# Patient Record
Sex: MALE | Race: OTHER | NOT HISPANIC OR LATINO | URBAN - METROPOLITAN AREA
[De-identification: names, ages, dates, MRNs, and addresses within clinical notes are randomized per-mention and may not be internally consistent; named-entity substitution may affect disease eponyms.]

---

## 2017-04-14 ENCOUNTER — EMERGENCY (EMERGENCY)
Facility: HOSPITAL | Age: 62
LOS: 1 days | Discharge: HOME | End: 2017-04-14
Attending: EMERGENCY MEDICINE
Payer: COMMERCIAL

## 2017-04-14 VITALS
OXYGEN SATURATION: 98 % | HEART RATE: 82 BPM | DIASTOLIC BLOOD PRESSURE: 77 MMHG | HEIGHT: 66 IN | WEIGHT: 179.9 LBS | TEMPERATURE: 98 F | RESPIRATION RATE: 16 BRPM | SYSTOLIC BLOOD PRESSURE: 115 MMHG

## 2017-04-14 DIAGNOSIS — L29.9 PRURITUS, UNSPECIFIED: ICD-10-CM

## 2017-04-14 DIAGNOSIS — L50.9 URTICARIA, UNSPECIFIED: ICD-10-CM

## 2017-04-14 PROCEDURE — 99283 EMERGENCY DEPT VISIT LOW MDM: CPT

## 2017-04-14 RX ORDER — DIPHENHYDRAMINE HCL 50 MG
1 CAPSULE ORAL
Qty: 15 | Refills: 0 | OUTPATIENT
Start: 2017-04-14 | End: 2017-04-19

## 2017-04-14 RX ADMIN — Medication 60 MILLIGRAM(S): at 22:41

## 2017-04-14 NOTE — ED STATDOCS - NS ED MD SCRIBE ATTENDING SCRIBE SECTIONS
HIV/PAST MEDICAL/SURGICAL/SOCIAL HISTORY/PHYSICAL EXAM/DISPOSITION/HISTORY OF PRESENT ILLNESS/REVIEW OF SYSTEMS/VITAL SIGNS( Pullset) DISPOSITION/HISTORY OF PRESENT ILLNESS/INTAKE ASSESSMENT/SCREENINGS/PAST MEDICAL/SURGICAL/SOCIAL HISTORY/PHYSICAL EXAM/REVIEW OF SYSTEMS/HIV/VITAL SIGNS( Pullset)

## 2017-04-14 NOTE — ED STATDOCS - OBJECTIVE STATEMENT
60 y/o M pt presents to ED c/o intermittent diffuse urticaria and itchy welts since yesterday. Pt reports he has been putting calamine lotion on but has not taken any medication. Pt denies fever, nausea, vomiting, difficulty swallowing, CP, and SOB. No further complaints at this time. NKDA.

## 2017-12-12 ENCOUNTER — EMERGENCY (EMERGENCY)
Facility: HOSPITAL | Age: 62
LOS: 1 days | Discharge: HOME | End: 2017-12-12
Attending: EMERGENCY MEDICINE | Admitting: EMERGENCY MEDICINE
Payer: COMMERCIAL

## 2017-12-12 VITALS
HEART RATE: 98 BPM | HEIGHT: 66 IN | SYSTOLIC BLOOD PRESSURE: 142 MMHG | WEIGHT: 175.05 LBS | OXYGEN SATURATION: 97 % | DIASTOLIC BLOOD PRESSURE: 90 MMHG | RESPIRATION RATE: 20 BRPM | TEMPERATURE: 98 F

## 2017-12-12 VITALS
SYSTOLIC BLOOD PRESSURE: 138 MMHG | OXYGEN SATURATION: 98 % | HEART RATE: 90 BPM | DIASTOLIC BLOOD PRESSURE: 88 MMHG | RESPIRATION RATE: 18 BRPM

## 2017-12-12 LAB
ALBUMIN SERPL ELPH-MCNC: 2.1 G/DL — LOW (ref 3.3–5.2)
ALP SERPL-CCNC: 140 U/L — HIGH (ref 40–120)
ALT FLD-CCNC: 34 U/L — SIGNIFICANT CHANGE UP
ANION GAP SERPL CALC-SCNC: 12 MMOL/L — SIGNIFICANT CHANGE UP (ref 5–17)
AST SERPL-CCNC: 51 U/L — HIGH
BASOPHILS # BLD AUTO: 0 K/UL — SIGNIFICANT CHANGE UP (ref 0–0.2)
BASOPHILS NFR BLD AUTO: 0.6 % — SIGNIFICANT CHANGE UP (ref 0–2)
BILIRUB SERPL-MCNC: 0.6 MG/DL — SIGNIFICANT CHANGE UP (ref 0.4–2)
BUN SERPL-MCNC: 28 MG/DL — HIGH (ref 8–20)
CALCIUM SERPL-MCNC: 8.7 MG/DL — SIGNIFICANT CHANGE UP (ref 8.6–10.2)
CHLORIDE SERPL-SCNC: 104 MMOL/L — SIGNIFICANT CHANGE UP (ref 98–107)
CO2 SERPL-SCNC: 22 MMOL/L — SIGNIFICANT CHANGE UP (ref 22–29)
CREAT SERPL-MCNC: 0.83 MG/DL — SIGNIFICANT CHANGE UP (ref 0.5–1.3)
EOSINOPHIL # BLD AUTO: 0.2 K/UL — SIGNIFICANT CHANGE UP (ref 0–0.5)
EOSINOPHIL NFR BLD AUTO: 2.4 % — SIGNIFICANT CHANGE UP (ref 0–5)
GLUCOSE SERPL-MCNC: 118 MG/DL — HIGH (ref 70–115)
HCT VFR BLD CALC: 40.3 % — LOW (ref 42–52)
HGB BLD-MCNC: 14.3 G/DL — SIGNIFICANT CHANGE UP (ref 14–18)
LIDOCAIN IGE QN: 58 U/L — HIGH (ref 22–51)
LYMPHOCYTES # BLD AUTO: 2 K/UL — SIGNIFICANT CHANGE UP (ref 1–4.8)
LYMPHOCYTES # BLD AUTO: 29.5 % — SIGNIFICANT CHANGE UP (ref 20–55)
MCHC RBC-ENTMCNC: 32.3 PG — HIGH (ref 27–31)
MCHC RBC-ENTMCNC: 35.5 G/DL — SIGNIFICANT CHANGE UP (ref 32–36)
MCV RBC AUTO: 91 FL — SIGNIFICANT CHANGE UP (ref 80–94)
MONOCYTES # BLD AUTO: 0.7 K/UL — SIGNIFICANT CHANGE UP (ref 0–0.8)
MONOCYTES NFR BLD AUTO: 9.7 % — SIGNIFICANT CHANGE UP (ref 3–10)
NEUTROPHILS # BLD AUTO: 3.9 K/UL — SIGNIFICANT CHANGE UP (ref 1.8–8)
NEUTROPHILS NFR BLD AUTO: 57.1 % — SIGNIFICANT CHANGE UP (ref 37–73)
OB PNL STL: NEGATIVE — SIGNIFICANT CHANGE UP
PLATELET # BLD AUTO: 243 K/UL — SIGNIFICANT CHANGE UP (ref 150–400)
POTASSIUM SERPL-MCNC: 4.6 MMOL/L — SIGNIFICANT CHANGE UP (ref 3.5–5.3)
POTASSIUM SERPL-SCNC: 4.6 MMOL/L — SIGNIFICANT CHANGE UP (ref 3.5–5.3)
PROT SERPL-MCNC: 5.9 G/DL — LOW (ref 6.6–8.7)
RBC # BLD: 4.43 M/UL — LOW (ref 4.6–6.2)
RBC # FLD: 14.1 % — SIGNIFICANT CHANGE UP (ref 11–15.6)
SODIUM SERPL-SCNC: 138 MMOL/L — SIGNIFICANT CHANGE UP (ref 135–145)
WBC # BLD: 6.8 K/UL — SIGNIFICANT CHANGE UP (ref 4.8–10.8)
WBC # FLD AUTO: 6.8 K/UL — SIGNIFICANT CHANGE UP (ref 4.8–10.8)

## 2017-12-12 PROCEDURE — 36415 COLL VENOUS BLD VENIPUNCTURE: CPT

## 2017-12-12 PROCEDURE — 74177 CT ABD & PELVIS W/CONTRAST: CPT

## 2017-12-12 PROCEDURE — 83690 ASSAY OF LIPASE: CPT

## 2017-12-12 PROCEDURE — 82272 OCCULT BLD FECES 1-3 TESTS: CPT

## 2017-12-12 PROCEDURE — 99284 EMERGENCY DEPT VISIT MOD MDM: CPT

## 2017-12-12 PROCEDURE — 99284 EMERGENCY DEPT VISIT MOD MDM: CPT | Mod: 25

## 2017-12-12 PROCEDURE — 74177 CT ABD & PELVIS W/CONTRAST: CPT | Mod: 26

## 2017-12-12 PROCEDURE — 85027 COMPLETE CBC AUTOMATED: CPT

## 2017-12-12 PROCEDURE — 80053 COMPREHEN METABOLIC PANEL: CPT

## 2017-12-12 NOTE — ED STATDOCS - OBJECTIVE STATEMENT
62 year old male, with hx of DM on Metformin (taken for past couple of months), presenting to the ED complaining of abdominal pain, diarrhea, and hematochezia. Pt states that he has been having approximately 5-7 episodes of diarrhea per day. He states that he has had intermittent hematochezia as well. Pt describes his pain as a "knot in the middle of my stomach" that is intermittent and is exacerbated when he feels a bowel movement coming. He states that his pain lasts for a few minutes when it onsets. He denies having any nausea or vomiting. Pt states that he was previously placed on abx which he completed. He states that he had taken Pepto Bismol for his diarrhea with no relief. Pt denies having abdominal PSHx. He states that he had visited GI Dr. Aden and had a CT scan ordered, but is waiting on the insurance coverage. Pt states that his sx onset after having taken Metformin.  PMD: Dr. Bland

## 2017-12-12 NOTE — ED STATDOCS - ATTENDING CONTRIBUTION TO CARE
I, Shabbir Steen, performed the initial face to face bedside interview with this patient regarding history of present illness, review of symptoms and relevant past medical, social and family history.  I completed an independent physical examination.  I was the provider who initially evaluated this patient.  The history, relevant review of systems, past medical and surgical history, medical decision making, and physical examination was documented by the scribe in my presence and I attest to the accuracy of the documentation. Follow-up on ordered tests (ie labs, radiologic studies) and re-evaluation of the patient's status has been communicated to the ACP.  Disposition of the patient will be based on test outcome and response to ED interventions.

## 2017-12-12 NOTE — ED STATDOCS - ABDOMEN FINDINGS, MLM
TENDER/DISTENDED/Tympanitic to percussion. RECTAL EXAM: Non-thrombosed external hemorrhoids, no masses, no tenderness, and no gross blood.

## 2017-12-12 NOTE — ED STATDOCS - CARE PLAN
Yes Principal Discharge DX:	Left upper quadrant pain  Secondary Diagnosis:	Splenomegaly  Secondary Diagnosis:	Other ascites

## 2017-12-12 NOTE — ED ADULT TRIAGE NOTE - CHIEF COMPLAINT QUOTE
abd pain for a while as per pt. sees Dr Bland. constipated. saw GI Dr last week. Dr Gandara, wait approval for ct scan. some rectal bleed, no vomit

## 2017-12-12 NOTE — ED ADULT NURSE NOTE - OBJECTIVE STATEMENT
Pt c/o abdominal pain, diarrhea since November. Pt states he went to PMD in November and they did bloodwork and was put on ABX. Pt is unsure of the medication or the results of the bloodwork. Pt states that he has had bloody stools intermittently and the pain is becoming increasingly worse.

## 2017-12-12 NOTE — ED STATDOCS - PROGRESS NOTE DETAILS
NP NOTE: Pt seen by intake physician and HPI/ROS/PE/MDM reviewed. Pt seen and evaluated. Discussed plan and any resulted studies at this time. Will continue to monitor and re-evaluate.  Re-Evaluation: with diffuse abd pain, hematochezia. pending labs and ct. pt does not want pain medication at this time. + pleural effusion + ascitics + splenomegaly, occult blood neg.  discussed results with patient who is still refusing pain medication and states he will fu with Dr. Rose (GI) and Dr. Bland (pcp)

## 2018-02-08 ENCOUNTER — INPATIENT (INPATIENT)
Facility: HOSPITAL | Age: 63
LOS: 14 days | Discharge: HOME | DRG: 186 | End: 2018-02-23
Attending: HOSPITALIST | Admitting: HOSPITALIST
Payer: COMMERCIAL

## 2018-02-08 VITALS
OXYGEN SATURATION: 89 % | RESPIRATION RATE: 22 BRPM | HEART RATE: 106 BPM | HEIGHT: 66 IN | WEIGHT: 162.04 LBS | TEMPERATURE: 98 F | DIASTOLIC BLOOD PRESSURE: 81 MMHG | SYSTOLIC BLOOD PRESSURE: 120 MMHG

## 2018-02-08 DIAGNOSIS — J90 PLEURAL EFFUSION, NOT ELSEWHERE CLASSIFIED: ICD-10-CM

## 2018-02-08 DIAGNOSIS — K74.60 UNSPECIFIED CIRRHOSIS OF LIVER: ICD-10-CM

## 2018-02-08 DIAGNOSIS — R60.0 LOCALIZED EDEMA: ICD-10-CM

## 2018-02-08 DIAGNOSIS — E11.65 TYPE 2 DIABETES MELLITUS WITH HYPERGLYCEMIA: ICD-10-CM

## 2018-02-08 DIAGNOSIS — Z29.9 ENCOUNTER FOR PROPHYLACTIC MEASURES, UNSPECIFIED: ICD-10-CM

## 2018-02-08 LAB
ALBUMIN SERPL ELPH-MCNC: 2.6 G/DL — LOW (ref 3.3–5.2)
ALP SERPL-CCNC: 157 U/L — HIGH (ref 40–120)
ALT FLD-CCNC: 19 U/L — SIGNIFICANT CHANGE UP
ANION GAP SERPL CALC-SCNC: 14 MMOL/L — SIGNIFICANT CHANGE UP (ref 5–17)
APTT BLD: 35.4 SEC — SIGNIFICANT CHANGE UP (ref 27.5–37.4)
AST SERPL-CCNC: 40 U/L — HIGH
B PERT IGG+IGM PNL SER: ABNORMAL
BASOPHILS # BLD AUTO: 0 K/UL — SIGNIFICANT CHANGE UP (ref 0–0.2)
BASOPHILS NFR BLD AUTO: 0.2 % — SIGNIFICANT CHANGE UP (ref 0–2)
BILIRUB SERPL-MCNC: 0.8 MG/DL — SIGNIFICANT CHANGE UP (ref 0.4–2)
BLD GP AB SCN SERPL QL: SIGNIFICANT CHANGE UP
BUN SERPL-MCNC: 10 MG/DL — SIGNIFICANT CHANGE UP (ref 8–20)
CALCIUM SERPL-MCNC: 9 MG/DL — SIGNIFICANT CHANGE UP (ref 8.6–10.2)
CHLORIDE SERPL-SCNC: 101 MMOL/L — SIGNIFICANT CHANGE UP (ref 98–107)
CO2 SERPL-SCNC: 24 MMOL/L — SIGNIFICANT CHANGE UP (ref 22–29)
CREAT SERPL-MCNC: 1.15 MG/DL — SIGNIFICANT CHANGE UP (ref 0.5–1.3)
EOSINOPHIL # BLD AUTO: 0.1 K/UL — SIGNIFICANT CHANGE UP (ref 0–0.5)
EOSINOPHIL NFR BLD AUTO: 0.7 % — SIGNIFICANT CHANGE UP (ref 0–5)
FLUID INTAKE SUBSTANCE CLASS: SIGNIFICANT CHANGE UP
GLUCOSE BLDC GLUCOMTR-MCNC: 79 MG/DL — SIGNIFICANT CHANGE UP (ref 70–99)
GLUCOSE SERPL-MCNC: 130 MG/DL — HIGH (ref 70–115)
HCT VFR BLD CALC: 41.9 % — LOW (ref 42–52)
HGB BLD-MCNC: 14.6 G/DL — SIGNIFICANT CHANGE UP (ref 14–18)
INR BLD: 1.17 RATIO — HIGH (ref 0.88–1.16)
LYMPHOCYTES # BLD AUTO: 2 K/UL — SIGNIFICANT CHANGE UP (ref 1–4.8)
LYMPHOCYTES # BLD AUTO: 20.3 % — SIGNIFICANT CHANGE UP (ref 20–55)
MCHC RBC-ENTMCNC: 32.4 PG — HIGH (ref 27–31)
MCHC RBC-ENTMCNC: 34.8 G/DL — SIGNIFICANT CHANGE UP (ref 32–36)
MCV RBC AUTO: 92.9 FL — SIGNIFICANT CHANGE UP (ref 80–94)
MONOCYTES # BLD AUTO: 1 K/UL — HIGH (ref 0–0.8)
MONOCYTES NFR BLD AUTO: 9.9 % — SIGNIFICANT CHANGE UP (ref 3–10)
NEUTROPHILS # BLD AUTO: 6.6 K/UL — SIGNIFICANT CHANGE UP (ref 1.8–8)
NEUTROPHILS NFR BLD AUTO: 68.6 % — SIGNIFICANT CHANGE UP (ref 37–73)
NT-PROBNP SERPL-SCNC: 338 PG/ML — HIGH (ref 0–300)
PH FLD: 8 — SIGNIFICANT CHANGE UP
PLATELET # BLD AUTO: 269 K/UL — SIGNIFICANT CHANGE UP (ref 150–400)
POTASSIUM SERPL-MCNC: 4.5 MMOL/L — SIGNIFICANT CHANGE UP (ref 3.5–5.3)
POTASSIUM SERPL-SCNC: 4.5 MMOL/L — SIGNIFICANT CHANGE UP (ref 3.5–5.3)
PROT SERPL-MCNC: 6.4 G/DL — LOW (ref 6.6–8.7)
PROTHROM AB SERPL-ACNC: 12.9 SEC — HIGH (ref 9.8–12.7)
RBC # BLD: 4.51 M/UL — LOW (ref 4.6–6.2)
RBC # FLD: 15.3 % — SIGNIFICANT CHANGE UP (ref 11–15.6)
RCV VOL RI: 31 /UL — HIGH (ref 0–5)
SODIUM SERPL-SCNC: 139 MMOL/L — SIGNIFICANT CHANGE UP (ref 135–145)
TOTAL NUCLEATED CELL COUNT, BODY FLUID: 104 /UL — HIGH (ref 0–5)
TROPONIN T SERPL-MCNC: <0.01 NG/ML — SIGNIFICANT CHANGE UP (ref 0–0.06)
TUBE TYPE: SIGNIFICANT CHANGE UP
TYPE + AB SCN PNL BLD: SIGNIFICANT CHANGE UP
WBC # BLD: 9.7 K/UL — SIGNIFICANT CHANGE UP (ref 4.8–10.8)
WBC # FLD AUTO: 9.7 K/UL — SIGNIFICANT CHANGE UP (ref 4.8–10.8)

## 2018-02-08 PROCEDURE — 32557 INSERT CATH PLEURA W/ IMAGE: CPT

## 2018-02-08 PROCEDURE — 99223 1ST HOSP IP/OBS HIGH 75: CPT

## 2018-02-08 PROCEDURE — 71260 CT THORAX DX C+: CPT | Mod: 26

## 2018-02-08 PROCEDURE — 99285 EMERGENCY DEPT VISIT HI MDM: CPT | Mod: 25

## 2018-02-08 PROCEDURE — 93306 TTE W/DOPPLER COMPLETE: CPT | Mod: 26

## 2018-02-08 PROCEDURE — 71046 X-RAY EXAM CHEST 2 VIEWS: CPT | Mod: 26

## 2018-02-08 PROCEDURE — 88305 TISSUE EXAM BY PATHOLOGIST: CPT | Mod: 26

## 2018-02-08 PROCEDURE — 88112 CYTOPATH CELL ENHANCE TECH: CPT | Mod: 26

## 2018-02-08 PROCEDURE — 93010 ELECTROCARDIOGRAM REPORT: CPT

## 2018-02-08 PROCEDURE — 71045 X-RAY EXAM CHEST 1 VIEW: CPT | Mod: 26,59

## 2018-02-08 PROCEDURE — 74177 CT ABD & PELVIS W/CONTRAST: CPT | Mod: 26

## 2018-02-08 PROCEDURE — 93970 EXTREMITY STUDY: CPT | Mod: 26

## 2018-02-08 PROCEDURE — 99254 IP/OBS CNSLTJ NEW/EST MOD 60: CPT

## 2018-02-08 RX ORDER — DEXTROSE 50 % IN WATER 50 %
12.5 SYRINGE (ML) INTRAVENOUS ONCE
Qty: 0 | Refills: 0 | Status: DISCONTINUED | OUTPATIENT
Start: 2018-02-08 | End: 2018-02-10

## 2018-02-08 RX ORDER — OXYCODONE HYDROCHLORIDE 5 MG/1
5 TABLET ORAL EVERY 4 HOURS
Qty: 0 | Refills: 0 | Status: DISCONTINUED | OUTPATIENT
Start: 2018-02-08 | End: 2018-02-15

## 2018-02-08 RX ORDER — LIDOCAINE HCL 20 MG/ML
30 VIAL (ML) INJECTION ONCE
Qty: 0 | Refills: 0 | Status: DISCONTINUED | OUTPATIENT
Start: 2018-02-08 | End: 2018-02-23

## 2018-02-08 RX ORDER — ENOXAPARIN SODIUM 100 MG/ML
40 INJECTION SUBCUTANEOUS EVERY 24 HOURS
Qty: 0 | Refills: 0 | Status: DISCONTINUED | OUTPATIENT
Start: 2018-02-08 | End: 2018-02-08

## 2018-02-08 RX ORDER — SODIUM CHLORIDE 9 MG/ML
1000 INJECTION, SOLUTION INTRAVENOUS
Qty: 0 | Refills: 0 | Status: DISCONTINUED | OUTPATIENT
Start: 2018-02-08 | End: 2018-02-10

## 2018-02-08 RX ORDER — SPIRONOLACTONE 25 MG/1
50 TABLET, FILM COATED ORAL
Qty: 0 | Refills: 0 | Status: DISCONTINUED | OUTPATIENT
Start: 2018-02-08 | End: 2018-02-23

## 2018-02-08 RX ORDER — DEXTROSE 50 % IN WATER 50 %
25 SYRINGE (ML) INTRAVENOUS ONCE
Qty: 0 | Refills: 0 | Status: DISCONTINUED | OUTPATIENT
Start: 2018-02-08 | End: 2018-02-10

## 2018-02-08 RX ORDER — INSULIN LISPRO 100/ML
VIAL (ML) SUBCUTANEOUS
Qty: 0 | Refills: 0 | Status: DISCONTINUED | OUTPATIENT
Start: 2018-02-08 | End: 2018-02-10

## 2018-02-08 RX ORDER — INSULIN LISPRO 100/ML
VIAL (ML) SUBCUTANEOUS AT BEDTIME
Qty: 0 | Refills: 0 | Status: DISCONTINUED | OUTPATIENT
Start: 2018-02-08 | End: 2018-02-10

## 2018-02-08 RX ORDER — DEXTROSE 50 % IN WATER 50 %
1 SYRINGE (ML) INTRAVENOUS ONCE
Qty: 0 | Refills: 0 | Status: DISCONTINUED | OUTPATIENT
Start: 2018-02-08 | End: 2018-02-10

## 2018-02-08 RX ORDER — GLUCAGON INJECTION, SOLUTION 0.5 MG/.1ML
1 INJECTION, SOLUTION SUBCUTANEOUS ONCE
Qty: 0 | Refills: 0 | Status: DISCONTINUED | OUTPATIENT
Start: 2018-02-08 | End: 2018-02-10

## 2018-02-08 RX ORDER — METRONIDAZOLE 500 MG
500 TABLET ORAL THREE TIMES A DAY
Qty: 0 | Refills: 0 | Status: COMPLETED | OUTPATIENT
Start: 2018-02-08 | End: 2018-02-10

## 2018-02-08 RX ORDER — HYDROMORPHONE HYDROCHLORIDE 2 MG/ML
0.5 INJECTION INTRAMUSCULAR; INTRAVENOUS; SUBCUTANEOUS ONCE
Qty: 0 | Refills: 0 | Status: DISCONTINUED | OUTPATIENT
Start: 2018-02-08 | End: 2018-02-08

## 2018-02-08 RX ORDER — ENOXAPARIN SODIUM 100 MG/ML
70 INJECTION SUBCUTANEOUS EVERY 12 HOURS
Qty: 0 | Refills: 0 | Status: DISCONTINUED | OUTPATIENT
Start: 2018-02-08 | End: 2018-02-13

## 2018-02-08 RX ADMIN — ENOXAPARIN SODIUM 70 MILLIGRAM(S): 100 INJECTION SUBCUTANEOUS at 22:55

## 2018-02-08 RX ADMIN — HYDROMORPHONE HYDROCHLORIDE 0.5 MILLIGRAM(S): 2 INJECTION INTRAMUSCULAR; INTRAVENOUS; SUBCUTANEOUS at 23:30

## 2018-02-08 RX ADMIN — OXYCODONE HYDROCHLORIDE 5 MILLIGRAM(S): 5 TABLET ORAL at 22:55

## 2018-02-08 RX ADMIN — Medication 1 TABLET(S): at 12:13

## 2018-02-08 RX ADMIN — OXYCODONE HYDROCHLORIDE 5 MILLIGRAM(S): 5 TABLET ORAL at 22:25

## 2018-02-08 RX ADMIN — Medication 500 MILLIGRAM(S): at 22:13

## 2018-02-08 RX ADMIN — ENOXAPARIN SODIUM 40 MILLIGRAM(S): 100 INJECTION SUBCUTANEOUS at 12:13

## 2018-02-08 NOTE — ED ADULT NURSE REASSESSMENT NOTE - NS ED NURSE REASSESS COMMENT FT1
pt in no apparent distress, resting comfortably in bed, plan of care explained, pt verbalized understanding.

## 2018-02-08 NOTE — PROCEDURE NOTE - NSPROCDETAILS_GEN_ALL_CORE
ultrasound assessment of fluid (location)/Seldinger technique/secured in place/sterile dressing applied/ultrasound assessment of fluid (size)

## 2018-02-08 NOTE — CONSULT NOTE ADULT - SUBJECTIVE AND OBJECTIVE BOX
HPI: Patient      PAST MEDICAL & SURGICAL HISTORY:  DM (diabetes mellitus)  No significant past surgical history      ROS:  No Heartburn, regurgitation, dysphagia, odynophagia.  No dyspepsia  No abdominal pain.    No Nausea, vomiting.  No Bleeding.  No hematemesis.   No diarrhea.    No hematochesia.  No weight loss, anorexia.  No edema.      MEDICATIONS  (STANDING):  dextrose 5%. 1000 milliLiter(s) (50 mL/Hr) IV Continuous <Continuous>  dextrose 50% Injectable 12.5 Gram(s) IV Push once  dextrose 50% Injectable 25 Gram(s) IV Push once  dextrose 50% Injectable 25 Gram(s) IV Push once  enoxaparin Injectable 40 milliGRAM(s) SubCutaneous every 24 hours  insulin lispro (HumaLOG) corrective regimen sliding scale   SubCutaneous three times a day before meals  insulin lispro (HumaLOG) corrective regimen sliding scale   SubCutaneous at bedtime  multivitamin 1 Tablet(s) Oral daily    MEDICATIONS  (PRN):  dextrose Gel 1 Dose(s) Oral once PRN Blood Glucose LESS THAN 70 milliGRAM(s)/deciliter  glucagon  Injectable 1 milliGRAM(s) IntraMuscular once PRN Glucose LESS THAN 70 milligrams/deciliter      Allergies    No Known Allergies    Intolerances        SOCIAL HISTORY:Ex smoker and ex alcohol drinker. No drugs    FAMILY HISTORY: No liver disease      Vital Signs Last 24 Hrs  T(C): 36.5 (08 Feb 2018 11:59), Max: 36.5 (08 Feb 2018 07:53)  T(F): 97.7 (08 Feb 2018 11:59), Max: 97.7 (08 Feb 2018 07:53)  HR: 110 (08 Feb 2018 11:59) (106 - 110)  BP: 149/100 (08 Feb 2018 11:59) (120/81 - 149/100)  BP(mean): --  RR: 24 (08 Feb 2018 11:59) (22 - 24)  SpO2: 95% (08 Feb 2018 11:59) (89% - 95%)    PHYSICAL EXAM:    GENERAL: NAD, well-groomed, well-developed  HEAD:  Atraumatic, Normocephalic  EYES: EOMI, PERRLA, conjunctiva and sclera clear  ENMT: No tonsillar erythema, exudates, or enlargement; Moist mucous membranes, Good dentition, No lesions  NECK: Supple, No JVD, Normal thyroid  CHEST/LUNG: Clear to percussion bilaterally; No rales, rhonchi, wheezing, or rubs  HEART: Regular rate and rhythm; No murmurs, rubs, or gallops  ABDOMEN: Soft, Nontender, Nondistended; Bowel sounds present  EXTREMITIES:  2+ Peripheral Pulses, No clubbing, cyanosis, or edema  LYMPH: No lymphadenopathy noted  SKIN: No rashes or lesions      LABS:                        14.6   9.7   )-----------( 269      ( 08 Feb 2018 09:49 )             41.9     02-08    139  |  101  |  10.0  ----------------------------<  130<H>  4.5   |  24.0  |  1.15    Ca    9.0      08 Feb 2018 09:49    TPro  6.4<L>  /  Alb  2.6<L>  /  TBili  0.8  /  DBili  x   /  AST  40<H>  /  ALT  19  /  AlkPhos  157<H>  02-08    PT/INR - ( 08 Feb 2018 09:49 )   PT: 12.9 sec;   INR: 1.17 ratio         PTT - ( 08 Feb 2018 09:49 )  PTT:35.4 sec       LIVER FUNCTIONS - ( 08 Feb 2018 09:49 )  Alb: 2.6 g/dL / Pro: 6.4 g/dL / ALK PHOS: 157 U/L / ALT: 19 U/L / AST: 40 U/L / GGT: x           < from: CT Abdomen and Pelvis w/ Oral Cont and w/ IV Cont (12.12.17 @ 14:41) >  INTERPRETATION:  Clinical: Abdominal pain and rectal bleeding.    Technique: CT tomographic sections of the abdomen were obtained and axial   views followed by MIP parasagittal and coronal reconstructions.  Oral and   intravenous contrast were administered with no immediate adverse   reaction. Patient was injected with 95 cc of intravenous Omnipaque 300.     Comparisons: none    Findings: Ascites in the upper abdomen. Large right pleural effusion.     Atelectasis right lower lobe.  The liver is homogeneous in density and   normal in size. The spleen is mildly enlarged measuring 14 cm in length.   There are no focal masses or cysts.  The gallbladder is normal..  There   is no evidence of intrahepatic biliary dilatation. The pancreas and   adrenal glands are normal. There is no evidence of retroperitoneal   lymphadenopathy. The kidneys are normal in size,  shape and density.   There is no evidence of obstructive uropathy or renal mass. Both kidneys   are excreting contrast normally. The aorta is normal in caliber and   contour. No evidence of an aneurysm.     The bowel and mesentery is normal.  The terminal ileum and appendix   appear unremarkable..  There are no extracolonic fluid collections or   inflammatory changes.  Bone window examination demonstrates mild   degenerative changes consistent with age..  No evidence of osteolytic or   osteoblastic bone disease.     The prostate gland is normal in size, shape and density.. The inguinal   areas are normal..  No evidence of pelvic lymphadenopathy.   Urinary   bladder is normal in wall thickness, contour and density. Pelvic   musculature appears symmetrical.    Impression: Ascites.    Large right pleural effusion. Atelectasis right lower lobe    Splenomegaly    < end of copied text >      RADIOLOGY & ADDITIONAL STUDIES: HPI: 58 yo M with h/o ETOH cirrhosis, c diff (on abx therapy), DM presents from home with worsening shortness of breath. Pt states that in December 2017, he had noticed dyspnea on exertion. He was evaluated by his GI-Dr Marce but had trouble getting CT abdomen. Pt presented to [facility] ED, CT abdomen at the time showed ascites, large right pleural effusion, atelectasis right lower lobe, and splenomegaly. Pt declined further workup at [facility] and had followed up with his primary GI-Dr Marce. Pt subsequently was hospitalized at [facility] for similar symptoms in January where he had 2 thoracentesis of the right pleural effusion (each time draining approximately 2L). Pt was then transferred to [facility] for a possible TIPS procedure. However, he states that the doctors there had felt that his pleural effusions were not due to his cirrhosis, and that there may have been an additional disease process occurring. Pt had improvement in dyspnea and was discharged home. One month later, he noticed similar symptoms of NGUYEN with minimal ambulation. He was on spironolactone and lasix. He had finished lasix about a week ago. No fevers or chills. No worsening abdominal distension or pain. He had C difficile infection in [place] and is still complaining of diarrhea.     PAST MEDICAL & SURGICAL HISTORY:  DM (diabetes mellitus)  Pleural effusion  ?Cirrhosis      ROS:  No Heartburn, regurgitation, dysphagia, odynophagia.  No dyspepsia  No abdominal pain.    No Nausea, vomiting.  No Bleeding.  No hematemesis.   +diarrhea.    No hematochezia  No weight loss, anorexia.  + edema.      MEDICATIONS  (STANDING):  dextrose 5%. 1000 milliLiter(s) (50 mL/Hr) IV Continuous <Continuous>  dextrose 50% Injectable 12.5 Gram(s) IV Push once  dextrose 50% Injectable 25 Gram(s) IV Push once  dextrose 50% Injectable 25 Gram(s) IV Push once  enoxaparin Injectable 40 milliGRAM(s) SubCutaneous every 24 hours  insulin lispro (HumaLOG) corrective regimen sliding scale   SubCutaneous three times a day before meals  insulin lispro (HumaLOG) corrective regimen sliding scale   SubCutaneous at bedtime  multivitamin 1 Tablet(s) Oral daily    MEDICATIONS  (PRN):  dextrose Gel 1 Dose(s) Oral once PRN Blood Glucose LESS THAN 70 milliGRAM(s)/deciliter  glucagon  Injectable 1 milliGRAM(s) IntraMuscular once PRN Glucose LESS THAN 70 milligrams/deciliter      Allergies    No Known Allergies    Intolerances        SOCIAL HISTORY:Ex smoker and ex alcohol drinker. No drugs    FAMILY HISTORY: No liver disease      Vital Signs Last 24 Hrs  T(C): 36.5 (08 Feb 2018 11:59), Max: 36.5 (08 Feb 2018 07:53)  T(F): 97.7 (08 Feb 2018 11:59), Max: 97.7 (08 Feb 2018 07:53)  HR: 110 (08 Feb 2018 11:59) (106 - 110)  BP: 149/100 (08 Feb 2018 11:59) (120/81 - 149/100)  BP(mean): --  RR: 24 (08 Feb 2018 11:59) (22 - 24)  SpO2: 95% (08 Feb 2018 11:59) (89% - 95%)    PHYSICAL EXAM:    GENERAL: NAD, well-groomed, well-developed  HEAD:  Atraumatic, Normocephalic  EYES: EOMI, PERRLA, conjunctiva and sclera clear  ENMT: No tonsillar erythema, exudates, or enlargement; Moist mucous membranes, Good dentition, No lesions  NECK: Supple, No JVD, Normal thyroid  CHEST/LUNG: Decreased breath sounds on right side; No rales, rhonchi, wheezing, or rubs  HEART: Regular rate and rhythm; No murmurs, rubs, or gallops  ABDOMEN: Soft, Nontender, Nondistended; Bowel sounds present  EXTREMITIES:  2+ Peripheral Pulses, No clubbing, cyanosis, 2+ edema  LYMPH: No lymphadenopathy noted  SKIN: No rashes or lesions      LABS:                        14.6   9.7   )-----------( 269      ( 08 Feb 2018 09:49 )             41.9     02-08    139  |  101  |  10.0  ----------------------------<  130<H>  4.5   |  24.0  |  1.15    Ca    9.0      08 Feb 2018 09:49    TPro  6.4<L>  /  Alb  2.6<L>  /  TBili  0.8  /  DBili  x   /  AST  40<H>  /  ALT  19  /  AlkPhos  157<H>  02-08    PT/INR - ( 08 Feb 2018 09:49 )   PT: 12.9 sec;   INR: 1.17 ratio         PTT - ( 08 Feb 2018 09:49 )  PTT:35.4 sec       LIVER FUNCTIONS - ( 08 Feb 2018 09:49 )  Alb: 2.6 g/dL / Pro: 6.4 g/dL / ALK PHOS: 157 U/L / ALT: 19 U/L / AST: 40 U/L / GGT: x           < from: CT Abdomen and Pelvis w/ Oral Cont and w/ IV Cont (12.12.17 @ 14:41) >  INTERPRETATION:  Clinical: Abdominal pain and rectal bleeding.    Technique: CT tomographic sections of the abdomen were obtained and axial   views followed by MIP parasagittal and coronal reconstructions.  Oral and   intravenous contrast were administered with no immediate adverse   reaction. Patient was injected with 95 cc of intravenous Omnipaque 300.     Comparisons: none    Findings: Ascites in the upper abdomen. Large right pleural effusion.     Atelectasis right lower lobe.  The liver is homogeneous in density and   normal in size. The spleen is mildly enlarged measuring 14 cm in length.   There are no focal masses or cysts.  The gallbladder is normal..  There   is no evidence of intrahepatic biliary dilatation. The pancreas and   adrenal glands are normal. There is no evidence of retroperitoneal   lymphadenopathy. The kidneys are normal in size,  shape and density.   There is no evidence of obstructive uropathy or renal mass. Both kidneys   are excreting contrast normally. The aorta is normal in caliber and   contour. No evidence of an aneurysm.     The bowel and mesentery is normal.  The terminal ileum and appendix   appear unremarkable..  There are no extracolonic fluid collections or   inflammatory changes.  Bone window examination demonstrates mild   degenerative changes consistent with age..  No evidence of osteolytic or   osteoblastic bone disease.     The prostate gland is normal in size, shape and density.. The inguinal   areas are normal..  No evidence of pelvic lymphadenopathy.   Urinary   bladder is normal in wall thickness, contour and density. Pelvic   musculature appears symmetrical.    Impression: Ascites.    Large right pleural effusion. Atelectasis right lower lobe    Splenomegaly    < end of copied text >      RADIOLOGY & ADDITIONAL STUDIES:

## 2018-02-08 NOTE — ED PROVIDER NOTE - OBJECTIVE STATEMENT
62 year old male with a h/o alcohol abuse presents with sob. Pt states that he had a similar  episode 2 months and was found to have ascites and a large pleural effusion and had a pleurodesis at [facility]. Pt was eventually transferred to [facility] for a TIPS procedure which was not done and he improved enough to be discharged

## 2018-02-08 NOTE — ED PROVIDER NOTE - TEMPLATE
Call regarding: pt mom calling to state that the cream that was prescribed for the pt last week is $75 after insurance, too expensive for pt, is there an alternative or generic that can be called in instead?    Okay to leave detailed voice mail?  Yes         Respiratory

## 2018-02-08 NOTE — H&P ADULT - ASSESSMENT
60 yo M with h/o ETOH cirrhosis, c diff (on abx therapy), DM here with large right pleural effusion.

## 2018-02-08 NOTE — ED ADULT NURSE NOTE - OBJECTIVE STATEMENT
pt AOX4 c/o chest pressure, difficulty breathing, pt was diagnosed with fluid in chest cavity in December 2017, pt had 4L removed total since december from chest cavity, pt unsure what is causing fluid buildup in chest. pt also c/o Nausea, diarrhea. Pt denies any syncope, vomiting, fevers.

## 2018-02-08 NOTE — CONSULT NOTE ADULT - ATTENDING COMMENTS
Pt seen and examined.  Imaging reviewed.  Large right effusion with mild ascites.  H/O cirrhosis.  Will place drain for now but no good options for effusion going forward.

## 2018-02-08 NOTE — PROCEDURE NOTE - ADDITIONAL PROCEDURE DETAILS
Pigtail paced as discussed with Dr Saez. 2200cc initial output then tube clamped and pleurevac changed with instructions to unclamp after 1 hour. CXR pending. Pt's severe respiratory distress (tachypnea, accessory muscle use) markedly improved. CXR pending.

## 2018-02-08 NOTE — ED ADULT NURSE REASSESSMENT NOTE - NS ED NURSE REASSESS COMMENT FT1
pt status unchanged, refer to flowsheet and chart, pt safety maintained, pt hemodynamically stable, report given to OVIDIO Navarrete on 2 Bracket

## 2018-02-08 NOTE — H&P ADULT - HISTORY OF PRESENT ILLNESS
60 yo M with h/o ETOH cirrhosis, c diff (on abx therapy), DM presents from home with worsening shortness of breath. Pt states that in December 2017, he had noticed dyspnea on exertion. Pt presented to [facility] ED, CT abdomen at the time showed ascites, large right pleural effusion, atelectasis right lower lobe, and splenomegaly. Pt declined further workup at [facility] and had followed up with his primary GI. Pt subsequently was hospitalized at [facility] for similar symptoms in January where he had 2 thoracentesis of the right pleural effusion (each time draining approximately 2L). Pt was then transferred to [facility] for a possible TIPS procedure. However, he states that the doctors there had felt that his pleural effusions were not due to his cirrhosis, and that there may have been an additional disease process occurring. Pt had improvement in dyspnea and was discharged home. One month later, he noticed similar symptoms of NGUYEN with minimal ambulation. No fevers or chills. No worsening abdominal distension or pain.

## 2018-02-08 NOTE — H&P ADULT - PROBLEM SELECTOR PLAN 1
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

## 2018-02-08 NOTE — CONSULT NOTE ADULT - ASSESSMENT
Patient with recurrent right sided pleural effusion, ascites, splenomegaly, C difficile infection    1. Obtain US abdomen  2. IR consult for thoracocentesis  3. Check C difficile  4. Liver disease work up Patient with recurrent right sided pleural effusion, ascites, splenomegaly, C difficile infection    1. Obtain US abdomen  2. IR consult for thoracocentesis  3. Check C difficile  4. Liver disease work up has been performed at [facility]. As per patient there is no history of any hepatitis B or C. Once stable, can follow up as outpatient with his primary GI Dr Zheng

## 2018-02-08 NOTE — CONSULT NOTE ADULT - SUBJECTIVE AND OBJECTIVE BOX
Surgeon: Nadja  Consult requesting by: ED  outpt GI: Dr. Fay ([facility])    HISTORY OF PRESENT ILLNESS:  62M presents with few day h/o SOB which came on acutely, now increasing in severity including with minimal activity, unable to lay flat.  PA/Lat in ED with large R pleural effusion. Spo2 89% on RA at rest. Thoracic surgery asked to intervene.  Pt states he has been having diarrhea x several months.  He was seen by outpt GI doctor and had a delay in obtaining a CT scan so he presented to [facility] ED 12/12/17 and had a CT abd which showed a right pleural effusion, and ascites.  Pt took that CT report to his outpt GI doctor who had pt admitted immediately to [facility].  Pt states he was told he probably had cirrhosis given his drinking hx. He states during his admission to [facility], he had two chest tubes inserted in the right chest, both times drained approximately 2L.  Pt denies ever having surgery or medication (pleurodesis) inserted into the tubes during that admission. From [facility] he was transferred to [facility] for possible TIPS procedure. However, due to nl LFTs pt was deemed not a candidate (per pt) and that [facility] GI doctors felt pt's liver was not the source of his fluid accumulation.  Pt states he thinks he had an Echo at [facility] which was "normal".  Pt developed C diff during his hospital course and was discharged on PO antibiotics.  Pt states he continues to have diarrhea, but at this time his main complaint is SOB.  SOB is associated with chest pressure to mid anterior chest. Pt denies fevers, chills, cough, palpitations, dizziness, N/V.     PAST MEDICAL & SURGICAL HISTORY:  DM (diabetes mellitus)  No significant past surgical history    MEDICATIONS  (STANDING):  dextrose 5%. 1000 milliLiter(s) (50 mL/Hr) IV Continuous <Continuous>  dextrose 50% Injectable 12.5 Gram(s) IV Push once  dextrose 50% Injectable 25 Gram(s) IV Push once  dextrose 50% Injectable 25 Gram(s) IV Push once  enoxaparin Injectable 40 milliGRAM(s) SubCutaneous every 24 hours  insulin lispro (HumaLOG) corrective regimen sliding scale   SubCutaneous three times a day before meals  insulin lispro (HumaLOG) corrective regimen sliding scale   SubCutaneous at bedtime  multivitamin 1 Tablet(s) Oral daily    MEDICATIONS  (PRN):  dextrose Gel 1 Dose(s) Oral once PRN Blood Glucose LESS THAN 70 milliGRAM(s)/deciliter  glucagon  Injectable 1 milliGRAM(s) IntraMuscular once PRN Glucose LESS THAN 70 milligrams/deciliter    Allergies: No Known Allergies    SOCIAL HISTORY:  Smoker: former 1ppd x 10 years quit 10 years ago  ETOH use: + ETOH abuse, former, 3drinks per day (mixed drinks/beer), last drink 3/2017  [employer] Drug use: denies  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[employer]  Live with: wife in house    FAMILY HISTORY:  non contributory     Review of Systems  CONSTITUTIONAL:  Fevers[ ] chills[ ] sweats[ ] fatigue[ ] weight loss[ ] weight gain [ ]      NEGATIVE [X]   NEURO:  parathesias[ ] seizures [ ]  syncope [ ]  confusion [ ]                                        NEGATIVE[X]   EYES: glasses[X]  blurry vision[ ]  discharge[ ] pain[ ] glaucoma [ ]                                                                   ENMT:  difficulty hearing [ ]  vertigo[ ]  dysphagia[ ] epistaxis[ ] recent dental work [ ]     NEGATIVE[X]    CV:  chest pain[ ] palpitations[ ] NGUYEN [ ] diaphoresis [ ]    + chest pressure  RESPIRATORY:  wheezing[ ] SOB[ ] cough [ ] sputum[ ] hemoptysis[ ]                              NEGATIVE[X]   GI:  nausea[ ]  vomiting [ ]  diarrhea[X] constipation [ ] melena [ ]                                                                      : hematuria[ ]  dysuria[ ] urgency[ ] incontinence[ ]                                                    NEGATIVE[X]   MUSKULOSKELETAL:  arthritis[ ]  joint swelling [ ] muscle weakness [ ]                             NEGATIVE[X]   SKIN/BREAST:  rash[ ] itching [ ]  hair loss[ ] masses[ ]                                                    NEGATIVE[X]   PSYCH:  dementia [ ] depression [ ] anxiety[ ]                                                                 NEGATIVE[X]   HEME/LYMPH:  bruises easily[ ] enlarged lymph nodes[ ] tender lymph nodes[ ]               NEGATIVE[X]   ENDOCRINE:  cold intolerance[ ] heat intolerance[ ] polydipsia[ ]                                     NEGATIVE[X]     PHYSICAL EXAM  Vital Signs Last 24 Hrs  T(C): 36.5 (08 Feb 2018 11:59), Max: 36.5 (08 Feb 2018 07:53)  T(F): 97.7 (08 Feb 2018 11:59), Max: 97.7 (08 Feb 2018 07:53)  HR: 94 (08 Feb 2018 13:34) (94 - 110)  BP: 136/90 (08 Feb 2018 13:34) (120/81 - 149/100)  BP(mean): --  RR: 20 (08 Feb 2018 13:34) (20 - 24)  SpO2: 98% (08 Feb 2018 13:34) (89% - 98%)    CONSTITUTIONAL:                                                                           Neuro: A&Ox3  Neck: +JVD, muscle wasting of supraclavicular space   CV: S1S2 RRR  Pulm: clear on left, no breath sounds half way up R chest posteriorly, decreased breath sounds anterior R chest  GI: + BS soft NT mild dist (pt states nl for him)  Ext: 2+ b/l LE edema, WWP                                                          LABS:                        14.6   9.7   )-----------( 269      ( 08 Feb 2018 09:49 )             41.9     02-08    139  |  101  |  10.0  ----------------------------<  130<H>  4.5   |  24.0  |  1.15    Ca    9.0      08 Feb 2018 09:49  TPro  6.4<L>  /  Alb  2.6<L>  /  TBili  0.8  /  DBili  x   /  AST  40<H>  /  ALT  19  /  AlkPhos  157<H>  02-08  PT/INR - ( 08 Feb 2018 09:49 )   PT: 12.9 sec;   INR: 1.17 ratio    PTT - ( 08 Feb 2018 09:49 )  PTT:35.4 sec  CARDIAC MARKERS ( 08 Feb 2018 09:49 )  x     / <0.01 ng/mL / x     / x     / x      Serum Pro-Brain Natriuretic Peptide: 338 pg/mL (02-08-18 @ 09:49)    CXR:     Support Devices: None  Heart: Normal in size  Mediastinum: Normal in contour  Lungs/Airways: Large right pleural effusion  Musculoskeletal: Normal

## 2018-02-08 NOTE — CHART NOTE - NSCHARTNOTEFT_GEN_A_CORE
Called by radiologist Dr Camarena CT of chest/abd/pelvis positive for Left distal pulmonary artery PE; also right pleural effusion.  D/W Dr. Ingram, lovenox 40mg subcutaneously d/c'd and lovenox 70mg subcutaneously Q12H ordered  RN made aware of change in orders and test results

## 2018-02-09 DIAGNOSIS — K74.60 UNSPECIFIED CIRRHOSIS OF LIVER: ICD-10-CM

## 2018-02-09 DIAGNOSIS — R19.7 DIARRHEA, UNSPECIFIED: ICD-10-CM

## 2018-02-09 DIAGNOSIS — I26.09 OTHER PULMONARY EMBOLISM WITH ACUTE COR PULMONALE: ICD-10-CM

## 2018-02-09 DIAGNOSIS — K74.69 OTHER CIRRHOSIS OF LIVER: ICD-10-CM

## 2018-02-09 LAB
ALBUMIN SERPL ELPH-MCNC: 1.9 G/DL — LOW (ref 3.3–5.2)
ALP SERPL-CCNC: 124 U/L — HIGH (ref 40–120)
ALT FLD-CCNC: 15 U/L — SIGNIFICANT CHANGE UP
ANION GAP SERPL CALC-SCNC: 12 MMOL/L — SIGNIFICANT CHANGE UP (ref 5–17)
AST SERPL-CCNC: 29 U/L — SIGNIFICANT CHANGE UP
BASOPHILS # BLD AUTO: 0 K/UL — SIGNIFICANT CHANGE UP (ref 0–0.2)
BASOPHILS NFR BLD AUTO: 0.3 % — SIGNIFICANT CHANGE UP (ref 0–2)
BILIRUB SERPL-MCNC: 0.5 MG/DL — SIGNIFICANT CHANGE UP (ref 0.4–2)
BUN SERPL-MCNC: 15 MG/DL — SIGNIFICANT CHANGE UP (ref 8–20)
CALCIUM SERPL-MCNC: 8 MG/DL — LOW (ref 8.6–10.2)
CHLORIDE SERPL-SCNC: 104 MMOL/L — SIGNIFICANT CHANGE UP (ref 98–107)
CK SERPL-CCNC: 111 U/L — SIGNIFICANT CHANGE UP (ref 30–200)
CO2 SERPL-SCNC: 23 MMOL/L — SIGNIFICANT CHANGE UP (ref 22–29)
COLOR FLD: YELLOW
CREAT SERPL-MCNC: 1.22 MG/DL — SIGNIFICANT CHANGE UP (ref 0.5–1.3)
EOSINOPHIL # BLD AUTO: 0.1 K/UL — SIGNIFICANT CHANGE UP (ref 0–0.5)
EOSINOPHIL NFR BLD AUTO: 0.6 % — SIGNIFICANT CHANGE UP (ref 0–5)
FLUID SEGMENTED GRANULOCYTES: 6 % — SIGNIFICANT CHANGE UP
GLUCOSE BLDC GLUCOMTR-MCNC: 130 MG/DL — HIGH (ref 70–99)
GLUCOSE BLDC GLUCOMTR-MCNC: 153 MG/DL — HIGH (ref 70–99)
GLUCOSE BLDC GLUCOMTR-MCNC: 155 MG/DL — HIGH (ref 70–99)
GLUCOSE BLDC GLUCOMTR-MCNC: 95 MG/DL — SIGNIFICANT CHANGE UP (ref 70–99)
GLUCOSE SERPL-MCNC: 135 MG/DL — HIGH (ref 70–115)
GRAM STN FLD: SIGNIFICANT CHANGE UP
HBA1C BLD-MCNC: 5.6 % — SIGNIFICANT CHANGE UP (ref 4–5.6)
HCT VFR BLD CALC: 35.2 % — LOW (ref 42–52)
HGB BLD-MCNC: 12.2 G/DL — LOW (ref 14–18)
INR BLD: 1.4 RATIO — HIGH (ref 0.88–1.16)
LYMPHOCYTES # BLD AUTO: 19.6 % — LOW (ref 20–55)
LYMPHOCYTES # BLD AUTO: 2 K/UL — SIGNIFICANT CHANGE UP (ref 1–4.8)
LYMPHOCYTES # FLD: 42 % — SIGNIFICANT CHANGE UP
MAGNESIUM SERPL-MCNC: 1.5 MG/DL — LOW (ref 1.6–2.6)
MCHC RBC-ENTMCNC: 32.2 PG — HIGH (ref 27–31)
MCHC RBC-ENTMCNC: 34.7 G/DL — SIGNIFICANT CHANGE UP (ref 32–36)
MCV RBC AUTO: 92.9 FL — SIGNIFICANT CHANGE UP (ref 80–94)
MESOTHL CELL # FLD: 34 % — SIGNIFICANT CHANGE UP
MONOCYTES # BLD AUTO: 1.1 K/UL — HIGH (ref 0–0.8)
MONOCYTES NFR BLD AUTO: 10.8 % — HIGH (ref 3–10)
MONOS+MACROS # FLD: 18 % — SIGNIFICANT CHANGE UP
NEUTROPHILS # BLD AUTO: 7 K/UL — SIGNIFICANT CHANGE UP (ref 1.8–8)
NEUTROPHILS NFR BLD AUTO: 68.5 % — SIGNIFICANT CHANGE UP (ref 37–73)
OTHER CELLS FLD MANUAL: 0 % — SIGNIFICANT CHANGE UP
PHOSPHATE SERPL-MCNC: 3.5 MG/DL — SIGNIFICANT CHANGE UP (ref 2.4–4.7)
PLATELET # BLD AUTO: 210 K/UL — SIGNIFICANT CHANGE UP (ref 150–400)
POTASSIUM SERPL-MCNC: 4.3 MMOL/L — SIGNIFICANT CHANGE UP (ref 3.5–5.3)
POTASSIUM SERPL-SCNC: 4.3 MMOL/L — SIGNIFICANT CHANGE UP (ref 3.5–5.3)
PROT SERPL-MCNC: 4.9 G/DL — LOW (ref 6.6–8.7)
PROTHROM AB SERPL-ACNC: 15.5 SEC — HIGH (ref 9.8–12.7)
RBC # BLD: 3.79 M/UL — LOW (ref 4.6–6.2)
RBC # FLD: 15.2 % — SIGNIFICANT CHANGE UP (ref 11–15.6)
SODIUM SERPL-SCNC: 139 MMOL/L — SIGNIFICANT CHANGE UP (ref 135–145)
SPECIMEN SOURCE: SIGNIFICANT CHANGE UP
TROPONIN T SERPL-MCNC: 0.02 NG/ML — SIGNIFICANT CHANGE UP (ref 0–0.06)
WBC # BLD: 10.2 K/UL — SIGNIFICANT CHANGE UP (ref 4.8–10.8)
WBC # FLD AUTO: 10.2 K/UL — SIGNIFICANT CHANGE UP (ref 4.8–10.8)

## 2018-02-09 PROCEDURE — 93306 TTE W/DOPPLER COMPLETE: CPT | Mod: 26

## 2018-02-09 PROCEDURE — 99232 SBSQ HOSP IP/OBS MODERATE 35: CPT

## 2018-02-09 PROCEDURE — 99221 1ST HOSP IP/OBS SF/LOW 40: CPT

## 2018-02-09 PROCEDURE — 71045 X-RAY EXAM CHEST 1 VIEW: CPT | Mod: 26

## 2018-02-09 PROCEDURE — 99233 SBSQ HOSP IP/OBS HIGH 50: CPT

## 2018-02-09 RX ORDER — MAGNESIUM SULFATE 500 MG/ML
2 VIAL (ML) INJECTION ONCE
Qty: 0 | Refills: 0 | Status: COMPLETED | OUTPATIENT
Start: 2018-02-09 | End: 2018-02-09

## 2018-02-09 RX ORDER — FUROSEMIDE 40 MG
40 TABLET ORAL DAILY
Qty: 0 | Refills: 0 | Status: DISCONTINUED | OUTPATIENT
Start: 2018-02-09 | End: 2018-02-21

## 2018-02-09 RX ADMIN — OXYCODONE HYDROCHLORIDE 5 MILLIGRAM(S): 5 TABLET ORAL at 21:32

## 2018-02-09 RX ADMIN — OXYCODONE HYDROCHLORIDE 5 MILLIGRAM(S): 5 TABLET ORAL at 11:03

## 2018-02-09 RX ADMIN — ENOXAPARIN SODIUM 70 MILLIGRAM(S): 100 INJECTION SUBCUTANEOUS at 21:01

## 2018-02-09 RX ADMIN — Medication 500 MILLIGRAM(S): at 06:01

## 2018-02-09 RX ADMIN — OXYCODONE HYDROCHLORIDE 5 MILLIGRAM(S): 5 TABLET ORAL at 10:12

## 2018-02-09 RX ADMIN — OXYCODONE HYDROCHLORIDE 5 MILLIGRAM(S): 5 TABLET ORAL at 21:01

## 2018-02-09 RX ADMIN — ENOXAPARIN SODIUM 70 MILLIGRAM(S): 100 INJECTION SUBCUTANEOUS at 10:01

## 2018-02-09 RX ADMIN — OXYCODONE HYDROCHLORIDE 5 MILLIGRAM(S): 5 TABLET ORAL at 15:48

## 2018-02-09 RX ADMIN — Medication 2: at 12:42

## 2018-02-09 RX ADMIN — OXYCODONE HYDROCHLORIDE 5 MILLIGRAM(S): 5 TABLET ORAL at 14:55

## 2018-02-09 RX ADMIN — HYDROMORPHONE HYDROCHLORIDE 0.5 MILLIGRAM(S): 2 INJECTION INTRAMUSCULAR; INTRAVENOUS; SUBCUTANEOUS at 00:00

## 2018-02-09 RX ADMIN — Medication 500 MILLIGRAM(S): at 21:01

## 2018-02-09 RX ADMIN — Medication 500 MILLIGRAM(S): at 14:55

## 2018-02-09 RX ADMIN — Medication 1 TABLET(S): at 10:01

## 2018-02-09 RX ADMIN — SPIRONOLACTONE 50 MILLIGRAM(S): 25 TABLET, FILM COATED ORAL at 06:01

## 2018-02-09 RX ADMIN — Medication 50 GRAM(S): at 10:01

## 2018-02-09 NOTE — PROGRESS NOTE ADULT - PROBLEM SELECTOR PLAN 1
etiology unclear but previously evaluated by Dr. Freed' group with small amount of ascites and not responsible for current clinical problems. He should f/u with his regulat gastroenterologist after discharge. Will restart lasix. rule out PMC-stool for C diff pending. There is no evidence of significant cirrhosis at this time with only minimal fluid in the abdomen. Nothing to do from GI standpoint. He should f/u with his regular gastorenterologist as outpt. Will await C diff result and subsequent course regarding his loose stools.

## 2018-02-09 NOTE — PROGRESS NOTE ADULT - PROBLEM SELECTOR PLAN 1
S/p chest tube  gram stain negative  Cultures pending  CTS consult appreciated  Possibly 2/2 hepatic hydrothorax  Supplemental oxygen PRN

## 2018-02-09 NOTE — PROGRESS NOTE ADULT - SUBJECTIVE AND OBJECTIVE BOX
Pt seen and examined f/u SOG with large pleaural effusion and history of cirrhosis  This morning he is S/P insertion of right chest tube and feels less SOB. The Cat scan of the chest and abdomen showed a large right pleural effusion as well as large PE to the left lung. The was a cirrhotic appearing liver but only a small amount of ascites. No BM today thus far but with several loose stools yesterday but no stools collected. Denies abdominal pain, nausea or vomiting.    REVIEW OF SYSTEMS:    CONSTITUTIONAL: No fever, weight loss, or fatigue  EYES: No eye pain, visual disturbances, or discharge  ENMT:  No difficulty hearing, tinnitus, vertigo; No sinus or throat pain  RESPIRATORY: No cough, wheezing, chills or hemoptysis; No shortness of breath  CARDIOVASCULAR: No chest pain, palpitations, dizziness, or leg swelling  GASTROINTESTINAL: No abdominal or epigastric pain. No nausea, vomiting, or hematemesis; No diarrhea or constipation. No melena or hematochezia.    MEDICATIONS:  MEDICATIONS  (STANDING):  dextrose 5%. 1000 milliLiter(s) (50 mL/Hr) IV Continuous <Continuous>  dextrose 50% Injectable 12.5 Gram(s) IV Push once  dextrose 50% Injectable 25 Gram(s) IV Push once  dextrose 50% Injectable 25 Gram(s) IV Push once  enoxaparin Injectable 70 milliGRAM(s) SubCutaneous every 12 hours  insulin lispro (HumaLOG) corrective regimen sliding scale   SubCutaneous three times a day before meals  insulin lispro (HumaLOG) corrective regimen sliding scale   SubCutaneous at bedtime  lidocaine 1% Injectable 30 milliLiter(s) Local Injection once  metroNIDAZOLE    Tablet 500 milliGRAM(s) Oral three times a day  multivitamin 1 Tablet(s) Oral daily  spironolactone 50 milliGRAM(s) Oral two times a day    MEDICATIONS  (PRN):  dextrose Gel 1 Dose(s) Oral once PRN Blood Glucose LESS THAN 70 milliGRAM(s)/deciliter  glucagon  Injectable 1 milliGRAM(s) IntraMuscular once PRN Glucose LESS THAN 70 milligrams/deciliter  oxyCODONE    IR 5 milliGRAM(s) Oral every 4 hours PRN pain from chest tube site      Allergies    No Known Allergies    Intolerances        Vital Signs Last 24 Hrs  T(C): 36.7 (09 Feb 2018 05:59), Max: 36.9 (08 Feb 2018 16:27)  T(F): 98 (09 Feb 2018 05:59), Max: 98.4 (08 Feb 2018 16:27)  HR: 111 (09 Feb 2018 05:59) (94 - 120)  BP: 108/60 (09 Feb 2018 05:59) (108/60 - 149/100)  BP(mean): 105 (08 Feb 2018 14:02) (105 - 105)  RR: 18 (09 Feb 2018 05:59) (18 - 24)  SpO2: 95% (09 Feb 2018 05:59) (89% - 98%)    02-08 @ 07:01  -  02-09 @ 07:00  --------------------------------------------------------  IN: 120 mL / OUT: 4250 mL / NET: -4130 mL        PHYSICAL EXAM:    General: Well developed; well nourished; in no acute distress  HEENT: MMM, conjunctiva and sclera clear  Gastrointestinal:Abdomen: Soft non-tender non-distended; Normal bowel sounds; No hepatosplenomegaly  Extremities: no cyanosis, clubbing, trace pedal edema.  Skin: Warm and dry. No obvious rash    LABS:      CBC Full  -  ( 09 Feb 2018 05:45 )  WBC Count : 10.2 K/uL  Hemoglobin : 12.2 g/dL  Hematocrit : 35.2 %  Platelet Count - Automated : 210 K/uL  Mean Cell Volume : 92.9 fl  Mean Cell Hemoglobin : 32.2 pg  Mean Cell Hemoglobin Concentration : 34.7 g/dL  Auto Neutrophil # : 7.0 K/uL  Auto Lymphocyte # : 2.0 K/uL  Auto Monocyte # : 1.1 K/uL  Auto Eosinophil # : 0.1 K/uL  Auto Basophil # : 0.0 K/uL  Auto Neutrophil % : 68.5 %  Auto Lymphocyte % : 19.6 %  Auto Monocyte % : 10.8 %  Auto Eosinophil % : 0.6 %  Auto Basophil % : 0.3 %    02-09    139  |  104  |  15.0  ----------------------------<  135<H>  4.3   |  23.0  |  1.22    Ca    8.0<L>      09 Feb 2018 05:45  Phos  3.5     02-09  Mg     1.5     02-09    TPro  4.9<L>  /  Alb  1.9<L>  /  TBili  0.5  /  DBili  x   /  AST  29  /  ALT  15  /  AlkPhos  124<H>  02-09    PT/INR - ( 09 Feb 2018 05:45 )   PT: 15.5 sec;   INR: 1.40 ratio         PTT - ( 08 Feb 2018 09:49 )  PTT:35.4 sec                  RADIOLOGY & ADDITIONAL STUDIES (The following images were personally reviewed):  < from: CT Chest w/ IV Cont (02.08.18 @ 17:45) >    EXAM:    CT Chest Without and With Intravenous Contrast    CLINICAL HISTORY:    62 years old, male; Screening exam; Other: Effusion; Other screening    TECHNIQUE:    Axial computed tomography images of the chest without and with   intravenous   contrast.    MIP reconstructed images were created and reviewed.    Coronal and sagittal reformatted images were created and reviewed.    COMPARISON:    No relevant prior studies available.    FINDINGS:    Lungs:  Complete lobar atelectasis of the right middle lobe and right   lower   lobe with partial atelectasis of the right upper lobe, secondary to the   pleural   effusion. No airway obstruction.    Pleural space:  Very large free right pleural effusion causing mild   shift of   the mediastinum to the left.   Heart:  No evidence of right heart strain.  No significant pericardial   effusion.    Bones/joints:  Unremarkable.  No acute fracture.  No dislocation.    Soft tissues:  Gynecomastia.    Vasculature:  Large acute PE in the distal left lower lobe pulmonary   artery   with extension into segmental branches.  There is also a small amount of   acute   PE in the left upper lobe pulmonary artery.    Lymph nodes:  Unremarkable.  No enlarged lymph nodes.    IMPRESSION:       1.  Large acute PE in the distal left lower lobe pulmonary artery with   extension into segmental branches.  There is also a small amount of acute   PE in   the left upper lobe pulmonary artery.  2.  Very large free right pleural effusion causing mild shift of the   mediastinum to the left.    _______________________________________________    EXAM:    CT Abdomen and Pelvis With Intravenous Contrast    EXAM DATE/TIME:    Exam ordered 2/8/2018 5:00 PM    CLINICAL HISTORY:    62 years old, male; Screening exam; Other: Effusion; Other screening    TECHNIQUE:    Axial computed tomography images of the abdomen and pelvis with   intravenous   contrast.    MIP reconstructed images were created and reviewed.    Coronal and sagittal reformatted images were created and reviewed.    COMPARISON:    DX - XR CHEST PA AND LATERAL 2018-02-08 09:02    FINDINGS:    Lower thorax: No acute findings.     ABDOMEN:    Liver:  Hepatic cirrhosis.    Gallbladder and bile ducts:  Unremarkable.  No calcified stones.  No   ductal   dilation.    Pancreas:  Unremarkable.  No mass.  No ductal dilation.    Spleen:  Splenomegaly.    Adrenals:  Unremarkable.  No mass.    Kidneys and ureters:  Unremarkable.  No solid mass.  No hydronephrosis.    Stomach and bowel:  Portions of the wall the ascending colon appear   thickened   which may be due to its under distended state; however, colitis is   possible.    Appendix:  Normal appendix.     PELVIS:    Bladder:  Unremarkable.  No mass.    Reproductive:  Unremarkable as visualized.     ABDOMEN and PELVIS:    Intraperitoneal space:  Small volume ascites.  No free air.    Bones/joints:  No acute fracture.  No dislocation.    Soft tissues:  Unremarkable.    Vasculature:  Unremarkable.  No abdominal aortic aneurysm.    Lymph nodes:  Unremarkable.  No enlarged lymph nodes.    IMPRESSION:       1.  Hepatic cirrhosis with splenomegaly.  2.  Small volume ascites.  3.  Portions of the wall the ascending colon appear thickened which may   be due   to its under distended state; however, colitis is possible.          ******PRELIMINARY REPORT******    ******PRELIMINARY REPORT******              AYAKA CRUZ M.D.;[employer] RADIOLOG[employer]                    < end of copied text > Pt seen and examined f/u SOG with large pleaural effusion and history of cirrhosis  This morning he is S/P insertion of right chest tube and feels less SOB. The Cat scan of the chest and abdomen showed a large right pleural effusion as well as large PE to the left lung. The was a normal appearing liver that was somewhat displaced by the large pleural effusion pushing down on the diaphragm and inverting it and only a minimal amount of ascites. No BM today thus far but with several loose stools yesterday but no stools collected. Denies abdominal pain, nausea or vomiting.    REVIEW OF SYSTEMS:    CONSTITUTIONAL: No fever, weight loss, or fatigue  EYES: No eye pain, visual disturbances, or discharge  ENMT:  No difficulty hearing, tinnitus, vertigo; No sinus or throat pain  RESPIRATORY: No cough, wheezing, chills or hemoptysis; No shortness of breath  CARDIOVASCULAR: No chest pain, palpitations, dizziness, or leg swelling  GASTROINTESTINAL: No abdominal or epigastric pain. No nausea, vomiting, or hematemesis; No diarrhea or constipation. No melena or hematochezia.    MEDICATIONS:  MEDICATIONS  (STANDING):  dextrose 5%. 1000 milliLiter(s) (50 mL/Hr) IV Continuous <Continuous>  dextrose 50% Injectable 12.5 Gram(s) IV Push once  dextrose 50% Injectable 25 Gram(s) IV Push once  dextrose 50% Injectable 25 Gram(s) IV Push once  enoxaparin Injectable 70 milliGRAM(s) SubCutaneous every 12 hours  insulin lispro (HumaLOG) corrective regimen sliding scale   SubCutaneous three times a day before meals  insulin lispro (HumaLOG) corrective regimen sliding scale   SubCutaneous at bedtime  lidocaine 1% Injectable 30 milliLiter(s) Local Injection once  metroNIDAZOLE    Tablet 500 milliGRAM(s) Oral three times a day  multivitamin 1 Tablet(s) Oral daily  spironolactone 50 milliGRAM(s) Oral two times a day    MEDICATIONS  (PRN):  dextrose Gel 1 Dose(s) Oral once PRN Blood Glucose LESS THAN 70 milliGRAM(s)/deciliter  glucagon  Injectable 1 milliGRAM(s) IntraMuscular once PRN Glucose LESS THAN 70 milligrams/deciliter  oxyCODONE    IR 5 milliGRAM(s) Oral every 4 hours PRN pain from chest tube site      Allergies    No Known Allergies    Intolerances        Vital Signs Last 24 Hrs  T(C): 36.7 (09 Feb 2018 05:59), Max: 36.9 (08 Feb 2018 16:27)  T(F): 98 (09 Feb 2018 05:59), Max: 98.4 (08 Feb 2018 16:27)  HR: 111 (09 Feb 2018 05:59) (94 - 120)  BP: 108/60 (09 Feb 2018 05:59) (108/60 - 149/100)  BP(mean): 105 (08 Feb 2018 14:02) (105 - 105)  RR: 18 (09 Feb 2018 05:59) (18 - 24)  SpO2: 95% (09 Feb 2018 05:59) (89% - 98%)    02-08 @ 07:01  -  02-09 @ 07:00  --------------------------------------------------------  IN: 120 mL / OUT: 4250 mL / NET: -4130 mL        PHYSICAL EXAM:    General: Well developed; well nourished; in no acute distress  HEENT: MMM, conjunctiva and sclera clear  Gastrointestinal:Abdomen: Soft non-tender non-distended; Normal bowel sounds; No hepatosplenomegaly  Extremities: no cyanosis, clubbing, trace pedal edema.  Skin: Warm and dry. No obvious rash    LABS:      CBC Full  -  ( 09 Feb 2018 05:45 )  WBC Count : 10.2 K/uL  Hemoglobin : 12.2 g/dL  Hematocrit : 35.2 %  Platelet Count - Automated : 210 K/uL  Mean Cell Volume : 92.9 fl  Mean Cell Hemoglobin : 32.2 pg  Mean Cell Hemoglobin Concentration : 34.7 g/dL  Auto Neutrophil # : 7.0 K/uL  Auto Lymphocyte # : 2.0 K/uL  Auto Monocyte # : 1.1 K/uL  Auto Eosinophil # : 0.1 K/uL  Auto Basophil # : 0.0 K/uL  Auto Neutrophil % : 68.5 %  Auto Lymphocyte % : 19.6 %  Auto Monocyte % : 10.8 %  Auto Eosinophil % : 0.6 %  Auto Basophil % : 0.3 %    02-09    139  |  104  |  15.0  ----------------------------<  135<H>  4.3   |  23.0  |  1.22    Ca    8.0<L>      09 Feb 2018 05:45  Phos  3.5     02-09  Mg     1.5     02-09    TPro  4.9<L>  /  Alb  1.9<L>  /  TBili  0.5  /  DBili  x   /  AST  29  /  ALT  15  /  AlkPhos  124<H>  02-09    PT/INR - ( 09 Feb 2018 05:45 )   PT: 15.5 sec;   INR: 1.40 ratio         PTT - ( 08 Feb 2018 09:49 )  PTT:35.4 sec                  RADIOLOGY & ADDITIONAL STUDIES (The following images were personally reviewed):  < from: CT Chest w/ IV Cont (02.08.18 @ 17:45) >    EXAM:    CT Chest Without and With Intravenous Contrast    CLINICAL HISTORY:    62 years old, male; Screening exam; Other: Effusion; Other screening    TECHNIQUE:    Axial computed tomography images of the chest without and with   intravenous   contrast.    MIP reconstructed images were created and reviewed.    Coronal and sagittal reformatted images were created and reviewed.    COMPARISON:    No relevant prior studies available.    FINDINGS:    Lungs:  Complete lobar atelectasis of the right middle lobe and right   lower   lobe with partial atelectasis of the right upper lobe, secondary to the   pleural   effusion. No airway obstruction.    Pleural space:  Very large free right pleural effusion causing mild   shift of   the mediastinum to the left.   Heart:  No evidence of right heart strain.  No significant pericardial   effusion.    Bones/joints:  Unremarkable.  No acute fracture.  No dislocation.    Soft tissues:  Gynecomastia.    Vasculature:  Large acute PE in the distal left lower lobe pulmonary   artery   with extension into segmental branches.  There is also a small amount of   acute   PE in the left upper lobe pulmonary artery.    Lymph nodes:  Unremarkable.  No enlarged lymph nodes.    IMPRESSION:       1.  Large acute PE in the distal left lower lobe pulmonary artery with   extension into segmental branches.  There is also a small amount of acute   PE in   the left upper lobe pulmonary artery.  2.  Very large free right pleural effusion causing mild shift of the   mediastinum to the left.    _______________________________________________    EXAM:    CT Abdomen and Pelvis With Intravenous Contrast    EXAM DATE/TIME:    Exam ordered 2/8/2018 5:00 PM    CLINICAL HISTORY:    62 years old, male; Screening exam; Other: Effusion; Other screening    TECHNIQUE:    Axial computed tomography images of the abdomen and pelvis with   intravenous   contrast.    MIP reconstructed images were created and reviewed.    Coronal and sagittal reformatted images were created and reviewed.    COMPARISON:    DX - XR CHEST PA AND LATERAL 2018-02-08 09:02    FINDINGS:    Lower thorax: No acute findings.     ABDOMEN:    Liver:  Hepatic cirrhosis.    Gallbladder and bile ducts:  Unremarkable.  No calcified stones.  No   ductal   dilation.    Pancreas:  Unremarkable.  No mass.  No ductal dilation.    Spleen:  Splenomegaly.    Adrenals:  Unremarkable.  No mass.    Kidneys and ureters:  Unremarkable.  No solid mass.  No hydronephrosis.    Stomach and bowel:  Portions of the wall the ascending colon appear   thickened   which may be due to its under distended state; however, colitis is   possible.    Appendix:  Normal appendix.     PELVIS:    Bladder:  Unremarkable.  No mass.    Reproductive:  Unremarkable as visualized.     ABDOMEN and PELVIS:    Intraperitoneal space:  Small volume ascites.  No free air.    Bones/joints:  No acute fracture.  No dislocation.    Soft tissues:  Unremarkable.    Vasculature:  Unremarkable.  No abdominal aortic aneurysm.    Lymph nodes:  Unremarkable.  No enlarged lymph nodes.    IMPRESSION:       1.  Hepatic cirrhosis with splenomegaly.  2.  Small volume ascites.  3.  Portions of the wall the ascending colon appear thickened which may   be due   to its under distended state; however, colitis is possible.          ******PRELIMINARY REPORT******    ******PRELIMINARY REPORT******              AYAKA CRUZ M.D.;[employer] RADIOLOGIST                    < end of copied text >

## 2018-02-09 NOTE — PROGRESS NOTE ADULT - PROBLEM SELECTOR PROBLEM 2
Diarrhea, unspecified type Other pulmonary embolism with acute cor pulmonale, unspecified chronicity

## 2018-02-09 NOTE — PROGRESS NOTE ADULT - SUBJECTIVE AND OBJECTIVE BOX
ERIC ARIAS    [id number]    62y      Male    INTERVAL HPI/OVERNIGHT EVENTS: Had chest tube placed overnight. Feels well today.    Hospital course:  60 yo M with h/o ETOH cirrhosis, c diff (on abx therapy), DM presents from home with worsening shortness of breath. Pt states that in December 2017, he had noticed dyspnea on exertion. Pt presented to [facility] ED, CT abdomen at the time showed ascites, large right pleural effusion, atelectasis right lower lobe, and splenomegaly. Pt declined further workup at [facility] and had followed up with his primary GI. Pt subsequently was hospitalized at [facility] for similar symptoms in January where he had 2 thoracentesis of the right pleural effusion (each time draining approximately 2L). Pt was then transferred to [facility] for a possible TIPS procedure. However, he states that the doctors there had felt that his pleural effusions were not due to his cirrhosis, and that there may have been an additional disease process occurring. Pt had improvement in dyspnea and was discharged home. One month later, he noticed similar symptoms of NGUYEN with minimal ambulation. No fevers or chills. No worsening abdominal distension or pain. In the ED, CT chest showed  large acute PE in the distal left lower lobe pulmonary artery with extension into segmental branches, there is also a small amount of acute PE in the left upper lobe pulmonary artery. CT abdomen showed the liver is displaced by an inverted right hemidiaphragm secondary to  a very large right pleural effusion. no intrinsic abnormality, small volume ascites, portions of the wall the ascending colon appear thickened which may be due to its under distended state; however, colitis is possible. Pt had chest tube placed by CTS on 2/8, and drained 2200ml. Lovenox started for PE.      REVIEW OF SYSTEMS:    CONSTITUTIONAL: No fever   RESPIRATORY: No cough  CARDIOVASCULAR: No chest pain       Vital Signs Last 24 Hrs  T(C): 36.3 (09 Feb 2018 10:02), Max: 36.9 (08 Feb 2018 16:27)  T(F): 97.3 (09 Feb 2018 10:02), Max: 98.4 (08 Feb 2018 16:27)  HR: 97 (09 Feb 2018 10:09) (94 - 120)  BP: 104/62 (09 Feb 2018 10:09) (98/69 - 136/90)  BP(mean): 105 (08 Feb 2018 14:02) (105 - 105)  RR: 18 (09 Feb 2018 10:09) (18 - 20)  SpO2: 98% (09 Feb 2018 10:09) (90% - 98%)    PHYSICAL EXAM:    GENERAL: NAD, frail  HEENT: PERRL, +EOMI, MMM  CHEST/LUNG: Clear to percussion bilaterally; No wheezing  HEART: S1S2+, Regular rate and rhythm   ABDOMEN: Soft, Nontender, Nondistended; Bowel sounds present        LABS:                        12.2   10.2  )-----------( 210      ( 09 Feb 2018 05:45 )             35.2     02-09    139  |  104  |  15.0  ----------------------------<  135<H>  4.3   |  23.0  |  1.22    Ca    8.0<L>      09 Feb 2018 05:45  Phos  3.5     02-09  Mg     1.5     02-09    TPro  4.9<L>  /  Alb  1.9<L>  /  TBili  0.5  /  DBili  x   /  AST  29  /  ALT  15  /  AlkPhos  124<H>  02-09    PT/INR - ( 09 Feb 2018 05:45 )   PT: 15.5 sec;   INR: 1.40 ratio         PTT - ( 08 Feb 2018 09:49 )  PTT:35.4 sec        MEDICATIONS  (STANDING):  dextrose 5%. 1000 milliLiter(s) (50 mL/Hr) IV Continuous <Continuous>  dextrose 50% Injectable 12.5 Gram(s) IV Push once  dextrose 50% Injectable 25 Gram(s) IV Push once  dextrose 50% Injectable 25 Gram(s) IV Push once  enoxaparin Injectable 70 milliGRAM(s) SubCutaneous every 12 hours  furosemide    Tablet 40 milliGRAM(s) Oral daily  insulin lispro (HumaLOG) corrective regimen sliding scale   SubCutaneous three times a day before meals  insulin lispro (HumaLOG) corrective regimen sliding scale   SubCutaneous at bedtime  lidocaine 1% Injectable 30 milliLiter(s) Local Injection once  metroNIDAZOLE    Tablet 500 milliGRAM(s) Oral three times a day  multivitamin 1 Tablet(s) Oral daily  spironolactone 50 milliGRAM(s) Oral two times a day    MEDICATIONS  (PRN):  dextrose Gel 1 Dose(s) Oral once PRN Blood Glucose LESS THAN 70 milliGRAM(s)/deciliter  glucagon  Injectable 1 milliGRAM(s) IntraMuscular once PRN Glucose LESS THAN 70 milligrams/deciliter  oxyCODONE    IR 5 milliGRAM(s) Oral every 4 hours PRN pain from chest tube site      RADIOLOGY & ADDITIONAL TESTS:

## 2018-02-09 NOTE — PROGRESS NOTE ADULT - SUBJECTIVE AND OBJECTIVE BOX
Subjective: " I am Ok"  pt in bed resting     T(C): 36.3 (02-09-18 @ 10:02), Max: 36.9 (02-08-18 @ 16:27)  HR: 97 (02-09-18 @ 10:09) (94 - 120)  BP: 104/62 (02-09-18 @ 10:09) (98/69 - 149/100)    RR: 18 (02-09-18 @ 10:09) (18 - 24)  SpO2: 98% (02-09-18 @ 10:09) (90% - 98%) RA  Tele: SR     CHEST TUBE:     Rt                            OUTPUT:     total 4.2 Liters since 2/8 02-09    139  |  104  |  15.0  ----------------------------<  135<H>  4.3   |  23.0  |  1.22    Ca    8.0<L>      09 Feb 2018 05:45  Phos  3.5     02-09  Mg     1.5     02-09    TPro  4.9<L>  /  Alb  1.9<L>  /  TBili  0.5  /  DBili  x   /  AST  29  /  ALT  15  /  AlkPhos  124<H>  02-09                               12.2   10.2  )-----------( 210      ( 09 Feb 2018 05:45 )             35.2        PT/INR - ( 09 Feb 2018 05:45 )   PT: 15.5 sec;   INR: 1.40 ratio         PTT - ( 08 Feb 2018 09:49 )  PTT:35.4 sec         CAPILLARY BLOOD GLUCOSE      POCT Blood Glucose.: 95 mg/dL (09 Feb 2018 08:18)  POCT Blood Glucose.: 79 mg/dL (08 Feb 2018 22:06)           CXR:  < from: Xray Chest 1 View- PORTABLE-Routine (02.09.18 @ 05:31) >  Again noted is a right pigtail chest tube. Significant decrease in right   pleural effusion since the prior study. No evidence of pneumothorax. The   left lung remains clear..    Impression:  Decreasing right pleural effusion since the prior exam..    < end of copied text >          Assessment  Neuro:  alert awake   Pulm:  decreased at base b/l   CV: RRR S1 S2   Abd: soft NT ND   Extremities: no edema b/l LE         Assessment:  62yMale    with PAST MEDICAL & SURGICAL HISTORY:  DM (diabetes mellitus)  ETOH cirrhosis   No significant past surgical history

## 2018-02-10 DIAGNOSIS — R18.8 OTHER ASCITES: ICD-10-CM

## 2018-02-10 LAB
ALBUMIN FLD-MCNC: <0.5 G/DL — SIGNIFICANT CHANGE UP
ALBUMIN SERPL ELPH-MCNC: 1.7 G/DL — LOW (ref 3.3–5.2)
ALP SERPL-CCNC: 128 U/L — HIGH (ref 40–120)
ALT FLD-CCNC: 13 U/L — SIGNIFICANT CHANGE UP
ANION GAP SERPL CALC-SCNC: 9 MMOL/L — SIGNIFICANT CHANGE UP (ref 5–17)
AST SERPL-CCNC: 32 U/L — SIGNIFICANT CHANGE UP
BASOPHILS # BLD AUTO: 0 K/UL — SIGNIFICANT CHANGE UP (ref 0–0.2)
BASOPHILS NFR BLD AUTO: 0.5 % — SIGNIFICANT CHANGE UP (ref 0–2)
BILIRUB SERPL-MCNC: 0.5 MG/DL — SIGNIFICANT CHANGE UP (ref 0.4–2)
BUN SERPL-MCNC: 18 MG/DL — SIGNIFICANT CHANGE UP (ref 8–20)
CALCIUM SERPL-MCNC: 7.9 MG/DL — LOW (ref 8.6–10.2)
CHLORIDE SERPL-SCNC: 99 MMOL/L — SIGNIFICANT CHANGE UP (ref 98–107)
CO2 SERPL-SCNC: 25 MMOL/L — SIGNIFICANT CHANGE UP (ref 22–29)
CREAT SERPL-MCNC: 1.19 MG/DL — SIGNIFICANT CHANGE UP (ref 0.5–1.3)
EOSINOPHIL # BLD AUTO: 0.3 K/UL — SIGNIFICANT CHANGE UP (ref 0–0.5)
EOSINOPHIL NFR BLD AUTO: 3.7 % — SIGNIFICANT CHANGE UP (ref 0–5)
GLUCOSE BLDC GLUCOMTR-MCNC: 111 MG/DL — HIGH (ref 70–99)
GLUCOSE BLDC GLUCOMTR-MCNC: 144 MG/DL — HIGH (ref 70–99)
GLUCOSE BLDC GLUCOMTR-MCNC: 91 MG/DL — SIGNIFICANT CHANGE UP (ref 70–99)
GLUCOSE FLD-MCNC: 124 MG/DL — SIGNIFICANT CHANGE UP
GLUCOSE SERPL-MCNC: 122 MG/DL — HIGH (ref 70–115)
HCT VFR BLD CALC: 36.3 % — LOW (ref 42–52)
HGB BLD-MCNC: 12.6 G/DL — LOW (ref 14–18)
LDH SERPL L TO P-CCNC: 62 U/L — SIGNIFICANT CHANGE UP
LYMPHOCYTES # BLD AUTO: 2.4 K/UL — SIGNIFICANT CHANGE UP (ref 1–4.8)
LYMPHOCYTES # BLD AUTO: 28.2 % — SIGNIFICANT CHANGE UP (ref 20–55)
MAGNESIUM SERPL-MCNC: 1.9 MG/DL — SIGNIFICANT CHANGE UP (ref 1.6–2.6)
MCHC RBC-ENTMCNC: 32.1 PG — HIGH (ref 27–31)
MCHC RBC-ENTMCNC: 34.7 G/DL — SIGNIFICANT CHANGE UP (ref 32–36)
MCV RBC AUTO: 92.4 FL — SIGNIFICANT CHANGE UP (ref 80–94)
MONOCYTES # BLD AUTO: 1 K/UL — HIGH (ref 0–0.8)
MONOCYTES NFR BLD AUTO: 11.4 % — HIGH (ref 3–10)
NEUTROPHILS # BLD AUTO: 4.6 K/UL — SIGNIFICANT CHANGE UP (ref 1.8–8)
NEUTROPHILS NFR BLD AUTO: 56 % — SIGNIFICANT CHANGE UP (ref 37–73)
PHOSPHATE SERPL-MCNC: 3.2 MG/DL — SIGNIFICANT CHANGE UP (ref 2.4–4.7)
PLATELET # BLD AUTO: 214 K/UL — SIGNIFICANT CHANGE UP (ref 150–400)
POTASSIUM SERPL-MCNC: 3.8 MMOL/L — SIGNIFICANT CHANGE UP (ref 3.5–5.3)
POTASSIUM SERPL-SCNC: 3.8 MMOL/L — SIGNIFICANT CHANGE UP (ref 3.5–5.3)
PROT FLD-MCNC: <1 G/DL — SIGNIFICANT CHANGE UP
PROT SERPL-MCNC: 5.1 G/DL — LOW (ref 6.6–8.7)
RBC # BLD: 3.93 M/UL — LOW (ref 4.6–6.2)
RBC # FLD: 15.2 % — SIGNIFICANT CHANGE UP (ref 11–15.6)
SODIUM SERPL-SCNC: 133 MMOL/L — LOW (ref 135–145)
WBC # BLD: 8.3 K/UL — SIGNIFICANT CHANGE UP (ref 4.8–10.8)
WBC # FLD AUTO: 8.3 K/UL — SIGNIFICANT CHANGE UP (ref 4.8–10.8)

## 2018-02-10 PROCEDURE — 99233 SBSQ HOSP IP/OBS HIGH 50: CPT

## 2018-02-10 PROCEDURE — 99232 SBSQ HOSP IP/OBS MODERATE 35: CPT

## 2018-02-10 PROCEDURE — 71045 X-RAY EXAM CHEST 1 VIEW: CPT | Mod: 26

## 2018-02-10 RX ADMIN — OXYCODONE HYDROCHLORIDE 5 MILLIGRAM(S): 5 TABLET ORAL at 05:25

## 2018-02-10 RX ADMIN — OXYCODONE HYDROCHLORIDE 5 MILLIGRAM(S): 5 TABLET ORAL at 13:46

## 2018-02-10 RX ADMIN — OXYCODONE HYDROCHLORIDE 5 MILLIGRAM(S): 5 TABLET ORAL at 21:50

## 2018-02-10 RX ADMIN — Medication 500 MILLIGRAM(S): at 05:24

## 2018-02-10 RX ADMIN — OXYCODONE HYDROCHLORIDE 5 MILLIGRAM(S): 5 TABLET ORAL at 06:00

## 2018-02-10 RX ADMIN — OXYCODONE HYDROCHLORIDE 5 MILLIGRAM(S): 5 TABLET ORAL at 13:56

## 2018-02-10 RX ADMIN — SPIRONOLACTONE 50 MILLIGRAM(S): 25 TABLET, FILM COATED ORAL at 17:55

## 2018-02-10 RX ADMIN — ENOXAPARIN SODIUM 70 MILLIGRAM(S): 100 INJECTION SUBCUTANEOUS at 21:21

## 2018-02-10 RX ADMIN — ENOXAPARIN SODIUM 70 MILLIGRAM(S): 100 INJECTION SUBCUTANEOUS at 09:16

## 2018-02-10 RX ADMIN — Medication 40 MILLIGRAM(S): at 05:24

## 2018-02-10 RX ADMIN — Medication 500 MILLIGRAM(S): at 13:48

## 2018-02-10 RX ADMIN — SPIRONOLACTONE 50 MILLIGRAM(S): 25 TABLET, FILM COATED ORAL at 05:25

## 2018-02-10 RX ADMIN — Medication 1 TABLET(S): at 13:46

## 2018-02-10 RX ADMIN — OXYCODONE HYDROCHLORIDE 5 MILLIGRAM(S): 5 TABLET ORAL at 21:21

## 2018-02-10 NOTE — PROGRESS NOTE ADULT - PROBLEM SELECTOR PLAN 1
CT clamped overnight and 1 liter drained when unclamped.  Pt asymptomatic.  Denies SOB.  Currently clamped.  RN instructed to unclamp in 30 minutes.  Effusion likely from cirrhosis.  Maintain chest tube for now.  Discussed with CT surgery team in AM rounds. CT clamped overnight and 1 liter drained when unclamped. Chest tube container changed.  Pt asymptomatic.  Denies SOB.  Currently clamped.  RN instructed to unclamp in 30 minutes.  Effusion likely from cirrhosis.  Maintain chest tube for now.  Discussed with CT surgery team in AM rounds.

## 2018-02-10 NOTE — PROGRESS NOTE ADULT - PROBLEM SELECTOR PLAN 1
Resolved. Not likely secondary to infection. Tolerating diet. Stool studies ordered if diarrhea recurs. No stool studies collected as pt. has had no BM's.

## 2018-02-10 NOTE — PROGRESS NOTE ADULT - PROBLEM SELECTOR PLAN 2
Trace. No radiographic or chemical evidence of cirrhosis. Await remainder of chronic liver disease serologies previously ordered. Plural effusion not likely secondary to liver disease but will await the results of liver disease serologies before officially signing off. Will see pt. again Monday. Daily GI f/u is not presently required.

## 2018-02-10 NOTE — PROGRESS NOTE ADULT - SUBJECTIVE AND OBJECTIVE BOX
Subjective: Pt lying in bed.  "I feel OK.  I wish they would figure out why this fluid keeps building up".  Denies SOB or CP.  Reports slight pain on deep inspiration to right side. NAD noted.    Vital Signs:  Vital Signs Last 24 Hrs  T(C): 36.9 (02-10-18 @ 11:31), Max: 37.2 (02-09-18 @ 20:54)  T(F): 98.5 (02-10-18 @ 11:31), Max: 98.9 (02-09-18 @ 20:54)  HR: 80 (02-10-18 @ 11:31) (80 - 102)  BP: 100/68 (02-10-18 @ 11:31) (98/64 - 103/710)  RR: 18 (02-10-18 @ 11:31) (18 - 19)  SpO2: 97% (02-10-18 @ 05:19) (95% - 97%) on (O2)      Relevant labs, radiology and Medications reviewed    Neurology: A&Ox3.  Respiratory: Diminished BLL.  R>L.  CV: RRR, +S1+S2.  Abdominal: Soft, NT.  Softly distended. +BS.  Extremities: Trace to +1 edema BLE and BL hands. + peripheral pulses    Tubes: +Right pleural CT.  No crepitus.        02-09 @ 07:01  -  02-10 @ 07:00  --------------------------------------------------------  IN:    Oral Fluid: 240 mL  Total IN: 240 mL    OUT:    Chest Tube: 720 mL    Voided: 300 mL  Total OUT: 1020 mL    Total NET: -780 mL

## 2018-02-10 NOTE — PROGRESS NOTE ADULT - PROBLEM SELECTOR PLAN 1
chest tube maintained, care per CT surgery  Possibly 2/2 hepatic hydrothorax  Supplemental oxygen PRN

## 2018-02-10 NOTE — PROGRESS NOTE ADULT - SUBJECTIVE AND OBJECTIVE BOX
seen for pleural effusion    no acute complaints  diarrhea resolved  sob improved  wants chest tube out  ROS otherwise negative     MEDICATIONS  (STANDING):  dextrose 5%. 1000 milliLiter(s) (50 mL/Hr) IV Continuous <Continuous>  dextrose 50% Injectable 12.5 Gram(s) IV Push once  dextrose 50% Injectable 25 Gram(s) IV Push once  dextrose 50% Injectable 25 Gram(s) IV Push once  enoxaparin Injectable 70 milliGRAM(s) SubCutaneous every 12 hours  furosemide    Tablet 40 milliGRAM(s) Oral daily  insulin lispro (HumaLOG) corrective regimen sliding scale   SubCutaneous three times a day before meals  insulin lispro (HumaLOG) corrective regimen sliding scale   SubCutaneous at bedtime  lidocaine 1% Injectable 30 milliLiter(s) Local Injection once  metroNIDAZOLE    Tablet 500 milliGRAM(s) Oral three times a day  multivitamin 1 Tablet(s) Oral daily  spironolactone 50 milliGRAM(s) Oral two times a day    MEDICATIONS  (PRN):  dextrose Gel 1 Dose(s) Oral once PRN Blood Glucose LESS THAN 70 milliGRAM(s)/deciliter  glucagon  Injectable 1 milliGRAM(s) IntraMuscular once PRN Glucose LESS THAN 70 milligrams/deciliter  oxyCODONE    IR 5 milliGRAM(s) Oral every 4 hours PRN pain from chest tube site      Allergies    No Known Allergies    Vital Signs Last 24 Hrs  T(C): 36.9 (10 Feb 2018 11:31), Max: 37.2 (09 Feb 2018 20:54)  T(F): 98.5 (10 Feb 2018 11:31), Max: 98.9 (09 Feb 2018 20:54)  HR: 80 (10 Feb 2018 11:31) (80 - 102)  BP: 100/68 (10 Feb 2018 11:31) (98/64 - 103/710)  BP(mean): --  RR: 18 (10 Feb 2018 11:31) (18 - 19)  SpO2: 97% (10 Feb 2018 05:19) (95% - 97%)    PHYSICAL EXAM:    GENERAL: NAD  CHEST/LUNG: dec bs right base, ct present  HEART: Regular rate and rhythm; S1 S2  ABDOMEN: Soft, Nontender, Nondistended; Bowel sounds present  EXTREMITIES:  no edema.   NERVOUS SYSTEM:  Alert & Oriented X3 nonfocal  PSYCH: normal mood, appropriate response.    LABS:                        12.6   8.3   )-----------( 214      ( 10 Feb 2018 07:18 )             36.3     02-10    133<L>  |  99  |  18.0  ----------------------------<  122<H>  3.8   |  25.0  |  1.19    Ca    7.9<L>      10 Feb 2018 07:18  Phos  3.2     02-10  Mg     1.9     02-10    TPro  5.1<L>  /  Alb  1.7<L>  /  TBili  0.5  /  DBili  x   /  AST  32  /  ALT  13  /  AlkPhos  128<H>  02-10    PT/INR - ( 09 Feb 2018 05:45 )   PT: 15.5 sec;   INR: 1.40 ratio               CAPILLARY BLOOD GLUCOSE      POCT Blood Glucose.: 144 mg/dL (10 Feb 2018 12:17)  POCT Blood Glucose.: 91 mg/dL (10 Feb 2018 08:49)  POCT Blood Glucose.: 155 mg/dL (09 Feb 2018 21:00)  POCT Blood Glucose.: 130 mg/dL (09 Feb 2018 17:24)        RADIOLOGY & ADDITIONAL TESTS:

## 2018-02-10 NOTE — PROGRESS NOTE ADULT - SUBJECTIVE AND OBJECTIVE BOX
Pt seen and examined. He states that his diarrhea spontaneously resolved and presently has a right sided chest tube in to drain large right sided pleural effusion.     REVIEW OF SYSTEMS:  Constitutional: No fever, weight loss or fatigue  Cardiovascular: No chest pain, palpitations, dizziness or leg swelling  Gastrointestinal: No abdominal or epigastric pain. No nausea, vomiting or hematemesis; No diarrhea or constipation. No melena or hematochezia.  Skin: No itching, burning, rashes or lesions       MEDICATIONS:  MEDICATIONS  (STANDING):  dextrose 5%. 1000 milliLiter(s) (50 mL/Hr) IV Continuous <Continuous>  dextrose 50% Injectable 12.5 Gram(s) IV Push once  dextrose 50% Injectable 25 Gram(s) IV Push once  dextrose 50% Injectable 25 Gram(s) IV Push once  enoxaparin Injectable 70 milliGRAM(s) SubCutaneous every 12 hours  furosemide    Tablet 40 milliGRAM(s) Oral daily  insulin lispro (HumaLOG) corrective regimen sliding scale   SubCutaneous three times a day before meals  insulin lispro (HumaLOG) corrective regimen sliding scale   SubCutaneous at bedtime  lidocaine 1% Injectable 30 milliLiter(s) Local Injection once  metroNIDAZOLE    Tablet 500 milliGRAM(s) Oral three times a day  multivitamin 1 Tablet(s) Oral daily  spironolactone 50 milliGRAM(s) Oral two times a day    MEDICATIONS  (PRN):  dextrose Gel 1 Dose(s) Oral once PRN Blood Glucose LESS THAN 70 milliGRAM(s)/deciliter  glucagon  Injectable 1 milliGRAM(s) IntraMuscular once PRN Glucose LESS THAN 70 milligrams/deciliter  oxyCODONE    IR 5 milliGRAM(s) Oral every 4 hours PRN pain from chest tube site      Allergies    No Known Allergies    Intolerances        Vital Signs Last 24 Hrs  T(C): 36.8 (10 Feb 2018 05:19), Max: 37.2 (09 Feb 2018 20:54)  T(F): 98.3 (10 Feb 2018 05:19), Max: 98.9 (09 Feb 2018 20:54)  HR: 92 (10 Feb 2018 05:19) (92 - 104)  BP: 98/64 (10 Feb 2018 05:19) (98/64 - 104/62)  BP(mean): --  RR: 19 (10 Feb 2018 05:19) (18 - 19)  SpO2: 97% (10 Feb 2018 05:19) (95% - 98%)    02-09 @ 07:01  -  02-10 @ 07:00  --------------------------------------------------------  IN: 240 mL / OUT: 1020 mL / NET: -780 mL        PHYSICAL EXAM:    General: Well developed; well nourished; in no acute distress  HEENT: MMM, conjunctiva pink and sclera anicteric.  Lungs: Right sided chest tube  Cor: RRR S1, S2 only.  Gastrointestinal: Abdomen: Soft non-tender non-distended; Normal bowel sounds; No hepatosplenomegaly  Extremities: no cyanosis, clubbing or edema.  Skin: Warm and dry. No obvious rash  Neuro: Pt. a + o x 3    LABS:      CBC Full  -  ( 10 Feb 2018 07:18 )  WBC Count : 8.3 K/uL  Hemoglobin : 12.6 g/dL  Hematocrit : 36.3 %  Platelet Count - Automated : 214 K/uL  Mean Cell Volume : 92.4 fl  Mean Cell Hemoglobin : 32.1 pg  Mean Cell Hemoglobin Concentration : 34.7 g/dL  Auto Neutrophil # : 4.6 K/uL  Auto Lymphocyte # : 2.4 K/uL  Auto Monocyte # : 1.0 K/uL  Auto Eosinophil # : 0.3 K/uL  Auto Basophil # : 0.0 K/uL  Auto Neutrophil % : 56.0 %  Auto Lymphocyte % : 28.2 %  Auto Monocyte % : 11.4 %  Auto Eosinophil % : 3.7 %  Auto Basophil % : 0.5 %    02-10    133<L>  |  99  |  18.0  ----------------------------<  122<H>  3.8   |  25.0  |  1.19    Ca    7.9<L>      10 Feb 2018 07:18  Phos  3.2     02-10  Mg     1.9     02-10    TPro  5.1<L>  /  Alb  1.7<L>  /  TBili  0.5  /  DBili  x   /  AST  32  /  ALT  13  /  AlkPhos  128<H>  02-10    PT/INR - ( 09 Feb 2018 05:45 )   PT: 15.5 sec;   INR: 1.40 ratio         PTT - ( 08 Feb 2018 09:49 )  PTT:35.4 sec                  RADIOLOGY & ADDITIONAL STUDIES (The following images were personally reviewed):

## 2018-02-11 LAB
C DIFF BY PCR RESULT: SIGNIFICANT CHANGE UP
C DIFF TOX GENS STL QL NAA+PROBE: SIGNIFICANT CHANGE UP

## 2018-02-11 PROCEDURE — 71045 X-RAY EXAM CHEST 1 VIEW: CPT | Mod: 26

## 2018-02-11 PROCEDURE — 99232 SBSQ HOSP IP/OBS MODERATE 35: CPT

## 2018-02-11 PROCEDURE — 99233 SBSQ HOSP IP/OBS HIGH 50: CPT

## 2018-02-11 RX ADMIN — SPIRONOLACTONE 50 MILLIGRAM(S): 25 TABLET, FILM COATED ORAL at 05:33

## 2018-02-11 RX ADMIN — ENOXAPARIN SODIUM 70 MILLIGRAM(S): 100 INJECTION SUBCUTANEOUS at 21:29

## 2018-02-11 RX ADMIN — OXYCODONE HYDROCHLORIDE 5 MILLIGRAM(S): 5 TABLET ORAL at 05:33

## 2018-02-11 RX ADMIN — OXYCODONE HYDROCHLORIDE 5 MILLIGRAM(S): 5 TABLET ORAL at 22:00

## 2018-02-11 RX ADMIN — OXYCODONE HYDROCHLORIDE 5 MILLIGRAM(S): 5 TABLET ORAL at 21:29

## 2018-02-11 RX ADMIN — Medication 40 MILLIGRAM(S): at 05:33

## 2018-02-11 RX ADMIN — Medication 1 TABLET(S): at 13:35

## 2018-02-11 RX ADMIN — OXYCODONE HYDROCHLORIDE 5 MILLIGRAM(S): 5 TABLET ORAL at 06:00

## 2018-02-11 RX ADMIN — SPIRONOLACTONE 50 MILLIGRAM(S): 25 TABLET, FILM COATED ORAL at 21:29

## 2018-02-11 RX ADMIN — ENOXAPARIN SODIUM 70 MILLIGRAM(S): 100 INJECTION SUBCUTANEOUS at 13:35

## 2018-02-11 NOTE — PROGRESS NOTE ADULT - SUBJECTIVE AND OBJECTIVE BOX
Subjective:   "Im ok"  Ambulating in room> gait steady      V/S  T(C): 36.3 (02-11-18 @ 15:41), Max: 37.2 (02-11-18 @ 11:00)  HR: 90 (02-11-18 @ 15:41) (84 - 100)  BP: 92/53 (02-11-18 @ 15:41) (92/53 - 109/60)  RR: 18 (02-11-18 @ 11:00) (17 - 18)  SpO2: 98% (02-11-18 @ 15:41) (98% - 100%)                                               Tele:  SR   80s    CHEST TUBE:       R posterior    OUTPUT:     450        per 24 hours     Drainage: clear , lt yellow    Crepitus noted along superior aspect dsg  AIR LEAKS:  [ ] YES [x ] NO      MEDICATIONS  (STANDING):  enoxaparin Injectable 70 milliGRAM(s) SubCutaneous every 12 hours  furosemide    Tablet 40 milliGRAM(s) Oral daily  lidocaine 1% Injectable 30 milliLiter(s) Local Injection once  multivitamin 1 Tablet(s) Oral daily  spironolactone 50 milliGRAM(s) Oral two times a day                             CXR:  Small right pneumothorax.    Physical Exam:    Neurology: A&Ox3.    Respiratory: Diminished BLL.  R>L.    CV: RRR, +S1+S2.    Abdominal: Soft, NT.  Softly distended. +BS.    Extremities: Trace to +1 edema BLE and BL hands. + peripheral pulses    Tubes: +Right pleural CT.  slight anterior crepitus.                  PAST MEDICAL & SURGICAL HISTORY:  DM (diabetes mellitus)  No significant past surgical history

## 2018-02-11 NOTE — PROGRESS NOTE ADULT - SUBJECTIVE AND OBJECTIVE BOX
seen for pleural effusion    intermittent chest pain at chest tube site  no acute complaints  no diarrhea  ROS otherwise negative     MEDICATIONS  (STANDING):  enoxaparin Injectable 70 milliGRAM(s) SubCutaneous every 12 hours  furosemide    Tablet 40 milliGRAM(s) Oral daily  lidocaine 1% Injectable 30 milliLiter(s) Local Injection once  multivitamin 1 Tablet(s) Oral daily  spironolactone 50 milliGRAM(s) Oral two times a day    MEDICATIONS  (PRN):  oxyCODONE    IR 5 milliGRAM(s) Oral every 4 hours PRN pain from chest tube site      Allergies    No Known Allergies    Vital Signs Last 24 Hrs  T(C): 37.2 (11 Feb 2018 11:00), Max: 37.2 (11 Feb 2018 11:00)  T(F): 99 (11 Feb 2018 11:00), Max: 99 (11 Feb 2018 11:00)  HR: 98 (11 Feb 2018 11:00) (84 - 100)  BP: 109/60 (11 Feb 2018 11:00) (94/56 - 109/60)  BP(mean): --  RR: 18 (11 Feb 2018 11:00) (17 - 18)  SpO2: 100% (11 Feb 2018 04:52) (98% - 100%)    PHYSICAL EXAM:    GENERAL: NAD  CHEST/LUNG: ctab   right chest tube   HEART: Regular rate and rhythm; S1 S2  ABDOMEN: Soft, Nontender, Nondistended; Bowel sounds present  EXTREMITIES: no edema.   NERVOUS SYSTEM:  Alert & Oriented X3, Good concentration; Motor Strength 5/5 B/L upper and lower extremities  PSYCH: normal mood, appropriate response.    LABS:                        12.6   8.3   )-----------( 214      ( 10 Feb 2018 07:18 )             36.3     02-10    133<L>  |  99  |  18.0  ----------------------------<  122<H>  3.8   |  25.0  |  1.19    Ca    7.9<L>      10 Feb 2018 07:18  Phos  3.2     02-10  Mg     1.9     02-10    TPro  5.1<L>  /  Alb  1.7<L>  /  TBili  0.5  /  DBili  x   /  AST  32  /  ALT  13  /  AlkPhos  128<H>  02-10          CAPILLARY BLOOD GLUCOSE      POCT Blood Glucose.: 111 mg/dL (10 Feb 2018 17:08)        RADIOLOGY & ADDITIONAL TESTS:

## 2018-02-12 LAB
ALBUMIN SERPL ELPH-MCNC: 1.5 G/DL — LOW (ref 3.3–5.2)
ALP SERPL-CCNC: 153 U/L — HIGH (ref 40–120)
ALT FLD-CCNC: 11 U/L — SIGNIFICANT CHANGE UP
ANION GAP SERPL CALC-SCNC: 11 MMOL/L — SIGNIFICANT CHANGE UP (ref 5–17)
AST SERPL-CCNC: 35 U/L — SIGNIFICANT CHANGE UP
BILIRUB SERPL-MCNC: 0.3 MG/DL — LOW (ref 0.4–2)
BUN SERPL-MCNC: 28 MG/DL — HIGH (ref 8–20)
CALCIUM SERPL-MCNC: 7.6 MG/DL — LOW (ref 8.6–10.2)
CHLORIDE SERPL-SCNC: 98 MMOL/L — SIGNIFICANT CHANGE UP (ref 98–107)
CO2 SERPL-SCNC: 24 MMOL/L — SIGNIFICANT CHANGE UP (ref 22–29)
CREAT SERPL-MCNC: 0.96 MG/DL — SIGNIFICANT CHANGE UP (ref 0.5–1.3)
GLUCOSE SERPL-MCNC: 176 MG/DL — HIGH (ref 70–115)
HCT VFR BLD CALC: 34.6 % — LOW (ref 42–52)
HGB BLD-MCNC: 11.9 G/DL — LOW (ref 14–18)
MAGNESIUM SERPL-MCNC: 1.9 MG/DL — SIGNIFICANT CHANGE UP (ref 1.8–2.6)
MCHC RBC-ENTMCNC: 31.7 PG — HIGH (ref 27–31)
MCHC RBC-ENTMCNC: 34.4 G/DL — SIGNIFICANT CHANGE UP (ref 32–36)
MCV RBC AUTO: 92.3 FL — SIGNIFICANT CHANGE UP (ref 80–94)
PLATELET # BLD AUTO: 242 K/UL — SIGNIFICANT CHANGE UP (ref 150–400)
POTASSIUM SERPL-MCNC: 3.9 MMOL/L — SIGNIFICANT CHANGE UP (ref 3.5–5.3)
POTASSIUM SERPL-SCNC: 3.9 MMOL/L — SIGNIFICANT CHANGE UP (ref 3.5–5.3)
PROT SERPL-MCNC: 4.7 G/DL — LOW (ref 6.6–8.7)
RBC # BLD: 3.75 M/UL — LOW (ref 4.6–6.2)
RBC # FLD: 15.2 % — SIGNIFICANT CHANGE UP (ref 11–15.6)
SODIUM SERPL-SCNC: 133 MMOL/L — LOW (ref 135–145)
WBC # BLD: 7.6 K/UL — SIGNIFICANT CHANGE UP (ref 4.8–10.8)
WBC # FLD AUTO: 7.6 K/UL — SIGNIFICANT CHANGE UP (ref 4.8–10.8)

## 2018-02-12 PROCEDURE — 71045 X-RAY EXAM CHEST 1 VIEW: CPT | Mod: 26

## 2018-02-12 PROCEDURE — 99232 SBSQ HOSP IP/OBS MODERATE 35: CPT

## 2018-02-12 PROCEDURE — 99233 SBSQ HOSP IP/OBS HIGH 50: CPT

## 2018-02-12 RX ADMIN — OXYCODONE HYDROCHLORIDE 5 MILLIGRAM(S): 5 TABLET ORAL at 05:53

## 2018-02-12 RX ADMIN — SPIRONOLACTONE 50 MILLIGRAM(S): 25 TABLET, FILM COATED ORAL at 17:54

## 2018-02-12 RX ADMIN — ENOXAPARIN SODIUM 70 MILLIGRAM(S): 100 INJECTION SUBCUTANEOUS at 09:05

## 2018-02-12 RX ADMIN — ENOXAPARIN SODIUM 70 MILLIGRAM(S): 100 INJECTION SUBCUTANEOUS at 21:34

## 2018-02-12 RX ADMIN — OXYCODONE HYDROCHLORIDE 5 MILLIGRAM(S): 5 TABLET ORAL at 23:24

## 2018-02-12 RX ADMIN — SPIRONOLACTONE 50 MILLIGRAM(S): 25 TABLET, FILM COATED ORAL at 05:53

## 2018-02-12 RX ADMIN — OXYCODONE HYDROCHLORIDE 5 MILLIGRAM(S): 5 TABLET ORAL at 19:50

## 2018-02-12 RX ADMIN — Medication 1 TABLET(S): at 13:29

## 2018-02-12 RX ADMIN — OXYCODONE HYDROCHLORIDE 5 MILLIGRAM(S): 5 TABLET ORAL at 19:20

## 2018-02-12 RX ADMIN — Medication 40 MILLIGRAM(S): at 09:05

## 2018-02-12 RX ADMIN — OXYCODONE HYDROCHLORIDE 5 MILLIGRAM(S): 5 TABLET ORAL at 13:57

## 2018-02-12 RX ADMIN — OXYCODONE HYDROCHLORIDE 5 MILLIGRAM(S): 5 TABLET ORAL at 06:23

## 2018-02-12 RX ADMIN — OXYCODONE HYDROCHLORIDE 5 MILLIGRAM(S): 5 TABLET ORAL at 14:30

## 2018-02-12 NOTE — PROGRESS NOTE ADULT - SUBJECTIVE AND OBJECTIVE BOX
Subjective: "It's impossible to get any sleep around here, my breathing is better just concerned because they changed the canister again."  Pt. lying in bed, NAD noted.    VITAL SIGNS  Vital Signs Last 24 Hrs  T(C): 36.9 (02-12-18 @ 05:46), Max: 37.4 (02-11-18 @ 21:25)  T(F): 98.4 (02-12-18 @ 05:46), Max: 99.3 (02-11-18 @ 21:25)  HR: 97 (02-12-18 @ 08:59) (90 - 98)  BP: 102/60 (02-12-18 @ 05:46) (90/56 - 109/60)  RR: 16 (02-12-18 @ 08:59) (16 - 18)  SpO2: 98% (02-12-18 @ 08:59) (96% - 98%)  on (O2)              Telemetry:  Sinus rhythm      MEDICATIONS  enoxaparin Injectable 70 milliGRAM(s) SubCutaneous every 12 hours  furosemide    Tablet 40 milliGRAM(s) Oral daily  lidocaine 1% Injectable 30 milliLiter(s) Local Injection once  multivitamin 1 Tablet(s) Oral daily  oxyCODONE    IR 5 milliGRAM(s) Oral every 4 hours PRN  spironolactone 50 milliGRAM(s) Oral two times a day      PHYSICAL EXAM  General: well nourished, well developed, no acute distress  Neurology: alert and oriented x 3, nonfocal, no gross deficits  Respiratory: clear to auscultation bilaterally  CV: regular rate and rhythm, normal S1, S2  Abdomen: soft, nontender, nondistended, positive bowel sounds, last bowel movement   Extremities: warm, well perfused. Right >left edema bilateranlly. + DP pulses  Chest tubes: Right chest tube to water seal, no air leak detected.    I&O's Detail    11 Feb 2018 07:01  -  12 Feb 2018 07:00  --------------------------------------------------------  IN:    Oral Fluid: 240 mL  Total IN: 240 mL    OUT:    Chest Tube: 2160 mL  Total OUT: 2160 mL    Total NET: -1920 mL          Weights:  Daily     Daily   Admit Wt: Drug Dosing Weight  Height (cm): 167.64 (08 Feb 2018 07:53)  Weight (kg): 73.5 (08 Feb 2018 07:53)  BMI (kg/m2): 26.2 (08 Feb 2018 07:53)  BSA (m2): 1.83 (08 Feb 2018 07:53)    LABS  02-12    133<L>  |  98  |  28.0<H>  ----------------------------<  176<H>  3.9   |  24.0  |  0.96    Ca    7.6<L>      12 Feb 2018 05:50  Mg     1.9     02-12    TPro  4.7<L>  /  Alb  1.5<L>  /  TBili  0.3<L>  /  DBili  x   /  AST  35  /  ALT  11  /  AlkPhos  153<H>  02-12                                 11.9   7.6   )-----------( 242      ( 12 Feb 2018 05:45 )             34.6                Bilirubin Total, Serum: 0.3 mg/dL (02-12 @ 05:50)    CAPILLARY BLOOD GLUCOSE               Today's CXR:< from: Xray Chest 1 View AP/PA. (02.12.18 @ 04:53) >  EXAM:  XR CHEST AP OR PA 1V                          PROCEDURE DATE:  02/12/2018          INTERPRETATION:  History: Right pleural effusion. Follow-up    Technique:  AP portable    Comparisons:  Chest x-ray dated 2/11/2018    Findings:     No acuteinfiltrates, congestion or pleural effusions  .   Pigtail catheter unchanged in the right lower lung field. Subsegmental   discoid atelectasis left lower lobe unchanged.  The pulmonary vasculature   and aorta are normal for age. Heart size is unremarkable.     The thorax is normal for age.    Impression: No significant pleural effusions. Subsegmental ectasis left   lower lobe unchanged    < end of copied text >  PAST MEDICAL & SURGICAL HISTORY:  DM (diabetes mellitus)  No significant past surgical history         PLAN  Neuro: Pain management  Pulm: Encourage coughing, deep breathing and use of incentive spirometry. Wean off supplemental oxygen as able. Daily CXR.   Cardio: Continue Aspirin, Lipitor. (BB)  GI: Tolerating diet. Continue stool softeners.  Renal: Keep negative fluid balance. (Diuretics) Trend BUN/Cr. Supplement electrolytes prn.   :   Vasc: Lovenox/SCDs for DVT prophylaxis  Heme: Stable H/H. Trend CBC daily.   Endo:  ID: Off antibiotics. Stable.  Therapy: PT daily as tolerated.  Tubes/Wires:   Disposition: Aim to D/C to home on  Discussed with Cardiothoracic Team at AM rounds.

## 2018-02-12 NOTE — PROGRESS NOTE ADULT - PROBLEM SELECTOR PLAN 1
Maintain right chest tube to water seal, repeat chest xray in am.  Effusion likely from cirrhosis.  Continue Oxy IR for pain control.  Discussed with Dr. Saez & CT surgery team in AM rounds.

## 2018-02-12 NOTE — PROGRESS NOTE ADULT - SUBJECTIVE AND OBJECTIVE BOX
seen for pleural effusion    no acute complaints  ROS negative  intermittent pain at chest tube site.    MEDICATIONS  (STANDING):  enoxaparin Injectable 70 milliGRAM(s) SubCutaneous every 12 hours  furosemide    Tablet 40 milliGRAM(s) Oral daily  lidocaine 1% Injectable 30 milliLiter(s) Local Injection once  multivitamin 1 Tablet(s) Oral daily  spironolactone 50 milliGRAM(s) Oral two times a day    MEDICATIONS  (PRN):  oxyCODONE    IR 5 milliGRAM(s) Oral every 4 hours PRN pain from chest tube site      Allergies    No Known Allergies    Vital Signs Last 24 Hrs  T(C): 36.8 (12 Feb 2018 10:02), Max: 37.4 (11 Feb 2018 21:25)  T(F): 98.2 (12 Feb 2018 10:02), Max: 99.3 (11 Feb 2018 21:25)  HR: 100 (12 Feb 2018 10:51) (90 - 106)  BP: 96/62 (12 Feb 2018 10:51) (90/56 - 106/72)  BP(mean): --  RR: 16 (12 Feb 2018 10:51) (16 - 18)  SpO2: 98% (12 Feb 2018 10:51) (96% - 99%)    PHYSICAL EXAM:    GENERAL: NAD  CHEST/LUNG: Clear to percussion bilaterally  HEART: Regular rate and rhythm; S1 S  ABDOMEN: Soft, Nontender, Nondistended; Bowel sounds present  EXTREMITIES: no edema.  NERVOUS SYSTEM:  Alert & Oriented X3, nonfocal    LABS:                        11.9   7.6   )-----------( 242      ( 12 Feb 2018 05:45 )             34.6     02-12    133<L>  |  98  |  28.0<H>  ----------------------------<  176<H>  3.9   |  24.0  |  0.96    Ca    7.6<L>      12 Feb 2018 05:50  Mg     1.9     02-12    TPro  4.7<L>  /  Alb  1.5<L>  /  TBili  0.3<L>  /  DBili  x   /  AST  35  /  ALT  11  /  AlkPhos  153<H>  02-12          CAPILLARY BLOOD GLUCOSE            RADIOLOGY & ADDITIONAL TESTS:

## 2018-02-13 LAB
GLUCOSE BLDC GLUCOMTR-MCNC: 121 MG/DL — HIGH (ref 70–99)
GLUCOSE BLDC GLUCOMTR-MCNC: 137 MG/DL — HIGH (ref 70–99)

## 2018-02-13 PROCEDURE — 99233 SBSQ HOSP IP/OBS HIGH 50: CPT

## 2018-02-13 PROCEDURE — 71045 X-RAY EXAM CHEST 1 VIEW: CPT | Mod: 26,77

## 2018-02-13 PROCEDURE — 71045 X-RAY EXAM CHEST 1 VIEW: CPT | Mod: 26

## 2018-02-13 RX ADMIN — OXYCODONE HYDROCHLORIDE 5 MILLIGRAM(S): 5 TABLET ORAL at 21:33

## 2018-02-13 RX ADMIN — OXYCODONE HYDROCHLORIDE 5 MILLIGRAM(S): 5 TABLET ORAL at 10:45

## 2018-02-13 RX ADMIN — ENOXAPARIN SODIUM 70 MILLIGRAM(S): 100 INJECTION SUBCUTANEOUS at 10:46

## 2018-02-13 RX ADMIN — OXYCODONE HYDROCHLORIDE 5 MILLIGRAM(S): 5 TABLET ORAL at 22:00

## 2018-02-13 RX ADMIN — Medication 1 TABLET(S): at 10:46

## 2018-02-13 RX ADMIN — OXYCODONE HYDROCHLORIDE 5 MILLIGRAM(S): 5 TABLET ORAL at 06:01

## 2018-02-13 RX ADMIN — OXYCODONE HYDROCHLORIDE 5 MILLIGRAM(S): 5 TABLET ORAL at 00:00

## 2018-02-13 RX ADMIN — SPIRONOLACTONE 50 MILLIGRAM(S): 25 TABLET, FILM COATED ORAL at 18:21

## 2018-02-13 RX ADMIN — OXYCODONE HYDROCHLORIDE 5 MILLIGRAM(S): 5 TABLET ORAL at 07:16

## 2018-02-13 RX ADMIN — SPIRONOLACTONE 50 MILLIGRAM(S): 25 TABLET, FILM COATED ORAL at 06:01

## 2018-02-13 RX ADMIN — OXYCODONE HYDROCHLORIDE 5 MILLIGRAM(S): 5 TABLET ORAL at 11:15

## 2018-02-13 RX ADMIN — Medication 40 MILLIGRAM(S): at 10:47

## 2018-02-13 RX ADMIN — OXYCODONE HYDROCHLORIDE 5 MILLIGRAM(S): 5 TABLET ORAL at 15:30

## 2018-02-13 RX ADMIN — OXYCODONE HYDROCHLORIDE 5 MILLIGRAM(S): 5 TABLET ORAL at 16:00

## 2018-02-13 NOTE — PROGRESS NOTE ADULT - SUBJECTIVE AND OBJECTIVE BOX
seen for pleural effusion    no acute complaints  ROS negative    MEDICATIONS  (STANDING):  enoxaparin Injectable 70 milliGRAM(s) SubCutaneous every 12 hours  furosemide    Tablet 40 milliGRAM(s) Oral daily  lidocaine 1% Injectable 30 milliLiter(s) Local Injection once  multivitamin 1 Tablet(s) Oral daily  spironolactone 50 milliGRAM(s) Oral two times a day    MEDICATIONS  (PRN):  oxyCODONE    IR 5 milliGRAM(s) Oral every 4 hours PRN pain from chest tube site      Allergies    No Known Allergies    Vital Signs Last 24 Hrs  T(C): 36.7 (13 Feb 2018 10:30), Max: 36.7 (12 Feb 2018 15:45)  T(F): 98 (13 Feb 2018 10:30), Max: 98 (12 Feb 2018 15:45)  HR: 89 (13 Feb 2018 10:30) (89 - 98)  BP: 105/60 (13 Feb 2018 10:30) (98/66 - 107/79)  BP(mean): --  RR: 18 (13 Feb 2018 10:30) (16 - 18)  SpO2: 98% (13 Feb 2018 10:30) (96% - 98%)    PHYSICAL EXAM:    GENERAL: NAD  CHEST/LUNG: ctab  right chest tube   HEART: Regular rate and rhythm; S1 S2  ABDOMEN: Soft, Nontender, Nondistended; Bowel sounds present  EXTREMITIES:  no edema  NERVOUS SYSTEM:  Alert & Oriented X3, nonfocal    LABS:                        11.9   7.6   )-----------( 242      ( 12 Feb 2018 05:45 )             34.6     02-12    133<L>  |  98  |  28.0<H>  ----------------------------<  176<H>  3.9   |  24.0  |  0.96    Ca    7.6<L>      12 Feb 2018 05:50  Mg     1.9     02-12    TPro  4.7<L>  /  Alb  1.5<L>  /  TBili  0.3<L>  /  DBili  x   /  AST  35  /  ALT  11  /  AlkPhos  153<H>  02-12          CAPILLARY BLOOD GLUCOSE      POCT Blood Glucose.: 121 mg/dL (13 Feb 2018 07:57)        RADIOLOGY & ADDITIONAL TESTS:

## 2018-02-13 NOTE — DIETITIAN INITIAL EVALUATION ADULT. - OTHER INFO
Pt reports good po intake at meals.  Pt with consistent po intake at home, but with wt loss pta due fluid loss after starting on diuretics.  2+ edema noted on b/l LE.  Pt states his UBW is 160-162#.  Reviewed cons cho, low na meal plan- pt with good understanding.

## 2018-02-13 NOTE — PROGRESS NOTE ADULT - SUBJECTIVE AND OBJECTIVE BOX
INTERVAL HPI/OVERNIGHT EVENTS:    MEDICATIONS  (STANDING):  enoxaparin Injectable 70 milliGRAM(s) SubCutaneous every 12 hours  furosemide    Tablet 40 milliGRAM(s) Oral daily  lidocaine 1% Injectable 30 milliLiter(s) Local Injection once  multivitamin 1 Tablet(s) Oral daily  spironolactone 50 milliGRAM(s) Oral two times a day    MEDICATIONS  (PRN):  oxyCODONE    IR 5 milliGRAM(s) Oral every 4 hours PRN pain from chest tube site      Allergies    No Known Allergies    Intolerances        Vital Signs Last 24 Hrs  T(C): 36.7 (13 Feb 2018 05:45), Max: 36.8 (12 Feb 2018 10:02)  T(F): 98 (13 Feb 2018 05:45), Max: 98.2 (12 Feb 2018 10:02)  HR: 98 (13 Feb 2018 05:45) (90 - 106)  BP: 98/66 (13 Feb 2018 05:45) (91/52 - 107/79)  BP(mean): --  RR: 18 (13 Feb 2018 05:45) (16 - 18)  SpO2: 96% (13 Feb 2018 05:45) (96% - 99%)    LABS:                        11.9   7.6   )-----------( 242      ( 12 Feb 2018 05:45 )             34.6     02-12    133<L>  |  98  |  28.0<H>  ----------------------------<  176<H>  3.9   |  24.0  |  0.96    Ca    7.6<L>      12 Feb 2018 05:50  Mg     1.9     02-12    TPro  4.7<L>  /  Alb  1.5<L>  /  TBili  0.3<L>  /  DBili  x   /  AST  35  /  ALT  11  /  AlkPhos  153<H>  02-12          RADIOLOGY & ADDITIONAL TESTS: INTERVAL HPI/OVERNIGHT EVENTS: FU for right sided pleural effusion, ? cirrhosis. There is no evidence of liver echotexture abnormality. Work up was performed in [facility]. He has right sided chest tube placed. He is on spironolactone and lasix.     MEDICATIONS  (STANDING):  enoxaparin Injectable 70 milliGRAM(s) SubCutaneous every 12 hours  furosemide    Tablet 40 milliGRAM(s) Oral daily  lidocaine 1% Injectable 30 milliLiter(s) Local Injection once  multivitamin 1 Tablet(s) Oral daily  spironolactone 50 milliGRAM(s) Oral two times a day    MEDICATIONS  (PRN):  oxyCODONE    IR 5 milliGRAM(s) Oral every 4 hours PRN pain from chest tube site      Allergies    No Known Allergies    Intolerances        Vital Signs Last 24 Hrs  T(C): 36.7 (13 Feb 2018 05:45), Max: 36.8 (12 Feb 2018 10:02)  T(F): 98 (13 Feb 2018 05:45), Max: 98.2 (12 Feb 2018 10:02)  HR: 98 (13 Feb 2018 05:45) (90 - 106)  BP: 98/66 (13 Feb 2018 05:45) (91/52 - 107/79)  BP(mean): --  RR: 18 (13 Feb 2018 05:45) (16 - 18)  SpO2: 96% (13 Feb 2018 05:45) (96% - 99%)    LABS:                        11.9   7.6   )-----------( 242      ( 12 Feb 2018 05:45 )             34.6     02-12    133<L>  |  98  |  28.0<H>  ----------------------------<  176<H>  3.9   |  24.0  |  0.96    Ca    7.6<L>      12 Feb 2018 05:50  Mg     1.9     02-12    TPro  4.7<L>  /  Alb  1.5<L>  /  TBili  0.3<L>  /  DBili  x   /  AST  35  /  ALT  11  /  AlkPhos  153<H>  02-12      < from: TTE Echo Complete w/Doppler (02.09.18 @ 11:34) >  Summary:   1. Follow-up study to assess for pericardial effusion. See previous   studies for comparison.   2. Small to moderate pericardial effusion.   3. Normal global left ventricular systolic function.   4. Moderately enlarged right ventricle.   5. Mild ascites.    < end of copied text >      RADIOLOGY & ADDITIONAL TESTS:< from: Xray Chest 1 View-PORTABLE IMMEDIATE (02.13.18 @ 09:01) >  FINDINGS/  IMPRESSION:    Small right-sided pneumothorax and subcutaneous emphysema unchanged.   Right basilar pigtail catheter in place.  Cardiac and mediastinal structures are stable.    < end of copied text >

## 2018-02-13 NOTE — PROGRESS NOTE ADULT - SUBJECTIVE AND OBJECTIVE BOX
After discussion with the patient, IR attending, Dr Singh, if chest tube can be removed and when lovenox is held for 12 hours, liver biopsy can be performed for assessing the liver disease. Also, liver disease work up was requested from [facility]. Patient is agreeable for the procedure.

## 2018-02-14 ENCOUNTER — RESULT REVIEW (OUTPATIENT)
Age: 63
End: 2018-02-14

## 2018-02-14 LAB
-  AMPICILLIN/SULBACTAM: SIGNIFICANT CHANGE UP
-  CEFAZOLIN: SIGNIFICANT CHANGE UP
-  CIPROFLOXACIN: SIGNIFICANT CHANGE UP
-  CLINDAMYCIN: SIGNIFICANT CHANGE UP
-  ERYTHROMYCIN: SIGNIFICANT CHANGE UP
-  GENTAMICIN: SIGNIFICANT CHANGE UP
-  LEVOFLOXACIN: SIGNIFICANT CHANGE UP
-  MOXIFLOXACIN(AEROBIC): SIGNIFICANT CHANGE UP
-  OXACILLIN: SIGNIFICANT CHANGE UP
-  RIFAMPIN: SIGNIFICANT CHANGE UP
-  TETRACYCLINE: SIGNIFICANT CHANGE UP
-  TRIMETHOPRIM/SULFAMETHOXAZOLE: SIGNIFICANT CHANGE UP
-  VANCOMYCIN: SIGNIFICANT CHANGE UP
ALBUMIN SERPL ELPH-MCNC: 1.8 G/DL — LOW (ref 3.3–5.2)
ALP SERPL-CCNC: 152 U/L — HIGH (ref 40–120)
ALT FLD-CCNC: 16 U/L — SIGNIFICANT CHANGE UP
ANION GAP SERPL CALC-SCNC: 9 MMOL/L — SIGNIFICANT CHANGE UP (ref 5–17)
AST SERPL-CCNC: 43 U/L — HIGH
BILIRUB SERPL-MCNC: <0.2 MG/DL — LOW (ref 0.4–2)
BUN SERPL-MCNC: 40 MG/DL — HIGH (ref 8–20)
CALCIUM SERPL-MCNC: 8 MG/DL — LOW (ref 8.6–10.2)
CHLORIDE SERPL-SCNC: 96 MMOL/L — LOW (ref 98–107)
CO2 SERPL-SCNC: 26 MMOL/L — SIGNIFICANT CHANGE UP (ref 22–29)
CREAT SERPL-MCNC: 0.97 MG/DL — SIGNIFICANT CHANGE UP (ref 0.5–1.3)
CULTURE RESULTS: SIGNIFICANT CHANGE UP
GLUCOSE BLDC GLUCOMTR-MCNC: 206 MG/DL — HIGH (ref 70–99)
GLUCOSE SERPL-MCNC: 117 MG/DL — HIGH (ref 70–115)
HCT VFR BLD CALC: 34.9 % — LOW (ref 42–52)
HGB BLD-MCNC: 11.9 G/DL — LOW (ref 14–18)
INR BLD: 1.1 RATIO — SIGNIFICANT CHANGE UP (ref 0.88–1.16)
MCHC RBC-ENTMCNC: 31.7 PG — HIGH (ref 27–31)
MCHC RBC-ENTMCNC: 34.1 G/DL — SIGNIFICANT CHANGE UP (ref 32–36)
MCV RBC AUTO: 93.1 FL — SIGNIFICANT CHANGE UP (ref 80–94)
METHOD TYPE: SIGNIFICANT CHANGE UP
ORGANISM # SPEC MICROSCOPIC CNT: SIGNIFICANT CHANGE UP
ORGANISM # SPEC MICROSCOPIC CNT: SIGNIFICANT CHANGE UP
PLATELET # BLD AUTO: 296 K/UL — SIGNIFICANT CHANGE UP (ref 150–400)
POTASSIUM SERPL-MCNC: 4.8 MMOL/L — SIGNIFICANT CHANGE UP (ref 3.5–5.3)
POTASSIUM SERPL-SCNC: 4.8 MMOL/L — SIGNIFICANT CHANGE UP (ref 3.5–5.3)
PROT SERPL-MCNC: 4.9 G/DL — LOW (ref 6.6–8.7)
PROTHROM AB SERPL-ACNC: 12.1 SEC — SIGNIFICANT CHANGE UP (ref 9.8–12.7)
RBC # BLD: 3.75 M/UL — LOW (ref 4.6–6.2)
RBC # FLD: 15.2 % — SIGNIFICANT CHANGE UP (ref 11–15.6)
SODIUM SERPL-SCNC: 131 MMOL/L — LOW (ref 135–145)
SPECIMEN SOURCE: SIGNIFICANT CHANGE UP
WBC # BLD: 7.4 K/UL — SIGNIFICANT CHANGE UP (ref 4.8–10.8)
WBC # FLD AUTO: 7.4 K/UL — SIGNIFICANT CHANGE UP (ref 4.8–10.8)

## 2018-02-14 PROCEDURE — 47000 NEEDLE BIOPSY OF LIVER PERQ: CPT

## 2018-02-14 PROCEDURE — 99233 SBSQ HOSP IP/OBS HIGH 50: CPT

## 2018-02-14 PROCEDURE — 76942 ECHO GUIDE FOR BIOPSY: CPT | Mod: 26

## 2018-02-14 PROCEDURE — 88313 SPECIAL STAINS GROUP 2: CPT | Mod: 26

## 2018-02-14 PROCEDURE — 88307 TISSUE EXAM BY PATHOLOGIST: CPT | Mod: 26

## 2018-02-14 RX ORDER — DABIGATRAN ETEXILATE MESYLATE 150 MG/1
1 CAPSULE ORAL
Qty: 60 | Refills: 0 | OUTPATIENT
Start: 2018-02-14 | End: 2018-03-15

## 2018-02-14 RX ADMIN — OXYCODONE HYDROCHLORIDE 5 MILLIGRAM(S): 5 TABLET ORAL at 19:22

## 2018-02-14 RX ADMIN — Medication 1 TABLET(S): at 17:42

## 2018-02-14 RX ADMIN — Medication 40 MILLIGRAM(S): at 05:22

## 2018-02-14 RX ADMIN — OXYCODONE HYDROCHLORIDE 5 MILLIGRAM(S): 5 TABLET ORAL at 20:22

## 2018-02-14 RX ADMIN — SPIRONOLACTONE 50 MILLIGRAM(S): 25 TABLET, FILM COATED ORAL at 17:42

## 2018-02-14 RX ADMIN — SPIRONOLACTONE 50 MILLIGRAM(S): 25 TABLET, FILM COATED ORAL at 05:22

## 2018-02-14 NOTE — PROGRESS NOTE ADULT - SUBJECTIVE AND OBJECTIVE BOX
seen for PE/pleural effusion    no acute complaints  post liver biopsy  ROS negative     MEDICATIONS  (STANDING):  furosemide    Tablet 40 milliGRAM(s) Oral daily  lidocaine 1% Injectable 30 milliLiter(s) Local Injection once  multivitamin 1 Tablet(s) Oral daily  spironolactone 50 milliGRAM(s) Oral two times a day    MEDICATIONS  (PRN):  oxyCODONE    IR 5 milliGRAM(s) Oral every 4 hours PRN pain from chest tube site      Allergies    No Known Allergies      Vital Signs Last 24 Hrs  T(C): 36.8 (14 Feb 2018 10:25), Max: 36.8 (14 Feb 2018 04:47)  T(F): 98.3 (14 Feb 2018 10:25), Max: 98.3 (14 Feb 2018 10:25)  HR: 90 (14 Feb 2018 09:30) (90 - 94)  BP: 95/65 (14 Feb 2018 10:25) (95/65 - 109/60)  BP(mean): --  RR: 18 (14 Feb 2018 10:25) (18 - 18)  SpO2: 97% (14 Feb 2018 10:25) (97% - 100%)    PHYSICAL EXAM:    GENERAL: NAD  CHEST/LUNG: Clear to percussion bilaterally;  HEART: Regular rate and rhythm; S1 S2; no murmurs noted  ABDOMEN: Soft, Nontender, Nondistended; Bowel sounds present  EXTREMITIES:  no edema  NERVOUS SYSTEM:  Alert & Oriented X3, Good concentration; Motor Strength 5/5 B/L upper and lower extremities  PSYCH: normal mood, appropriate response.    LABS:                        11.9   7.4   )-----------( 296      ( 14 Feb 2018 05:58 )             34.9     02-14    131<L>  |  96<L>  |  40.0<H>  ----------------------------<  117<H>  4.8   |  26.0  |  0.97    Ca    8.0<L>      14 Feb 2018 05:58    TPro  4.9<L>  /  Alb  1.8<L>  /  TBili  <0.2<L>  /  DBili  x   /  AST  43<H>  /  ALT  16  /  AlkPhos  152<H>  02-14    PT/INR - ( 14 Feb 2018 05:58 )   PT: 12.1 sec;   INR: 1.10 ratio               CAPILLARY BLOOD GLUCOSE      POCT Blood Glucose.: 137 mg/dL (13 Feb 2018 12:35)        RADIOLOGY & ADDITIONAL TESTS:

## 2018-02-14 NOTE — BRIEF OPERATIVE NOTE - OPERATION/FINDINGS
1) Echogenic hepatic parenchyma on pre-US  2) Access L lobe of liver via avascular place with 17G guide needle  3) 18G core x 3 placed in formalin  4) Gelfoam tract embolization under US guidance  5) No evidence of immediate post procedure hematoma on US

## 2018-02-14 NOTE — PROGRESS NOTE ADULT - PROBLEM SELECTOR PLAN 2
CTA chest reviewed - no right heart strain  dc lovenox   NO ac x 48 hrs post biopsy  check cost of pradaxa (reversible),  if not covered transition to coumadincoumadin

## 2018-02-14 NOTE — PROGRESS NOTE ADULT - SUBJECTIVE AND OBJECTIVE BOX
INTERVAL HPI/OVERNIGHT EVENTS: Patient s/p chest tube removal. Scheduled for IR guided liver biopsy. No particular complaints. Discussed the liver biopsy procedure with the patient.     MEDICATIONS  (STANDING):  furosemide    Tablet 40 milliGRAM(s) Oral daily  lidocaine 1% Injectable 30 milliLiter(s) Local Injection once  multivitamin 1 Tablet(s) Oral daily  spironolactone 50 milliGRAM(s) Oral two times a day    MEDICATIONS  (PRN):  oxyCODONE    IR 5 milliGRAM(s) Oral every 4 hours PRN pain from chest tube site      Allergies    No Known Allergies    Intolerances        Vital Signs Last 24 Hrs  T(C): 36.8 (14 Feb 2018 04:47), Max: 36.8 (14 Feb 2018 04:47)  T(F): 98.2 (14 Feb 2018 04:47), Max: 98.2 (14 Feb 2018 04:47)  HR: 90 (14 Feb 2018 04:47) (89 - 94)  BP: 109/60 (14 Feb 2018 04:47) (100/69 - 109/60)  BP(mean): --  RR: 18 (14 Feb 2018 04:47) (18 - 18)  SpO2: 100% (14 Feb 2018 04:47) (97% - 100%)    LABS:                        11.9   7.4   )-----------( 296      ( 14 Feb 2018 05:58 )             34.9     02-14    131<L>  |  96<L>  |  40.0<H>  ----------------------------<  117<H>  4.8   |  26.0  |  0.97    Ca    8.0<L>      14 Feb 2018 05:58    TPro  4.9<L>  /  Alb  1.8<L>  /  TBili  <0.2<L>  /  DBili  x   /  AST  43<H>  /  ALT  16  /  AlkPhos  152<H>  02-14    PT/INR - ( 14 Feb 2018 05:58 )   PT: 12.1 sec;   INR: 1.10 ratio               RADIOLOGY & ADDITIONAL TESTS:  < from: Xray Chest 1 View-PORTABLE IMMEDIATE (02.13.18 @ 09:01) >  FINDINGS/  IMPRESSION:    Small right-sided pneumothorax and subcutaneous emphysema unchanged.   Right basilar pigtail catheter in place.  Cardiac and mediastinal structures are stable.    < end of copied text >

## 2018-02-14 NOTE — CHART NOTE - NSCHARTNOTEFT_GEN_A_CORE
Thoracic note    61 y/o M with h/o recurrent Right side plural effusions secondary to cirrhosis.  2/8 s/p Pigtail placement for effusion (2.2 liters initially). over 4 liters total.  Pigtail removed yesterday with  a small PTX.    Pt is stable.  There is a still a small PTX noted on CXR today.  recommend Repeat CXR tomorrow.    will sign off for now.  Please call us for questions or concerns

## 2018-02-14 NOTE — BRIEF OPERATIVE NOTE - PROCEDURE
<<-----Click on this checkbox to enter Procedure Liver biopsy, percutaneous needle  02/14/2018    Active  EUGENIO

## 2018-02-15 LAB
ANION GAP SERPL CALC-SCNC: 8 MMOL/L — SIGNIFICANT CHANGE UP (ref 5–17)
BUN SERPL-MCNC: 41 MG/DL — HIGH (ref 8–20)
CALCIUM SERPL-MCNC: 8.4 MG/DL — LOW (ref 8.6–10.2)
CHLORIDE SERPL-SCNC: 95 MMOL/L — LOW (ref 98–107)
CO2 SERPL-SCNC: 28 MMOL/L — SIGNIFICANT CHANGE UP (ref 22–29)
CREAT SERPL-MCNC: 0.84 MG/DL — SIGNIFICANT CHANGE UP (ref 0.5–1.3)
GLUCOSE SERPL-MCNC: 124 MG/DL — HIGH (ref 70–115)
HCT VFR BLD CALC: 38.4 % — LOW (ref 42–52)
HGB BLD-MCNC: 13.2 G/DL — LOW (ref 14–18)
MCHC RBC-ENTMCNC: 32.4 PG — HIGH (ref 27–31)
MCHC RBC-ENTMCNC: 34.4 G/DL — SIGNIFICANT CHANGE UP (ref 32–36)
MCV RBC AUTO: 94.1 FL — HIGH (ref 80–94)
NON-GYN CYTOLOGY SPEC: SIGNIFICANT CHANGE UP
PLATELET # BLD AUTO: 301 K/UL — SIGNIFICANT CHANGE UP (ref 150–400)
POTASSIUM SERPL-MCNC: 5 MMOL/L — SIGNIFICANT CHANGE UP (ref 3.5–5.3)
POTASSIUM SERPL-SCNC: 5 MMOL/L — SIGNIFICANT CHANGE UP (ref 3.5–5.3)
RBC # BLD: 4.08 M/UL — LOW (ref 4.6–6.2)
RBC # FLD: 15.4 % — SIGNIFICANT CHANGE UP (ref 11–15.6)
SODIUM SERPL-SCNC: 131 MMOL/L — LOW (ref 135–145)
WBC # BLD: 7.2 K/UL — SIGNIFICANT CHANGE UP (ref 4.8–10.8)
WBC # FLD AUTO: 7.2 K/UL — SIGNIFICANT CHANGE UP (ref 4.8–10.8)

## 2018-02-15 PROCEDURE — 99233 SBSQ HOSP IP/OBS HIGH 50: CPT

## 2018-02-15 PROCEDURE — 71045 X-RAY EXAM CHEST 1 VIEW: CPT | Mod: 26

## 2018-02-15 RX ORDER — DABIGATRAN ETEXILATE MESYLATE 150 MG/1
1 CAPSULE ORAL
Qty: 60 | Refills: 0 | OUTPATIENT
Start: 2018-02-15 | End: 2018-03-16

## 2018-02-15 RX ORDER — ALBUMIN HUMAN 25 %
50 VIAL (ML) INTRAVENOUS EVERY 6 HOURS
Qty: 0 | Refills: 0 | Status: COMPLETED | OUTPATIENT
Start: 2018-02-15 | End: 2018-02-16

## 2018-02-15 RX ADMIN — Medication 50 MILLILITER(S): at 12:43

## 2018-02-15 RX ADMIN — Medication 1 TABLET(S): at 11:49

## 2018-02-15 RX ADMIN — OXYCODONE HYDROCHLORIDE 5 MILLIGRAM(S): 5 TABLET ORAL at 06:30

## 2018-02-15 RX ADMIN — Medication 50 MILLILITER(S): at 23:13

## 2018-02-15 RX ADMIN — OXYCODONE HYDROCHLORIDE 5 MILLIGRAM(S): 5 TABLET ORAL at 22:04

## 2018-02-15 RX ADMIN — OXYCODONE HYDROCHLORIDE 5 MILLIGRAM(S): 5 TABLET ORAL at 05:44

## 2018-02-15 RX ADMIN — SPIRONOLACTONE 50 MILLIGRAM(S): 25 TABLET, FILM COATED ORAL at 05:46

## 2018-02-15 RX ADMIN — Medication 50 MILLILITER(S): at 17:24

## 2018-02-15 RX ADMIN — OXYCODONE HYDROCHLORIDE 5 MILLIGRAM(S): 5 TABLET ORAL at 21:25

## 2018-02-15 RX ADMIN — Medication 40 MILLIGRAM(S): at 05:46

## 2018-02-15 RX ADMIN — SPIRONOLACTONE 50 MILLIGRAM(S): 25 TABLET, FILM COATED ORAL at 17:25

## 2018-02-15 NOTE — PROGRESS NOTE ADULT - SUBJECTIVE AND OBJECTIVE BOX
Patient seen and examined at bedside. No acute events overnight. Patient states she has minimal epigastric abdominal tenderness.    Vital Signs Last 24 Hrs  T(C): 36.4, Max: 36.8 (04-29 @ 08:53)  T(F): 97.5, Max: 98.2 (04-29 @ 08:53)  HR: 66 (62 - 67)  BP: 151/82 (150/60 - 169/85)  BP(mean): --  RR: 18 (18 - 18)  SpO2: 96% (95% - 97%)      PHYSICAL EXAM:  Constitutional: Patient well nourish. well developed.  Eyes:  PERRL, EOMI, Conjunctiva clear.  ENMT:  WNL  Neck:  Supple.  Respiratory:  Lungs CTA, B/L, no rales , no wheezing, no rhonchi.  Cardiovascular:  S1, S2, RRR  Gastrointestinal: Abdomen soft, non distended, + BS, minimal epigastric tenderness  Extremities:  No edema, no calf tenderrness,  Neurological: AxAxOx3    MEDICATIONS  (STANDING):  levothyroxine 50MICROGram(s) Oral daily  pantoprazole    Tablet 40milliGRAM(s) Oral before breakfast  multivitamin 1Tablet(s) Oral daily  flecainide 50milliGRAM(s) Oral two times a day  lactobacillus acidophilus 1Tablet(s) Oral two times a day with meals  metoprolol succinate ER 50milliGRAM(s) Oral daily  ertapenem  IVPB 1000milliGRAM(s) IV Intermittent every 24 hours  amLODIPine   Tablet 2.5milliGRAM(s) Oral daily    MEDICATIONS  (PRN):  acetaminophen   Tablet 650milliGRAM(s) Oral every 6 hours PRN For Temp greater than 38 C (100.4 F)  hydrALAZINE Injectable 10milliGRAM(s) IV Push every 6 hours PRN SBP>180, DBP>95      LABS:    04-29    140  |  99  |  11.0  ----------------------------<  94  4.0   |  29.0  |  0.47<L>    Ca    9.2      29 Apr 2017 07:59    TPro  6.7  /  Alb  3.2<L>  /  TBili  2.0  /  DBili  x   /  AST  130<H>  /  ALT  108<H>  /  AlkPhos  656<H>  04-29    PT/INR - ( 30 Apr 2017 07:54 )   PT: 26.2 sec;   INR: 2.34 ratio INTERVAL HPI/OVERNIGHT EVENTS: FU for recurrent pleural effusion.     MEDICATIONS  (STANDING):  albumin human 25% IVPB 50 milliLiter(s) IV Intermittent every 6 hours  furosemide    Tablet 40 milliGRAM(s) Oral daily  lidocaine 1% Injectable 30 milliLiter(s) Local Injection once  multivitamin 1 Tablet(s) Oral daily  spironolactone 50 milliGRAM(s) Oral two times a day    MEDICATIONS  (PRN):  oxyCODONE    IR 5 milliGRAM(s) Oral every 4 hours PRN pain from chest tube site      Allergies    No Known Allergies    Intolerances        Vital Signs Last 24 Hrs  T(C): 36.8 (15 Feb 2018 08:47), Max: 36.9 (14 Feb 2018 20:30)  T(F): 98.2 (15 Feb 2018 08:47), Max: 98.5 (14 Feb 2018 20:30)  HR: 87 (15 Feb 2018 08:47) (86 - 92)  BP: 101/69 (15 Feb 2018 08:47) (99/67 - 106/68)  BP(mean): --  RR: 18 (15 Feb 2018 08:47) (16 - 18)  SpO2: 96% (15 Feb 2018 08:47) (94% - 97%)    LABS:                        13.2   7.2   )-----------( 301      ( 15 Feb 2018 08:18 )             38.4     02-15    131<L>  |  95<L>  |  41.0<H>  ----------------------------<  124<H>  5.0   |  28.0  |  0.84    Ca    8.4<L>      15 Feb 2018 08:18    TPro  4.9<L>  /  Alb  1.8<L>  /  TBili  <0.2<L>  /  DBili  x   /  AST  43<H>  /  ALT  16  /  AlkPhos  152<H>  02-14    PT/INR - ( 14 Feb 2018 05:58 )   PT: 12.1 sec;   INR: 1.10 ratio               RADIOLOGY & ADDITIONAL TESTS: INTERVAL HPI/OVERNIGHT EVENTS: FU for recurrent pleural effusion. s/p liver biopsy yesterday. Does feel short of breath    MEDICATIONS  (STANDING):  albumin human 25% IVPB 50 milliLiter(s) IV Intermittent every 6 hours  furosemide    Tablet 40 milliGRAM(s) Oral daily  lidocaine 1% Injectable 30 milliLiter(s) Local Injection once  multivitamin 1 Tablet(s) Oral daily  spironolactone 50 milliGRAM(s) Oral two times a day    MEDICATIONS  (PRN):  oxyCODONE    IR 5 milliGRAM(s) Oral every 4 hours PRN pain from chest tube site      Allergies    No Known Allergies    Intolerances        Vital Signs Last 24 Hrs  T(C): 36.8 (15 Feb 2018 08:47), Max: 36.9 (14 Feb 2018 20:30)  T(F): 98.2 (15 Feb 2018 08:47), Max: 98.5 (14 Feb 2018 20:30)  HR: 87 (15 Feb 2018 08:47) (86 - 92)  BP: 101/69 (15 Feb 2018 08:47) (99/67 - 106/68)  BP(mean): --  RR: 18 (15 Feb 2018 08:47) (16 - 18)  SpO2: 96% (15 Feb 2018 08:47) (94% - 97%)    LABS:                        13.2   7.2   )-----------( 301      ( 15 Feb 2018 08:18 )             38.4     02-15    131<L>  |  95<L>  |  41.0<H>  ----------------------------<  124<H>  5.0   |  28.0  |  0.84    Ca    8.4<L>      15 Feb 2018 08:18    TPro  4.9<L>  /  Alb  1.8<L>  /  TBili  <0.2<L>  /  DBili  x   /  AST  43<H>  /  ALT  16  /  AlkPhos  152<H>  02-14    PT/INR - ( 14 Feb 2018 05:58 )   PT: 12.1 sec;   INR: 1.10 ratio               RADIOLOGY & ADDITIONAL TESTS: INTERVAL HPI/OVERNIGHT EVENTS: FU for recurrent pleural effusion. s/p liver biopsy yesterday. Does feel short of breath. Chest x ray confirms pleural effusion.    MEDICATIONS  (STANDING):  albumin human 25% IVPB 50 milliLiter(s) IV Intermittent every 6 hours  furosemide    Tablet 40 milliGRAM(s) Oral daily  lidocaine 1% Injectable 30 milliLiter(s) Local Injection once  multivitamin 1 Tablet(s) Oral daily  spironolactone 50 milliGRAM(s) Oral two times a day    MEDICATIONS  (PRN):  oxyCODONE    IR 5 milliGRAM(s) Oral every 4 hours PRN pain from chest tube site      Allergies    No Known Allergies    Intolerances        Vital Signs Last 24 Hrs  T(C): 36.8 (15 Feb 2018 08:47), Max: 36.9 (14 Feb 2018 20:30)  T(F): 98.2 (15 Feb 2018 08:47), Max: 98.5 (14 Feb 2018 20:30)  HR: 87 (15 Feb 2018 08:47) (86 - 92)  BP: 101/69 (15 Feb 2018 08:47) (99/67 - 106/68)  BP(mean): --  RR: 18 (15 Feb 2018 08:47) (16 - 18)  SpO2: 96% (15 Feb 2018 08:47) (94% - 97%)    LABS:                        13.2   7.2   )-----------( 301      ( 15 Feb 2018 08:18 )             38.4     02-15    131<L>  |  95<L>  |  41.0<H>  ----------------------------<  124<H>  5.0   |  28.0  |  0.84    Ca    8.4<L>      15 Feb 2018 08:18    TPro  4.9<L>  /  Alb  1.8<L>  /  TBili  <0.2<L>  /  DBili  x   /  AST  43<H>  /  ALT  16  /  AlkPhos  152<H>  02-14    PT/INR - ( 14 Feb 2018 05:58 )   PT: 12.1 sec;   INR: 1.10 ratio               RADIOLOGY & ADDITIONAL TESTS:

## 2018-02-15 NOTE — PROGRESS NOTE ADULT - SUBJECTIVE AND OBJECTIVE BOX
seen for pleural effusion    no acute complaitns  intermittent pain  ROS otherwise negative     MEDICATIONS  (STANDING):  albumin human 25% IVPB 50 milliLiter(s) IV Intermittent every 6 hours  furosemide    Tablet 40 milliGRAM(s) Oral daily  lidocaine 1% Injectable 30 milliLiter(s) Local Injection once  multivitamin 1 Tablet(s) Oral daily  spironolactone 50 milliGRAM(s) Oral two times a day    MEDICATIONS  (PRN):  oxyCODONE    IR 5 milliGRAM(s) Oral every 4 hours PRN pain from chest tube site      Allergies    No Known Allergies      Vital Signs Last 24 Hrs  T(C): 36.8 (15 Feb 2018 08:47), Max: 36.9 (14 Feb 2018 20:30)  T(F): 98.2 (15 Feb 2018 08:47), Max: 98.5 (14 Feb 2018 20:30)  HR: 87 (15 Feb 2018 08:47) (86 - 92)  BP: 101/69 (15 Feb 2018 08:47) (99/67 - 106/68)  BP(mean): --  RR: 18 (15 Feb 2018 08:47) (16 - 18)  SpO2: 96% (15 Feb 2018 08:47) (94% - 97%)    PHYSICAL EXAM:    GENERAL: NAD  CHEST/LUNG: dec bs right base   HEART: Regular rate and rhythm; S1 S2; no murmurs noted  ABDOMEN: Soft, Nontender, Nondistended; Bowel sounds present  EXTREMITIES: no edema.   NERVOUS SYSTEM:  Alert & Oriented X3, Good concentration; Motor Strength 5/5 B/L upper and lower extremities  PSYCH: normal mood, appropriate response.    LABS:                        13.2   7.2   )-----------( 301      ( 15 Feb 2018 08:18 )             38.4     02-15    131<L>  |  95<L>  |  41.0<H>  ----------------------------<  124<H>  5.0   |  28.0  |  0.84    Ca    8.4<L>      15 Feb 2018 08:18    TPro  4.9<L>  /  Alb  1.8<L>  /  TBili  <0.2<L>  /  DBili  x   /  AST  43<H>  /  ALT  16  /  AlkPhos  152<H>  02-14    PT/INR - ( 14 Feb 2018 05:58 )   PT: 12.1 sec;   INR: 1.10 ratio               CAPILLARY BLOOD GLUCOSE            RADIOLOGY & ADDITIONAL TESTS:

## 2018-02-15 NOTE — PROGRESS NOTE ADULT - PROBLEM SELECTOR PLAN 2
CTA chest reviewed - no right heart strain  dc lovenox   NO ac x 48 hrs post biopsy  check cost of pradaxa (reversible),  if not covered transition to coumadin

## 2018-02-16 DIAGNOSIS — E88.09 OTHER DISORDERS OF PLASMA-PROTEIN METABOLISM, NOT ELSEWHERE CLASSIFIED: ICD-10-CM

## 2018-02-16 LAB — GGT SERPL-CCNC: 129 U/L — HIGH (ref 8–61)

## 2018-02-16 PROCEDURE — 99233 SBSQ HOSP IP/OBS HIGH 50: CPT

## 2018-02-16 PROCEDURE — 99232 SBSQ HOSP IP/OBS MODERATE 35: CPT

## 2018-02-16 PROCEDURE — 76705 ECHO EXAM OF ABDOMEN: CPT | Mod: 26

## 2018-02-16 PROCEDURE — 78205: CPT | Mod: 26

## 2018-02-16 RX ORDER — HYDROMORPHONE HYDROCHLORIDE 2 MG/ML
1 INJECTION INTRAMUSCULAR; INTRAVENOUS; SUBCUTANEOUS ONCE
Qty: 0 | Refills: 0 | Status: DISCONTINUED | OUTPATIENT
Start: 2018-02-16 | End: 2018-02-16

## 2018-02-16 RX ORDER — ENOXAPARIN SODIUM 100 MG/ML
70 INJECTION SUBCUTANEOUS EVERY 12 HOURS
Qty: 0 | Refills: 0 | Status: DISCONTINUED | OUTPATIENT
Start: 2018-02-17 | End: 2018-02-23

## 2018-02-16 RX ADMIN — SPIRONOLACTONE 50 MILLIGRAM(S): 25 TABLET, FILM COATED ORAL at 05:25

## 2018-02-16 RX ADMIN — SPIRONOLACTONE 50 MILLIGRAM(S): 25 TABLET, FILM COATED ORAL at 17:20

## 2018-02-16 RX ADMIN — HYDROMORPHONE HYDROCHLORIDE 1 MILLIGRAM(S): 2 INJECTION INTRAMUSCULAR; INTRAVENOUS; SUBCUTANEOUS at 22:08

## 2018-02-16 RX ADMIN — HYDROMORPHONE HYDROCHLORIDE 1 MILLIGRAM(S): 2 INJECTION INTRAMUSCULAR; INTRAVENOUS; SUBCUTANEOUS at 23:00

## 2018-02-16 RX ADMIN — Medication 40 MILLIGRAM(S): at 05:25

## 2018-02-16 RX ADMIN — Medication 50 MILLILITER(S): at 05:25

## 2018-02-16 NOTE — PROGRESS NOTE ADULT - PROBLEM SELECTOR PLAN 2
CTA chest reviewed - no right heart strain  NO ac x 48 hrs post biopsy  restarted lovenox.  pradaxa covered by [employer] as pt has no insurance

## 2018-02-16 NOTE — PROGRESS NOTE ADULT - PROBLEM SELECTOR PLAN 1
etiology unclea. It is very wuestionable if patient has cirrhosis as platelet count and INR are normal as if he does have cirrhosis it would be well compensated except albumin of 1.8 which is lower than one would expect. Could he have nephrotic syndrome. Will also abtaine radionuclide liver spleen scan to check for portal hypertension.

## 2018-02-16 NOTE — PROGRESS NOTE ADULT - SUBJECTIVE AND OBJECTIVE BOX
seen for pleural effusion  no acute complaints  ROS negative     MEDICATIONS  (STANDING):  furosemide    Tablet 40 milliGRAM(s) Oral daily  lidocaine 1% Injectable 30 milliLiter(s) Local Injection once  multivitamin 1 Tablet(s) Oral daily  spironolactone 50 milliGRAM(s) Oral two times a day    MEDICATIONS  (PRN):      Allergies    No Known Allergies    Vital Signs Last 24 Hrs  T(C): 36.8 (16 Feb 2018 07:50), Max: 36.9 (15 Feb 2018 16:01)  T(F): 98.3 (16 Feb 2018 07:50), Max: 98.4 (15 Feb 2018 16:01)  HR: 81 (16 Feb 2018 07:50) (81 - 90)  BP: 102/70 (16 Feb 2018 07:50) (94/60 - 108/65)  BP(mean): --  RR: 18 (16 Feb 2018 07:50) (18 - 18)  SpO2: 96% (16 Feb 2018 07:50) (94% - 96%)    PHYSICAL EXAM:    GENERAL: NAD  CHEST/LUNG: dec bs right base  HEART: Regular rate and rhythm; S1 S2; no murmurs noted  ABDOMEN: Soft, Nontender, Nondistended; Bowel sounds present  EXTREMITIES: no edema  NERVOUS SYSTEM:  Alert & Oriented X3,nonfocal    LABS:                        13.2   7.2   )-----------( 301      ( 15 Feb 2018 08:18 )             38.4     02-15    131<L>  |  95<L>  |  41.0<H>  ----------------------------<  124<H>  5.0   |  28.0  |  0.84    Ca    8.4<L>      15 Feb 2018 08:18            CAPILLARY BLOOD GLUCOSE            RADIOLOGY & ADDITIONAL TESTS:

## 2018-02-16 NOTE — PROGRESS NOTE ADULT - SUBJECTIVE AND OBJECTIVE BOX
Pt seen and examined f/u right pleural effusion with questionable cirrhosis  This morning he denies any abdominal pain, diarrhea, nausea or vomiting. Abdominal sono still pending as is liver biopsy result that was performed 2 days ago, not in December.    REVIEW OF SYSTEMS:    CONSTITUTIONAL: No fever, weight loss, or fatigue  EYES: No eye pain, visual disturbances, or discharge  ENMT:  No difficulty hearing, tinnitus, vertigo; No sinus or throat pain  RESPIRATORY: No cough, wheezing, chills or hemoptysis; No shortness of breath  CARDIOVASCULAR: No chest pain, palpitations, dizziness, or leg swelling  GASTROINTESTINAL: No abdominal or epigastric pain. No nausea, vomiting, or hematemesis; No diarrhea or constipation. No melena or hematochezia.    MEDICATIONS:  MEDICATIONS  (STANDING):  furosemide    Tablet 40 milliGRAM(s) Oral daily  lidocaine 1% Injectable 30 milliLiter(s) Local Injection once  multivitamin 1 Tablet(s) Oral daily  spironolactone 50 milliGRAM(s) Oral two times a day    MEDICATIONS  (PRN):      Allergies    No Known Allergies    Intolerances        Vital Signs Last 24 Hrs  T(C): 36.8 (16 Feb 2018 05:45), Max: 36.9 (15 Feb 2018 16:01)  T(F): 98.3 (16 Feb 2018 05:45), Max: 98.4 (15 Feb 2018 16:01)  HR: 86 (16 Feb 2018 05:45) (86 - 90)  BP: 106/67 (16 Feb 2018 05:45) (94/60 - 108/65)  BP(mean): --  RR: 18 (16 Feb 2018 05:45) (18 - 18)  SpO2: 94% (16 Feb 2018 05:45) (94% - 96%)    02-15 @ 07:01  -  02-16 @ 07:00  --------------------------------------------------------  IN: 100 mL / OUT: 0 mL / NET: 100 mL        PHYSICAL EXAM:    General: Well developed; well nourished; in no acute distress  HEENT: MMM, conjunctiva and sclera clear  Gastrointestinal:Abdomen: Soft non-tender non-distended; Normal bowel sounds; No hepatosplenomegaly  Extremities: no cyanosis, clubbing or edema.  Skin: Warm and dry. No obvious rash    LABS:      CBC Full  -  ( 15 Feb 2018 08:18 )  WBC Count : 7.2 K/uL  Hemoglobin : 13.2 g/dL  Hematocrit : 38.4 %  Platelet Count - Automated : 301 K/uL  Mean Cell Volume : 94.1 fl  Mean Cell Hemoglobin : 32.4 pg  Mean Cell Hemoglobin Concentration : 34.4 g/dL  Auto Neutrophil # : x  Auto Lymphocyte # : x  Auto Monocyte # : x  Auto Eosinophil # : x  Auto Basophil # : x  Auto Neutrophil % : x  Auto Lymphocyte % : x  Auto Monocyte % : x  Auto Eosinophil % : x  Auto Basophil % : x    02-15    131<L>  |  95<L>  |  41.0<H>  ----------------------------<  124<H>  5.0   |  28.0  |  0.84    Ca    8.4<L>      15 Feb 2018 08:18

## 2018-02-17 LAB
ALBUMIN SERPL ELPH-MCNC: 2 G/DL — LOW (ref 3.3–5.2)
ALBUMIN SERPL ELPH-MCNC: 2.1 G/DL — LOW (ref 3.3–5.2)
ALP SERPL-CCNC: 125 U/L — HIGH (ref 40–120)
ALP SERPL-CCNC: 130 U/L — HIGH (ref 40–120)
ALT FLD-CCNC: 26 U/L — SIGNIFICANT CHANGE UP
ALT FLD-CCNC: 27 U/L — SIGNIFICANT CHANGE UP
ANION GAP SERPL CALC-SCNC: 9 MMOL/L — SIGNIFICANT CHANGE UP (ref 5–17)
AST SERPL-CCNC: 51 U/L — HIGH
AST SERPL-CCNC: 52 U/L — HIGH
BILIRUB DIRECT SERPL-MCNC: 0.1 MG/DL — SIGNIFICANT CHANGE UP (ref 0–0.3)
BILIRUB INDIRECT FLD-MCNC: 0.3 MG/DL — SIGNIFICANT CHANGE UP (ref 0.2–1)
BILIRUB SERPL-MCNC: 0.4 MG/DL — SIGNIFICANT CHANGE UP (ref 0.4–2)
BILIRUB SERPL-MCNC: 0.4 MG/DL — SIGNIFICANT CHANGE UP (ref 0.4–2)
BUN SERPL-MCNC: 42 MG/DL — HIGH (ref 8–20)
CALCIUM SERPL-MCNC: 8.3 MG/DL — LOW (ref 8.6–10.2)
CHLORIDE SERPL-SCNC: 99 MMOL/L — SIGNIFICANT CHANGE UP (ref 98–107)
CO2 SERPL-SCNC: 25 MMOL/L — SIGNIFICANT CHANGE UP (ref 22–29)
CREAT SERPL-MCNC: 0.8 MG/DL — SIGNIFICANT CHANGE UP (ref 0.5–1.3)
GLUCOSE SERPL-MCNC: 114 MG/DL — SIGNIFICANT CHANGE UP (ref 70–115)
HCT VFR BLD CALC: 35.4 % — LOW (ref 42–52)
HGB BLD-MCNC: 11.8 G/DL — LOW (ref 14–18)
MCHC RBC-ENTMCNC: 32 PG — HIGH (ref 27–31)
MCHC RBC-ENTMCNC: 33.3 G/DL — SIGNIFICANT CHANGE UP (ref 32–36)
MCV RBC AUTO: 95.9 FL — HIGH (ref 80–94)
PLATELET # BLD AUTO: 232 K/UL — SIGNIFICANT CHANGE UP (ref 150–400)
POTASSIUM SERPL-MCNC: 5.1 MMOL/L — SIGNIFICANT CHANGE UP (ref 3.5–5.3)
POTASSIUM SERPL-SCNC: 5.1 MMOL/L — SIGNIFICANT CHANGE UP (ref 3.5–5.3)
PROT SERPL-MCNC: 5.2 G/DL — LOW (ref 6.6–8.7)
PROT SERPL-MCNC: 5.2 G/DL — LOW (ref 6.6–8.7)
RBC # BLD: 3.69 M/UL — LOW (ref 4.6–6.2)
RBC # FLD: 15.6 % — SIGNIFICANT CHANGE UP (ref 11–15.6)
SODIUM SERPL-SCNC: 133 MMOL/L — LOW (ref 135–145)
WBC # BLD: 6.8 K/UL — SIGNIFICANT CHANGE UP (ref 4.8–10.8)
WBC # FLD AUTO: 6.8 K/UL — SIGNIFICANT CHANGE UP (ref 4.8–10.8)

## 2018-02-17 PROCEDURE — 99233 SBSQ HOSP IP/OBS HIGH 50: CPT

## 2018-02-17 PROCEDURE — 99232 SBSQ HOSP IP/OBS MODERATE 35: CPT

## 2018-02-17 RX ADMIN — Medication 1 TABLET(S): at 12:15

## 2018-02-17 RX ADMIN — ENOXAPARIN SODIUM 70 MILLIGRAM(S): 100 INJECTION SUBCUTANEOUS at 16:50

## 2018-02-17 RX ADMIN — Medication 40 MILLIGRAM(S): at 05:57

## 2018-02-17 RX ADMIN — ENOXAPARIN SODIUM 70 MILLIGRAM(S): 100 INJECTION SUBCUTANEOUS at 05:57

## 2018-02-17 RX ADMIN — SPIRONOLACTONE 50 MILLIGRAM(S): 25 TABLET, FILM COATED ORAL at 16:52

## 2018-02-17 NOTE — PROGRESS NOTE ADULT - SUBJECTIVE AND OBJECTIVE BOX
Pt seen and examined f/u right pleural effusion and possible liver disease  Today he denies any abdominal pain, nausea or vomiting. No abdominal distention. No SOB-yet. The liver/spleen scan was essentially normal with no evidence of portal hypertension. An absd sono showed a lobular contour to the liver possilby due to cirrhosis as well as some upper abdominal ascites but no biliary dilation. On aldactone and lasix, Liver biopsy still pending.    REVIEW OF SYSTEMS:    CONSTITUTIONAL: No fever, weight loss, or fatigue  EYES: No eye pain, visual disturbances, or discharge  ENMT:  No difficulty hearing, tinnitus, vertigo; No sinus or throat pain  RESPIRATORY: No cough, wheezing, chills or hemoptysis; No shortness of breath  CARDIOVASCULAR: No chest pain, palpitations, dizziness, or leg swelling  GASTROINTESTINAL: No abdominal or epigastric pain. No nausea, vomiting, or hematemesis; No diarrhea or constipation. No melena or hematochezia.    MEDICATIONS:  MEDICATIONS  (STANDING):  enoxaparin Injectable 70 milliGRAM(s) SubCutaneous every 12 hours  furosemide    Tablet 40 milliGRAM(s) Oral daily  lidocaine 1% Injectable 30 milliLiter(s) Local Injection once  multivitamin 1 Tablet(s) Oral daily  spironolactone 50 milliGRAM(s) Oral two times a day    MEDICATIONS  (PRN):      Allergies    No Known Allergies    Intolerances        Vital Signs Last 24 Hrs  T(C): 36.5 (17 Feb 2018 07:35), Max: 36.9 (16 Feb 2018 20:47)  T(F): 97.7 (17 Feb 2018 07:35), Max: 98.4 (16 Feb 2018 20:47)  HR: 63 (17 Feb 2018 07:35) (63 - 99)  BP: 132/65 (17 Feb 2018 07:35) (101/66 - 132/65)  BP(mean): --  RR: 18 (17 Feb 2018 07:35) (18 - 18)  SpO2: 98% (17 Feb 2018 07:35) (92% - 98%)      PHYSICAL EXAM:    General: Well developed; well nourished; in no acute distress  HEENT: MMM, conjunctiva and sclera clear  Gastrointestinal:Abdomen: Soft non-tender non-distended; Normal bowel sounds; No hepatosplenomegaly  Extremities: no cyanosis, clubbing or edema.  Skin: Warm and dry. No obvious rash    LABS:      CBC Full  -  ( 17 Feb 2018 06:53 )  WBC Count : 6.8 K/uL  Hemoglobin : 11.8 g/dL  Hematocrit : 35.4 %  Platelet Count - Automated : 232 K/uL  Mean Cell Volume : 95.9 fl  Mean Cell Hemoglobin : 32.0 pg  Mean Cell Hemoglobin Concentration : 33.3 g/dL  Auto Neutrophil # : x  Auto Lymphocyte # : x  Auto Monocyte # : x  Auto Eosinophil # : x  Auto Basophil # : x  Auto Neutrophil % : x  Auto Lymphocyte % : x  Auto Monocyte % : x  Auto Eosinophil % : x  Auto Basophil % : x    02-17    133<L>  |  99  |  42.0<H>  ----------------------------<  114  5.1   |  25.0  |  0.80    Ca    8.3<L>      17 Feb 2018 06:53    TPro  5.2<L>  /  Alb  2.0<L>  /  TBili  0.4  /  DBili  0.1  /  AST  52<H>  /  ALT  26  /  AlkPhos  130<H>  02-17                      RADIOLOGY & ADDITIONAL STUDIES (The following images were personally reviewed):  < from: US Abdomen Limited (02.16.18 @ 16:09) >    TECHNIQUE: Sonography of the right upper quadrant.     FINDINGS:    Liver: Lobular contour consistent with the clinical diagnosis of   cirrhosis.    Bile ducts: Normal caliber. Common bile duct measures 4 mm.     Gallbladder: Cholelithiasis without evidence of acute cholecystitis.        Pancreas: Visualized portions are within normal limits.    Right kidney: 10.5 cm. No hydronephrosis.    Ascites: Ascites noted in the right upper quadrant, left upper quadrant,   and pelvis.    IVC: Visualized portions are within normal limits.    Large right pleural effusion.    IMPRESSION:     Evidence of cirrhosis with ascites.    Cholelithiasis without evidence of acute cholecystitis.    Large right pleural effusion.                SCAR FIERRO M.D., ATTENDING RADIOLOGIST  This document hasbeen electronically signed. Feb 16 2018  5:32PM                  < end of copied text >  ?becki Pt seen and examined f/u right pleural effusion and possible liver disease  Today he denies any abdominal pain, nausea or vomiting. No abdominal distention. No SOB-yet. The liver/spleen scan was essentially normal with no evidence of portal hypertension. An absd sono showed a lobular contour to the liver possilby due to cirrhosis as well as some upper abdominal ascites but no biliary dilation. On aldactone and lasix, Liver biopsy still pending.    REVIEW OF SYSTEMS:    CONSTITUTIONAL: No fever, weight loss, or fatigue  EYES: No eye pain, visual disturbances, or discharge  ENMT:  No difficulty hearing, tinnitus, vertigo; No sinus or throat pain  RESPIRATORY: No cough, wheezing, chills or hemoptysis; No shortness of breath  CARDIOVASCULAR: No chest pain, palpitations, dizziness, or leg swelling  GASTROINTESTINAL: No abdominal or epigastric pain. No nausea, vomiting, or hematemesis; No diarrhea or constipation. No melena or hematochezia.    MEDICATIONS:  MEDICATIONS  (STANDING):  enoxaparin Injectable 70 milliGRAM(s) SubCutaneous every 12 hours  furosemide    Tablet 40 milliGRAM(s) Oral daily  lidocaine 1% Injectable 30 milliLiter(s) Local Injection once  multivitamin 1 Tablet(s) Oral daily  spironolactone 50 milliGRAM(s) Oral two times a day    MEDICATIONS  (PRN):      Allergies    No Known Allergies    Intolerances        Vital Signs Last 24 Hrs  T(C): 36.5 (17 Feb 2018 07:35), Max: 36.9 (16 Feb 2018 20:47)  T(F): 97.7 (17 Feb 2018 07:35), Max: 98.4 (16 Feb 2018 20:47)  HR: 63 (17 Feb 2018 07:35) (63 - 99)  BP: 132/65 (17 Feb 2018 07:35) (101/66 - 132/65)  BP(mean): --  RR: 18 (17 Feb 2018 07:35) (18 - 18)  SpO2: 98% (17 Feb 2018 07:35) (92% - 98%)      PHYSICAL EXAM:    General: Well developed; well nourished; in no acute distress  HEENT: MMM, conjunctiva and sclera clear  Gastrointestinal:Abdomen: Soft non-tender non-distended; Normal bowel sounds; No hepatosplenomegaly  Extremities: no cyanosis, clubbing or edema.  Skin: Warm and dry. No obvious rash    LABS:      CBC Full  -  ( 17 Feb 2018 06:53 )  WBC Count : 6.8 K/uL  Hemoglobin : 11.8 g/dL  Hematocrit : 35.4 %  Platelet Count - Automated : 232 K/uL  Mean Cell Volume : 95.9 fl  Mean Cell Hemoglobin : 32.0 pg  Mean Cell Hemoglobin Concentration : 33.3 g/dL  Auto Neutrophil # : x  Auto Lymphocyte # : x  Auto Monocyte # : x  Auto Eosinophil # : x  Auto Basophil # : x  Auto Neutrophil % : x  Auto Lymphocyte % : x  Auto Monocyte % : x  Auto Eosinophil % : x  Auto Basophil % : x    02-17    133<L>  |  99  |  42.0<H>  ----------------------------<  114  5.1   |  25.0  |  0.80    Ca    8.3<L>      17 Feb 2018 06:53    TPro  5.2<L>  /  Alb  2.0<L>  /  TBili  0.4  /  DBili  0.1  /  AST  52<H>  /  ALT  26  /  AlkPhos  130<H>  02-17                      RADIOLOGY & ADDITIONAL STUDIES (The following images were personally reviewed):  < from: US Abdomen Limited (02.16.18 @ 16:09) >    TECHNIQUE: Sonography of the right upper quadrant.     FINDINGS:    Liver: Lobular contour consistent with the clinical diagnosis of   cirrhosis.    Bile ducts: Normal caliber. Common bile duct measures 4 mm.     Gallbladder: Cholelithiasis without evidence of acute cholecystitis.        Pancreas: Visualized portions are within normal limits.    Right kidney: 10.5 cm. No hydronephrosis.    Ascites: Ascites noted in the right upper quadrant, left upper quadrant,   and pelvis.    IVC: Visualized portions are within normal limits.    Large right pleural effusion.    IMPRESSION:     Evidence of cirrhosis with ascites.    Cholelithiasis without evidence of acute cholecystitis.    Large right pleural effusion.                SCAR FIERRO M.D., ATTENDING RADIOLOGIST  This document hasbeen electronically signed. Feb 16 2018  5:32PM                  < end of copied text >  < from: NM Liver + Spleen Scan (02.16.18 @ 12:34) >        INTERPRETATION:  RADIOPHARMACEUTICAL: 3.1 mCi Tc-99m sulfur colloid, I.V.    CLINICAL STATEMENT: 62-year-old male with alcoholic cirrhosis.    TECHNIQUE:  Static images of the upper abdomen were obtained in the   anterior, posterior, both lateral, and four oblique projections   approximately 20 minutes following administration of radiopharmaceutical.    SPECT images of the upper abdomen also were obtained.      FINDINGS: The liver and spleen are normal in size and position.  The   liver measures 17 cm in longest vertical dimension (normal up to 17 cm).    The spleen measures 14.2 cm in longest dimension (normal up to 14 cm).    There is uniform distribution of tracer in the liver and spleen.  There   are no focal defects. There is no evidence of colloid shift to spleen or   marrow.     IMPRESSION: Liver spleen scan demonstrates:    No evidence of hepatomegaly.      Mild splenomegaly.    No evidence of colloid shift the spleen or bone marrow.                      < end of copied text >

## 2018-02-17 NOTE — PROGRESS NOTE ADULT - PROBLEM SELECTOR PLAN 1
etiology unclear and big question is the small amount of ascites causing the large reurrant plearual effusion. If he even does have cirrhosis it is mild, Child Colvin A without portal hypertension and only minimal ascites and under those circumstances it would be rare the the abdominal ascites to be responsible for the pleural effusion. Even if that is the case he is at maximum diuretic dose that he would tolerate. While awaiting liver biopsy result suggest pulmonary and thoracic surgery reevaluation

## 2018-02-17 NOTE — PROGRESS NOTE ADULT - SUBJECTIVE AND OBJECTIVE BOX
ERIC ARIAS Patient is a 62y old  Male who presents with a chief complaint of Feeling SOB (08 Feb 2018 14:59)     HPI:  60 yo M with h/o ETOH cirrhosis, c diff (on abx therapy), DM presents from home with worsening shortness of breath. Pt states that in December 2017, he had noticed dyspnea on exertion. Pt presented to [facility] ED, CT abdomen at the time showed ascites, large right pleural effusion, atelectasis right lower lobe, and splenomegaly. Pt declined further workup at [facility] and had followed up with his primary GI. Pt subsequently was hospitalized at [facility] for similar symptoms in January where he had 2 thoracentesis of the right pleural effusion (each time draining approximately 2L). Pt was then transferred to [place] for a possible TIPS procedure. However, he states that the doctors there had felt that his pleural effusions were not due to his cirrhosis, and that there may have been an additional disease process occurring. Pt had improvement in dyspnea and was discharged home. One month later, he noticed similar symptoms of NGUYEN with minimal ambulation. No fevers or chills. No worsening abdominal distension or pain. (08 Feb 2018 14:02)    The patient was seen and evaluated   The patient is in no acute distress.  Denied any fever chest pain, palpitations, shortness of breath, abdominal pain, fever, dysuria, cough, edema     I&O's Summary    Allergies    No Known Allergies    Intolerances      HEALTH ISSUES - PROBLEM Dx:  Hypoalbuminemia: Hypoalbuminemia  Other ascites: Other ascites  Cirrhosis of liver with ascites, unspecified hepatic cirrhosis type: Cirrhosis of liver with ascites, unspecified hepatic cirrhosis type  Other pulmonary embolism with acute cor pulmonale, unspecified chronicity: Other pulmonary embolism with acute cor pulmonale, unspecified chronicity  Diarrhea, unspecified type: Diarrhea, unspecified type  Prophylactic measure: Prophylactic measure  Type 2 diabetes mellitus with hyperglycemia, without long-term current use of insulin: Type 2 diabetes mellitus with hyperglycemia, without long-term current use of insulin  Edema of lower extremity: Edema of lower extremity  Cirrhosis: Cirrhosis  Pleural effusion on right: Pleural effusion on right        PAST MEDICAL & SURGICAL HISTORY:  DM (diabetes mellitus)  No significant past surgical history          Vital Signs Last 24 Hrs  T(C): 36.5 (17 Feb 2018 07:35), Max: 36.9 (16 Feb 2018 20:47)  T(F): 97.7 (17 Feb 2018 07:35), Max: 98.4 (16 Feb 2018 20:47)  HR: 63 (17 Feb 2018 07:35) (63 - 99)  BP: 132/65 (17 Feb 2018 07:35) (101/66 - 132/65)  BP(mean): --  RR: 18 (17 Feb 2018 07:35) (18 - 18)  SpO2: 98% (17 Feb 2018 07:35) (92% - 98%)T(C): 36.5 (02-17-18 @ 07:35), Max: 36.9 (02-16-18 @ 20:47)  HR: 63 (02-17-18 @ 07:35) (63 - 99)  BP: 132/65 (02-17-18 @ 07:35) (101/66 - 132/65)  RR: 18 (02-17-18 @ 07:35) (18 - 18)  SpO2: 98% (02-17-18 @ 07:35) (92% - 98%)  Wt(kg): --    PHYSICAL EXAM:    GENERAL: NAD, well-groomed,   HEAD:  Atraumatic, Normocephalic  EYES: EOMI, conjunctiva and sclera clear  ENMT:  Moist mucous membranes,  No lesions  NECK: Supple, No JVD, Normal thyroid  NERVOUS SYSTEM:  Alert & Oriented X3,  Moves upper and lower extremities; CNS-II-XII  CHEST/LUNG: Clear to auscultation bilaterally; No rales, rhonchi, wheezing,   HEART: Regular rate and rhythm; No murmurs,   ABDOMEN: Soft, Nontender, Nondistended; Bowel sounds present  EXTREMITIES:  Peripheral Pulses, No  cyanosis, or edema  SKIN: No rashes or lesions  psychiatry- mood and affect approprite, Insight and judgement intact     enoxaparin Injectable 70 milliGRAM(s) SubCutaneous every 12 hours  furosemide    Tablet 40 milliGRAM(s) Oral daily  lidocaine 1% Injectable 30 milliLiter(s) Local Injection once  multivitamin 1 Tablet(s) Oral daily  spironolactone 50 milliGRAM(s) Oral two times a day      LABS:                          11.8   6.8   )-----------( 232      ( 17 Feb 2018 06:53 )             35.4     02-17    133<L>  |  99  |  42.0<H>  ----------------------------<  114  5.1   |  25.0  |  0.80    Ca    8.3<L>      17 Feb 2018 06:53    TPro  5.2<L>  /  Alb  2.0<L>  /  TBili  0.4  /  DBili  0.1  /  AST  52<H>  /  ALT  26  /  AlkPhos  130<H>  02-17    LIVER FUNCTIONS - ( 17 Feb 2018 06:53 )  Alb: 2.0 g/dL / Pro: 5.2 g/dL / ALK PHOS: 130 U/L / ALT: 26 U/L / AST: 52 U/L / GGT: x                   CAPILLARY BLOOD GLUCOSE          RADIOLOGY & ADDITIONAL TESTS:      Consultant notes reviewed    Case discussed with consultant/provider/ family /patient

## 2018-02-18 PROCEDURE — 99233 SBSQ HOSP IP/OBS HIGH 50: CPT

## 2018-02-18 RX ADMIN — ENOXAPARIN SODIUM 70 MILLIGRAM(S): 100 INJECTION SUBCUTANEOUS at 17:25

## 2018-02-18 RX ADMIN — Medication 40 MILLIGRAM(S): at 08:22

## 2018-02-18 RX ADMIN — ENOXAPARIN SODIUM 70 MILLIGRAM(S): 100 INJECTION SUBCUTANEOUS at 06:15

## 2018-02-18 RX ADMIN — Medication 1 TABLET(S): at 11:48

## 2018-02-18 NOTE — PROGRESS NOTE ADULT - SUBJECTIVE AND OBJECTIVE BOX
ERIC ARIAS Patient is a 62y old  Male who presents with a chief complaint of Feeling SOB (08 Feb 2018 14:59)     HPI:  60 yo M with h/o ETOH cirrhosis, c diff (on abx therapy), DM presents from home with worsening shortness of breath. Pt states that in December 2017, he had noticed dyspnea on exertion. Pt presented to [facility] ED, CT abdomen at the time showed ascites, large right pleural effusion, atelectasis right lower lobe, and splenomegaly. Pt declined further workup at [facility] and had followed up with his primary GI. Pt subsequently was hospitalized at [facility] for similar symptoms in January where he had 2 thoracentesis of the right pleural effusion (each time draining approximately 2L). Pt was then transferred to [place] for a possible TIPS procedure. However, he states that the doctors there had felt that his pleural effusions were not due to his cirrhosis, and that there may have been an additional disease process occurring. Pt had improvement in dyspnea and was discharged home. One month later, he noticed similar symptoms of NGUYEN with minimal ambulation. No fevers or chills. No worsening abdominal distension or pain. (08 Feb 2018 14:02)    The patient was seen and evaluated   The patient is in no acute distress.  Denied any fever chest pain, palpitations, shortness of breath, abdominal pain, fever, dysuria, cough, edema   Complains of worry around why he has a pleural effusion    I&O's Summary    Allergies    No Known Allergies    Intolerances      HEALTH ISSUES - PROBLEM Dx:  Hypoalbuminemia: Hypoalbuminemia  Other ascites: Other ascites  Cirrhosis of liver with ascites, unspecified hepatic cirrhosis type: Cirrhosis of liver with ascites, unspecified hepatic cirrhosis type  Other pulmonary embolism with acute cor pulmonale, unspecified chronicity: Other pulmonary embolism with acute cor pulmonale, unspecified chronicity  Diarrhea, unspecified type: Diarrhea, unspecified type  Prophylactic measure: Prophylactic measure  Type 2 diabetes mellitus with hyperglycemia, without long-term current use of insulin: Type 2 diabetes mellitus with hyperglycemia, without long-term current use of insulin  Edema of lower extremity: Edema of lower extremity  Cirrhosis: Cirrhosis  Pleural effusion on right: Pleural effusion on right        PAST MEDICAL & SURGICAL HISTORY:  DM (diabetes mellitus)  No significant past surgical history          Vital Signs Last 24 Hrs  T(C): 36.3 (18 Feb 2018 08:54), Max: 36.7 (17 Feb 2018 21:29)  T(F): 97.4 (18 Feb 2018 08:54), Max: 98.1 (17 Feb 2018 21:29)  HR: 100 (18 Feb 2018 08:54) (86 - 100)  BP: 106/72 (18 Feb 2018 08:54) (100/71 - 116/71)  BP(mean): --  RR: 18 (18 Feb 2018 08:54) (18 - 18)  SpO2: 96% (18 Feb 2018 08:54) (95% - 96%)T(C): 36.3 (02-18-18 @ 08:54), Max: 36.7 (02-17-18 @ 21:29)  HR: 100 (02-18-18 @ 08:54) (86 - 100)  BP: 106/72 (02-18-18 @ 08:54) (100/71 - 116/71)  RR: 18 (02-18-18 @ 08:54) (18 - 18)  SpO2: 96% (02-18-18 @ 08:54) (95% - 96%)  Wt(kg): --    PHYSICAL EXAM:    GENERAL: NAD,ill appearing   HEAD:  Atraumatic, Normocephalic  EYES: EOMI, PERRL, conjunctiva and sclera clear  ENMT:  Moist mucous membranes,  No lesions  NECK: Supple, No JVD, Normal thyroid  NERVOUS SYSTEM:  Alert & Oriented X3,  Moves upper and lower extremities; CNS-II-XII  CHEST/LUNG: Clear to auscultation bilaterally; No rales, rhonchi, wheezing, positive rt side crepts   HEART: Regular rate and rhythm; No murmurs,   ABDOMEN: Soft, Nontender, Nondistended; Bowel sounds present, positive ascites   EXTREMITIES:  Peripheral Pulses, No  cyanosis, or edema  SKIN: No rashes or lesions  psychiatry- mood and affect appropriate Insight and judgement intact     enoxaparin Injectable 70 milliGRAM(s) SubCutaneous every 12 hours  furosemide    Tablet 40 milliGRAM(s) Oral daily  lidocaine 1% Injectable 30 milliLiter(s) Local Injection once  multivitamin 1 Tablet(s) Oral daily  spironolactone 50 milliGRAM(s) Oral two times a day      LABS:                          11.8   6.8   )-----------( 232      ( 17 Feb 2018 06:53 )             35.4     02-17    133<L>  |  99  |  42.0<H>  ----------------------------<  114  5.1   |  25.0  |  0.80    Ca    8.3<L>      17 Feb 2018 06:53    TPro  5.2<L>  /  Alb  2.0<L>  /  TBili  0.4  /  DBili  0.1  /  AST  52<H>  /  ALT  26  /  AlkPhos  130<H>  02-17    LIVER FUNCTIONS - ( 17 Feb 2018 06:53 )  Alb: 2.0 g/dL / Pro: 5.2 g/dL / ALK PHOS: 130 U/L / ALT: 26 U/L / AST: 52 U/L / GGT: x                   CAPILLARY BLOOD GLUCOSE          RADIOLOGY & ADDITIONAL TESTS:      Consultant notes reviewed    Case discussed with consultant/provider/ family /patient

## 2018-02-19 PROCEDURE — 99232 SBSQ HOSP IP/OBS MODERATE 35: CPT

## 2018-02-19 PROCEDURE — 99233 SBSQ HOSP IP/OBS HIGH 50: CPT

## 2018-02-19 PROCEDURE — 99223 1ST HOSP IP/OBS HIGH 75: CPT

## 2018-02-19 PROCEDURE — 71045 X-RAY EXAM CHEST 1 VIEW: CPT | Mod: 26

## 2018-02-19 PROCEDURE — 93970 EXTREMITY STUDY: CPT | Mod: 26

## 2018-02-19 RX ADMIN — ENOXAPARIN SODIUM 70 MILLIGRAM(S): 100 INJECTION SUBCUTANEOUS at 05:18

## 2018-02-19 RX ADMIN — Medication 1 TABLET(S): at 11:45

## 2018-02-19 RX ADMIN — ENOXAPARIN SODIUM 70 MILLIGRAM(S): 100 INJECTION SUBCUTANEOUS at 17:06

## 2018-02-19 RX ADMIN — SPIRONOLACTONE 50 MILLIGRAM(S): 25 TABLET, FILM COATED ORAL at 17:06

## 2018-02-19 NOTE — CONSULT NOTE ADULT - ASSESSMENT
58 y/o male with alcohol cirrhosis, DM , with recurrent rapidly accumulating right sided pleural effusion , ascites  and acute left upper lobe and lower lobe PE  Right pleural effusion etiology unclear (GI uncertain if liver cirrhosis and minimal acites causing right pleural effusion ) renal etiology being worked up due to low albumin  Doubt cardiac etiology for right sided pleural effusion (absence of pulmonary congestion , and normal LV function ) no risk factors for constrictive pericarditis which traditionally will present with predominately right sided heart failure .   The thought of portal hypertension channeling into the right pulmonary artery primarily due to left upper and lower lobe PE is noted.   will access pulmonary artery pressures and PCWP with RHC to confirm PA pressures.   Keep NPO after midnight.

## 2018-02-19 NOTE — PROGRESS NOTE ADULT - SUBJECTIVE AND OBJECTIVE BOX
ERIC ARIAS Patient is a 62y old  Male who presents with a chief complaint of Feeling SOB (08 Feb 2018 14:59)     HPI:  60 yo M with h/o ETOH cirrhosis, c diff (on abx therapy), DM presents from home with worsening shortness of breath. Pt states that in December 2017, he had noticed dyspnea on exertion. Pt presented to [facility] ED, CT abdomen at the time showed ascites, large right pleural effusion, atelectasis right lower lobe, and splenomegaly. Pt declined further workup at [facility] and had followed up with his primary GI. Pt subsequently was hospitalized at [facility] for similar symptoms in January where he had 2 thoracentesis of the right pleural effusion (each time draining approximately 2L). Pt was then transferred to [place] for a possible TIPS procedure. However, he states that the doctors there had felt that his pleural effusions were not due to his cirrhosis, and that there may have been an additional disease process occurring. Pt had improvement in dyspnea and was discharged home. One month later, he noticed similar symptoms of NGUYEN with minimal ambulation. No fevers or chills. No worsening abdominal distension or pain. (08 Feb 2018 14:02)    The patient was seen and evaluated large right pleural effusion, PE, cirrhosis  The patient is in no acute distress.  Denied any fever chest pain, palpitations,  abdominal pain, fever, dysuria, cough, edema   Complains of some SOB     I&O's Summary    19 Feb 2018 07:01  -  19 Feb 2018 13:32  --------------------------------------------------------  IN: 0 mL / OUT: 1 mL / NET: -1 mL      Allergies    No Known Allergies    Intolerances      HEALTH ISSUES - PROBLEM Dx:  Hypoalbuminemia: Hypoalbuminemia  Other ascites: Other ascites  Cirrhosis of liver with ascites, unspecified hepatic cirrhosis type: Cirrhosis of liver with ascites, unspecified hepatic cirrhosis type  Other pulmonary embolism with acute cor pulmonale, unspecified chronicity: Other pulmonary embolism with acute cor pulmonale, unspecified chronicity  Diarrhea, unspecified type: Diarrhea, unspecified type  Prophylactic measure: Prophylactic measure  Type 2 diabetes mellitus with hyperglycemia, without long-term current use of insulin: Type 2 diabetes mellitus with hyperglycemia, without long-term current use of insulin  Edema of lower extremity: Edema of lower extremity  Cirrhosis: Cirrhosis  Pleural effusion on right: Pleural effusion on right        PAST MEDICAL & SURGICAL HISTORY:  DM (diabetes mellitus)  No significant past surgical history          Vital Signs Last 24 Hrs  T(C): 36.8 (19 Feb 2018 08:25), Max: 36.8 (19 Feb 2018 08:25)  T(F): 98.2 (19 Feb 2018 08:25), Max: 98.2 (19 Feb 2018 08:25)  HR: 98 (19 Feb 2018 08:25) (94 - 108)  BP: 115/81 (19 Feb 2018 08:25) (91/57 - 115/81)  BP(mean): --  RR: 18 (19 Feb 2018 08:25) (18 - 18)  SpO2: 97% (19 Feb 2018 08:25) (95% - 97%)T(C): 36.8 (02-19-18 @ 08:25), Max: 36.8 (02-19-18 @ 08:25)  HR: 98 (02-19-18 @ 08:25) (94 - 108)  BP: 115/81 (02-19-18 @ 08:25) (91/57 - 115/81)  RR: 18 (02-19-18 @ 08:25) (18 - 18)  SpO2: 97% (02-19-18 @ 08:25) (95% - 97%)  Wt(kg): --    PHYSICAL EXAM:    GENERAL: NAD, well-groomed, ill appearing   HEAD:  Atraumatic, Normocephalic  EYES: EOMI,  conjunctiva and sclera clear  ENMT:  Moist mucous membranes,  No lesions  NECK: Supple, No JVD, Normal thyroid  NERVOUS SYSTEM:  Alert & Oriented X3,  Moves upper and lower extremities; CNS-II-XII  CHEST/LUNG: Clear to auscultation left and decreased BS on right, no wheezing,   HEART: Regular rate and rhythm; No murmurs,   ABDOMEN: Soft, Nontender, Nondistended; Bowel sounds present, positive ascites  EXTREMITIES:  Peripheral Pulses, No  cyanosis, or edema  SKIN: No rashes or lesions  psychiatry- mood and affect appropriate Insight and judgement intact     enoxaparin Injectable 70 milliGRAM(s) SubCutaneous every 12 hours  furosemide    Tablet 40 milliGRAM(s) Oral daily  lidocaine 1% Injectable 30 milliLiter(s) Local Injection once  multivitamin 1 Tablet(s) Oral daily  spironolactone 50 milliGRAM(s) Oral two times a day      LABS:                      CAPILLARY BLOOD GLUCOSE          RADIOLOGY & ADDITIONAL TESTS:      Consultant notes reviewed    Case discussed with consultant/provider/ family /patient

## 2018-02-19 NOTE — PROGRESS NOTE ADULT - PROBLEM SELECTOR PLAN 1
but with normal PT and platelet count and minimal ascites. Yet the albumin is extremely low. Will get 24 hour urine for protein-rule out nephrotic syndrome. Awaiting liver biopsy result. Would give diuretics as long as systolic is 90 or greater and is asymptomatic. Again uncertain if minimal ascites is causing the right pleural effusion

## 2018-02-19 NOTE — CONSULT NOTE ADULT - SUBJECTIVE AND OBJECTIVE BOX
CARDIOLOGY CONSULTATION NOTE ([facility])  Consult requested by:      Reason for Consultation:     History obtained by: Patient and medical record     obtained: No    Chief complaint:    Patient is a 62y old  Male who presents with a chief complaint of Feeling SOB (08 Feb 2018 14:59)      HPI:  58 yo M with h/o ETOH cirrhosis, c diff (on abx therapy), DM presents from home with worsening shortness of breath. Pt states that in December 2017, he had noticed dyspnea on exertion. Pt presented to [facility] ED, CT abdomen at the time showed ascites, large right pleural effusion, atelectasis right lower lobe, and splenomegaly. Pt declined further workup at [facility] and had followed up with his primary GI. Pt subsequently was hospitalized at [facility] for similar symptoms in January where he had 2 thoracentesis of the right pleural effusion (each time draining approximately 2L). Pt was then transferred to [place] for a possible TIPS procedure. However, he states that the doctors there had felt that his pleural effusions were not due to his cirrhosis, and that there may have been an additional disease process occurring. Pt had improvement in dyspnea and was discharged home. One month later, he noticed similar symptoms of NGUYEN with minimal ambulation. No fevers or chills. No worsening abdominal distension or pain. .   Cardiology was consulted to evaluate for cardiac causes of right sided pleural effusion  Patient had no prior cardiac disease . no history of radiation to the chest and no history of pericarditis  He had prior LE edema bilaterally that improved with diuretic therapy.        REVIEW OF SYMPTOMS: Cardiovascular:  as per HPI;  Respiratory:  as per HPI  Genitourinary:  No dysuria, no hematuria; Gastrointestinal:  No nausea, no vomiting. No diarrhea.  No abdominal pain. No dark color stool, no melena ; Neurological: No headache, no dizziness, no slurred speech;  Psychiatric: No agitation, no anxiety.  ALL OTHER REVIEW OF SYSTEMS ARE NEGATIVE.    ALLERGIES: Allergies    No Known Allergies    Intolerances        MEDICATIONS  (STANDING):  enoxaparin Injectable 70 milliGRAM(s) SubCutaneous every 12 hours  furosemide    Tablet 40 milliGRAM(s) Oral daily  lidocaine 1% Injectable 30 milliLiter(s) Local Injection once  multivitamin 1 Tablet(s) Oral daily  spironolactone 50 milliGRAM(s) Oral two times a day    MEDICATIONS  (PRN):      CURRENT MEDICATIONS:  furosemide    Tablet 40 milliGRAM(s) Oral daily  spironolactone 50 milliGRAM(s) Oral two times a day     enoxaparin Injectable  lidocaine 1% Injectable  multivitamin        PAST MEDICAL HISTORY  DM (diabetes mellitus)  No pertinent past medical history      PAST SURGICAL HISTORY  No significant past surgical history      FAMILY HISTORY:  No pertinent family history in first degree relatives      SOCIAL HISTORY:  Denies smoking/alcohol/drugs      Vital Signs Last 24 Hrs  T(C): 36.7 (19 Feb 2018 16:48), Max: 36.8 (19 Feb 2018 08:25)  T(F): 98 (19 Feb 2018 16:48), Max: 98.2 (19 Feb 2018 08:25)  HR: 91 (19 Feb 2018 16:48) (91 - 98)  BP: 114/77 (19 Feb 2018 16:48) (91/57 - 115/81)  BP(mean): --  RR: 18 (19 Feb 2018 16:48) (18 - 18)  SpO2: 98% (19 Feb 2018 16:48) (95% - 98%)      PHYSICAL EXAM:  Constitutional: Comfortable . No acute distress.   HEENT: Atraumatic and normcephalic , neck is supple . no JVD. No carotid bruit.   CNS: A&Ox3. No focal deficits.  Lymph Nodes: Cervical : Not palpable.  Respiratory: diminished air entry on right 2/3 lungs  Cardiovascular: S1S2 RRR. No murmur/rubs or gallop.  Gastrointestinal: Soft non-tender and non distended . +Bowel sounds, no HSM  Extremities: No edema , DP and PT +2   Psychiatric: Calm . no agitation., mood appropriate  Skin: No skin lesions    Intake and output:   02-19 @ 07:01  -  02-19 @ 20:17  --------------------------------------------------------  IN: 0 mL / OUT: 1 mL / NET: -1 mL      CT   IMPRESSION:       1.  Large acute PE in the distal left lower lobe pulmonary artery with   extension into segmental branches.  There is also a small amount of acute   PE in   the left upper lobe pulmonary artery.  2.  Very large free right pleural effusion causing shift of the   mediastinum to the left.      INTERPRETATION OF TELEMETRY:  ECG: sinus tachycardia , non specific ST-T wave changes    RADIOLOGY & ADDITIONAL STUDIES:    X-ray: Large right pleural effusion significantly increased in size since   2/15/2018..     ECHO FINDINGS:   Summary:   1. Patient tachycardic during the entire study.   2. The left atrium is normal in size.   3. Normal wall motion. Left ventricular ejection fraction, by visual   estimation, is 55 to 60%. Grade II diastolic dysfunction.   4. The right atrium is normal in size.   5. The right ventricular size is mildly enlarged. RV systolic function   is normal.   6. Small pericardial effusion.   7. Large pleural effusion in both left and right lateral regions   compressing the left atrium and right atrium. The compression   significantly improved after the drainage of the pleural fluid but not   completely resolved. Repeat study to evaluate RA compression if   clinically indicated.   8. Findings are consistent with cardiac tamponade due to increase   intra-thoracic pressure.

## 2018-02-19 NOTE — CONSULT NOTE ADULT - ASSESSMENT
Interesting problem in this patient with cirrhosis with large left PE with recurrent effusion.  GI apparently does not feel that patient'scirrhosis is responsible for recurrent effusion.  However transudative effusion would most likely be related to that EXCEPT patient has large left PE.  Conceivably there is significant shunt of pulmonary blood flow through right circulation in face of likely portal hypertension.  Problem is related to echo report which reports RV dysfunction but PA pressures only at 21.      Plan:  1.Discussed with   2.Will need drainage>?pleurex  3.Continue A/C (will need to hold for drainage)  4.Advise cardiology consult>repeat echo  5.Repeat leg doppler as patient has been off A/C for several days until recently>r/o DVT

## 2018-02-19 NOTE — CONSULT NOTE ADULT - SUBJECTIVE AND OBJECTIVE BOX
PULMONARY CONSULT NOTE      ERIC ARIASN-[id number]    Patient is a 62y old  Male who presents with a chief complaint of Feeling SOB (08 Feb 2018 14:59)  Patient admitted 2/8 with c/o shortness of breath>found to have large right sided pleural effusion for which CT surgery was observing after IR placed drain.  Patient has cirrhosis secondary to ETOH>last drink 1 year ago.  However, patient on admission CTA was found to have large pulmonary embolus in left circulation.  Patient was on full dose lovenox which was stopped on the 13th and just restarted on the 17th,.  Initial leg doppler negative.  Review of pleural fluid>pure transudate.     INTERVAL HPI/OVERNIGHT EVENTS:    MEDICATIONS  (STANDING):  enoxaparin Injectable 70 milliGRAM(s) SubCutaneous every 12 hours  furosemide    Tablet 40 milliGRAM(s) Oral daily  lidocaine 1% Injectable 30 milliLiter(s) Local Injection once  multivitamin 1 Tablet(s) Oral daily  spironolactone 50 milliGRAM(s) Oral two times a day      MEDICATIONS  (PRN):      Allergies    No Known Allergies    Intolerances        PAST MEDICAL & SURGICAL HISTORY:  DM (diabetes mellitus)  No significant past surgical history      FAMILY HISTORY:  No pertinent family history in first degree relatives      SOCIAL HISTORY  Smoking History: Former    REVIEW OF SYSTEMS:    CONSTITUTIONAL:  As per HPI.    HEENT:  Eyes:  No diplopia or blurred vision. ENT:  No earache, sore throat or runny nose.    CARDIOVASCULAR:  No pressure, squeezing, tightness, heaviness or aching about the chest; no palpitations.    RESPIRATORY:  As per HPI    GASTROINTESTINAL: +diarrhea    GENITOURINARY:  No dysuria, frequency or urgency.    MUSCULOSKELETAL:  No joint pains    SKIN:  No new lesions.    NEUROLOGIC:  No paresthesias, fasciculations, seizures or weakness.    PSYCHIATRIC:  No disorder of thought or mood.    ENDOCRINE:  No heat or cold intolerance, polyuria or polydipsia.    HEMATOLOGICAL:  No easy bruising or bleeding.     Vital Signs Last 24 Hrs  T(C): 36.8 (19 Feb 2018 08:25), Max: 36.8 (19 Feb 2018 08:25)  T(F): 98.2 (19 Feb 2018 08:25), Max: 98.2 (19 Feb 2018 08:25)  HR: 98 (19 Feb 2018 08:25) (94 - 108)  BP: 115/81 (19 Feb 2018 08:25) (91/57 - 115/81)  BP(mean): --  RR: 18 (19 Feb 2018 08:25) (18 - 18)  SpO2: 97% (19 Feb 2018 08:25) (95% - 97%)    PHYSICAL EXAMINATION:    GENERAL: The patient is a well-developed, well-nourished _____in no apparent distress.     HEENT: Head is normocephalic and atraumatic. Extraocular muscles are intact. Mucous membranes are moist.     NECK: Supple.     LUNGS: Marked decrease in aeration right side.  Left clear    HEART: Regular rate and rhythm without murmur.    ABDOMEN: Soft, nontender, and nondistended.  No hepatosplenomegaly is noted.    EXTREMITIES: Without any cyanosis, clubbing, rash, lesions or edema.    NEUROLOGIC: Grossly intact.    SKIN: No ulceration or induration present.      LABS:      < from: CT Chest w/ IV Cont (02.08.18 @ 17:45) >   EXAM:  CT ABDOMEN AND PELVIS OC IC                         EXAM:  CT CHEST IC                          PROCEDURE DATE:  02/08/2018          INTERPRETATION:  [facility] RADIOLOGIST PRELIMINARY ADDENDUM REPORT      Addendum created by Omer Camarena MD on 2/8/2018 10:08:36 PM EST     Findings discussed with QUETA MACIEL at time of interpretation.  Initial report created on 2/8/2018 9:43:07 PM EST     EXAM:    CT Chest Without and With Intravenous Contrast    CLINICAL HISTORY:    62 years old, male; Screening exam; Other: Effusion; Other screening    TECHNIQUE:    Axial computed tomography images of the chest without and with   intravenous   contrast.    MIP reconstructed images were created and reviewed.    Coronal and sagittal reformatted images were created and reviewed.    COMPARISON:    No relevant prior studies available.    FINDINGS:    Lungs:  Complete lobar atelectasis of the right middle lobe and right   lower   lobe with partial atelectasis of the right upper lobe, secondary tothe   pleural   effusion. No airway obstruction.    Pleural space:  Very large free right pleural effusion causing shift of   the mediastinum to the left.    Heart:  No evidence of right heart strain.  No significant pericardial   effusion.    Bones/joints:  Unremarkable.  No acute fracture.  No dislocation.    Soft tissues:  Gynecomastia.    Vasculature:  Large acute PE in the distal left lower lobe pulmonary   artery   with extension into segmental branches.  There is also a small amount of   acute   PE in the left upper lobe pulmonary artery.    Lymph nodes:  Unremarkable.  No enlarged lymph nodes.    IMPRESSION:       1.  Large acute PE in the distal left lower lobe pulmonary artery with   extension into segmental branches.  There is alsoa small amount of acute   PE in   the left upper lobe pulmonary artery.  2.  Very large free right pleural effusion causing shift of the   mediastinum to the left.    _______________________________________________    EXAM:    CT Abdomen and Pelvis With Intravenous Contrast    EXAM DATE/TIME:    Exam ordered 2/8/2018 5:00 PM    CLINICAL HISTORY:    62 years old, male; Screening exam; Other: Effusion; Other screening    TECHNIQUE:    Axial computed tomography images of the abdomen and pelvis with   intravenous   contrast.    MIP reconstructed images were created and reviewed.    Coronal and sagittal reformatted images were created and reviewed.    COMPARISON:    DX - XR CHEST PA AND LATERAL 2018-02-08 09:02    FINDINGS:    Lower thorax: No acute findings.     ABDOMEN:    Liver: Displaced by an inverted right hemidiaphragm secondary to large   right pleural effusion. No intrinsic abnormality.    Gallbladder and bile ducts:  Unremarkable.  No calcified stones.  No   ductal   dilation.    Pancreas:  Unremarkable.  No mass.  No ductal dilation.    Spleen: Unremarkable.    Adrenals:  Unremarkable.  No mass.    Kidneys and ureters:  Unremarkable.  No solid mass.  No hydronephrosis.    Stomach and bowel:  Portions of the wall the ascending colon appear   thickened   which may be due to its under distended state; however, colitis is   possible.    Appendix:  Normal appendix.     PELVIS:    Bladder:  Unremarkable.  No mass.    Reproductive:  Unremarkable as visualized.     ABDOMEN and PELVIS:    Intraperitoneal space:  Small volume ascites.  No free air.    Bones/joints:  No acute fracture.  No dislocation.    Soft tissues:  Unremarkable.    Vasculature:  Unremarkable.  No abdominal aortic aneurysm.    Lymph nodes:  Unremarkable.  No enlarged lymph nodes.    IMPRESSION:       1. The liver is displaced by an inverted right hemidiaphragm secondary to   a very large right pleural effusion. No intrinsic abnormality.  2.  Small volume ascites.  3.  Portions of the wall the ascending colon appear thickened which may   be due   to its under distended state; however, colitis is possible.        < end of copied text >  < from: US Duplex Venous Lower Ext Complete, Bilateral (02.08.18 @ 16:49) >   EXAM:  US DPLX LWR EXT VEINS COMPL BI                          PROCEDURE DATE:  02/08/2018          INTERPRETATION:  CLINICAL INFORMATION: Bilateral lower extremity   swelling, right greater than left    COMPARISON: None available.    TECHNIQUE: Duplex sonography of the BILATERAL LOWER extremities with   color and spectral Doppler, with and without compression.      FINDINGS:    There is normal compressibility of the bilateral common femoral, femoral   and popliteal veins. No calf vein thrombosis is detected.    Doppler examination shows normal spontaneous and phasic flow.    IMPRESSION:     No evidence of bilateral lower extremity deep venous thrombosis.      < end of copied text >      < from: TTE Echo Complete w/Doppler (02.09.18 @ 11:34) >    EXAM:  ECHO TRANSTHORACIC COMP W DOPP      PROCEDURE DATE:  Feb 9 2018   .      INTERPRETATION:  REPORT:    TRANSTHORACIC ECHOCARDIOGRAM REPORT         Patient Name:   ERIC ARIAS Patient Location: [facility] Rec #:  [id number]      Accession #:      [id number]  Account #:                     Height:           66.1 in 168.0 cm  YOB: 1955      Weight:           163.1 lb 74.00 kg  Patient Age:    62 years       BSA:              1.84 m²  Patient Gender: M              BP:              104/62 mmHg       Date of Exam:        2/9/2018 11:34:24 AM  Sonographer:         Reggie Mcgregor  Referring Physician: Tigist Aiken MD    Procedure:   Follow up or Limited Echocardiogram.  Indications: Pericardial effusion (noninflammatory) - I31.3  Diagnosis:   Pericardial effusion (noninflammatory) - I31.3    SPECTRAL DOPPLER ANALYSIS (where applicable):  Tricuspid Valve and PA/RV Systolic Pressure: TR Max Velocity: 2.13 m/s RA   Pressure: 3 mmHg RVSP/PASP: 21.1 mmHg       PHYSICIANINTERPRETATION:  Left Ventricle:  Global LV systolic function was normal.  Right Ventricle: The right ventricular size is moderately enlarged. RV   systolic function is mildly reduced. RV free wall appears hyopkinetic.  Pericardium: Small to moderatepericardial effusion. The pericardial   effusion is localized near the right atrium. There is no evidence of   cardiac tamponade. Mild ascites is noted. There is a small pleural   effusion in the right lateral region.  Venous: The inferior vena cava is normal.       Summary:   1. Follow-up study to assess for pericardial effusion. See previous   studies for comparison.   2. Small to moderate pericardial effusion.   3. Normal global left ventricular systolic function.   4. Moderately enlarged right ventricle.   5. Mild ascites.    MD Rosy Electronically signed on 2/9/2018 at 6:03:35 PM         *** Final **    < end of copied text >                      MICROBIOLOGY:    RADIOLOGY & ADDITIONAL STUDIES:

## 2018-02-19 NOTE — PROGRESS NOTE ADULT - SUBJECTIVE AND OBJECTIVE BOX
Pt seen and examined f/u right pleaural effusion and possible cirrhosis  Today he denies any increase in his baseline SOB. No chest x ray for at least several days. Denies abdominal pain, nausea or vomiting. No diarrhea. He notes that the diutetics are being held due to low BP.    REVIEW OF SYSTEMS:    CONSTITUTIONAL: No fever, weight loss, or fatigue  EYES: No eye pain, visual disturbances, or discharge  ENMT:  No difficulty hearing, tinnitus, vertigo; No sinus or throat pain  RESPIRATORY: No cough, wheezing, chills or hemoptysis;   CARDIOVASCULAR: No chest pain, palpitations, dizziness, or leg swelling  GASTROINTESTINAL: as above    MEDICATIONS:  MEDICATIONS  (STANDING):  enoxaparin Injectable 70 milliGRAM(s) SubCutaneous every 12 hours  furosemide    Tablet 40 milliGRAM(s) Oral daily  lidocaine 1% Injectable 30 milliLiter(s) Local Injection once  multivitamin 1 Tablet(s) Oral daily  spironolactone 50 milliGRAM(s) Oral two times a day    MEDICATIONS  (PRN):      Allergies    No Known Allergies    Intolerances        Vital Signs Last 24 Hrs  T(C): 36.1 (19 Feb 2018 05:18), Max: 36.7 (18 Feb 2018 16:28)  T(F): 96.9 (19 Feb 2018 05:18), Max: 98.1 (18 Feb 2018 16:28)  HR: 94 (19 Feb 2018 05:18) (94 - 108)  BP: 91/57 (19 Feb 2018 05:18) (91/57 - 106/72)  BP(mean): --  RR: 18 (19 Feb 2018 05:18) (18 - 18)  SpO2: 95% (19 Feb 2018 05:18) (95% - 96%)      PHYSICAL EXAM:    General: Well developed; well nourished; in no acute distress  HEENT: MMM, conjunctiva and sclera clear  Gastrointestinal:Abdomen: Soft non-tender non-distended; Normal bowel sounds; No hepatosplenomegaly  Extremities: no cyanosis, clubbing or edema.  Skin: Warm and dry. No obvious rash    LABS:                                RADIOLOGY & ADDITIONAL STUDIES (The following images were personally reviewed): Pt seen and examined f/u right pleaural effusion and possible cirrhosis  Today he denies any increase in his baseline SOB. No chest x ray for at least several days. Denies abdominal pain, nausea or vomiting. No diarrhea. He notes that the diutetics are being held due to low BP.    REVIEW OF SYSTEMS:    CONSTITUTIONAL: No fever, weight loss, or fatigue  EYES: No eye pain, visual disturbances, or discharge  ENMT:  No difficulty hearing, tinnitus, vertigo; No sinus or throat pain  RESPIRATORY: No cough, wheezing, chills or hemoptysis;   CARDIOVASCULAR: No chest pain, palpitations, dizziness, or leg swelling  GASTROINTESTINAL: as above    MEDICATIONS:  MEDICATIONS  (STANDING):  enoxaparin Injectable 70 milliGRAM(s) SubCutaneous every 12 hours  furosemide    Tablet 40 milliGRAM(s) Oral daily  lidocaine 1% Injectable 30 milliLiter(s) Local Injection once  multivitamin 1 Tablet(s) Oral daily  spironolactone 50 milliGRAM(s) Oral two times a day    MEDICATIONS  (PRN):      Allergies    No Known Allergies    Intolerances        Vital Signs Last 24 Hrs  T(C): 36.1 (19 Feb 2018 05:18), Max: 36.7 (18 Feb 2018 16:28)  T(F): 96.9 (19 Feb 2018 05:18), Max: 98.1 (18 Feb 2018 16:28)  HR: 94 (19 Feb 2018 05:18) (94 - 108)  BP: 91/57 (19 Feb 2018 05:18) (91/57 - 106/72)  BP(mean): --  RR: 18 (19 Feb 2018 05:18) (18 - 18)  SpO2: 95% (19 Feb 2018 05:18) (95% - 96%)      PHYSICAL EXAM:    General: Well developed; well nourished; in no acute distress  HEENT: MMM, conjunctiva and sclera clear  Gastrointestinal:Abdomen: Soft non-tender non-distended; Normal bowel sounds; No hepatosplenomegaly  Extremities: no cyanosis, clubbing or edema.  Skin: Warm and dry. No obvious rash    LABS:                                :

## 2018-02-19 NOTE — PROGRESS NOTE ADULT - PROBLEM SELECTOR PLAN 2
CTA chest reviewed - no right heart strain  NO ac x 48 hrs post biopsy  restarted Lovenox.  Pradaxa covered by [employer] on discharge as pt has no insurance

## 2018-02-20 LAB
ANION GAP SERPL CALC-SCNC: 10 MMOL/L — SIGNIFICANT CHANGE UP (ref 5–17)
BUN SERPL-MCNC: 39 MG/DL — HIGH (ref 8–20)
CALCIUM SERPL-MCNC: 8.9 MG/DL — SIGNIFICANT CHANGE UP (ref 8.6–10.2)
CHLORIDE SERPL-SCNC: 102 MMOL/L — SIGNIFICANT CHANGE UP (ref 98–107)
CO2 SERPL-SCNC: 24 MMOL/L — SIGNIFICANT CHANGE UP (ref 22–29)
CREAT SERPL-MCNC: 0.68 MG/DL — SIGNIFICANT CHANGE UP (ref 0.5–1.3)
GLUCOSE SERPL-MCNC: 104 MG/DL — SIGNIFICANT CHANGE UP (ref 70–115)
HCT VFR BLD CALC: 38.4 % — LOW (ref 42–52)
HGB BLD-MCNC: 13 G/DL — LOW (ref 14–18)
MCHC RBC-ENTMCNC: 32.7 PG — HIGH (ref 27–31)
MCHC RBC-ENTMCNC: 33.9 G/DL — SIGNIFICANT CHANGE UP (ref 32–36)
MCV RBC AUTO: 96.5 FL — HIGH (ref 80–94)
PLATELET # BLD AUTO: 304 K/UL — SIGNIFICANT CHANGE UP (ref 150–400)
POTASSIUM SERPL-MCNC: 5.3 MMOL/L — SIGNIFICANT CHANGE UP (ref 3.5–5.3)
POTASSIUM SERPL-SCNC: 5.3 MMOL/L — SIGNIFICANT CHANGE UP (ref 3.5–5.3)
RBC # BLD: 3.98 M/UL — LOW (ref 4.6–6.2)
RBC # FLD: 15.5 % — SIGNIFICANT CHANGE UP (ref 11–15.6)
SODIUM SERPL-SCNC: 136 MMOL/L — SIGNIFICANT CHANGE UP (ref 135–145)
WBC # BLD: 6.7 K/UL — SIGNIFICANT CHANGE UP (ref 4.8–10.8)
WBC # FLD AUTO: 6.7 K/UL — SIGNIFICANT CHANGE UP (ref 4.8–10.8)

## 2018-02-20 PROCEDURE — 99232 SBSQ HOSP IP/OBS MODERATE 35: CPT

## 2018-02-20 PROCEDURE — 32555 ASPIRATE PLEURA W/ IMAGING: CPT | Mod: RT

## 2018-02-20 PROCEDURE — 99233 SBSQ HOSP IP/OBS HIGH 50: CPT

## 2018-02-20 PROCEDURE — 71045 X-RAY EXAM CHEST 1 VIEW: CPT | Mod: 26

## 2018-02-20 RX ORDER — LOPERAMIDE HCL 2 MG
2 TABLET ORAL ONCE
Qty: 0 | Refills: 0 | Status: DISCONTINUED | OUTPATIENT
Start: 2018-02-20 | End: 2018-02-23

## 2018-02-20 RX ADMIN — SPIRONOLACTONE 50 MILLIGRAM(S): 25 TABLET, FILM COATED ORAL at 17:50

## 2018-02-20 RX ADMIN — ENOXAPARIN SODIUM 70 MILLIGRAM(S): 100 INJECTION SUBCUTANEOUS at 05:32

## 2018-02-20 RX ADMIN — Medication 1 TABLET(S): at 12:10

## 2018-02-20 RX ADMIN — Medication 40 MILLIGRAM(S): at 05:32

## 2018-02-20 RX ADMIN — ENOXAPARIN SODIUM 70 MILLIGRAM(S): 100 INJECTION SUBCUTANEOUS at 17:50

## 2018-02-20 RX ADMIN — SPIRONOLACTONE 50 MILLIGRAM(S): 25 TABLET, FILM COATED ORAL at 05:32

## 2018-02-20 NOTE — PROGRESS NOTE ADULT - SUBJECTIVE AND OBJECTIVE BOX
Cardiology Nurse Practitioner Note    Cardiac cath cancelled today d/t pt received lovenox this AM.  Rescheduled for tomorrow.  Please hold AM dose of lovenox.

## 2018-02-20 NOTE — CHART NOTE - NSCHARTNOTEFT_GEN_A_CORE
Source: Patient, EMR    Current Diet: Cons cho (w/ snack)    PO intake:  100%    Current Weight:   (2/14) 68kg  (2/11) 64kg  (2/9) 64kg    % Weight Change  n/a    Pertinent Medications: MEDICATIONS  (STANDING):  enoxaparin Injectable 70 milliGRAM(s) SubCutaneous every 12 hours  furosemide    Tablet 40 milliGRAM(s) Oral daily  lidocaine 1% Injectable 30 milliLiter(s) Local Injection once  multivitamin 1 Tablet(s) Oral daily  spironolactone 50 milliGRAM(s) Oral two times a day    MEDICATIONS  (PRN):    Pertinent Labs: CBC Full  -  ( 20 Feb 2018 08:31 )  WBC Count : 6.7 K/uL  Hemoglobin : 13.0 g/dL  Hematocrit : 38.4 %  Platelet Count - Automated : 304 K/uL  Mean Cell Volume : 96.5 fl  Mean Cell Hemoglobin : 32.7 pg  Mean Cell Hemoglobin Concentration : 33.9 g/dL      Skin: Intact, 2+ BLLE edema     Nutrition focused physical exam conducted - found signs of malnutrition [ X]absent [ ]present      Estimated Needs:   [ X] no change since previous assessment  [ ] recalculated:     Current Nutrition Diagnosis: Unintended weight loss related to diuretic treatment for fluid retention with cirrhosis and pleural effusion as evidenced  by pt reports ~20# wt loss due to fluid loss      Recommendations: DASH diet, continue cons cho diet as tolerated     Monitoring and Evaluation:   [X ] PO intake [ X] Tolerance to diet prescription [X] Weights  [X] Follow up per protocol [X] Labs:

## 2018-02-20 NOTE — PROGRESS NOTE ADULT - SUBJECTIVE AND OBJECTIVE BOX
Pt seen and examined f/u right pleural effusion and possible cirrhosis  This morning he has no new complaints but repeat chest x ray yesterday shows recurrant large pleural effusion. Getting lasix and aldactone now. HE DOES NOT HAVE PORTAL HYPERTENSION. THE LIVER?SPLEEN SCAN WAS ESSENTIALLY NORMAL WITH NO SHIFT OF THE COLLIOD. Denies abdominal pain, nausea or vomiting. Getting 24 hour urine for protein ordered by me yesterday. Liver biopsy still pending.    REVIEW OF SYSTEMS:    CONSTITUTIONAL: No fever, weight loss, or fatigue  EYES: No eye pain, visual disturbances, or discharge  ENMT:  No difficulty hearing, tinnitus, vertigo; No sinus or throat pain  RESPIRATORY: No cough, wheezing, chills or hemoptysis; No shortness of breath  CARDIOVASCULAR: No chest pain, palpitations, dizziness, or leg swelling  GASTROINTESTINAL: No abdominal or epigastric pain. No nausea, vomiting, or hematemesis; No diarrhea or constipation. No melena or hematochezia.    MEDICATIONS:  MEDICATIONS  (STANDING):  enoxaparin Injectable 70 milliGRAM(s) SubCutaneous every 12 hours  furosemide    Tablet 40 milliGRAM(s) Oral daily  lidocaine 1% Injectable 30 milliLiter(s) Local Injection once  multivitamin 1 Tablet(s) Oral daily  spironolactone 50 milliGRAM(s) Oral two times a day    MEDICATIONS  (PRN):      Allergies    No Known Allergies    Intolerances        Vital Signs Last 24 Hrs  T(C): 36.4 (20 Feb 2018 07:51), Max: 36.8 (19 Feb 2018 08:25)  T(F): 97.5 (20 Feb 2018 07:51), Max: 98.2 (19 Feb 2018 08:25)  HR: 97 (20 Feb 2018 07:51) (91 - 98)  BP: 103/68 (20 Feb 2018 07:51) (99/56 - 115/81)  BP(mean): --  RR: 18 (20 Feb 2018 07:51) (18 - 18)  SpO2: 97% (20 Feb 2018 07:51) (95% - 98%)    02-19 @ 07:01  -  02-20 @ 07:00  --------------------------------------------------------  IN: 0 mL / OUT: 301 mL / NET: -301 mL        PHYSICAL EXAM:    General: Well developed; well nourished; in no acute distress  HEENT: MMM, conjunctiva and sclera clear  Gastrointestinal:Abdomen: Soft non-tender non-distended; Normal bowel sounds; No hepatosplenomegaly  Extremities: no cyanosis, clubbing or edema.  Skin: Warm and dry. No obvious rash    LABS:                                RADIOLOGY & ADDITIONAL STUDIES (The following images were personally reviewed):  < from: Xray Chest 1 View-PORTABLE IMMEDIATE (02.19.18 @ 10:23) >  INTERPRETATION:  CHEST AP PORTABLE:    History: Shortness of Breath.     Date and time of exam: 2/19/2018 10:09 AM.    Technique: A single AP view of the chest was obtained.    Comparison exam: 2/15/2018.    Findings:  There is a large right pleural effusion occupying most of the right   hemithorax. This represents a significant change and 2/15/2018 at which   time there was a small right pleural effusion. No evidence of   displacement of the mediastinum. The heart and mediastinum are   unremarkable. The left lung remains clear. No evidence of a left pleural   effusion..    Impression:  Large right pleural effusion significantly increased in size since   2/15/2018..          < end of copied text >

## 2018-02-20 NOTE — PROGRESS NOTE ADULT - ATTENDING COMMENTS
will try to obtain records from [facility] and [facility]  ?liver biopsy at some point
attempted to obtain records from [facility] and [facility]
attempted to obtain records from [facility] and [facility]
attempted to obtain records from [facility] and [facility]
attempted to obtain records from [facility] and [facility]
attempted to obtain records from [facility] and [facility]
attempted to obtain records from [facility] and [facility]
attempted to obtain records from [facility] and [facility]

## 2018-02-20 NOTE — PROGRESS NOTE ADULT - SUBJECTIVE AND OBJECTIVE BOX
ERIC ARIAS Patient is a 62y old  Male who presents with a chief complaint of Feeling SOB (08 Feb 2018 14:59)     HPI:  60 yo M with h/o ETOH cirrhosis, c diff (on abx therapy), DM presents from home with worsening shortness of breath. Pt states that in December 2017, he had noticed dyspnea on exertion. Pt presented to [facility] ED, CT abdomen at the time showed ascites, large right pleural effusion, atelectasis right lower lobe, and splenomegaly. Pt declined further workup at [facility] and had followed up with his primary GI. Pt subsequently was hospitalized at [facility] for similar symptoms in January where he had 2 thoracentesis of the right pleural effusion (each time draining approximately 2L). Pt was then transferred to [place] for a possible TIPS procedure. However, he states that the doctors there had felt that his pleural effusions were not due to his cirrhosis, and that there may have been an additional disease process occurring. Pt had improvement in dyspnea and was discharged home. One month later, he noticed similar symptoms of NGUYEN with minimal ambulation. No fevers or chills. No worsening abdominal distension or pain. (08 Feb 2018 14:02)    The patient was seen and evaluated ascites, large pleural effusion  The patient is in no acute distress.  Denied any fever ,abdominal pain, fever, dysuria, cough,   Complains of SOB    I&O's Summary    19 Feb 2018 07:01  -  20 Feb 2018 07:00  --------------------------------------------------------  IN: 0 mL / OUT: 301 mL / NET: -301 mL      Allergies    No Known Allergies    Intolerances      HEALTH ISSUES - PROBLEM Dx:  Hypoalbuminemia: Hypoalbuminemia  Other ascites: Other ascites  Cirrhosis of liver with ascites, unspecified hepatic cirrhosis type: Cirrhosis of liver with ascites, unspecified hepatic cirrhosis type  Other pulmonary embolism with acute cor pulmonale, unspecified chronicity: Other pulmonary embolism with acute cor pulmonale, unspecified chronicity  Diarrhea, unspecified type: Diarrhea, unspecified type  Prophylactic measure: Prophylactic measure  Type 2 diabetes mellitus with hyperglycemia, without long-term current use of insulin: Type 2 diabetes mellitus with hyperglycemia, without long-term current use of insulin  Edema of lower extremity: Edema of lower extremity  Cirrhosis: Cirrhosis  Pleural effusion on right: Pleural effusion on right        PAST MEDICAL & SURGICAL HISTORY:  DM (diabetes mellitus)  No significant past surgical history          Vital Signs Last 24 Hrs  T(C): 36.4 (20 Feb 2018 07:51), Max: 36.8 (19 Feb 2018 22:44)  T(F): 97.5 (20 Feb 2018 07:51), Max: 98.2 (19 Feb 2018 22:44)  HR: 97 (20 Feb 2018 07:51) (91 - 97)  BP: 103/68 (20 Feb 2018 07:51) (99/56 - 114/77)  BP(mean): --  RR: 18 (20 Feb 2018 07:51) (18 - 18)  SpO2: 97% (20 Feb 2018 07:51) (95% - 98%)T(C): 36.4 (02-20-18 @ 07:51), Max: 36.8 (02-19-18 @ 22:44)  HR: 97 (02-20-18 @ 07:51) (91 - 97)  BP: 103/68 (02-20-18 @ 07:51) (99/56 - 114/77)  RR: 18 (02-20-18 @ 07:51) (18 - 18)  SpO2: 97% (02-20-18 @ 07:51) (95% - 98%)  Wt(kg): --    PHYSICAL EXAM:    GENERAL: NAD,ill appearing   HEAD:  Atraumatic, Normocephalic  EYES: EOMI,conjunctiva and sclera clear  ENMT:  Moist mucous membranes,  No lesions  NERVOUS SYSTEM:  Alert & Oriented X3,  Moves upper and lower extremities; CNS-II-XII  CHEST/LUNG: Clear to auscultation bilaterally; decreased BS right side   HEART: Regular rate and rhythm; No murmurs,   ABDOMEN: Soft, Nontender, ascites Bowel sounds present  EXTREMITIES:  Peripheral Pulses, No  cyanosis, or edema  SKIN: No rashes or lesions  psychiatry- mood and affect appropriate Insight and judgement intact     enoxaparin Injectable 70 milliGRAM(s) SubCutaneous every 12 hours  furosemide    Tablet 40 milliGRAM(s) Oral daily  lidocaine 1% Injectable 30 milliLiter(s) Local Injection once  multivitamin 1 Tablet(s) Oral daily  spironolactone 50 milliGRAM(s) Oral two times a day      LABS:                          13.0   6.7   )-----------( 304      ( 20 Feb 2018 08:31 )             38.4     02-20    136  |  102  |  39.0<H>  ----------------------------<  104  5.3   |  24.0  |  0.68    Ca    8.9      20 Feb 2018 08:31                CAPILLARY BLOOD GLUCOSE          RADIOLOGY & ADDITIONAL TESTS:      Consultant notes reviewed    Case discussed with consultant/provider/ family /patient

## 2018-02-20 NOTE — PROGRESS NOTE ADULT - SUBJECTIVE AND OBJECTIVE BOX
PULMONARY PROGRESS NOTE      [id number]    Patient is a 62y old  Male who presents with a chief complaint of Feeling SOB (08 Feb 2018 14:59)      INTERVAL HPI/OVERNIGHT EVENTS:  feels more dyspneic than few days ago  unable to lie totally flat  MEDICATIONS  (STANDING):  enoxaparin Injectable 70 milliGRAM(s) SubCutaneous every 12 hours  furosemide    Tablet 40 milliGRAM(s) Oral daily  lidocaine 1% Injectable 30 milliLiter(s) Local Injection once  multivitamin 1 Tablet(s) Oral daily  spironolactone 50 milliGRAM(s) Oral two times a day      MEDICATIONS  (PRN):      Allergies    No Known Allergies    Intolerances        PAST MEDICAL & SURGICAL HISTORY:  DM (diabetes mellitus)  No significant past surgical history      SOCIAL HISTORY  Smoking History:       REVIEW OF SYSTEMS:    CONSTITUTIONAL:  No distress    HEENT:  Eyes:  No diplopia or blurred vision. ENT:  No earache, sore throat or runny nose.    CARDIOVASCULAR:  No pressure, squeezing, tightness, heaviness or aching about the chest; no palpitations.    RESPIRATORY:  see hpi    GASTROINTESTINAL:  No nausea, vomiting or diarrhea.    GENITOURINARY:  No dysuria, frequency or urgency.    NEUROLOGIC:  No paresthesias, fasciculations, seizures or weakness.    PSYCHIATRIC:  No disorder of thought or mood.    Vital Signs Last 24 Hrs  T(C): 36.4 (20 Feb 2018 07:51), Max: 36.8 (19 Feb 2018 22:44)  T(F): 97.5 (20 Feb 2018 07:51), Max: 98.2 (19 Feb 2018 22:44)  HR: 97 (20 Feb 2018 07:51) (91 - 97)  BP: 103/68 (20 Feb 2018 07:51) (99/56 - 114/77)  BP(mean): --  RR: 18 (20 Feb 2018 07:51) (18 - 18)  SpO2: 97% (20 Feb 2018 07:51) (95% - 98%)    PHYSICAL EXAMINATION:    GENERAL: The patient is awake and alert in no apparent distress.     HEENT: Head is normocephalic and atraumatic. Extraocular muscles are intact. Mucous membranes are moist.    NECK: Supple.    LUNGS: decrease BS R base, respirations unlabored    HEART: Regular rate and rhythm without murmur.    ABDOMEN: Soft, nontender, and nondistended.      EXTREMITIES: Without any cyanosis, clubbing, rash, lesions or edema.    NEUROLOGIC: Grossly intact.    LABS:                        13.0   6.7   )-----------( 304      ( 20 Feb 2018 08:31 )             38.4     02-20    136  |  102  |  39.0<H>  ----------------------------<  104  5.3   |  24.0  |  0.68    Ca    8.9      20 Feb 2018 08:31                          MICROBIOLOGY:    RADIOLOGY & ADDITIONAL STUDIES:  CT scan, echo, notes reviewed

## 2018-02-20 NOTE — PROGRESS NOTE ADULT - PROBLEM SELECTOR PLAN 1
small amount around liver-possibly due to cirrhosis but not certain. Awaiting liver biopsy. No protal hypertension on L/S scan. On max diuretics that he will tolerate.

## 2018-02-20 NOTE — PROGRESS NOTE ADULT - SUBJECTIVE AND OBJECTIVE BOX
Spoke with pathology from core lab.  Bx likely cirrhosis and they are running additional stains before final report.  Pleurodesis /pleurx not options for hepatic hydrothorax due to lack of efficacy and increased risk of complications.

## 2018-02-21 LAB
5NT SERPL-CCNC: 11 U/L — HIGH (ref 0–10)
COLLECT DURATION TIME UR: 24 HR — SIGNIFICANT CHANGE UP
PROT 24H UR-MRATE: <28 MG/24HR — LOW (ref 50–100)
TOTAL VOLUME - 24 HOUR: 700 ML — SIGNIFICANT CHANGE UP

## 2018-02-21 PROCEDURE — 99233 SBSQ HOSP IP/OBS HIGH 50: CPT

## 2018-02-21 PROCEDURE — 99232 SBSQ HOSP IP/OBS MODERATE 35: CPT

## 2018-02-21 RX ORDER — FUROSEMIDE 40 MG
60 TABLET ORAL DAILY
Qty: 0 | Refills: 0 | Status: DISCONTINUED | OUTPATIENT
Start: 2018-02-21 | End: 2018-02-23

## 2018-02-21 RX ADMIN — ENOXAPARIN SODIUM 70 MILLIGRAM(S): 100 INJECTION SUBCUTANEOUS at 17:45

## 2018-02-21 RX ADMIN — Medication 40 MILLIGRAM(S): at 05:19

## 2018-02-21 RX ADMIN — SPIRONOLACTONE 50 MILLIGRAM(S): 25 TABLET, FILM COATED ORAL at 05:19

## 2018-02-21 RX ADMIN — Medication 1 TABLET(S): at 17:45

## 2018-02-21 RX ADMIN — SPIRONOLACTONE 50 MILLIGRAM(S): 25 TABLET, FILM COATED ORAL at 17:45

## 2018-02-21 NOTE — PROGRESS NOTE ADULT - PROBLEM SELECTOR PLAN 2
CTA chest reviewed - no right heart strain  NO ac x 48 hrs post biopsy  restarted Lovenox- discussed with patient

## 2018-02-21 NOTE — PROGRESS NOTE ADULT - PROBLEM SELECTOR PROBLEM 1
[facility]    [address]    [facility] [address]    Phone:  [phone]           Arline Hernandez   MRN: [id number]    Department:  [facility]   Date of Visit:  4/28/2017           Diagnosis     Acute right flank pain        You were seen by Coni Munoz PA-C.      Disclaimer     Follow-up Care:  It is your responsibility to arrange for follow-up care with your healthcare provider or as instructed. Call to get an appointment time.           Contact your doctor for follow-up appointment if not already scheduled.     Follow up with Steffanie Lopes MD In 3 days.    Specialty:  Internal Medicine    Contact information    [address] [address]  [phone]        Medications you received while in the ED through 04/28/2017  9:03 PM     Date/Time Order Dose Route Action    04/28/2017  6:53 PM ketorolac injection 30 mg 30 mg Intravenous Given    04/28/2017  8:11 PM morphine injection 2 mg 2 mg Intravenous Given    04/28/2017  8:09 PM DIAZepam (VALIUM) injection 2 mg 2 mg Intravenous Given         What to Do with Your Medications      START taking these medications today unless otherwise stated        Details    cyclobenzaprine 5 MG tablet   Commonly known as:  FLEXERIL        Take 1 tablet by mouth 3 times daily as needed for Muscle spasms.   Authorizing Provider:  Coni Munoz       naproxen 500 MG tablet   Commonly known as:  NAPROSYN        Take 1 tablet by mouth 2 times daily (with meals).   Authorizing Provider:  Coni Munoz                             Where to Get Your Medications      You can get these medications from any pharmacy     Bring a paper prescription for each of these medications     cyclobenzaprine 5 MG tablet    naproxen 500 MG tablet               Procedures and tests performed during your visit     CBC & Auto Differential    CT ABDOMEN PELVIS FOR KIDNEY STONES    Comprehensive Metabolic Panel    Lipase Level    Urinalysis &  Reflex Micro with Culture if Indicated    Urinalysis Microscopic    Urine pregnancy POC      Procedures     None      Imaging Results         CT ABDOMEN PELVIS FOR KIDNEY STONES (Final result) Result time:  04/28/17 20:35:45    Final result    Impression:    IMPRESSION:    1.  No urolithiasis.  The kidneys appear unremarkable.    2.  Questionable tiny amount of pneumatosis within the cecum nonspecific  and can sometimes be benign.    3.  Colonic diverticulosis.  No convincing evidence for diverticulitis.      Narrative:    CT ABDOMEN PELVIS FOR KIDNEY STONES WO CONTRAST    CLINICAL INFORMATION:  Right flank pain, UTI ABDOMINAL PAIN    COMPARISON:  No prior CT comparison available.    TECHNIQUE:  Using a multidetector, multislice helical CT imaging system, CT  of the abdomen and pelvis is performed without the use of IV contrast.     No enteric contrast administered.  Evaluation of the bowel is therefore  limited in this setting.. Coronal and sagittal multiplanar reformats.    FINDINGS:      Trace atelectasis is demonstrated at the lung bases.    Kidneys are normal in size and attenuation.  No hydronephrosis or  urolithiasis.  No perinephric stranding.  No renal mass can be resolved on  the unenhanced exam.  The urinary bladder is unremarkable.    Unenhanced scans of the liver, spleen, pancreas and adrenal glands are  within normal limits.    Aorta is normal in caliber with trace atherosclerotic calcification.    The stomach contains a moderate amount of fluid and particulate material  within it.  There is scattered stool throughout the colon.  No gas-filled,  dilated bowel loops are evident.  Scattered colonic diverticulosis.  No  obvious inflammatory changes.  Period there may be a small amount of  pneumatosis involving the cecum which is a nonspecific finding.  This is  best seen on the sagittal reformatted images, #104 (gas bubbles on the  dependent portion of the bowel and cannot exclude that there is some  Cirrhosis of liver with ascites, unspecified hepatic cirrhosis type within  the bowel wall.  No linear/streaky gas demonstrated within the bowel and no  bowel gas elsewhere.  No free intraperitoneal air.    No suspicious osseous lesions.  Extensive postsurgical and degenerative  changes are present within the lumbar spine.          Discharge Instructions     None      Discharge References/Attachments     FLANK PAIN, UNCERTAIN CAUSE (ENGLISH)      Medication Safety: What you need to know     Maintain Security - It is important to keep all medications in a secure location:  Keep out of the reach of children and pets    Consider using a lock box or locked filing cabinet    Place pill bottles in private area such as bedroom or drawer    Don't Share - It is illegal to share your prescription medication, even with family:  The doctor prescribes medications specifically for you and your body    You cannot be sure how the drug may affect others physically or emotionally    It is a criminal offense to share prescriptions    Proper Disposal - It is no longer acceptable to flush or throw away medications:  Recent studies show measurable amounts of medication have been found in drinking water and wildlife due to flushing or throwing away medications    Medication strength changes over time and is not typically safe after one year    Proper disposal removes the medication from your home in a safe way so that others don't have access to it. Use your local drug drop site.    Your local pharmacy can provide information on medication disposal options in your community. The Department of [employer] Drug Enforcement Administration website also has information on safe medication disposal:  www.deadiversion.usdoj.gov/drug_disposal/index.html

## 2018-02-21 NOTE — PROGRESS NOTE ADULT - SUBJECTIVE AND OBJECTIVE BOX
ERIC ARIAS Patient is a 62y old  Male who presents with a chief complaint of Feeling SOB (08 Feb 2018 14:59)     HPI:  58 yo M with h/o ETOH cirrhosis, c diff (on abx therapy), DM presents from home with worsening shortness of breath. Pt states that in December 2017, he had noticed dyspnea on exertion. Pt presented to [facility] ED, CT abdomen at the time showed ascites, large right pleural effusion, atelectasis right lower lobe, and splenomegaly. Pt declined further workup at [facility] and had followed up with his primary GI. Pt subsequently was hospitalized at [facility] for similar symptoms in January where he had 2 thoracentesis of the right pleural effusion (each time draining approximately 2L). Pt was then transferred to [place] for a possible TIPS procedure. However, he states that the doctors there had felt that his pleural effusions were not due to his cirrhosis, and that there may have been an additional disease process occurring. Pt had improvement in dyspnea and was discharged home. One month later, he noticed similar symptoms of NGUYEN with minimal ambulation. No fevers or chills. No worsening abdominal distension or pain. (08 Feb 2018 14:02)    The patient was seen and evaluated with liver cirrhosis and recurrent pleural effusion   The patient is in no acute distress.  Denied any fever chest pain, palpitations, shortness of breath, abdominal pain, fever, dysuria, cough, edema     I&O's Summary    Allergies    No Known Allergies    Intolerances      HEALTH ISSUES - PROBLEM Dx:  Hypoalbuminemia: Hypoalbuminemia  Other ascites: Other ascites  Cirrhosis of liver with ascites, unspecified hepatic cirrhosis type: Cirrhosis of liver with ascites, unspecified hepatic cirrhosis type  Other pulmonary embolism with acute cor pulmonale, unspecified chronicity: Other pulmonary embolism with acute cor pulmonale, unspecified chronicity  Diarrhea, unspecified type: Diarrhea, unspecified type  Prophylactic measure: Prophylactic measure  Type 2 diabetes mellitus with hyperglycemia, without long-term current use of insulin: Type 2 diabetes mellitus with hyperglycemia, without long-term current use of insulin  Edema of lower extremity: Edema of lower extremity  Cirrhosis: Cirrhosis  Pleural effusion on right: Pleural effusion on right        PAST MEDICAL & SURGICAL HISTORY:  DM (diabetes mellitus)  No significant past surgical history          Vital Signs Last 24 Hrs  T(C): 36.6 (21 Feb 2018 08:05), Max: 36.8 (20 Feb 2018 20:18)  T(F): 97.9 (21 Feb 2018 08:05), Max: 98.3 (20 Feb 2018 20:18)  HR: 92 (21 Feb 2018 08:05) (80 - 95)  BP: 94/67 (21 Feb 2018 08:05) (94/67 - 106/65)  BP(mean): --  RR: 16 (21 Feb 2018 08:05) (16 - 18)  SpO2: 98% (21 Feb 2018 08:05) (96% - 98%)T(C): 36.6 (02-21-18 @ 08:05), Max: 36.8 (02-20-18 @ 20:18)  HR: 92 (02-21-18 @ 08:05) (80 - 95)  BP: 94/67 (02-21-18 @ 08:05) (94/67 - 106/65)  RR: 16 (02-21-18 @ 08:05) (16 - 18)  SpO2: 98% (02-21-18 @ 08:05) (96% - 98%)  Wt(kg): --    PHYSICAL EXAM:    GENERAL: NAD,   HEAD:  Atraumatic, Normocephalic  EYES: EOMI, conjunctiva and sclera clear  NERVOUS SYSTEM:  Alert & Oriented X3,  Moves upper and lower extremities; CNS-II-XII  CHEST/LUNG: Clear to auscultation bilaterally; No rales, rhonchi, wheezing,   HEART: Regular rate and rhythm; No murmurs,   ABDOMEN: Soft, Nontender, Nondistended; Bowel sounds present, ascites   EXTREMITIES:  Peripheral minimal edema  SKIN: No rashes or lesions  psychiatry- mood and affect appropriately anxious and sad ,  Insight and judgement intact     enoxaparin Injectable 70 milliGRAM(s) SubCutaneous every 12 hours  furosemide    Tablet 60 milliGRAM(s) Oral daily  lidocaine 1% Injectable 30 milliLiter(s) Local Injection once  loperamide 2 milliGRAM(s) Oral once  multivitamin 1 Tablet(s) Oral daily  spironolactone 50 milliGRAM(s) Oral two times a day      LABS:                          13.0   6.7   )-----------( 304      ( 20 Feb 2018 08:31 )             38.4     02-20    136  |  102  |  39.0<H>  ----------------------------<  104  5.3   |  24.0  |  0.68    Ca    8.9      20 Feb 2018 08:31                CAPILLARY BLOOD GLUCOSE          RADIOLOGY & ADDITIONAL TESTS:      Consultant notes reviewed    Case discussed with consultant/provider/ family /patient

## 2018-02-21 NOTE — PROGRESS NOTE ADULT - SUBJECTIVE AND OBJECTIVE BOX
Pt seen and examined f/u possible cirrhosis and recurrant right pleural effusion  Yesterday IR did therapeutic thoracentesis with removal of 1750ml clear straw colored fluid. This morning he has no new complaints. Denies abdominal pain, nausea or vomiting. 24 hour urine for protwein was unremarkable. For cath today.    REVIEW OF SYSTEMS:    CONSTITUTIONAL: No fever, weight loss, or fatigue  EYES: No eye pain, visual disturbances, or discharge  ENMT:  No difficulty hearing, tinnitus, vertigo; No sinus or throat pain  RESPIRATORY: No cough, wheezing, chills or hemoptysis; No shortness of breath  CARDIOVASCULAR: No chest pain, palpitations, dizziness, or leg swelling  GASTROINTESTINAL: as above    MEDICATIONS:  MEDICATIONS  (STANDING):  enoxaparin Injectable 70 milliGRAM(s) SubCutaneous every 12 hours  furosemide    Tablet 40 milliGRAM(s) Oral daily  lidocaine 1% Injectable 30 milliLiter(s) Local Injection once  loperamide 2 milliGRAM(s) Oral once  multivitamin 1 Tablet(s) Oral daily  spironolactone 50 milliGRAM(s) Oral two times a day    MEDICATIONS  (PRN):      Allergies    No Known Allergies    Intolerances        Vital Signs Last 24 Hrs  T(C): 36.4 (21 Feb 2018 05:11), Max: 36.8 (20 Feb 2018 20:18)  T(F): 97.6 (21 Feb 2018 05:11), Max: 98.3 (20 Feb 2018 20:18)  HR: 80 (21 Feb 2018 05:11) (80 - 97)  BP: 100/61 (21 Feb 2018 05:11) (100/61 - 106/65)  BP(mean): --  RR: 16 (21 Feb 2018 05:11) (16 - 18)  SpO2: 96% (21 Feb 2018 05:11) (96% - 97%)      PHYSICAL EXAM:    General: Well developed; well nourished; in no acute distress  HEENT: MMM, conjunctiva and sclera clear  Gastrointestinal:Abdomen: Soft non-tender non-distended; Normal bowel sounds; No hepatosplenomegaly  Extremities: no cyanosis, clubbing or edema.  Skin: Warm and dry. No obvious rash    LABS:      CBC Full  -  ( 20 Feb 2018 08:31 )  WBC Count : 6.7 K/uL  Hemoglobin : 13.0 g/dL  Hematocrit : 38.4 %  Platelet Count - Automated : 304 K/uL  Mean Cell Volume : 96.5 fl  Mean Cell Hemoglobin : 32.7 pg  Mean Cell Hemoglobin Concentration : 33.9 g/dL  Auto Neutrophil # : x  Auto Lymphocyte # : x  Auto Monocyte # : x  Auto Eosinophil # : x  Auto Basophil # : x  Auto Neutrophil % : x  Auto Lymphocyte % : x  Auto Monocyte % : x  Auto Eosinophil % : x  Auto Basophil % : x    02-20    136  |  102  |  39.0<H>  ----------------------------<  104  5.3   |  24.0  |  0.68    Ca    8.9      20 Feb 2018 08:31                        RADIOLOGY & ADDITIONAL STUDIES (The following images were personally reviewed):  < from: IR US Guided Needle Placement (02.20.18 @ 19:34) >  NTERPRETATION:    Ultrasound-guided therapeutic right pleural drainage:  Chest semierect single AP view:  Clinical History:    Large volume right pleural fluid. Image guided drainage procedure.    Technique:    An informed consent obtained from patient . Risks and benefits explained   in detail.Patient made sit on the stretcher foward leaning on a pillow.   Time out procedure was performed. Limited ultrasonography demonstrated   right pleural fluid. The site marked on the skin.    The access site prepped and draped in sterile fashion. 1% lidocaine   utilized for local anesthesia. Right pleural cavity accessed, under   ultrasound guidance, using 5 [place] drainage catheter placed into the   pleural cavity.   Ultrasound guidance with permanent recording and reporting. Approximately   1750 cc of clear yellow fluid drained.The catheter removed. Site dressed   in sterile fashion.    Patient tolerated the procedure well. No complications noted. Patient   transferred to the floor in stable condition for further observation and   management.  Follow-up chest radiograph showed residual small pleural fluid. No   pneumothorax.  Impression:    Ultrasound guided right pleural drainage.                 TABITHA SPAULDING M.D., ATTENDING RADIOLOGIST  This document has been electronically signed. Feb 20 2018  7:40PM                  < end of copied text >

## 2018-02-22 LAB
ANION GAP SERPL CALC-SCNC: 9 MMOL/L — SIGNIFICANT CHANGE UP (ref 5–17)
BUN SERPL-MCNC: 41 MG/DL — HIGH (ref 8–20)
CALCIUM SERPL-MCNC: 8.8 MG/DL — SIGNIFICANT CHANGE UP (ref 8.6–10.2)
CHLORIDE SERPL-SCNC: 103 MMOL/L — SIGNIFICANT CHANGE UP (ref 98–107)
CO2 SERPL-SCNC: 24 MMOL/L — SIGNIFICANT CHANGE UP (ref 22–29)
CREAT SERPL-MCNC: 0.75 MG/DL — SIGNIFICANT CHANGE UP (ref 0.5–1.3)
GLUCOSE SERPL-MCNC: 112 MG/DL — SIGNIFICANT CHANGE UP (ref 70–115)
HCT VFR BLD CALC: 34.2 % — LOW (ref 42–52)
HGB BLD-MCNC: 11.6 G/DL — LOW (ref 14–18)
MCHC RBC-ENTMCNC: 32.4 PG — HIGH (ref 27–31)
MCHC RBC-ENTMCNC: 33.9 G/DL — SIGNIFICANT CHANGE UP (ref 32–36)
MCV RBC AUTO: 95.5 FL — HIGH (ref 80–94)
PLATELET # BLD AUTO: 250 K/UL — SIGNIFICANT CHANGE UP (ref 150–400)
POTASSIUM SERPL-MCNC: 5.1 MMOL/L — SIGNIFICANT CHANGE UP (ref 3.5–5.3)
POTASSIUM SERPL-SCNC: 5.1 MMOL/L — SIGNIFICANT CHANGE UP (ref 3.5–5.3)
RBC # BLD: 3.58 M/UL — LOW (ref 4.6–6.2)
RBC # FLD: 15.3 % — SIGNIFICANT CHANGE UP (ref 11–15.6)
SODIUM SERPL-SCNC: 136 MMOL/L — SIGNIFICANT CHANGE UP (ref 135–145)
WBC # BLD: 6.3 K/UL — SIGNIFICANT CHANGE UP (ref 4.8–10.8)
WBC # FLD AUTO: 6.3 K/UL — SIGNIFICANT CHANGE UP (ref 4.8–10.8)

## 2018-02-22 PROCEDURE — 99232 SBSQ HOSP IP/OBS MODERATE 35: CPT

## 2018-02-22 PROCEDURE — 99233 SBSQ HOSP IP/OBS HIGH 50: CPT

## 2018-02-22 RX ADMIN — SPIRONOLACTONE 50 MILLIGRAM(S): 25 TABLET, FILM COATED ORAL at 17:47

## 2018-02-22 RX ADMIN — SPIRONOLACTONE 50 MILLIGRAM(S): 25 TABLET, FILM COATED ORAL at 05:26

## 2018-02-22 RX ADMIN — ENOXAPARIN SODIUM 70 MILLIGRAM(S): 100 INJECTION SUBCUTANEOUS at 05:25

## 2018-02-22 RX ADMIN — Medication 1 TABLET(S): at 12:22

## 2018-02-22 RX ADMIN — Medication 60 MILLIGRAM(S): at 05:26

## 2018-02-22 RX ADMIN — ENOXAPARIN SODIUM 70 MILLIGRAM(S): 100 INJECTION SUBCUTANEOUS at 17:47

## 2018-02-22 NOTE — PROGRESS NOTE ADULT - ASSESSMENT
58 yo M with h/o ETOH cirrhosis, c diff (on abx therapy), DM2 presents from home with worsening shortness of breath. Pt states that in December 2017, he had noticed dyspnea on exertion. Pt presented to [facility] ED, CT abdomen at the time showed ascites, large right pleural effusion, atelectasis right lower lobe, and splenomegaly. Pt declined further workup at [facility] and had followed up with his primary GI. Pt subsequently was hospitalized at [facility] for similar symptoms in January where he had 2 thoracentesis of the right pleural effusion (each time draining approximately 2L). Pt was then transferred to [place] for a possible TIPS procedure. However, he states that the doctors there had felt that his pleural effusions were not due to his cirrhosis, and that there may have been an additional disease process occurring. Pt had improvement in dyspnea and was discharged home. One month later, he noticed similar symptoms of NGUYEN with minimal ambulation. No fevers or chills. No worsening abdominal distension or pain. In the ED, CT chest showed  large acute PE in the distal left lower lobe pulmonary artery with extension into segmental branches, there is also a small amount of acute PE in the left upper lobe pulmonary artery. CT abdomen showed the liver is displaced by an inverted right hemidiaphragm secondary to  a very large right pleural effusion. no intrinsic abnormality, small volume ascites, portions of the wall the ascending colon appear thickened which may be due to its under distended state; however, colitis is possible. Pt had chest tube placed by CTS on 2/8 and subsequently removed with re-accumulation of fluid.  GI following and underwent liver biopsy (results likely next week).  Low albumin and urine studies performed for possible proteinuria.   Lovenox restarted post biopsy for PE.
58 yo M with h/o ETOH cirrhosis, c diff (on abx therapy), DM presents from home with worsening shortness of breath. Pt states that in December 2017, he had noticed dyspnea on exertion. Pt presented to [facility] ED, CT abdomen at the time showed ascites, large right pleural effusion, atelectasis right lower lobe, and splenomegaly. Pt declined further workup at [facility] and had followed up with his primary GI. Pt subsequently was hospitalized at [facility] for similar symptoms in January where he had 2 thoracentesis of the right pleural effusion (each time draining approximately 2L). Pt was then transferred to [place] for a possible TIPS procedure. However, he states that the doctors there had felt that his pleural effusions were not due to his cirrhosis, and that there may have been an additional disease process occurring. Pt had improvement in dyspnea and was discharged home. One month later, he noticed similar symptoms of NGUYEN with minimal ambulation. No fevers or chills. No worsening abdominal distension or pain. In the ED, CT chest showed  large acute PE in the distal left lower lobe pulmonary artery with extension into segmental branches, there is also a small amount of acute PE in the left upper lobe pulmonary artery. CT abdomen showed the liver is displaced by an inverted right hemidiaphragm secondary to  a very large right pleural effusion. no intrinsic abnormality, small volume ascites, portions of the wall the ascending colon appear thickened which may be due to its under distended state; however, colitis is possible. Pt had chest tube placed by CTS on 2/8, and drained 2200ml. Lovenox started for PE.
58 yo M with h/o ETOH cirrhosis, c diff (on abx therapy), DM presents from home with worsening shortness of breath. Pt states that in December 2017, he had noticed dyspnea on exertion. Pt presented to [facility] ED, CT abdomen at the time showed ascites, large right pleural effusion, atelectasis right lower lobe, and splenomegaly. Pt declined further workup at [facility] and had followed up with his primary GI. Pt subsequently was hospitalized at [facility] for similar symptoms in January where he had 2 thoracentesis of the right pleural effusion (each time draining approximately 2L). Pt was then transferred to [place] for a possible TIPS procedure. However, he states that the doctors there had felt that his pleural effusions were not due to his cirrhosis, and that there may have been an additional disease process occurring. Pt had improvement in dyspnea and was discharged home. One month later, he noticed similar symptoms of NGUYEN with minimal ambulation. No fevers or chills. No worsening abdominal distension or pain. In the ED, CT chest showed  large acute PE in the distal left lower lobe pulmonary artery with extension into segmental branches, there is also a small amount of acute PE in the left upper lobe pulmonary artery. CT abdomen showed the liver is displaced by an inverted right hemidiaphragm secondary to  a very large right pleural effusion. no intrinsic abnormality, small volume ascites, portions of the wall the ascending colon appear thickened which may be due to its under distended state; however, colitis is possible. Pt had chest tube placed by CTS on 2/8, and drained 2200ml. Lovenox started for PE.
58 yo M with h/o ETOH cirrhosis, c diff (on abx therapy), DM presents from home with worsening shortness of breath. Pt states that in December 2017, he had noticed dyspnea on exertion. Pt presented to [facility] ED, CT abdomen at the time showed ascites, large right pleural effusion, atelectasis right lower lobe, and splenomegaly. Pt declined further workup at [facility] and had followed up with his primary GI. Pt subsequently was hospitalized at [facility] for similar symptoms in January where he had 2 thoracentesis of the right pleural effusion (each time draining approximately 2L). Pt was then transferred to [place] for a possible TIPS procedure. However, he states that the doctors there had felt that his pleural effusions were not due to his cirrhosis, and that there may have been an additional disease process occurring. Pt had improvement in dyspnea and was discharged home. One month later, he noticed similar symptoms of NGUYEN with minimal ambulation. No fevers or chills. No worsening abdominal distension or pain. In the ED, CT chest showed  large acute PE in the distal left lower lobe pulmonary artery with extension into segmental branches, there is also a small amount of acute PE in the left upper lobe pulmonary artery. CT abdomen showed the liver is displaced by an inverted right hemidiaphragm secondary to  a very large right pleural effusion. no intrinsic abnormality, small volume ascites, portions of the wall the ascending colon appear thickened which may be due to its under distended state; however, colitis is possible. Pt had chest tube placed by CTS on 2/8, and drained 2200ml. Lovenox started for PE.
58 yo M with h/o ETOH cirrhosis, c diff (on abx therapy), DM2 presents from home with worsening shortness of breath. Pt states that in December 2017, he had noticed dyspnea on exertion. Pt presented to [facility] ED, CT abdomen at the time showed ascites, large right pleural effusion, atelectasis right lower lobe, and splenomegaly. Pt declined further workup at [facility] and had followed up with his primary GI. Pt subsequently was hospitalized at [facility] for similar symptoms in January where he had 2 thoracentesis of the right pleural effusion (each time draining approximately 2L). Pt was then transferred to [place] for a possible TIPS procedure. However, he states that the doctors there had felt that his pleural effusions were not due to his cirrhosis, and that there may have been an additional disease process occurring. Pt had improvement in dyspnea and was discharged home. One month later, he noticed similar symptoms of NGUYEN with minimal ambulation. No fevers or chills. No worsening abdominal distension or pain. In the ED, CT chest showed  large acute PE in the distal left lower lobe pulmonary artery with extension into segmental branches, there is also a small amount of acute PE in the left upper lobe pulmonary artery. CT abdomen showed the liver is displaced by an inverted right hemidiaphragm secondary to  a very large right pleural effusion. no intrinsic abnormality, small volume ascites, portions of the wall the ascending colon appear thickened which may be due to its under distended state; however, colitis is possible. Pt had chest tube placed by CTS on 2/8 and subsequently removed with re-accumulation of fluid.  GI following and underwent liver biopsy (results likely next week).  Low albumin and urine studies performed for possible proteinuria.   Lovenox restarted post biopsy for PE.
58 yo M with h/o ETOH cirrhosis, c diff (on abx therapy), DM2 presents from home with worsening shortness of breath. Pt states that in December 2017, he had noticed dyspnea on exertion. Pt presented to [facility] ED, CT abdomen at the time showed ascites, large right pleural effusion, atelectasis right lower lobe, and splenomegaly. Pt declined further workup at [facility] and had followed up with his primary GI. Pt subsequently was hospitalized at [facility] for similar symptoms in January where he had 2 thoracentesis of the right pleural effusion (each time draining approximately 2L). Pt was then transferred to [place] for a possible TIPS procedure. However, he states that the doctors there had felt that his pleural effusions were not due to his cirrhosis, and that there may have been an additional disease process occurring. Pt had improvement in dyspnea and was discharged home. One month later, he noticed similar symptoms of NGUYEN with minimal ambulation. No fevers or chills. No worsening abdominal distension or pain. In the ED, CT chest showed  large acute PE in the distal left lower lobe pulmonary artery with extension into segmental branches, there is also a small amount of acute PE in the left upper lobe pulmonary artery. CT abdomen showed the liver is displaced by an inverted right hemidiaphragm secondary to  a very large right pleural effusion. no intrinsic abnormality, small volume ascites, portions of the wall the ascending colon appear thickened which may be due to its under distended state; however, colitis is possible. Pt had chest tube placed by CTS on 2/8 and subsequently removed with re-accumulation of fluid.  GI following and underwent liver biopsy (results likely next week).  Low albumin and urine studies performed for possible proteinuria.   Lovenox restarted post biopsy for PE.     Discussed at length with cardio, Pulmonary plan for draining pleural fluid today and then cath in AM
60 yo M with h/o ETOH cirrhosis, c diff (on abx therapy), DM presents from home with worsening shortness of breath. Pt states that in December 2017, he had noticed dyspnea on exertion. Pt presented to [facility] ED, CT abdomen at the time showed ascites, large right pleural effusion, atelectasis right lower lobe, and splenomegaly. Pt declined further workup at [facility] and had followed up with his primary GI. Pt subsequently was hospitalized at [facility] for similar symptoms in January where he had 2 thoracentesis of the right pleural effusion (each time draining approximately 2L). Pt was then transferred to [place] for a possible TIPS procedure. However, he states that the doctors there had felt that his pleural effusions were not due to his cirrhosis, and that there may have been an additional disease process occurring. Pt had improvement in dyspnea and was discharged home. One month later, he noticed similar symptoms of NGUYEN with minimal ambulation. No fevers or chills. No worsening abdominal distension or pain. In the ED, CT chest showed  large acute PE in the distal left lower lobe pulmonary artery with extension into segmental branches, there is also a small amount of acute PE in the left upper lobe pulmonary artery. CT abdomen showed the liver is displaced by an inverted right hemidiaphragm secondary to  a very large right pleural effusion. no intrinsic abnormality, small volume ascites, portions of the wall the ascending colon appear thickened which may be due to its under distended state; however, colitis is possible. Pt had chest tube placed by CTS on 2/8, and drained 2200ml. Lovenox started for PE.
60 yo M with h/o ETOH cirrhosis, c diff (on abx therapy), DM2 presents from home with worsening shortness of breath. Pt states that in December 2017, he had noticed dyspnea on exertion. Pt presented to [facility] ED, CT abdomen at the time showed ascites, large right pleural effusion, atelectasis right lower lobe, and splenomegaly. Pt declined further workup at [facility] and had followed up with his primary GI. Pt subsequently was hospitalized at [facility] for similar symptoms in January where he had 2 thoracentesis of the right pleural effusion (each time draining approximately 2L). Pt was then transferred to [place] for a possible TIPS procedure. However, he states that the doctors there had felt that his pleural effusions were not due to his cirrhosis, and that there may have been an additional disease process occurring. Pt had improvement in dyspnea and was discharged home. One month later, he noticed similar symptoms of NGUYEN with minimal ambulation. No fevers or chills. No worsening abdominal distension or pain. In the ED, CT chest showed  large acute PE in the distal left lower lobe pulmonary artery with extension into segmental branches, there is also a small amount of acute PE in the left upper lobe pulmonary artery. CT abdomen showed the liver is displaced by an inverted right hemidiaphragm secondary to  a very large right pleural effusion. no intrinsic abnormality, small volume ascites, portions of the wall the ascending colon appear thickened which may be due to its under distended state; however, colitis is possible. Pt had chest tube placed by CTS on 2/8 and subsequently removed with re-accumulation of fluid.  GI following and underwent liver biopsy (results likely next week).  Low albumin and urine studies performed for possible proteinuria.   Lovenox restarted post biopsy for PE.
60 yo M with h/o ETOH cirrhosis, c diff (on abx therapy), DM2 presents from home with worsening shortness of breath. Pt states that in December 2017, he had noticed dyspnea on exertion. Pt presented to [facility] ED, CT abdomen at the time showed ascites, large right pleural effusion, atelectasis right lower lobe, and splenomegaly. Pt declined further workup at [facility] and had followed up with his primary GI. Pt subsequently was hospitalized at [facility] for similar symptoms in January where he had 2 thoracentesis of the right pleural effusion (each time draining approximately 2L). Pt was then transferred to [place] for a possible TIPS procedure. However, he states that the doctors there had felt that his pleural effusions were not due to his cirrhosis, and that there may have been an additional disease process occurring. Pt had improvement in dyspnea and was discharged home. One month later, he noticed similar symptoms of NGUYEN with minimal ambulation. No fevers or chills. No worsening abdominal distension or pain. In the ED, CT chest showed  large acute PE in the distal left lower lobe pulmonary artery with extension into segmental branches, there is also a small amount of acute PE in the left upper lobe pulmonary artery. CT abdomen showed the liver is displaced by an inverted right hemidiaphragm secondary to  a very large right pleural effusion. no intrinsic abnormality, small volume ascites, portions of the wall the ascending colon appear thickened which may be due to its under distended state; however, colitis is possible. Pt had chest tube placed by CTS on 2/8 and subsequently removed with re-accumulation of fluid.  GI following and underwent liver biopsy (results likely next week).  Low albumin and urine studies performed for possible proteinuria.   Lovenox restarted post biopsy for PE.
60 yo M with h/o ETOH cirrhosis, c diff (on abx therapy), DM2 presents from home with worsening shortness of breath. Pt states that in December 2017, he had noticed dyspnea on exertion. Pt presented to [facility] ED, CT abdomen at the time showed ascites, large right pleural effusion, atelectasis right lower lobe, and splenomegaly. Pt declined further workup at [facility] and had followed up with his primary GI. Pt subsequently was hospitalized at [facility] for similar symptoms in January where he had 2 thoracentesis of the right pleural effusion (each time draining approximately 2L). Pt was then transferred to [place] for a possible TIPS procedure. However, he states that the doctors there had felt that his pleural effusions were not due to his cirrhosis, and that there may have been an additional disease process occurring. Pt had improvement in dyspnea and was discharged home. One month later, he noticed similar symptoms of NGUYEN with minimal ambulation. No fevers or chills. No worsening abdominal distension or pain. In the ED, CT chest showed  large acute PE in the distal left lower lobe pulmonary artery with extension into segmental branches, there is also a small amount of acute PE in the left upper lobe pulmonary artery. CT abdomen showed the liver is displaced by an inverted right hemidiaphragm secondary to  a very large right pleural effusion. no intrinsic abnormality, small volume ascites, portions of the wall the ascending colon appear thickened which may be due to its under distended state; however, colitis is possible. Pt had chest tube placed by CTS on 2/8 and subsequently removed with re-accumulation of fluid.  GI following and underwent liver biopsy (results likely next week).  Low albumin and urine studies performed for possible proteinuria.   Lovenox restarted post biopsy for PE.     Discussed at length with cardio, Pulmonary plan for draining pleural fluid yesterday by IR  discussed with patient - either transfer to [place] for hepatology and possible TIPS  or DC home on Lovenox for few months and come into IR out patient for PRN thoracentesis.  Risks benefits of above options
60 yo M with h/o ETOH cirrhosis, c diff (on abx therapy), DM2 presents from home with worsening shortness of breath. Pt states that in December 2017, he had noticed dyspnea on exertion. Pt presented to [facility] ED, CT abdomen at the time showed ascites, large right pleural effusion, atelectasis right lower lobe, and splenomegaly. Pt declined further workup at [facility] and had followed up with his primary GI. Pt subsequently was hospitalized at [facility] for similar symptoms in January where he had 2 thoracentesis of the right pleural effusion (each time draining approximately 2L). Pt was then transferred to [place] for a possible TIPS procedure. However, he states that the doctors there had felt that his pleural effusions were not due to his cirrhosis, and that there may have been an additional disease process occurring. Pt had improvement in dyspnea and was discharged home. One month later, he noticed similar symptoms of NGUYEN with minimal ambulation. No fevers or chills. No worsening abdominal distension or pain. In the ED, CT chest showed  large acute PE in the distal left lower lobe pulmonary artery with extension into segmental branches, there is also a small amount of acute PE in the left upper lobe pulmonary artery. CT abdomen showed the liver is displaced by an inverted right hemidiaphragm secondary to  a very large right pleural effusion. no intrinsic abnormality, small volume ascites, portions of the wall the ascending colon appear thickened which may be due to its under distended state; however, colitis is possible. Pt had chest tube placed by CTS on 2/8 and subsequently removed with re-accumulation of fluid.  GI following and underwent liver biopsy (results likely next week).  Low albumin and urine studies performed for possible proteinuria.   Lovenox restarted post biopsy for PE.     Discussed at length with cardio, Pulmonary plan for draining pleural fluid yesterday by IR  discussed with patient - either transfer to [place] for hepatology and possible TIPS  or DC home on Lovenox for few months and come into IR out patient for PRN thoracentesis.  Risks benefits of above options    DW Dr Carson to discussed with dr Shepherd who is a hepatologist at [place] and is agreeable to patient transfer to [place]
61 y/o M with h/o recurrent Right side plural effusions secondary to cirrhosis now s/p Pigtail placement for effusion
61 y/o M with h/o recurrent Right side plural effusions secondary to cirrhosis.  2/8 s/p Pigtail placement for effusion (2.2 liters initially).
Imp--Cirrhosis with Lg olume Rt hydrothorax--drained.  Thoracic feels no good intervention to prevent recurrent fluid.  L PE on lovenox.  Dopplers neg.  Could be hepatic vein clot.  Will need long term anticoag.  Recommend heme opinion re:this.
Left PE, ? sub acute ? thrombophilic  focus   large recurrent transudative pleural effusion R  favor hepatic hydrothorax through diaphragmatic  defect, although liver spleen scan bothersome  spoke with pathology, liver path pending, differential includes nephrotic syndrome, doubt constrictive pericardial disease  I contacted Dr Saez and IR, will drain WITHOUT  chest tube  Spoke with Cardiology, hold on R heart cath pending urine/liver results  repeat echo  needs hematology evaluation for thrombophilia ? occult malignancy  prognosis guarded
Patient with recurrent effusions  Cirrhosis  PE  Probably secondary to liver disease; Role of flow diversion secondary to Left PE is intriguing.    Plan:  Would consider evaluation at [facility]>may need surgery.  Needs second opinions.   Thoracentesis PRN  Continue lovenox as outpatient  Will need f/u via hematology
63 y/o M with h/o recurrent Right side plural effusions secondary to cirrhosis.  2/8 s/p Pigtail placement for effusion (2.2 liters initially).
61 y/o M with h/o recurrent Right side plural effusions secondary to cirrhosis.  2/8 s/p Pigtail placement for effusion (2.2 liters initially).
58 yo M with h/o ETOH cirrhosis, c diff (on abx therapy), DM presents from home with worsening shortness of breath. Pt states that in December 2017, he had noticed dyspnea on exertion. Pt presented to [facility] ED, CT abdomen at the time showed ascites, large right pleural effusion, atelectasis right lower lobe, and splenomegaly. Pt declined further workup at [facility] and had followed up with his primary GI. Pt subsequently was hospitalized at [facility] for similar symptoms in January where he had 2 thoracentesis of the right pleural effusion (each time draining approximately 2L). Pt was then transferred to [place] for a possible TIPS procedure. However, he states that the doctors there had felt that his pleural effusions were not due to his cirrhosis, and that there may have been an additional disease process occurring. Pt had improvement in dyspnea and was discharged home. One month later, he noticed similar symptoms of NGUYEN with minimal ambulation. No fevers or chills. No worsening abdominal distension or pain. In the ED, CT chest showed  large acute PE in the distal left lower lobe pulmonary artery with extension into segmental branches, there is also a small amount of acute PE in the left upper lobe pulmonary artery. CT abdomen showed the liver is displaced by an inverted right hemidiaphragm secondary to  a very large right pleural effusion. no intrinsic abnormality, small volume ascites, portions of the wall the ascending colon appear thickened which may be due to its under distended state; however, colitis is possible. Pt had chest tube placed by CTS on 2/8, and drained 2200ml. Lovenox started for PE.
60 yo M with h/o ETOH cirrhosis, c diff (on abx therapy), DM presents from home with worsening shortness of breath. Pt states that in December 2017, he had noticed dyspnea on exertion. Pt presented to [facility] ED, CT abdomen at the time showed ascites, large right pleural effusion, atelectasis right lower lobe, and splenomegaly. Pt declined further workup at [facility] and had followed up with his primary GI. Pt subsequently was hospitalized at [facility] for similar symptoms in January where he had 2 thoracentesis of the right pleural effusion (each time draining approximately 2L). Pt was then transferred to [place] for a possible TIPS procedure. However, he states that the doctors there had felt that his pleural effusions were not due to his cirrhosis, and that there may have been an additional disease process occurring. Pt had improvement in dyspnea and was discharged home. One month later, he noticed similar symptoms of NGUYEN with minimal ambulation. No fevers or chills. No worsening abdominal distension or pain. In the ED, CT chest showed  large acute PE in the distal left lower lobe pulmonary artery with extension into segmental branches, there is also a small amount of acute PE in the left upper lobe pulmonary artery. CT abdomen showed the liver is displaced by an inverted right hemidiaphragm secondary to  a very large right pleural effusion. no intrinsic abnormality, small volume ascites, portions of the wall the ascending colon appear thickened which may be due to its under distended state; however, colitis is possible. Pt had chest tube placed by CTS on 2/8, and drained 2200ml. Lovenox started for PE.
Patient with significant shortness of breath, right pleural effusion s/p chest tube. It is unclear what is the etiology of pleural effusion. Fluid analysis suggest transudative etiology    1. Will recommend obtaining work up from [facility] Navarrete and Iggy Lopes  2. Remove the chest tube as per CT surgery  3. For the time being, continue diuretics and assess  4. Continue anticoagulation for PE
58 yo M with h/o ETOH cirrhosis, c diff (on abx therapy), DM here with large right pleural effusion. Found to have acute pE.
Patient with recurrent idiopathic pleural effusion, splenomegaly    1. Obtain liver biopsy  2. Would recommend at least lovenox to prevent bleeding for 24 hours  3. Continue diuretic therapy and eventually can be discharged with follow up in the GI office
Patient with recurrent pleural effusion, splenomegaly, ascites s/p liver biopsy, PE    1. no clear etiology so far  2. Await liver biopsy results  3. If recurrent, and no etiology, ? pleurodesis vs TIPS if shows liver cirrhosis.

## 2018-02-22 NOTE — PROGRESS NOTE ADULT - PROBLEM SELECTOR PLAN 1
post thoracentesis by IR    Pleurx is relative contraindicated in cirrhosis  discussed with CT surgery Dr Saez

## 2018-02-22 NOTE — PROGRESS NOTE ADULT - PROBLEM SELECTOR PROBLEM 5
Prophylactic measure
Prophylactic measure
Type 2 diabetes mellitus with hyperglycemia, without long-term current use of insulin
Prophylactic measure

## 2018-02-22 NOTE — PROGRESS NOTE ADULT - PROVIDER SPECIALTY LIST ADULT
CT Surgery
Cardiology
Gastroenterology
Hospitalist
Pulmonology
Thoracic Surgery
CT Surgery
Gastroenterology
Gastroenterology
Hospitalist

## 2018-02-22 NOTE — PROGRESS NOTE ADULT - PROBLEM SELECTOR PLAN 3
2/2 ETOH  CT abd - minimal ascites  Appreciate GI consult  C/w spironolactone and lasix
2/2 ETOH  CT abd - minimal ascites  Appreciate GI consult  C/w spironolactone
s/p liver biopsy 12/14/18  C/w spironolactone and lasix  abdominal ultrasound to evaluate for ascites (d/w GI and CT surgery)
????? etiology, ? liver. again awaiting liver biopsy, continue diuretics
etiology unclear.- will need chest tube drainage again.
on anticoagulation
s/p liver biopsy 12/14/18  C/w spironolactone and Lasix
2/2 ETOH  CT abd - minimal ascites  Appreciate GI consult  C/w spironolactone
s/p liver biopsy 12/14/18  C/w spironolactone and Lasix
s/p liver biopsy 12/14/18  C/w spironolactone and lasix  abdominal ultrasound to evaluate for ascites (d/w GI and CT surgery) which may require paracentesis
Continue SCD's & Lovenox for GI prophylaxis.

## 2018-02-22 NOTE — PROGRESS NOTE ADULT - PROBLEM SELECTOR PLAN 1
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 Pt. to be transferred to [facility] for GI/ Hepatology evaluation there and probable TIPS procedure. Case and management d/w Dr. Collazo who will arrange for the transfer.

## 2018-02-22 NOTE — PROGRESS NOTE ADULT - NSHPATTENDINGPLANDISCUSS_GEN_ALL_CORE
Dr. Saez & CT surgery in morning rounds.
PMD, cardio, IR, T surg, pt, pathology
Dr. Collazo
patient and GI
Dr Pearl
patient and GI
patient and GI, pulmonary,
patient and GI, pulmonary, cardiothoracic,

## 2018-02-22 NOTE — PROGRESS NOTE ADULT - PROBLEM SELECTOR PROBLEM 4
Type 2 diabetes mellitus with hyperglycemia, without long-term current use of insulin
Edema of lower extremity
Type 2 diabetes mellitus with hyperglycemia, without long-term current use of insulin
Edema of lower extremity

## 2018-02-22 NOTE — PROGRESS NOTE ADULT - PROBLEM SELECTOR PLAN 6
Lovenox
urine studies  if proteinuria present, check 24hr urine.

## 2018-02-22 NOTE — CONSULT NOTE ADULT - SUBJECTIVE AND OBJECTIVE BOX
[facility]  MD Kadeem Ayala MD  [phone]  Answering [place] : [phone]        ERIC [id number]      HPI:  60 yo M with h/o ETOH cirrhosis, c diff (on abx therapy), DM presents from home with worsening shortness of breath. Pt states that in December 2017, he had noticed dyspnea on exertion. Pt presented to [facility] ED, CT abdomen at the time showed ascites, large right pleural effusion, atelectasis right lower lobe, and splenomegaly. Pt declined further workup at [facility] and had followed up with his primary GI. Pt subsequently was hospitalized at [facility] for similar symptoms in January where he had 2 thoracentesis of the right pleural effusion (each time draining approximately 2L). Pt was then transferred to [place] for a possible TIPS procedure. However, he states that the doctors there had felt that his pleural effusions were not due to his cirrhosis, and that there may have been an additional disease process occurring. Pt had improvement in dyspnea and was discharged home. One month later, he noticed similar symptoms of NGUYEN with minimal ambulation. No fevers or chills. No worsening abdominal distension or pain. (08 Feb 2018 14:02). The patient claims always to have a dramatic improvement in breathing after thoracentesis. Denies any history of GI related bleeding. His last colonoscopy last year with numerous polyps. he has no significant family history of possible thrombophilia or malignancy      PAST MEDICAL & SURGICAL HISTORY:  DM (diabetes mellitus)  No significant past surgical history      ANTIBIOTICS      Allergies    No Known Allergies    Intolerances        SOCIAL HISTORY:    FAMILY HISTORY:  No pertinent family history in first degree relatives      Vital Signs Last 24 Hrs  T(C): 36.9 (22 Feb 2018 08:10), Max: 37.1 (21 Feb 2018 22:27)  T(F): 98.4 (22 Feb 2018 08:10), Max: 98.8 (21 Feb 2018 22:27)  HR: 87 (22 Feb 2018 08:10) (87 - 97)  BP: 92/58 (22 Feb 2018 08:10) (92/58 - 112/71)  BP(mean): --  RR: 18 (22 Feb 2018 08:10) (16 - 18)  SpO2: 98% (22 Feb 2018 08:10) (95% - 98%)  Drug Dosing Weight  Height (cm): 167.64 (08 Feb 2018 07:53)  Weight (kg): 73.5 (08 Feb 2018 07:53)  BMI (kg/m2): 26.2 (08 Feb 2018 07:53)  BSA (m2): 1.83 (08 Feb 2018 07:53)      REVIEW OF SYSTEMS:    CONSTITUTIONAL:  As per HPI.    HEENT:  Eyes:  No diplopia or blurred vision. ENT:  No earache, sore throat or runny nose.    CARDIOVASCULAR:  No pressure, squeezing, strangling, tightness, heaviness or aching about the chest, neck, axilla or epigastrium.    RESPIRATORY:  No cough, shortness of breath, PND or orthopnea.    GASTROINTESTINAL:  No nausea, vomiting or diarrhea.    GENITOURINARY:  No dysuria, frequency or urgency.    MUSCULOSKELETAL:  As per HPI.    SKIN:  No change in skin, hair or nails.    NEUROLOGIC:  No paresthesias, fasciculations, seizures or weakness.    PSYCHIATRIC:  No disorder of thought or mood.    ENDOCRINE:  No heat or cold intolerance, polyuria or polydipsia.    HEMATOLOGICAL:  No easy bruising or bleeding.           PHYSICAL EXAMINATION:    GENERAL: The patient is a well-developed, well-nourished _____in no apparent distress. ___ is alert and oriented x3.    VITAL SIGNS:     HEENT: Head is normocephalic and atraumatic. Extraocular muscles are intact. Pupils are equal, round, and reactive to light and accommodation. Nares appeared normal. Mouth is well hydrated and without lesions. Mucous membranes are moist. Posterior pharynx clear of any exudate or lesions.    NECK: Supple. No carotid bruits.  No lymphadenopathy or thyromegaly.    LUNGS: Clear to auscultation.    HEART: Regular rate and rhythm without murmur.    ABDOMEN: Soft, nontender, and nondistended.  Positive bowel sounds.  No hepatosplenomegaly was noted.    EXTREMITIES: Without any cyanosis, clubbing, rash, lesions or edema.    NEUROLOGIC: Cranial nerves II through XII are grossly intact.    PSYCHIATRIC: Flat affect, but denies suicidal or homicidal ideations.  SKIN: No ulceration or induration present.    MICROBIOLOGY:      LABS:                        11.6   6.3   )-----------( 250      ( 22 Feb 2018 08:02 )             34.2     02-22    136  |  103  |  41.0<H>  ----------------------------<  112  5.1   |  24.0  |  0.75    Ca    8.8      22 Feb 2018 08:02    ASSESSMENT:  Unprovoked left Pulmonary embolism  Recurrent bilateral pleural effusion  History of liver cirrhosis  ACD    PLAN:  This patient's history of unprovoked PE requires anticoagulation. This patient is not a candidate for long term anticoagulation considering his history of liver cirrhosis and increased risk for spontaneous bleeding. His family history is not concerning for thrombophilia. Suggest full anticoagulation and he prefers to continue on Lovenox for now until he sees his gastroenterologist for further discussion. He is refusing TIPS. I suggest outpatient testing for thrombophilia in 2 weeks. This entire visit entailed approx. 50 min, 50% in counselling      Colby Joseph M.D.

## 2018-02-22 NOTE — PROGRESS NOTE ADULT - SUBJECTIVE AND OBJECTIVE BOX
ERIC ARIAS Patient is a 62y old  Male who presents with a chief complaint of Feeling SOB (08 Feb 2018 14:59)     HPI:  58 yo M with h/o ETOH cirrhosis, c diff (on abx therapy), DM presents from home with worsening shortness of breath. Pt states that in December 2017, he had noticed dyspnea on exertion. Pt presented to [facility] ED, CT abdomen at the time showed ascites, large right pleural effusion, atelectasis right lower lobe, and splenomegaly. Pt declined further workup at [facility] and had followed up with his primary GI. Pt subsequently was hospitalized at [facility] for similar symptoms in January where he had 2 thoracentesis of the right pleural effusion (each time draining approximately 2L). Pt was then transferred to [place] for a possible TIPS procedure. However, he states that the doctors there had felt that his pleural effusions were not due to his cirrhosis, and that there may have been an additional disease process occurring. Pt had improvement in dyspnea and was discharged home. One month later, he noticed similar symptoms of NGUYEN with minimal ambulation. No fevers or chills. No worsening abdominal distension or pain. (08 Feb 2018 14:02)    The patient was seen and evaluated pleural effusion, ascites, PE  The patient is in no acute distress.  Denied any fever chest pain, palpitations, shortness of breath, abdominal pain, fever, dysuria, cough, edema       I&O's Summary    Allergies    No Known Allergies    Intolerances      HEALTH ISSUES - PROBLEM Dx:  Hypoalbuminemia: Hypoalbuminemia  Other ascites: Other ascites  Cirrhosis of liver with ascites, unspecified hepatic cirrhosis type: Cirrhosis of liver with ascites, unspecified hepatic cirrhosis type  Other pulmonary embolism with acute cor pulmonale, unspecified chronicity: Other pulmonary embolism with acute cor pulmonale, unspecified chronicity  Diarrhea, unspecified type: Diarrhea, unspecified type  Prophylactic measure: Prophylactic measure  Type 2 diabetes mellitus with hyperglycemia, without long-term current use of insulin: Type 2 diabetes mellitus with hyperglycemia, without long-term current use of insulin  Edema of lower extremity: Edema of lower extremity  Cirrhosis: Cirrhosis  Pleural effusion on right: Pleural effusion on right        PAST MEDICAL & SURGICAL HISTORY:  DM (diabetes mellitus)  No significant past surgical history          Vital Signs Last 24 Hrs  T(C): 36.9 (22 Feb 2018 08:10), Max: 37.1 (21 Feb 2018 22:27)  T(F): 98.4 (22 Feb 2018 08:10), Max: 98.8 (21 Feb 2018 22:27)  HR: 87 (22 Feb 2018 08:10) (87 - 97)  BP: 92/58 (22 Feb 2018 08:10) (92/58 - 112/71)  BP(mean): --  RR: 18 (22 Feb 2018 08:10) (16 - 18)  SpO2: 98% (22 Feb 2018 08:10) (95% - 98%)T(C): 36.9 (02-22-18 @ 08:10), Max: 37.1 (02-21-18 @ 22:27)  HR: 87 (02-22-18 @ 08:10) (87 - 97)  BP: 92/58 (02-22-18 @ 08:10) (92/58 - 112/71)  RR: 18 (02-22-18 @ 08:10) (16 - 18)  SpO2: 98% (02-22-18 @ 08:10) (95% - 98%)  Wt(kg): --    PHYSICAL EXAM:    GENERAL: NAD, chronically ill appearing   HEAD:  Atraumatic, Normocephalic  EYES: EOMI, conjunctiva and sclera clear  NERVOUS SYSTEM:  Alert & Oriented X3,  Moves upper and lower extremities; CNS-II-XII  CHEST/LUNG: Clear to auscultation bilaterally; No rales, rhonchi, wheezing,   HEART: Regular rate and rhythm; No murmurs,   ABDOMEN: Soft, Nontender, Nondistended; Bowel sounds present  EXTREMITIES:  Peripheral Pulses, No  cyanosis, or edema  SKIN: No rashes or lesions  psychiatry- mood and affect appropriate Insight and judgement intact     enoxaparin Injectable 70 milliGRAM(s) SubCutaneous every 12 hours  furosemide    Tablet 60 milliGRAM(s) Oral daily  lidocaine 1% Injectable 30 milliLiter(s) Local Injection once  loperamide 2 milliGRAM(s) Oral once  multivitamin 1 Tablet(s) Oral daily  spironolactone 50 milliGRAM(s) Oral two times a day      LABS:                          11.6   6.3   )-----------( 250      ( 22 Feb 2018 08:02 )             34.2     02-22    136  |  103  |  41.0<H>  ----------------------------<  112  5.1   |  24.0  |  0.75    Ca    8.8      22 Feb 2018 08:02                CAPILLARY BLOOD GLUCOSE          RADIOLOGY & ADDITIONAL TESTS:      Consultant notes reviewed    Case discussed with consultant/provider/ family /patient

## 2018-02-22 NOTE — PROGRESS NOTE ADULT - PROBLEM SELECTOR PLAN 5
Lovenox
HOLD chemical a/c
Hold metformin  C/w sliding scale and accuchecks
Lovenox therapeutic
HOLD chemical a/c
Lovenox
Lovenox therapeutic
lovenox

## 2018-02-22 NOTE — PROGRESS NOTE ADULT - PROBLEM SELECTOR PLAN 4
Continue Lasix & Aldactone for edema.
Hold metformin  a1c 5.6  dc sliding scale for comfort
Hold metformin  a1c 5.6  dc sliding scale for comfort
R>L  Dopplers negative
Hold metformin  a1c 5.6  dc sliding scale for comfort

## 2018-02-22 NOTE — PROGRESS NOTE ADULT - SUBJECTIVE AND OBJECTIVE BOX
Pt seen and examined. His liver biopsy preliminary report was c/w cirrhosis. I spoke with Dr. Anthony Pruitt, Chief of Hepatology @ [facility]  and he agreed that the pt. should be transferred ther for probable TIPS for hepatic hydrothorax    REVIEW OF SYSTEMS:  Constitutional: No fever, weight loss or fatigue  Cardiovascular: No chest pain, palpitations, dizziness or leg swelling  Gastrointestinal: No abdominal or epigastric pain. No nausea, vomiting or hematemesis; No diarrhea or constipation. No melena or hematochezia.  Skin: No itching, burning, rashes or lesions       MEDICATIONS:  MEDICATIONS  (STANDING):  enoxaparin Injectable 70 milliGRAM(s) SubCutaneous every 12 hours  furosemide    Tablet 60 milliGRAM(s) Oral daily  lidocaine 1% Injectable 30 milliLiter(s) Local Injection once  loperamide 2 milliGRAM(s) Oral once  multivitamin 1 Tablet(s) Oral daily  spironolactone 50 milliGRAM(s) Oral two times a day    MEDICATIONS  (PRN):      Allergies    No Known Allergies    Intolerances        Vital Signs Last 24 Hrs  T(C): 36.9 (22 Feb 2018 08:10), Max: 37.1 (21 Feb 2018 22:27)  T(F): 98.4 (22 Feb 2018 08:10), Max: 98.8 (21 Feb 2018 22:27)  HR: 87 (22 Feb 2018 08:10) (87 - 97)  BP: 92/58 (22 Feb 2018 08:10) (92/58 - 112/71)  BP(mean): --  RR: 18 (22 Feb 2018 08:10) (16 - 18)  SpO2: 98% (22 Feb 2018 08:10) (95% - 98%)      PHYSICAL EXAM:    General: Well developed; well nourished; in no acute distress  HEENT: MMM, conjunctiva pink and sclera anicteric.  Lungs: Decreased breath sounds on right  Cor: RRR S1, S2 only.  Gastrointestinal: Abdomen: Soft non-tender non-distended; Normal bowel sounds  Extremities: no cyanosis, clubbing or edema.  Skin: Warm and dry. No obvious rash  Neuro: Pt. a + o x 3.    LABS:      CBC Full  -  ( 22 Feb 2018 08:02 )  WBC Count : 6.3 K/uL  Hemoglobin : 11.6 g/dL  Hematocrit : 34.2 %  Platelet Count - Automated : 250 K/uL  Mean Cell Volume : 95.5 fl  Mean Cell Hemoglobin : 32.4 pg  Mean Cell Hemoglobin Concentration : 33.9 g/dL  Auto Neutrophil # : x  Auto Lymphocyte # : x  Auto Monocyte # : x  Auto Eosinophil # : x  Auto Basophil # : x  Auto Neutrophil % : x  Auto Lymphocyte % : x  Auto Monocyte % : x  Auto Eosinophil % : x  Auto Basophil % : x    02-22    136  |  103  |  41.0<H>  ----------------------------<  112  5.1   |  24.0  |  0.75    Ca    8.8      22 Feb 2018 08:02                        RADIOLOGY & ADDITIONAL STUDIES (The following images were personally reviewed):

## 2018-02-22 NOTE — CONSULT NOTE ADULT - CONSULT REASON
Cirrhosis  Right sided pleural effusion
Pulmonary embolism, unprovoked  Advanced liver cirrhosis  Recurrent bilateral pleural effusion
Recurrent pleural effusion
Recurrent pleural effusion  Dyspnea
pleural effusion

## 2018-02-22 NOTE — PROGRESS NOTE ADULT - PROBLEM SELECTOR PROBLEM 3
Cirrhosis
Cirrhosis
Pleural effusion on right
Cirrhosis
Other pulmonary embolism with acute cor pulmonale, unspecified chronicity
Pleural effusion on right
Cirrhosis
Prophylactic measure
Cirrhosis

## 2018-02-23 ENCOUNTER — TRANSCRIPTION ENCOUNTER (OUTPATIENT)
Age: 63
End: 2018-02-23

## 2018-02-23 VITALS
DIASTOLIC BLOOD PRESSURE: 75 MMHG | RESPIRATION RATE: 18 BRPM | TEMPERATURE: 98 F | SYSTOLIC BLOOD PRESSURE: 112 MMHG | HEART RATE: 96 BPM | OXYGEN SATURATION: 98 %

## 2018-02-23 PROCEDURE — 99285 EMERGENCY DEPT VISIT HI MDM: CPT | Mod: 25

## 2018-02-23 PROCEDURE — 88112 CYTOPATH CELL ENHANCE TECH: CPT

## 2018-02-23 PROCEDURE — 93306 TTE W/DOPPLER COMPLETE: CPT

## 2018-02-23 PROCEDURE — 99239 HOSP IP/OBS DSCHRG MGMT >30: CPT

## 2018-02-23 PROCEDURE — 82042 OTHER SOURCE ALBUMIN QUAN EA: CPT

## 2018-02-23 PROCEDURE — 93005 ELECTROCARDIOGRAM TRACING: CPT

## 2018-02-23 PROCEDURE — P9047: CPT

## 2018-02-23 PROCEDURE — 87070 CULTURE OTHR SPECIMN AEROBIC: CPT

## 2018-02-23 PROCEDURE — 36415 COLL VENOUS BLD VENIPUNCTURE: CPT

## 2018-02-23 PROCEDURE — 71260 CT THORAX DX C+: CPT

## 2018-02-23 PROCEDURE — 78215 LVR&SPLEEN IMG STATIC ONLY: CPT

## 2018-02-23 PROCEDURE — 83915 ASSAY OF NUCLEOTIDASE: CPT

## 2018-02-23 PROCEDURE — 87186 SC STD MICRODIL/AGAR DIL: CPT

## 2018-02-23 PROCEDURE — 78803 RP LOCLZJ TUM SPECT 1 AREA: CPT

## 2018-02-23 PROCEDURE — 83036 HEMOGLOBIN GLYCOSYLATED A1C: CPT

## 2018-02-23 PROCEDURE — 84484 ASSAY OF TROPONIN QUANT: CPT

## 2018-02-23 PROCEDURE — 71045 X-RAY EXAM CHEST 1 VIEW: CPT

## 2018-02-23 PROCEDURE — 82945 GLUCOSE OTHER FLUID: CPT

## 2018-02-23 PROCEDURE — 93970 EXTREMITY STUDY: CPT

## 2018-02-23 PROCEDURE — 83986 ASSAY PH BODY FLUID NOS: CPT

## 2018-02-23 PROCEDURE — 85610 PROTHROMBIN TIME: CPT

## 2018-02-23 PROCEDURE — 85730 THROMBOPLASTIN TIME PARTIAL: CPT

## 2018-02-23 PROCEDURE — 84100 ASSAY OF PHOSPHORUS: CPT

## 2018-02-23 PROCEDURE — 96372 THER/PROPH/DIAG INJ SC/IM: CPT

## 2018-02-23 PROCEDURE — 85027 COMPLETE CBC AUTOMATED: CPT

## 2018-02-23 PROCEDURE — 71046 X-RAY EXAM CHEST 2 VIEWS: CPT

## 2018-02-23 PROCEDURE — 80053 COMPREHEN METABOLIC PANEL: CPT

## 2018-02-23 PROCEDURE — 76705 ECHO EXAM OF ABDOMEN: CPT

## 2018-02-23 PROCEDURE — 84156 ASSAY OF PROTEIN URINE: CPT

## 2018-02-23 PROCEDURE — C1729: CPT

## 2018-02-23 PROCEDURE — C1894: CPT

## 2018-02-23 PROCEDURE — 89051 BODY FLUID CELL COUNT: CPT

## 2018-02-23 PROCEDURE — 87075 CULTR BACTERIA EXCEPT BLOOD: CPT

## 2018-02-23 PROCEDURE — 87493 C DIFF AMPLIFIED PROBE: CPT

## 2018-02-23 PROCEDURE — 83615 LACTATE (LD) (LDH) ENZYME: CPT

## 2018-02-23 PROCEDURE — 86850 RBC ANTIBODY SCREEN: CPT

## 2018-02-23 PROCEDURE — C1769: CPT

## 2018-02-23 PROCEDURE — 74177 CT ABD & PELVIS W/CONTRAST: CPT

## 2018-02-23 PROCEDURE — A9541: CPT

## 2018-02-23 PROCEDURE — 80048 BASIC METABOLIC PNL TOTAL CA: CPT

## 2018-02-23 PROCEDURE — 76942 ECHO GUIDE FOR BIOPSY: CPT

## 2018-02-23 PROCEDURE — 88305 TISSUE EXAM BY PATHOLOGIST: CPT

## 2018-02-23 PROCEDURE — 82977 ASSAY OF GGT: CPT

## 2018-02-23 PROCEDURE — 87205 SMEAR GRAM STAIN: CPT

## 2018-02-23 PROCEDURE — 83735 ASSAY OF MAGNESIUM: CPT

## 2018-02-23 PROCEDURE — 83880 ASSAY OF NATRIURETIC PEPTIDE: CPT

## 2018-02-23 PROCEDURE — 82962 GLUCOSE BLOOD TEST: CPT

## 2018-02-23 PROCEDURE — 80076 HEPATIC FUNCTION PANEL: CPT

## 2018-02-23 PROCEDURE — 82550 ASSAY OF CK (CPK): CPT

## 2018-02-23 PROCEDURE — 84157 ASSAY OF PROTEIN OTHER: CPT

## 2018-02-23 PROCEDURE — 86901 BLOOD TYPING SEROLOGIC RH(D): CPT

## 2018-02-23 PROCEDURE — 86900 BLOOD TYPING SEROLOGIC ABO: CPT

## 2018-02-23 RX ORDER — SPIRONOLACTONE 25 MG/1
2 TABLET, FILM COATED ORAL
Qty: 120 | Refills: 0 | OUTPATIENT
Start: 2018-02-23 | End: 2018-03-24

## 2018-02-23 RX ORDER — APIXABAN 2.5 MG/1
1 TABLET, FILM COATED ORAL
Qty: 60 | Refills: 0 | OUTPATIENT
Start: 2018-02-23 | End: 2018-03-24

## 2018-02-23 RX ORDER — METRONIDAZOLE 500 MG
1 TABLET ORAL
Qty: 0 | Refills: 0 | COMMUNITY

## 2018-02-23 RX ORDER — SPIRONOLACTONE 25 MG/1
1 TABLET, FILM COATED ORAL
Qty: 0 | Refills: 0 | COMMUNITY

## 2018-02-23 RX ORDER — ENOXAPARIN SODIUM 100 MG/ML
70 INJECTION SUBCUTANEOUS
Qty: 1 | Refills: 0 | OUTPATIENT
Start: 2018-02-23 | End: 2018-03-24

## 2018-02-23 RX ORDER — ENOXAPARIN SODIUM 100 MG/ML
80 INJECTION SUBCUTANEOUS
Qty: 1 | Refills: 0 | OUTPATIENT
Start: 2018-02-23 | End: 2018-03-24

## 2018-02-23 RX ORDER — FUROSEMIDE 40 MG
3 TABLET ORAL
Qty: 90 | Refills: 0 | OUTPATIENT
Start: 2018-02-23 | End: 2018-03-24

## 2018-02-23 RX ADMIN — ENOXAPARIN SODIUM 70 MILLIGRAM(S): 100 INJECTION SUBCUTANEOUS at 05:48

## 2018-02-23 RX ADMIN — Medication 60 MILLIGRAM(S): at 05:51

## 2018-02-23 RX ADMIN — Medication 1 TABLET(S): at 11:12

## 2018-02-23 RX ADMIN — SPIRONOLACTONE 50 MILLIGRAM(S): 25 TABLET, FILM COATED ORAL at 05:49

## 2018-02-23 NOTE — DISCHARGE NOTE ADULT - CARE PROVIDERS DIRECT ADDRESSES
,[email or web address]_Unknown

## 2018-02-23 NOTE — DISCHARGE NOTE ADULT - CARE PLAN
Principal Discharge DX:	Pleural effusion on right  Goal:	patient underwent thoracentesis twice by IR  Assessment and plan of treatment:	Dr Pruitt in [facility]  Secondary Diagnosis:	Other pulmonary embolism with acute cor pulmonale, unspecified chronicity  Goal:	Lovenox till definitive plan and then transition to Eliquis  Secondary Diagnosis:	Edema of lower extremity  Secondary Diagnosis:	DM (diabetes mellitus)  Secondary Diagnosis:	Cirrhosis of liver with ascites, unspecified hepatic cirrhosis type  Secondary Diagnosis:	Hypoalbuminemia  Secondary Diagnosis:	Type 2 diabetes mellitus with hyperglycemia, without long-term current use of insulin

## 2018-02-23 NOTE — DISCHARGE NOTE ADULT - SECONDARY DIAGNOSIS.
Other pulmonary embolism with acute cor pulmonale, unspecified chronicity Edema of lower extremity DM (diabetes mellitus) Cirrhosis of liver with ascites, unspecified hepatic cirrhosis type Hypoalbuminemia Type 2 diabetes mellitus with hyperglycemia, without long-term current use of insulin

## 2018-02-23 NOTE — DISCHARGE NOTE ADULT - MEDICATION SUMMARY - MEDICATIONS TO TAKE
I will START or STAY ON the medications listed below when I get home from the hospital:    spironolactone 25 mg oral tablet  -- 2 tab(s) by mouth 2 times a day  -- Indication: For Cirrhosis    enoxaparin 80 mg/0.8 mL injectable solution  -- 70 milligram(s) subcutaneously every 12 hours   -- It is very important that you take or use this exactly as directed.  Do not skip doses or discontinue unless directed by your doctor.    -- Indication: For PE    metFORMIN 850 mg oral tablet  -- 1 tab(s) by mouth 2 times a day  -- Indication: For DM (diabetes mellitus)    furosemide 20 mg oral tablet  -- 3 tab(s) by mouth once a day  -- Indication: For Cirrhosis    Multiple Vitamins oral tablet  -- 1 tab(s) by mouth once a day  -- Indication: For Cirrhosis I will START or STAY ON the medications listed below when I get home from the hospital:    spironolactone 25 mg oral tablet  -- 2 tab(s) by mouth 2 times a day  -- Indication: For Cirrhosis    apixaban 5 mg oral tablet  -- 1 tab(s) by mouth 2 times a day   -- Check with your doctor before becoming pregnant.  It is very important that you take or use this exactly as directed.  Do not skip doses or discontinue unless directed by your doctor.  Obtain medical advice before taking any non-prescription drugs as some may affect the action of this medication.    -- Indication: For Other pulmonary embolism with acute cor pulmonale, unspecified chronicity    metFORMIN 850 mg oral tablet  -- 1 tab(s) by mouth 2 times a day  -- Indication: For DM (diabetes mellitus)    furosemide 20 mg oral tablet  -- 3 tab(s) by mouth once a day  -- Indication: For Cirrhosis    Multiple Vitamins oral tablet  -- 1 tab(s) by mouth once a day  -- Indication: For Cirrhosis

## 2018-02-23 NOTE — DISCHARGE NOTE ADULT - CARE PROVIDER_API CALL
Anthony Pruitt), Gastroenterology; Internal Medicine  [address] [place] [address]  Phone: [phone]  Fax: [phone] Anthony Pruitt), Gastroenterology; Internal Medicine  [address] [place] [address]  Phone: [phone]  Fax: [phone]    Omer Wong), Internal Medicine; Pulmonary Disease  Pulmonary Medicine at [address]  [place] [address]  Phone: [phone]  Fax: [phone]    Zelalem Pearl), Gastroenterology; Internal Medicine  [address] [place] [address]  Phone: [phone]  Fax: [phone]

## 2018-02-23 NOTE — DISCHARGE NOTE ADULT - PLAN OF CARE
patient underwent thoracentesis twice by IR Dr Pruitt in [facility] Lovenox till definitive plan and then transition to Eliquis

## 2018-02-23 NOTE — DISCHARGE NOTE ADULT - PATIENT PORTAL LINK FT
You can access the [facility] Patient Portal, offered by [facility], by registering with the following website: [email or web address]

## 2018-02-23 NOTE — DISCHARGE NOTE ADULT - MEDICATION SUMMARY - MEDICATIONS TO CHANGE
I will SWITCH the dose or number of times a day I take the medications listed below when I get home from the hospital:    spironolactone 50 mg oral tablet  -- 1 tab(s) by mouth 2 times a day

## 2018-02-23 NOTE — DISCHARGE NOTE ADULT - HOSPITAL COURSE
58 yo M with h/o ETOH cirrhosis, c diff (on abx therapy), DM2 presents from home with worsening shortness of breath. Pt states that in December 2017, he had noticed dyspnea on exertion. Pt presented to [facility] ED, CT abdomen at the time showed ascites, large right pleural effusion, atelectasis right lower lobe, and splenomegaly. Pt declined further workup at [facility] and had followed up with his primary GI. Pt subsequently was hospitalized at [facility] for similar symptoms in January where he had 2 thoracentesis of the right pleural effusion (each time draining approximately 2L). Pt was then transferred to [place] for a possible TIPS procedure. However, he states that the doctors there had felt that his pleural effusions were not due to his cirrhosis, and that there may have been an additional disease process occurring. Pt had improvement in dyspnea and was discharged home. One month later, he noticed similar symptoms of NGUYEN with minimal ambulation. No fevers or chills. No worsening abdominal distension or pain. In the ED, CT chest showed  large acute PE in the distal left lower lobe pulmonary artery with extension into segmental branches, there is also a small amount of acute PE in the left upper lobe pulmonary artery. CT abdomen showed the liver is displaced by an inverted right hemidiaphragm secondary to  a very large right pleural effusion. no intrinsic abnormality, small volume ascites, portions of the wall the ascending colon appear thickened which may be due to its under distended state; however, colitis is possible. Pt had chest tube placed by CTS on 2/8 and subsequently removed with re-accumulation of fluid.  GI following and underwent liver biopsy (results likely next week).  Low albumin and urine studies performed for possible proteinuria.   Lovenox restarted post biopsy for PE.     Discussed at length with cardio, Pulmonary plan for draining pleural fluid yesterday by IR  discussed with patient - either transfer to [place] for hepatology and possible TIPS  or DC home on Lovenox for few months and come into IR out patient for PRN thoracentesis.  Risks benefits of above options    DW Dr Carson to discussed with dr Pruitt who is a hepatologist at [place] and is agreeable to patient transfer to [place], i discussed the case with Dr Sahni (hospitalist in [place] via transfer center and they will call us back when there is a a bed available)         Problem/Plan - 1:  ·  Problem: Pleural effusion on right.  Plan: post thoracentesis by IR    Pleurx is relative contraindicated in cirrhosis  discussed with CT surgery Dr Saez.      Problem/Plan - 2:  ·  Problem: Other pulmonary embolism with acute cor pulmonale, unspecified chronicity.  Plan: CTA chest reviewed - no right heart strain  restarted Lovenox- discussed with patient. - continue till definitive plan by hepatologist in [place]      Problem/Plan - 3:  ·  Problem: Cirrhosis.  Plan: s/p liver biopsy 12/14/18- prilim results liver cirrhosis   C/w spironolactone and Lasix.      Problem/Plan - 4:  ·  Problem: Type 2 diabetes mellitus with hyperglycemia, without long-term current use of insulin.  Plan: Hold metformin  a1c 5.6, sliding scale for comfort. 60 yo M with h/o ETOH cirrhosis, c diff (on abx therapy), DM2 presents from home with worsening shortness of breath. Pt states that in December 2017, he had noticed dyspnea on exertion. Pt presented to [facility] ED, CT abdomen at the time showed ascites, large right pleural effusion, atelectasis right lower lobe, and splenomegaly. Pt declined further workup at [facility] and had followed up with his primary GI. Pt subsequently was hospitalized at [facility] for similar symptoms in January where he had 2 thoracentesis of the right pleural effusion (each time draining approximately 2L). Pt was then transferred to [place] for a possible TIPS procedure. However, he states that the doctors there had felt that his pleural effusions were not due to his cirrhosis, and that there may have been an additional disease process occurring. Pt had improvement in dyspnea and was discharged home. One month later, he noticed similar symptoms of NGUYEN with minimal ambulation. No fevers or chills. No worsening abdominal distension or pain. In the ED, CT chest showed  large acute PE in the distal left lower lobe pulmonary artery with extension into segmental branches, there is also a small amount of acute PE in the left upper lobe pulmonary artery. CT abdomen showed the liver is displaced by an inverted right hemidiaphragm secondary to  a very large right pleural effusion. no intrinsic abnormality, small volume ascites, portions of the wall the ascending colon appear thickened which may be due to its under distended state; however, colitis is possible. Pt had chest tube placed by CTS on 2/8 and subsequently removed with re-accumulation of fluid.  GI following and underwent liver biopsy (results likely next week).  Low albumin and urine studies performed for possible proteinuria.   Lovenox restarted post biopsy for PE.     Discussed at length with cardio, Pulmonary plan for draining pleural fluid yesterday by IR  discussed with patient - either transfer to [place] for hepatology and possible TIPS  or DC home on Lovenox for few months and come into IR out patient for PRN thoracentesis.  Risks benefits of above options    DW Dr Carson to discussed with dr Pruitt who is a hepatologist at [place] and is agreeable to patient transfer to [place], i discussed the case with Dr Sahni (hospitalist in [place] via transfer center and they will call us back when there is a a bed available)     Problem/Plan - 1:  ·  Problem: Pleural effusion on right.  Plan:follow up out patient with pulmonary Dr Wong and outpatient thoracentesis by IR    Pleyassinex is relative contraindicated in cirrhosis  discussed with CT surgery Dr Saez.      Problem/Plan - 2:  ·  Problem: Other pulmonary embolism with acute cor pulmonale, unspecified chronicity.  Plan: CTA chest reviewed - no right heart strain,-cont  Lovenox- discussed with patient. - continue till definitive plan by hepatologist in [place] - he can hold lovenox for thoracentesis     Problem/Plan - 3:  ·  Problem: Cirrhosis.  Plan: s/p liver biopsy 12/14/18- prilim results liver cirrhosis   C/w spironolactone and Lasix. Arranged for transfer to [place] but patient refused to go he really wants to go home and states will follow up with       Problem/Plan - 4:  ·  Problem: Type 2 diabetes mellitus with hyperglycemia, without long-term current use of insulin.  Plan: Hold metformin  a1c 5.6, sliding scale for comfort.     GENERAL: NAD, ill appearing   HEAD:  Atraumatic, Normocephalic  EYES: EOMI,  conjunctiva and sclera clear  ENMT: No tonsillar erythema, exudates, or enlargement; Moist mucous membranes,   NECK: Supple, No JVD, Normal thyroid  NERVOUS SYSTEM:  Alert & Oriented X3, Good concentration; Moves B/L upper and lower extremities; DTRs 2+ intact and symmetric  CHEST/LUNG: Clear to percussion bilaterally; No rales, rhonchi, wheezing, or rubs  HEART: Regular rate and rhythm; No murmurs, rubs, or gallops, some ascites   ABDOMEN: Soft, Nontender, Nondistended; Bowel sounds present  EXTREMITIES:  2+ Peripheral edema  SKIN: No rashes or lesions

## 2018-03-06 ENCOUNTER — INPATIENT (INPATIENT)
Facility: HOSPITAL | Age: 63
LOS: 0 days | Discharge: HOME | DRG: 188 | End: 2018-03-07
Attending: INTERNAL MEDICINE | Admitting: INTERNAL MEDICINE
Payer: COMMERCIAL

## 2018-03-06 VITALS
TEMPERATURE: 98 F | HEIGHT: 66 IN | RESPIRATION RATE: 24 BRPM | DIASTOLIC BLOOD PRESSURE: 82 MMHG | SYSTOLIC BLOOD PRESSURE: 125 MMHG | OXYGEN SATURATION: 98 % | WEIGHT: 315 LBS | HEART RATE: 95 BPM

## 2018-03-06 DIAGNOSIS — Z98.890 OTHER SPECIFIED POSTPROCEDURAL STATES: Chronic | ICD-10-CM

## 2018-03-06 DIAGNOSIS — R06.02 SHORTNESS OF BREATH: ICD-10-CM

## 2018-03-06 DIAGNOSIS — J90 PLEURAL EFFUSION, NOT ELSEWHERE CLASSIFIED: ICD-10-CM

## 2018-03-06 PROBLEM — E11.9 TYPE 2 DIABETES MELLITUS WITHOUT COMPLICATIONS: Chronic | Status: ACTIVE | Noted: 2018-02-08

## 2018-03-06 LAB
ALBUMIN SERPL ELPH-MCNC: 2.5 G/DL — LOW (ref 3.3–5.2)
ALP SERPL-CCNC: 170 U/L — HIGH (ref 40–120)
ALT FLD-CCNC: 57 U/L — HIGH
ANION GAP SERPL CALC-SCNC: 11 MMOL/L — SIGNIFICANT CHANGE UP (ref 5–17)
AST SERPL-CCNC: 58 U/L — HIGH
BASOPHILS # BLD AUTO: 0.1 K/UL — SIGNIFICANT CHANGE UP (ref 0–0.2)
BASOPHILS NFR BLD AUTO: 1 % — SIGNIFICANT CHANGE UP (ref 0–2)
BILIRUB SERPL-MCNC: 0.5 MG/DL — SIGNIFICANT CHANGE UP (ref 0.4–2)
BUN SERPL-MCNC: 43 MG/DL — HIGH (ref 8–20)
CALCIUM SERPL-MCNC: 9.2 MG/DL — SIGNIFICANT CHANGE UP (ref 8.6–10.2)
CHLORIDE SERPL-SCNC: 103 MMOL/L — SIGNIFICANT CHANGE UP (ref 98–107)
CO2 SERPL-SCNC: 22 MMOL/L — SIGNIFICANT CHANGE UP (ref 22–29)
CREAT SERPL-MCNC: 0.91 MG/DL — SIGNIFICANT CHANGE UP (ref 0.5–1.3)
EOSINOPHIL # BLD AUTO: 0.2 K/UL — SIGNIFICANT CHANGE UP (ref 0–0.5)
EOSINOPHIL NFR BLD AUTO: 2.4 % — SIGNIFICANT CHANGE UP (ref 0–6)
GLUCOSE BLDC GLUCOMTR-MCNC: 126 MG/DL — HIGH (ref 70–99)
GLUCOSE SERPL-MCNC: 111 MG/DL — SIGNIFICANT CHANGE UP (ref 70–115)
HCT VFR BLD CALC: 38.9 % — LOW (ref 42–52)
HGB BLD-MCNC: 13.2 G/DL — LOW (ref 14–18)
LYMPHOCYTES # BLD AUTO: 2.2 K/UL — SIGNIFICANT CHANGE UP (ref 1–4.8)
LYMPHOCYTES # BLD AUTO: 26 % — SIGNIFICANT CHANGE UP (ref 20–55)
MCHC RBC-ENTMCNC: 32.4 PG — HIGH (ref 27–31)
MCHC RBC-ENTMCNC: 33.9 G/DL — SIGNIFICANT CHANGE UP (ref 32–36)
MCV RBC AUTO: 95.3 FL — HIGH (ref 80–94)
MONOCYTES # BLD AUTO: 0.6 K/UL — SIGNIFICANT CHANGE UP (ref 0–0.8)
MONOCYTES NFR BLD AUTO: 7.3 % — SIGNIFICANT CHANGE UP (ref 3–10)
NEUTROPHILS # BLD AUTO: 5.3 K/UL — SIGNIFICANT CHANGE UP (ref 1.8–8)
NEUTROPHILS NFR BLD AUTO: 63.1 % — SIGNIFICANT CHANGE UP (ref 37–73)
NT-PROBNP SERPL-SCNC: 159 PG/ML — SIGNIFICANT CHANGE UP (ref 0–300)
PLATELET # BLD AUTO: 367 K/UL — SIGNIFICANT CHANGE UP (ref 150–400)
POTASSIUM SERPL-MCNC: 5.1 MMOL/L — SIGNIFICANT CHANGE UP (ref 3.5–5.3)
POTASSIUM SERPL-SCNC: 5.1 MMOL/L — SIGNIFICANT CHANGE UP (ref 3.5–5.3)
PROT SERPL-MCNC: 6.2 G/DL — LOW (ref 6.6–8.7)
RBC # BLD: 4.08 M/UL — LOW (ref 4.6–6.2)
RBC # FLD: 14.9 % — SIGNIFICANT CHANGE UP (ref 11–15.6)
SODIUM SERPL-SCNC: 136 MMOL/L — SIGNIFICANT CHANGE UP (ref 135–145)
WBC # BLD: 8.4 K/UL — SIGNIFICANT CHANGE UP (ref 4.8–10.8)
WBC # FLD AUTO: 8.4 K/UL — SIGNIFICANT CHANGE UP (ref 4.8–10.8)

## 2018-03-06 PROCEDURE — 99223 1ST HOSP IP/OBS HIGH 75: CPT

## 2018-03-06 PROCEDURE — 71046 X-RAY EXAM CHEST 2 VIEWS: CPT | Mod: 26

## 2018-03-06 PROCEDURE — 99285 EMERGENCY DEPT VISIT HI MDM: CPT

## 2018-03-06 RX ORDER — SODIUM CHLORIDE 9 MG/ML
1000 INJECTION, SOLUTION INTRAVENOUS
Qty: 0 | Refills: 0 | Status: DISCONTINUED | OUTPATIENT
Start: 2018-03-06 | End: 2018-03-07

## 2018-03-06 RX ORDER — FUROSEMIDE 40 MG
20 TABLET ORAL DAILY
Qty: 0 | Refills: 0 | Status: DISCONTINUED | OUTPATIENT
Start: 2018-03-06 | End: 2018-03-07

## 2018-03-06 RX ORDER — GLUCAGON INJECTION, SOLUTION 0.5 MG/.1ML
1 INJECTION, SOLUTION SUBCUTANEOUS ONCE
Qty: 0 | Refills: 0 | Status: DISCONTINUED | OUTPATIENT
Start: 2018-03-06 | End: 2018-03-07

## 2018-03-06 RX ORDER — DEXTROSE 50 % IN WATER 50 %
25 SYRINGE (ML) INTRAVENOUS ONCE
Qty: 0 | Refills: 0 | Status: DISCONTINUED | OUTPATIENT
Start: 2018-03-06 | End: 2018-03-07

## 2018-03-06 RX ORDER — INSULIN LISPRO 100/ML
VIAL (ML) SUBCUTANEOUS
Qty: 0 | Refills: 0 | Status: DISCONTINUED | OUTPATIENT
Start: 2018-03-06 | End: 2018-03-07

## 2018-03-06 RX ORDER — IPRATROPIUM/ALBUTEROL SULFATE 18-103MCG
3 AEROSOL WITH ADAPTER (GRAM) INHALATION EVERY 6 HOURS
Qty: 0 | Refills: 0 | Status: DISCONTINUED | OUTPATIENT
Start: 2018-03-06 | End: 2018-03-07

## 2018-03-06 RX ORDER — DEXTROSE 50 % IN WATER 50 %
12.5 SYRINGE (ML) INTRAVENOUS ONCE
Qty: 0 | Refills: 0 | Status: DISCONTINUED | OUTPATIENT
Start: 2018-03-06 | End: 2018-03-07

## 2018-03-06 RX ORDER — DEXTROSE 50 % IN WATER 50 %
1 SYRINGE (ML) INTRAVENOUS ONCE
Qty: 0 | Refills: 0 | Status: DISCONTINUED | OUTPATIENT
Start: 2018-03-06 | End: 2018-03-07

## 2018-03-06 RX ORDER — SPIRONOLACTONE 25 MG/1
50 TABLET, FILM COATED ORAL
Qty: 0 | Refills: 0 | Status: DISCONTINUED | OUTPATIENT
Start: 2018-03-06 | End: 2018-03-07

## 2018-03-06 RX ADMIN — Medication 20 MILLIGRAM(S): at 17:27

## 2018-03-06 RX ADMIN — SPIRONOLACTONE 50 MILLIGRAM(S): 25 TABLET, FILM COATED ORAL at 17:27

## 2018-03-06 NOTE — H&P ADULT - HISTORY OF PRESENT ILLNESS
This is 63 yo M with h/o ETOH cirrhosis, DM2, PE on Eliquis, recurrent pleural effusion s/p Thoracentesis, recently discharged from [facility] due to pleural effusion came to ED c/o exertional SOB for 2 days, gradual in onset, getting worse, currently c/o SOB with minimal exertion. Pt was also hospitalized at [facility] for similar symptoms in January where he had 2 thoracentesis of the right pleural effusion (each time draining approximately 2L). Pt was then transferred to [place] for a possible TIPS procedure. However, he states that the doctors there had felt that his pleural effusions were not due to his cirrhosis, and that there may have been an additional disease process occurring. Pt had improvement in dyspnea and was discharged home. One month later, he noticed similar symptoms of NGUYEN with minimal ambulation. No fevers or chills. No worsening abdominal distension or pain. In the ED, CT chest showed  large acute PE in the distal left lower lobe pulmonary artery with extension into segmental branches, there is also a small amount of acute PE in the left upper lobe pulmonary artery.     During last admission GI discussed with patient to transfer to [place] for hepatology and possible TIPS.     Denied fever, chills, chest pain, abd. pain, nausea, vomiting, urinary and bowel complaints

## 2018-03-06 NOTE — H&P ADULT - NSHPPHYSICALEXAM_GEN_ALL_CORE
ICU Vital Signs Last 24 Hrs  T(C): 36.4 (06 Mar 2018 10:47), Max: 36.4 (06 Mar 2018 10:47)  T(F): 97.6 (06 Mar 2018 10:47), Max: 97.6 (06 Mar 2018 10:47)  HR: 95 (06 Mar 2018 10:47) (95 - 95)  BP: 125/82 (06 Mar 2018 10:47) (125/82 - 125/82)  BP(mean): --  ABP: --  ABP(mean): --  RR: 24 (06 Mar 2018 10:47) (24 - 24)  SpO2: 98% (06 Mar 2018 10:47) (98% - 98%)

## 2018-03-06 NOTE — ED PROVIDER NOTE - CARE PLAN
Principal Discharge DX:	SOB (shortness of breath) on exertion  Secondary Diagnosis:	Pleural effusion

## 2018-03-06 NOTE — ED ADULT TRIAGE NOTE - CHIEF COMPLAINT QUOTE
'I am feeling SOB and I can't breath for the past couple of days, last time I was here they said I had a pulmonary embolism and I had to have it drained. " Pt was discharged 2/23

## 2018-03-06 NOTE — ED PROVIDER NOTE - MEDICAL DECISION MAKING DETAILS
SOB hx of pleural effusion and PE with NGUYEN likely due to recurrence of effusion.  Will check CXR and admit.

## 2018-03-06 NOTE — ED PROVIDER NOTE - OBJECTIVE STATEMENT
This patient is a 62 year old man recently discharged from [facility] 11 days ago who presents to the ER c/o increasing dyspnea on exertion.  Patient was diagnosed with pulmonary embolism on recent admission and started on eliquis.  Patient reports increase bilateral leg swelling and SOB over the past 3 days.  He denies SOB, fever, and chest pain.

## 2018-03-06 NOTE — ED ADULT NURSE NOTE - OBJECTIVE STATEMENT
Patient states that he was recently diagnosed with a PE and was placed on blood thinners. Patient states that he is having SOB at this time. Patient states that this has been going on for the last 2 days. Patient appears SOB at this time and speaking in short sentences.

## 2018-03-06 NOTE — H&P ADULT - ASSESSMENT
This is 61 yo M with h/o ETOH cirrhosis, DM2, PE recurrent pleural effusion s/p Thoracentesis, recently discharged from [facility] due to pleural effusion came to ED c/o exertional SOB for 2 days, gradual in onset, getting worse, currently c/o SOB with minimal exertion. Xray chest showed large right sided pleural effusion.     A/P    >Recurrent Pleural effusion  admit to medicine  CT surgery consulted  oxygen, IS   will likely need repeat Thoracentesis   consider GI consult - may need TIPS   c/w lasix, aldactone   holding eliquis for now pending CT surgery eval - last dose was this morning     >Alcoholic cirrhosis  c/w lasix, aldactone   consider Gi consult     >PE - holding Eliquis for now pending CT surgery eval as will need thoracentesis   resume Eliquis once ok with CT surgery     >DM- hold Metformin  FS and sliding scale     >DVT PPX - on Eliquis

## 2018-03-06 NOTE — CONSULT NOTE ADULT - SUBJECTIVE AND OBJECTIVE BOX
Surgeon: Nadja MENDEZ    Consult requesting by: Tre MENDEZ    HISTORY OF PRESENT ILLNESS:  62 year old male with pmhx of ETOH abuse, liver cirrhosis, c diff, DM who presents from home with worsening shortness of breath over the past few days. Patient is known to the service line last seen in early February with similar presentation where he was found to have right side pleural effusion which was drained at the time.     PAST MEDICAL & SURGICAL HISTORY:  ETOH abuse  Liver cirrhosis  C. Diff  DM (diabetes mellitus)  No significant past surgical history    SOCIAL HISTORY:  Smoker: [ ] Yes  [X ] No        PACK YEARS:                         WHEN QUIT?  ETOH use: [X] Yes  [ ] No              Ilicit Drug use:  [ ] Yes  [X] No    FAMILY HISTORY:  No pertinent family history in first degree relatives    Allergies  No Known Allergies  Intolerances    MEDICATIONS  (STANDING):  apixaban 5mg BID  furosemide 1 Tablet 20 milliGRAM(s) Oral 3 times a day  spironolactone 25 milliGRAM(s) 2 tab BID  metformin 850mg BID  MEDICATIONS  (PRN):  ALBUTerol/ipratropium for Nebulization 3 milliLiter(s) Nebulizer every 6 hours PRN sob    Review of Systems  CONSTITUTIONAL:  Fevers[ ] chills[ ] sweats[ ] fatigue[ ] weight loss[ ] weight gain [ ]                                     NEGATIVE [X]   NEURO:  parathesias[ ] seizures [ ]  syncope [ ]  confusion [ ]                                                                                NEGATIVE[X]                                                                                            RESPIRATORY:  wheezing[ ] SOB[X] cough [ ] sputum[ ] hemoptysis[ ]                                                                  NEGATIVE[ ]   GI:  nausea[ ]  vommiting [ ]  diarrhea[ ] constipation [ ] melena [ ]                                                                      NEGATIVE[X]   : hematuria[ ]  dysuria[ ] urgency[ ] incontinence[ ]                                                                                            NEGATIVE[X]   MUSKULOSKELETAL:  arthritis[ ]  joint swelling [X] muscle weakness [ ]                                                                NEGATIVE[]   SKIN/BREAST:  rash[ ] itching [ ]  hair loss[ ] masses[ ]                                                                                              NEGATIVE[X]       PHYSICAL EXAM  Vital Signs Last 24 Hrs  T(C): 36.4 (06 Mar 2018 10:47), Max: 36.4 (06 Mar 2018 10:47)  T(F): 97.6 (06 Mar 2018 10:47), Max: 97.6 (06 Mar 2018 10:47)  HR: 95 (06 Mar 2018 10:47) (95 - 95)  BP: 125/82 (06 Mar 2018 10:47) (125/82 - 125/82)  RR: 24 (06 Mar 2018 10:47) (24 - 24)  SpO2: 98% (06 Mar 2018 10:47) (98% - 98%) on RA    CONSTITUTIONAL:                                                                          WNL[X]   Neuro: WNL[X] Normal exam oriented to person/place & time with no focal motor or sensory  deficits. Other __________                    Chest: WNL[ ] Normal lung exam with good air movement absence of wheezes, rales, or rhonchi: Other diminished breath sounds on right. CTA left without wheeze                                                                             CV:  Auscultation: normal [X] S3[ ] S4[ ] Irregular [ ] Rub[ ] Clicks[ ]    Murmurs none:[X]systolic [ ]  diastolic [ ] holosystolic [ ]                                                                                   GI: WNL[X] Normal exam of abdomen, liver & spleen with no noted masses or tenderness. Other______________________                                                                                                        Extremities: WNL[X] Normal no evidence of cyanosis or deformity Edema: none[ ]trace[ ]1+[X]2+[ ]3+[ ]4+[ ]  Lower Extremity Pulses: Right[1+] Left[1+]Varicosities[ ]  SKIN :WNL[X] Normal exam to inspection & palation. Other:____________________                                                          LABS:                        13.2   8.4   )-----------( 367      ( 06 Mar 2018 13:35 )             38.9     03-06    136  |  103  |  43.0<H>  ----------------------------<  111  5.1   |  22.0  |  0.91    Ca    9.2      06 Mar 2018 13:35    TPro  6.2<L>  /  Alb  2.5<L>  /  TBili  0.5  /  DBili  x   /  AST  58<H>  /  ALT  57<H>  /  AlkPhos  170<H>  03-06    Serum Pro-Brain Natriuretic Peptide: 159 pg/mL (03-06-18 @ 13:35)      CXR:   < from: Xray Chest 2 Views PA/Lat (03.06.18 @ 13:15) >  FINDINGS:    Frontal and lateral views of the chest demonstrates large right-sided   pleural effusion, larger in size. The cardiomediastinal silhouette is   normal. No acute osseous abnormalities.    IMPRESSION:    Large right-sided pleural effusion, larger in size. Surgeon: Nadja MENDEZ    Consult requesting by: Tre MENDEZ    HISTORY OF PRESENT ILLNESS:  62 year old male with pmhx of ETOH abuse, liver cirrhosis, c diff, DM who presents from home with worsening shortness of breath over the past few days. Patient is known to the service line last seen in early February with similar presentation where he was found to have right side pleural effusion which was drained at the time. Currently his only complaint is shortness of breath with minimal exertion. Deneis fevers, chills, chest pain, abdominal pain. Admits to lower extremity swelling R>L.     PAST MEDICAL & SURGICAL HISTORY:  ETOH abuse  Liver cirrhosis  C. Diff  DM (diabetes mellitus)  No significant past surgical history    SOCIAL HISTORY:  Smoker: [ ] Yes  [X ] No        PACK YEARS:                         WHEN QUIT?  ETOH use: [X] Yes  [ ] No              Ilicit Drug use:  [ ] Yes  [X] No    FAMILY HISTORY:  No pertinent family history in first degree relatives    Allergies  No Known Allergies  Intolerances    MEDICATIONS  (STANDING):  apixaban 5mg BID  furosemide 1 Tablet 20 milliGRAM(s) Oral 3 times a day  spironolactone 25 milliGRAM(s) 2 tab BID  metformin 850mg BID  MEDICATIONS  (PRN):  ALBUTerol/ipratropium for Nebulization 3 milliLiter(s) Nebulizer every 6 hours PRN sob    Review of Systems  CONSTITUTIONAL:  Fevers[ ] chills[ ] sweats[ ] fatigue[ ] weight loss[ ] weight gain [ ]                                     NEGATIVE [X]   NEURO:  parathesias[ ] seizures [ ]  syncope [ ]  confusion [ ]                                                                                NEGATIVE[X]                                                                                            RESPIRATORY:  wheezing[ ] SOB[X] cough [ ] sputum[ ] hemoptysis[ ]                                                                  NEGATIVE[ ]   GI:  nausea[ ]  vommiting [ ]  diarrhea[ ] constipation [ ] melena [ ]                                                                      NEGATIVE[X]   : hematuria[ ]  dysuria[ ] urgency[ ] incontinence[ ]                                                                                            NEGATIVE[X]   MUSKULOSKELETAL:  arthritis[ ]  joint swelling [X] muscle weakness [ ]                                                                NEGATIVE[]   SKIN/BREAST:  rash[ ] itching [ ]  hair loss[ ] masses[ ]                                                                                              NEGATIVE[X]       PHYSICAL EXAM  Vital Signs Last 24 Hrs  T(C): 36.4 (06 Mar 2018 10:47), Max: 36.4 (06 Mar 2018 10:47)  T(F): 97.6 (06 Mar 2018 10:47), Max: 97.6 (06 Mar 2018 10:47)  HR: 95 (06 Mar 2018 10:47) (95 - 95)  BP: 125/82 (06 Mar 2018 10:47) (125/82 - 125/82)  RR: 24 (06 Mar 2018 10:47) (24 - 24)  SpO2: 98% (06 Mar 2018 10:47) (98% - 98%) on RA    CONSTITUTIONAL:                                                                          WNL[X]   Neuro: WNL[X] Normal exam oriented to person/place & time with no focal motor or sensory  deficits. Other __________                    Chest: WNL[ ] Normal lung exam with good air movement absence of wheezes, rales, or rhonchi: Other diminished breath sounds on right. CTA left without wheeze                                                                             CV:  Auscultation: normal [X] S3[ ] S4[ ] Irregular [ ] Rub[ ] Clicks[ ]    Murmurs none:[X]systolic [ ]  diastolic [ ] holosystolic [ ]                                                                                   GI: WNL[X] Normal exam of abdomen, liver & spleen with no noted masses or tenderness. Other______________________                                                                                                        Extremities: WNL[X] Normal no evidence of cyanosis or deformity Edema: none[ ]trace[ ]1+[X]2+[ ]3+[ ]4+[ ]  Lower Extremity Pulses: Right[1+] Left[1+]Varicosities[ ]  SKIN :WNL[X] Normal exam to inspection & palation. Other:____________________                                                          LABS:                        13.2   8.4   )-----------( 367      ( 06 Mar 2018 13:35 )             38.9     03-06    136  |  103  |  43.0<H>  ----------------------------<  111  5.1   |  22.0  |  0.91    Ca    9.2      06 Mar 2018 13:35    TPro  6.2<L>  /  Alb  2.5<L>  /  TBili  0.5  /  DBili  x   /  AST  58<H>  /  ALT  57<H>  /  AlkPhos  170<H>  03-06    Serum Pro-Brain Natriuretic Peptide: 159 pg/mL (03-06-18 @ 13:35)      CXR:   < from: Xray Chest 2 Views PA/Lat (03.06.18 @ 13:15) >  FINDINGS:    Frontal and lateral views of the chest demonstrates large right-sided   pleural effusion, larger in size. The cardiomediastinal silhouette is   normal. No acute osseous abnormalities.    IMPRESSION:    Large right-sided pleural effusion, larger in size. Surgeon: Nadja MENDEZ    Consult requesting by: Tre MENDEZ    HISTORY OF PRESENT ILLNESS:  62 year old male with pmhx of ETOH abuse, liver cirrhosis, c diff, DM who presents from home with worsening shortness of breath over the past few days. Patient is known to the service line last seen in early February with similar presentation where he was found to have right side pleural effusion which was drained at the time. Currently his only complaint is shortness of breath with minimal exertion. Patient recently found to have PE now on Eliquis. Deneis fevers, chills, chest pain, abdominal pain. Admits to lower extremity swelling R>L.     PAST MEDICAL & SURGICAL HISTORY:  ETOH abuse  Liver cirrhosis  C. Diff  DM (diabetes mellitus)  No significant past surgical history    SOCIAL HISTORY:  Smoker: [ ] Yes  [X ] No        PACK YEARS:                         WHEN QUIT?  ETOH use: [X] Yes  [ ] No              Ilicit Drug use:  [ ] Yes  [X] No    FAMILY HISTORY:  No pertinent family history in first degree relatives    Allergies  No Known Allergies  Intolerances    MEDICATIONS  (STANDING):  apixaban 5mg BID  furosemide 1 Tablet 20 milliGRAM(s) Oral 3 times a day  spironolactone 25 milliGRAM(s) 2 tab BID  metformin 850mg BID  MEDICATIONS  (PRN):  ALBUTerol/ipratropium for Nebulization 3 milliLiter(s) Nebulizer every 6 hours PRN sob    Review of Systems  CONSTITUTIONAL:  Fevers[ ] chills[ ] sweats[ ] fatigue[ ] weight loss[ ] weight gain [ ]                                     NEGATIVE [X]   NEURO:  parathesias[ ] seizures [ ]  syncope [ ]  confusion [ ]                                                                                NEGATIVE[X]                                                                                            RESPIRATORY:  wheezing[ ] SOB[X] cough [ ] sputum[ ] hemoptysis[ ]                                                                  NEGATIVE[ ]   GI:  nausea[ ]  vommiting [ ]  diarrhea[ ] constipation [ ] melena [ ]                                                                      NEGATIVE[X]   : hematuria[ ]  dysuria[ ] urgency[ ] incontinence[ ]                                                                                            NEGATIVE[X]   MUSKULOSKELETAL:  arthritis[ ]  joint swelling [X] muscle weakness [ ]                                                                NEGATIVE[]   SKIN/BREAST:  rash[ ] itching [ ]  hair loss[ ] masses[ ]                                                                                              NEGATIVE[X]       PHYSICAL EXAM  Vital Signs Last 24 Hrs  T(C): 36.4 (06 Mar 2018 10:47), Max: 36.4 (06 Mar 2018 10:47)  T(F): 97.6 (06 Mar 2018 10:47), Max: 97.6 (06 Mar 2018 10:47)  HR: 95 (06 Mar 2018 10:47) (95 - 95)  BP: 125/82 (06 Mar 2018 10:47) (125/82 - 125/82)  RR: 24 (06 Mar 2018 10:47) (24 - 24)  SpO2: 98% (06 Mar 2018 10:47) (98% - 98%) on RA    CONSTITUTIONAL:                                                                          WNL[X]   Neuro: WNL[X] Normal exam oriented to person/place & time with no focal motor or sensory  deficits. Other __________                    Chest: WNL[ ] Normal lung exam with good air movement absence of wheezes, rales, or rhonchi: Other diminished breath sounds on right. CTA left without wheeze                                                                             CV:  Auscultation: normal [X] S3[ ] S4[ ] Irregular [ ] Rub[ ] Clicks[ ]    Murmurs none:[X]systolic [ ]  diastolic [ ] holosystolic [ ]                                                                                   GI: WNL[X] Normal exam of abdomen, liver & spleen with no noted masses or tenderness. Other______________________                                                                                                        Extremities: WNL[X] Normal no evidence of cyanosis or deformity Edema: none[ ]trace[ ]1+[X]2+[ ]3+[ ]4+[ ]  Lower Extremity Pulses: Right[1+] Left[1+]Varicosities[ ]  SKIN :WNL[X] Normal exam to inspection & palation. Other:____________________                                                          LABS:                        13.2   8.4   )-----------( 367      ( 06 Mar 2018 13:35 )             38.9     03-06    136  |  103  |  43.0<H>  ----------------------------<  111  5.1   |  22.0  |  0.91    Ca    9.2      06 Mar 2018 13:35    TPro  6.2<L>  /  Alb  2.5<L>  /  TBili  0.5  /  DBili  x   /  AST  58<H>  /  ALT  57<H>  /  AlkPhos  170<H>  03-06    Serum Pro-Brain Natriuretic Peptide: 159 pg/mL (03-06-18 @ 13:35)      CXR:   < from: Xray Chest 2 Views PA/Lat (03.06.18 @ 13:15) >  FINDINGS:    Frontal and lateral views of the chest demonstrates large right-sided   pleural effusion, larger in size. The cardiomediastinal silhouette is   normal. No acute osseous abnormalities.    IMPRESSION:    Large right-sided pleural effusion, larger in size.

## 2018-03-06 NOTE — CONSULT NOTE ADULT - ASSESSMENT
62 year old male with pmhx of ETOH abuse, liver cirrhosis, c diff, DM who presents with shortness of breath found to have recurrent pleural effusion.

## 2018-03-06 NOTE — CONSULT NOTE ADULT - PROBLEM SELECTOR RECOMMENDATION 9
Patient to be admitted to medicine service. Patient admits to taking Eliquis this morning so will hold off on chest tube vs. thoracentesis till the morning. Hold evening/morning eliquis dose. Thoracic surgery will continue to follow.

## 2018-03-07 VITALS
HEART RATE: 90 BPM | TEMPERATURE: 97 F | OXYGEN SATURATION: 97 % | DIASTOLIC BLOOD PRESSURE: 67 MMHG | SYSTOLIC BLOOD PRESSURE: 100 MMHG | RESPIRATION RATE: 16 BRPM

## 2018-03-07 LAB
GLUCOSE BLDC GLUCOMTR-MCNC: 118 MG/DL — HIGH (ref 70–99)
GLUCOSE BLDC GLUCOMTR-MCNC: 83 MG/DL — SIGNIFICANT CHANGE UP (ref 70–99)

## 2018-03-07 PROCEDURE — 83880 ASSAY OF NATRIURETIC PEPTIDE: CPT

## 2018-03-07 PROCEDURE — 99232 SBSQ HOSP IP/OBS MODERATE 35: CPT | Mod: 25

## 2018-03-07 PROCEDURE — 82962 GLUCOSE BLOOD TEST: CPT

## 2018-03-07 PROCEDURE — 71045 X-RAY EXAM CHEST 1 VIEW: CPT | Mod: 26,77

## 2018-03-07 PROCEDURE — C1729: CPT

## 2018-03-07 PROCEDURE — 71045 X-RAY EXAM CHEST 1 VIEW: CPT

## 2018-03-07 PROCEDURE — 71046 X-RAY EXAM CHEST 2 VIEWS: CPT

## 2018-03-07 PROCEDURE — 80053 COMPREHEN METABOLIC PANEL: CPT

## 2018-03-07 PROCEDURE — 99285 EMERGENCY DEPT VISIT HI MDM: CPT | Mod: 25

## 2018-03-07 PROCEDURE — 85027 COMPLETE CBC AUTOMATED: CPT

## 2018-03-07 PROCEDURE — 36415 COLL VENOUS BLD VENIPUNCTURE: CPT

## 2018-03-07 PROCEDURE — 32556 INSERT CATH PLEURA W/O IMAGE: CPT

## 2018-03-07 PROCEDURE — 99239 HOSP IP/OBS DSCHRG MGMT >30: CPT

## 2018-03-07 PROCEDURE — 71045 X-RAY EXAM CHEST 1 VIEW: CPT | Mod: 26

## 2018-03-07 RX ORDER — APIXABAN 2.5 MG/1
5 TABLET, FILM COATED ORAL EVERY 12 HOURS
Qty: 0 | Refills: 0 | Status: DISCONTINUED | OUTPATIENT
Start: 2018-03-08 | End: 2018-03-07

## 2018-03-07 RX ORDER — MORPHINE SULFATE 50 MG/1
2 CAPSULE, EXTENDED RELEASE ORAL EVERY 4 HOURS
Qty: 0 | Refills: 0 | Status: DISCONTINUED | OUTPATIENT
Start: 2018-03-07 | End: 2018-03-07

## 2018-03-07 RX ADMIN — SPIRONOLACTONE 50 MILLIGRAM(S): 25 TABLET, FILM COATED ORAL at 06:49

## 2018-03-07 RX ADMIN — Medication 20 MILLIGRAM(S): at 06:49

## 2018-03-07 NOTE — PROGRESS NOTE ADULT - ASSESSMENT
This is 63 yo M with h/o ETOH cirrhosis, DM2, PE recurrent pleural effusion s/p Thoracentesis, recently discharged from [facility] due to pleural effusion came to ED c/o exertional SOB for 2 days, gradual in onset, getting worse, currently c/o SOB with minimal exertion. Xray chest showed large right sided pleural effusion, seen by CT surgery s/p Thoracentesis      A/P    >Recurrent Pleural effusion  CT surgery consult appreciated, s/p chest tube   oxygen, IS   Gi consulted, discussed with Dr. Pearl, pt was evaluated by [facility] during last admission, pt declined to go to [facility]   c/w lasix, aldactone   discussed with Dr. Saez, resume Eliquis in am     >Alcoholic cirrhosis  c/w Lasix,  aldactone   GI consulted - had extensive work up last admission including liver biopsy     >h/o PE - Pt is s/p Chest tube, discussed with Dr. Saez suggest to hold eliquis today and resume tomorrow     >DM- hold Metformin  FS and sliding scale     >DVT PPX - resume Eliquis in am

## 2018-03-07 NOTE — DISCHARGE NOTE ADULT - CARE PLAN
Principal Discharge DX:	Pleural effusion  Goal:	s/p thoracentesis - left AMA  Assessment and plan of treatment:	pt left AMA - high risk for readmission  Secondary Diagnosis:	Cirrhosis  Secondary Diagnosis:	DM (diabetes mellitus)  Secondary Diagnosis:	Pulmonary embolism  Assessment and plan of treatment:	resume eliquis in am  Secondary Diagnosis:	S/P thoracentesis

## 2018-03-07 NOTE — PROCEDURE NOTE - PROCEDURE
<<-----Click on this checkbox to enter Procedure Chest tube insertion  03/07/2018  10F  Active  [id number]

## 2018-03-07 NOTE — PROCEDURE NOTE - NSPROCDETAILS_GEN_ALL_CORE
Seldinger technique/secured in place/percutaneous/ultrasound assessment of fluid (location)/dressing applied/sterile dressing applied/ultrasound assessment of fluid (size)

## 2018-03-07 NOTE — CONSULT NOTE ADULT - ASSESSMENT
62 year old male with pmhx of ETOH abuse, liver cirrhosis, c diff, DM who presents with shortness of breath found to have recurrent pleural effusion s/p pigtail placement/drainage/removal.

## 2018-03-07 NOTE — PROGRESS NOTE ADULT - SUBJECTIVE AND OBJECTIVE BOX
Internal Medicine Hospitalist - Dr. Lev ARIAS    [id number]    62y      Male    Patient is a 62y old  Male who presents with a chief complaint of SOB (06 Mar 2018 15:50)      INTERVAL HPI/ OVERNIGHT EVENTS: Patient is seen and examined, s/p thoracentesis almost 3L drain, feeling better, report mild chest pain at the site of chest tube     REVIEW OF SYSTEMS:    Denied fever, chills, abd. pain, nausea, vomiting, headache, dizziness    PHYSICAL EXAM:    Vital Signs Last 24 Hrs  T(C): 36.3 (07 Mar 2018 09:06), Max: 36.8 (07 Mar 2018 02:32)  T(F): 97.3 (07 Mar 2018 09:06), Max: 98.3 (07 Mar 2018 02:32)  HR: 90 (07 Mar 2018 09:06) (74 - 96)  BP: 100/67 (07 Mar 2018 09:06) (100/67 - 125/84)  BP(mean): --  RR: 16 (07 Mar 2018 09:06) (16 - 24)  SpO2: 97% (07 Mar 2018 09:06) (97% - 98%)    GENERAL: NAD  HEENT: EOMI  Neck: supple  CHEST/LUNG: improved air entry right side, chest tube    HEART: S1S2+ audible  ABDOMEN: Soft, Nontender, Nondistended; Bowel sounds present  EXTREMITIES:  positive edema  CNS: AAO X 3  Psychiatry: normal mood    LABS:                        13.2   8.4   )-----------( 367      ( 06 Mar 2018 13:35 )             38.9     03-06    136  |  103  |  43.0<H>  ----------------------------<  111  5.1   |  22.0  |  0.91    Ca    9.2      06 Mar 2018 13:35    TPro  6.2<L>  /  Alb  2.5<L>  /  TBili  0.5  /  DBili  x   /  AST  58<H>  /  ALT  57<H>  /  AlkPhos  170<H>  03-06            MEDICATIONS  (STANDING):  dextrose 5%. 1000 milliLiter(s) (50 mL/Hr) IV Continuous <Continuous>  dextrose 50% Injectable 12.5 Gram(s) IV Push once  dextrose 50% Injectable 25 Gram(s) IV Push once  dextrose 50% Injectable 25 Gram(s) IV Push once  furosemide    Tablet 20 milliGRAM(s) Oral daily  insulin lispro (HumaLOG) corrective regimen sliding scale   SubCutaneous three times a day before meals  spironolactone 50 milliGRAM(s) Oral two times a day    MEDICATIONS  (PRN):  ALBUTerol/ipratropium for Nebulization 3 milliLiter(s) Nebulizer every 6 hours PRN sob  dextrose Gel 1 Dose(s) Oral once PRN Blood Glucose LESS THAN 70 milliGRAM(s)/deciliter  glucagon  Injectable 1 milliGRAM(s) IntraMuscular once PRN Glucose LESS THAN 70 milligrams/deciliter      RADIOLOGY & ADDITIONAL TEST

## 2018-03-07 NOTE — CONSULT NOTE ADULT - PROBLEM SELECTOR RECOMMENDATION 9
10F pigtail placed this morning for therapeutic drainage. Chest tube removed after 3L taken off and patient states breathing is much improved. Thoracic surgery will sign off at this time. Patient educated and urged to seek treatment for underlying cirrhosis and told this will be a recurrent problem until that point.

## 2018-03-07 NOTE — DISCHARGE NOTE ADULT - CARE PROVIDER_API CALL
Zelalem Pearl), Gastroenterology; Internal Medicine  [address] [place] [address]  Phone: [phone]  Fax: [phone]    Richy Saez), Surgery; Thoracic Surgery  [address] [place] [address]  Phone: [phone]  Fax: [phone]

## 2018-03-07 NOTE — ED ADULT NURSE REASSESSMENT NOTE - NS ED NURSE REASSESS COMMENT FT1
Late Note 0836  Report received from outgoing RN Melissa Mendez at change of shift and assumed care of pt at that time. Pt received on stretcher awake, alert and oriented X 4, no complaints.

## 2018-03-07 NOTE — ED ADULT NURSE REASSESSMENT NOTE - NS ED NURSE REASSESS COMMENT FT1
Paul Bray cardiothoracic PA performed insertion of chest tube to R pleural space, initially to suction with 500 milky white fluid and now waterseal, PA instructs to clamp for excessive coughing, procedure done per hospital protocol, pt tolerated well. Await radiology team who were made aware of need for xray to confirm placement. Pt in no acute distress, vss, denies pain, fall precautions in place.

## 2018-03-07 NOTE — ED ADULT NURSE REASSESSMENT NOTE - NS ED NURSE REASSESS COMMENT FT1
New Fontanelle chamber placed, tube unclamped, pt tolerated well but reports local anesthetic now wearing off and now pain 5/10 at site. Will continue to monitor until pt in [facility] and endorsed to Malinda Jackman.

## 2018-03-07 NOTE — DISCHARGE NOTE ADULT - PATIENT PORTAL LINK FT
You can access the [facility] Portal, offered by [facility], by registering with the following website: [email or web address]

## 2018-03-07 NOTE — ED ADULT NURSE REASSESSMENT NOTE - NS ED NURSE REASSESS COMMENT FT1
Report given to receiving RN Malinda Jackman and pt endorsed to same for follow up and continuity of care. Pt noted to have 2000mL milky fluid in chest tube and large amount leaked onto floor. Chest tube clamped and replacement Slidell chamber sent for. Pt reports breathing much improved. Occlusive dressing, sterile water, tape and sterile gauze given to Malinda for placement at new bedside, pt in no acute distress, vs as charted, will safely transport to [facility] for follow up and continuity of care.

## 2018-03-07 NOTE — DISCHARGE NOTE ADULT - HOSPITAL COURSE
This is 61 yo M with h/o ETOH cirrhosis, DM2, PE recurrent pleural effusion s/p Thoracentesis, recently discharged from [facility] due to pleural effusion came to ED c/o exertional SOB for 2 days, gradual in onset, getting worse, currently c/o SOB with minimal exertion. Xray chest showed large right sided pleural effusion, seen by CT surgery s/p Thoracentesis - symptoms improved, CT surgery removed chest tube. Pt wishes to go home, explained risk and benefit, risk of recurrent pleural effusion, SOB and death, understood, still left AMA -      A/P    >Recurrent Pleural effusion  c/w lasix, aldactone   f/u GI as an outtpatient  decline [facility] procedure     >Alcoholic cirrhosis  c/w Lasix,  aldactone     >h/o PE - Pt informed to resume eliquis tomorrow     Time spent 32 minutes

## 2018-03-07 NOTE — CONSULT NOTE ADULT - SUBJECTIVE AND OBJECTIVE BOX
Surgeon: Nadja MENDEZ    HISTORY OF PRESENT ILLNESS:  62 year old male with pmhx of ETOH abuse, liver cirrhosis, c diff, DM who presents from home with worsening shortness of breath over the past few days. Patient is known to the service line last seen in early February with similar presentation where he was found to have right side pleural effusion which was drained at the time. Currently his only complaint is shortness of breath with minimal exertion. Patient recently found to have PE now on Eliquis. Deneis fevers, chills, chest pain, abdominal pain. Admits to lower extremity swelling R>L.     PAST MEDICAL & SURGICAL HISTORY:  ETOH abuse  Liver cirrhosis  C. Diff  DM (diabetes mellitus)  No significant past surgical history    SOCIAL HISTORY:  Smoker: [ ] Yes  [X ] No        PACK YEARS:                         WHEN QUIT?  ETOH use: [X] Yes  [ ] No              Ilicit Drug use:  [ ] Yes  [X] No    FAMILY HISTORY:  No pertinent family history in first degree relatives    Allergies  No Known Allergies  Intolerances    MEDICATIONS  (STANDING):  apixaban 5mg BID  furosemide 1 Tablet 20 milliGRAM(s) Oral 3 times a day  spironolactone 25 milliGRAM(s) 2 tab BID  metformin 850mg BID  MEDICATIONS  (PRN):  ALBUTerol/ipratropium for Nebulization 3 milliLiter(s) Nebulizer every 6 hours PRN sob    Review of Systems  CONSTITUTIONAL:  Fevers[ ] chills[ ] sweats[ ] fatigue[ ] weight loss[ ] weight gain [ ]                                     NEGATIVE [X]   NEURO:  parathesias[ ] seizures [ ]  syncope [ ]  confusion [ ]                                                                                NEGATIVE[X]                                                                                            RESPIRATORY:  wheezing[ ] SOB[X] cough [ ] sputum[ ] hemoptysis[ ]                                                                  NEGATIVE[ ]   GI:  nausea[ ]  vommiting [ ]  diarrhea[ ] constipation [ ] melena [ ]                                                                      NEGATIVE[X]   : hematuria[ ]  dysuria[ ] urgency[ ] incontinence[ ]                                                                                            NEGATIVE[X]   MUSKULOSKELETAL:  arthritis[ ]  joint swelling [X] muscle weakness [ ]                                                                NEGATIVE[]   SKIN/BREAST:  rash[ ] itching [ ]  hair loss[ ] masses[ ]                                                                                              NEGATIVE[X]       PHYSICAL EXAM  Vital Signs Last 24 Hrs  T(C): 36.4 (06 Mar 2018 10:47), Max: 36.4 (06 Mar 2018 10:47)  T(F): 97.6 (06 Mar 2018 10:47), Max: 97.6 (06 Mar 2018 10:47)  HR: 95 (06 Mar 2018 10:47) (95 - 95)  BP: 125/82 (06 Mar 2018 10:47) (125/82 - 125/82)  RR: 24 (06 Mar 2018 10:47) (24 - 24)  SpO2: 98% (06 Mar 2018 10:47) (98% - 98%) on RA    CONSTITUTIONAL:                                                                          WNL[X]   Neuro: WNL[X] Normal exam oriented to person/place & time with no focal motor or sensory  deficits. Other __________                    Chest: WNL[ ] Normal lung exam with good air movement absence of wheezes, rales, or rhonchi: Other diminished breath sounds on right. CTA left without wheeze                                                                             CV:  Auscultation: normal [X] S3[ ] S4[ ] Irregular [ ] Rub[ ] Clicks[ ]    Murmurs none:[X]systolic [ ]  diastolic [ ] holosystolic [ ]                                                                                   GI: WNL[X] Normal exam of abdomen, liver & spleen with no noted masses or tenderness. Other______________________                                                                                                        Extremities: WNL[X] Normal no evidence of cyanosis or deformity Edema: none[ ]trace[ ]1+[X]2+[ ]3+[ ]4+[ ]  Lower Extremity Pulses: Right[1+] Left[1+]Varicosities[ ]  SKIN :WNL[X] Normal exam to inspection & palation. Other:____________________                                                          LABS:                        13.2   8.4   )-----------( 367      ( 06 Mar 2018 13:35 )             38.9     03-06    136  |  103  |  43.0<H>  ----------------------------<  111  5.1   |  22.0  |  0.91    Ca    9.2      06 Mar 2018 13:35    TPro  6.2<L>  /  Alb  2.5<L>  /  TBili  0.5  /  DBili  x   /  AST  58<H>  /  ALT  57<H>  /  AlkPhos  170<H>  03-06    Serum Pro-Brain Natriuretic Peptide: 159 pg/mL (03-06-18 @ 13:35)      CXR:   < from: Xray Chest 2 Views PA/Lat (03.06.18 @ 13:15) >  FINDINGS:    Frontal and lateral views of the chest demonstrates large right-sided   pleural effusion, larger in size. The cardiomediastinal silhouette is   normal. No acute osseous abnormalities.    IMPRESSION:    Large right-sided pleural effusion, larger in size.    CXR:   < from: Xray Chest 1 View- PORTABLE-Urgent (03.07.18 @ 09:16) >  Comparison: 3/6    IMPRESSION:    Support Devices: Interval placement of the right basal pigtail catheter.  Heart:  Unremarkable  Mediastinum:  Unremarkable  Lungs/Airways: Large right pleural effusion stable.  Bones/Soft tissues: Unremarkable

## 2018-03-11 NOTE — PROGRESS NOTE ADULT - PROBLEM SELECTOR PROBLEM 6
Pt referred by staff nurse due to disruptive, intrusive, and "manic" behavior believes "People are coming into her room and moving her things around." Pt not responding to staff's verbal redirection and gets agitated and argumentative. staff nurse referred patient for medication management for control of agitation, aggressive and manic psychotic behavior. Pt was offered Zydis 10 mg PO STAT.  Patient agrees and voluntarily accepted.
Prophylactic measure
Hypoalbuminemia

## 2018-03-18 ENCOUNTER — RESULT REVIEW (OUTPATIENT)
Age: 63
End: 2018-03-18

## 2018-03-18 ENCOUNTER — INPATIENT (INPATIENT)
Facility: HOSPITAL | Age: 63
LOS: 3 days | Discharge: ANOTHER HOSPITAL | DRG: 433 | End: 2018-03-22
Attending: INTERNAL MEDICINE | Admitting: INTERNAL MEDICINE
Payer: COMMERCIAL

## 2018-03-18 VITALS
DIASTOLIC BLOOD PRESSURE: 79 MMHG | RESPIRATION RATE: 26 BRPM | HEIGHT: 66 IN | WEIGHT: 164.91 LBS | HEART RATE: 106 BPM | OXYGEN SATURATION: 98 % | SYSTOLIC BLOOD PRESSURE: 115 MMHG | TEMPERATURE: 97 F

## 2018-03-18 DIAGNOSIS — K74.60 UNSPECIFIED CIRRHOSIS OF LIVER: ICD-10-CM

## 2018-03-18 DIAGNOSIS — J90 PLEURAL EFFUSION, NOT ELSEWHERE CLASSIFIED: ICD-10-CM

## 2018-03-18 DIAGNOSIS — Z98.890 OTHER SPECIFIED POSTPROCEDURAL STATES: Chronic | ICD-10-CM

## 2018-03-18 LAB
ALBUMIN SERPL ELPH-MCNC: 2.3 G/DL — LOW (ref 3.3–5.2)
ALP SERPL-CCNC: 203 U/L — HIGH (ref 40–120)
ALT FLD-CCNC: 44 U/L — HIGH
AMMONIA BLD-MCNC: 28 UMOL/L — SIGNIFICANT CHANGE UP (ref 11–55)
ANION GAP SERPL CALC-SCNC: 10 MMOL/L — SIGNIFICANT CHANGE UP (ref 5–17)
APTT BLD: 32.4 SEC — SIGNIFICANT CHANGE UP (ref 27.5–37.4)
AST SERPL-CCNC: 49 U/L — HIGH
B PERT IGG+IGM PNL SER: ABNORMAL
BASOPHILS # BLD AUTO: 0.1 K/UL — SIGNIFICANT CHANGE UP (ref 0–0.2)
BASOPHILS NFR BLD AUTO: 0.6 % — SIGNIFICANT CHANGE UP (ref 0–2)
BILIRUB SERPL-MCNC: 0.4 MG/DL — SIGNIFICANT CHANGE UP (ref 0.4–2)
BUN SERPL-MCNC: 46 MG/DL — HIGH (ref 8–20)
CALCIUM SERPL-MCNC: 9.1 MG/DL — SIGNIFICANT CHANGE UP (ref 8.6–10.2)
CHLORIDE SERPL-SCNC: 104 MMOL/L — SIGNIFICANT CHANGE UP (ref 98–107)
CK SERPL-CCNC: 99 U/L — SIGNIFICANT CHANGE UP (ref 30–200)
CO2 SERPL-SCNC: 20 MMOL/L — LOW (ref 22–29)
COLOR FLD: SIGNIFICANT CHANGE UP
CREAT SERPL-MCNC: 0.84 MG/DL — SIGNIFICANT CHANGE UP (ref 0.5–1.3)
EOSINOPHIL # BLD AUTO: 0.2 K/UL — SIGNIFICANT CHANGE UP (ref 0–0.5)
EOSINOPHIL NFR BLD AUTO: 2.5 % — SIGNIFICANT CHANGE UP (ref 0–5)
FLUID INTAKE SUBSTANCE CLASS: SIGNIFICANT CHANGE UP
FLUID SEGMENTED GRANULOCYTES: 45 % — SIGNIFICANT CHANGE UP
GLUCOSE BLDC GLUCOMTR-MCNC: 79 MG/DL — SIGNIFICANT CHANGE UP (ref 70–99)
GLUCOSE BLDC GLUCOMTR-MCNC: 89 MG/DL — SIGNIFICANT CHANGE UP (ref 70–99)
GLUCOSE SERPL-MCNC: 125 MG/DL — HIGH (ref 70–115)
GRAM STN FLD: SIGNIFICANT CHANGE UP
HCT VFR BLD CALC: 37.6 % — LOW (ref 42–52)
HGB BLD-MCNC: 12.9 G/DL — LOW (ref 14–18)
INR BLD: 1.12 RATIO — SIGNIFICANT CHANGE UP (ref 0.88–1.16)
LYMPHOCYTES # BLD AUTO: 2.2 K/UL — SIGNIFICANT CHANGE UP (ref 1–4.8)
LYMPHOCYTES # BLD AUTO: 22.3 % — SIGNIFICANT CHANGE UP (ref 20–55)
LYMPHOCYTES # FLD: 24 % — SIGNIFICANT CHANGE UP
MCHC RBC-ENTMCNC: 32.8 PG — HIGH (ref 27–31)
MCHC RBC-ENTMCNC: 34.3 G/DL — SIGNIFICANT CHANGE UP (ref 32–36)
MCV RBC AUTO: 95.7 FL — HIGH (ref 80–94)
MESOTHL CELL # FLD: 14 % — SIGNIFICANT CHANGE UP
MONOCYTES # BLD AUTO: 0.7 K/UL — SIGNIFICANT CHANGE UP (ref 0–0.8)
MONOCYTES NFR BLD AUTO: 7.1 % — SIGNIFICANT CHANGE UP (ref 3–10)
MONOS+MACROS # FLD: 17 % — SIGNIFICANT CHANGE UP
NEUTROPHILS # BLD AUTO: 6.6 K/UL — SIGNIFICANT CHANGE UP (ref 1.8–8)
NEUTROPHILS NFR BLD AUTO: 67.2 % — SIGNIFICANT CHANGE UP (ref 37–73)
NT-PROBNP SERPL-SCNC: 347 PG/ML — HIGH (ref 0–300)
PH FLD: 8 — SIGNIFICANT CHANGE UP
PLATELET # BLD AUTO: 289 K/UL — SIGNIFICANT CHANGE UP (ref 150–400)
POTASSIUM SERPL-MCNC: 5.4 MMOL/L — HIGH (ref 3.5–5.3)
POTASSIUM SERPL-SCNC: 5.4 MMOL/L — HIGH (ref 3.5–5.3)
PROT SERPL-MCNC: 6.1 G/DL — LOW (ref 6.6–8.7)
PROTHROM AB SERPL-ACNC: 12.3 SEC — SIGNIFICANT CHANGE UP (ref 9.8–12.7)
RBC # BLD: 3.93 M/UL — LOW (ref 4.6–6.2)
RBC # FLD: 14.3 % — SIGNIFICANT CHANGE UP (ref 11–15.6)
RCV VOL RI: 920 /UL — HIGH (ref 0–5)
SODIUM SERPL-SCNC: 134 MMOL/L — LOW (ref 135–145)
SPECIMEN SOURCE: SIGNIFICANT CHANGE UP
TOTAL NUCLEATED CELL COUNT, BODY FLUID: 163 /UL — HIGH (ref 0–5)
TROPONIN T SERPL-MCNC: <0.01 NG/ML — SIGNIFICANT CHANGE UP (ref 0–0.06)
TUBE TYPE: SIGNIFICANT CHANGE UP
WBC # BLD: 9.9 K/UL — SIGNIFICANT CHANGE UP (ref 4.8–10.8)
WBC # FLD AUTO: 9.9 K/UL — SIGNIFICANT CHANGE UP (ref 4.8–10.8)

## 2018-03-18 PROCEDURE — 32556 INSERT CATH PLEURA W/O IMAGE: CPT

## 2018-03-18 PROCEDURE — 88112 CYTOPATH CELL ENHANCE TECH: CPT | Mod: 26

## 2018-03-18 PROCEDURE — 71045 X-RAY EXAM CHEST 1 VIEW: CPT | Mod: 26,77

## 2018-03-18 PROCEDURE — 71275 CT ANGIOGRAPHY CHEST: CPT | Mod: 26

## 2018-03-18 PROCEDURE — 99223 1ST HOSP IP/OBS HIGH 75: CPT

## 2018-03-18 PROCEDURE — 99285 EMERGENCY DEPT VISIT HI MDM: CPT

## 2018-03-18 PROCEDURE — 71045 X-RAY EXAM CHEST 1 VIEW: CPT | Mod: 26

## 2018-03-18 RX ORDER — INSULIN LISPRO 100/ML
VIAL (ML) SUBCUTANEOUS AT BEDTIME
Qty: 0 | Refills: 0 | Status: DISCONTINUED | OUTPATIENT
Start: 2018-03-18 | End: 2018-03-22

## 2018-03-18 RX ORDER — DEXTROSE 50 % IN WATER 50 %
25 SYRINGE (ML) INTRAVENOUS ONCE
Qty: 0 | Refills: 0 | Status: DISCONTINUED | OUTPATIENT
Start: 2018-03-18 | End: 2018-03-22

## 2018-03-18 RX ORDER — SPIRONOLACTONE 25 MG/1
50 TABLET, FILM COATED ORAL
Qty: 0 | Refills: 0 | Status: DISCONTINUED | OUTPATIENT
Start: 2018-03-18 | End: 2018-03-22

## 2018-03-18 RX ORDER — INSULIN LISPRO 100/ML
VIAL (ML) SUBCUTANEOUS
Qty: 0 | Refills: 0 | Status: DISCONTINUED | OUTPATIENT
Start: 2018-03-18 | End: 2018-03-22

## 2018-03-18 RX ORDER — KETOROLAC TROMETHAMINE 30 MG/ML
15 SYRINGE (ML) INJECTION ONCE
Qty: 0 | Refills: 0 | Status: DISCONTINUED | OUTPATIENT
Start: 2018-03-18 | End: 2018-03-18

## 2018-03-18 RX ORDER — OXYCODONE AND ACETAMINOPHEN 5; 325 MG/1; MG/1
2 TABLET ORAL ONCE
Qty: 0 | Refills: 0 | Status: DISCONTINUED | OUTPATIENT
Start: 2018-03-18 | End: 2018-03-18

## 2018-03-18 RX ORDER — PANTOPRAZOLE SODIUM 20 MG/1
40 TABLET, DELAYED RELEASE ORAL
Qty: 0 | Refills: 0 | Status: DISCONTINUED | OUTPATIENT
Start: 2018-03-18 | End: 2018-03-22

## 2018-03-18 RX ORDER — SODIUM CHLORIDE 9 MG/ML
1000 INJECTION, SOLUTION INTRAVENOUS
Qty: 0 | Refills: 0 | Status: DISCONTINUED | OUTPATIENT
Start: 2018-03-18 | End: 2018-03-22

## 2018-03-18 RX ORDER — FUROSEMIDE 40 MG
40 TABLET ORAL EVERY 12 HOURS
Qty: 0 | Refills: 0 | Status: DISCONTINUED | OUTPATIENT
Start: 2018-03-18 | End: 2018-03-22

## 2018-03-18 RX ORDER — APIXABAN 2.5 MG/1
5 TABLET, FILM COATED ORAL EVERY 12 HOURS
Qty: 0 | Refills: 0 | Status: DISCONTINUED | OUTPATIENT
Start: 2018-03-18 | End: 2018-03-18

## 2018-03-18 RX ORDER — DEXTROSE 50 % IN WATER 50 %
12.5 SYRINGE (ML) INTRAVENOUS ONCE
Qty: 0 | Refills: 0 | Status: DISCONTINUED | OUTPATIENT
Start: 2018-03-18 | End: 2018-03-22

## 2018-03-18 RX ORDER — DEXTROSE 50 % IN WATER 50 %
1 SYRINGE (ML) INTRAVENOUS ONCE
Qty: 0 | Refills: 0 | Status: DISCONTINUED | OUTPATIENT
Start: 2018-03-18 | End: 2018-03-22

## 2018-03-18 RX ORDER — GLUCAGON INJECTION, SOLUTION 0.5 MG/.1ML
1 INJECTION, SOLUTION SUBCUTANEOUS ONCE
Qty: 0 | Refills: 0 | Status: DISCONTINUED | OUTPATIENT
Start: 2018-03-18 | End: 2018-03-22

## 2018-03-18 RX ADMIN — Medication 40 MILLIGRAM(S): at 17:15

## 2018-03-18 RX ADMIN — OXYCODONE AND ACETAMINOPHEN 2 TABLET(S): 5; 325 TABLET ORAL at 23:13

## 2018-03-18 RX ADMIN — OXYCODONE AND ACETAMINOPHEN 2 TABLET(S): 5; 325 TABLET ORAL at 22:46

## 2018-03-18 RX ADMIN — Medication 15 MILLIGRAM(S): at 18:23

## 2018-03-18 RX ADMIN — Medication 15 MILLIGRAM(S): at 17:10

## 2018-03-18 NOTE — CONSULT NOTE ADULT - PROBLEM SELECTOR RECOMMENDATION 9
pigtail catheter placed strict fluid drainage to pleuravac. Fluid removal should not exceed greater than 1L per hour and should be clamped iff dumps 1 L within hour not to exceed greater than2.5L in  24 hour period. Hemodynamic shifts are likely to occur in pt. Monitor hemodynamics maintain pt on tele.  Daily chest xray  will continue to follow pt  Thoracentesis fluid sent for evalutation of fluid please follow up.  will continue to follow pt as consult on medicine service  appreciate consult  agree pt should be admitted to medicine service, if there are any issues with chest tube please contact CT surgery service. Will continue to manage tube. pigtail catheter placed strict fluid drainage to pleuravac. Fluid removal should not exceed greater than 1L per hour and should be clamped iff dumps 1 L within hour not to exceed greater than2.5L in  24 hour period. Hemodynamic shifts are likely to occur in pt. Monitor hemodynamics maintain pt on tele.  Daily chest xray  will continue to follow pt  Thoracentesis fluid sent for evalutation of fluid please follow up.  will continue to follow pt as consult on medicine service  appreciate consult  agree pt should be admitted to medicine service, if there are any issues with chest tube please contact CT surgery service. Will continue to manage tube.  Please hold all anticoagulation until we can reassess as pt high risk for bleeding with chest tube placement .

## 2018-03-18 NOTE — ED PROVIDER NOTE - PROGRESS NOTE DETAILS
spoke to CT surgery- note that do not recommend any more thoracentesis as think pt would benefit from TIPS otherwise fluid will continue to reaccumulate--note that will have Dr. Saez reassess pt in the am--will have someone available to tap pt if sat decompensates baranch ct with large pleural effusion with left mediastinal shift--spoke to CT sx--will do thorocentesis--admitted to tele

## 2018-03-18 NOTE — CONSULT NOTE ADULT - ASSESSMENT
62 year old male with PMH of ETOH abuse, liver cirrhosis, c diff, DM who presents from home with worsening shortness of breath over the past few days. Patient is known to the service line last seen in early February with similar presentation where he was found to have right side pleural effusion which was drained at the time. Currently his only complaint is shortness of breath with minimal exertion. Patient recently found to have PE now on Eliquis. Deneis fevers, chills, chest pain, abdominal pain. Admits to lower extremity swelling R>L.  (3/7/18 CT surgery consult Paul MACIEL 1054am)     Pt present to ED today with similar complaint of SOB. Pt reports he went to [facility] for evaluation for TIPS procedure at which time he reports the 'Medical Doctor" felt the procedure would not benefit him. Pt presenting to [facility] ED with SOB with CT scan of chest with evidence of large right sided pleural effusion with mediastinal shift to left side likely secondary to pleural effusion. Pt seen at bedside c/o SOB and difficulty breathing. Pt endorses he took Eliquis last night. Discussed case with Dr. Love and it was felt given the CT findings of mediastinal shift secondary to the effusion and 4-5 L NC needed to maintain SPo2% above 95% it would benefit pt to receive pigtail catheter for drainage of effusion. Risk and benefits were discussed in detail with pt and given the risk of further decompensation it was decided he would benefit from pigtail catheter despite the greater risk for bleeding secondary to an iatrogenic injury and him taking elequis last night at 8pm. Pt understood risks and agreed he should receive pigtail catheter. Pt tolerated procedure well with out complication. Pt will be admitted to [address] [place] [address] hospitalist service.

## 2018-03-18 NOTE — ED PROVIDER NOTE - OBJECTIVE STATEMENT
63 y/o M pt with hx of liver cirrhosis, diabetes, recurrent pleural effusion presents to ED c/o worsening SOB associated with nausea  over the period of 1 month. Symptoms worsened within the last week. +mild diarrhea. Recently dx with PE last month placed on anticoagulations. He was seen in October at [facility] was told he possibly needed a tips procedure because of his cirrhosis, he followed up at [place] and was told tips procedure didn't need to be performed. Seen at [place] for his last few visit has he has no PMD. Last Thoracentesis March 7th 3liters drained. Denies fevers, chest pain, abdominal pain,  constipation, urinary symptoms, no recent travel. 63 y/o M pt with hx of liver cirrhosis, diabetes, recurrent pleural effusion presents to ED c/o worsening SOB associated with nausea  over the period of 1 month. Symptoms worsened within the last week. +mild diarrhea. Recently dx with PE last month placed on anticoagulations. He was seen in October at [facility] was told he possibly needed a TIPS procedure because of his cirrhosis, he followed up at [place] and was told tips procedure didn't need to be performed. Seen at [place] for his last few visit has he has no PMD. Last Thoracentesis March 7th 3liters drained. Denies fevers, chest pain, abdominal pain,  constipation, urinary symptoms, no recent travel.

## 2018-03-18 NOTE — H&P ADULT - NSHPPHYSICALEXAM_GEN_ALL_CORE
PHYSICAL EXAMINATION:  GENERAL: Alert, appears uncomfortable, speaking in short sentences.  HEENT:  Normocephalic and atraumatic.  Extraocular muscles are intact.  NECK: Supple.  - JVD.  CARDIOVASCULAR: RRR S1, S2.  LUNGS: decreased BS R base.  L side clear.  BACK: - CVA tenderness.  ABDOMEN: Soft, - tenderness, + distension, + ascites, + BS.  EXTREMITIES: - cyanosis, - clubbing, + edema.  NEUROLOGIC: strength is symmetric, sensation intact, speech fluent.  PSYCHIATRIC: Calm.  - agitation.    SKIN: - rashes, - lesions.

## 2018-03-18 NOTE — H&P ADULT - NSHPLABSRESULTS_GEN_ALL_CORE
VITALS:  Vital Signs Last 24 Hrs  T(C): 36.5 (18 Mar 2018 12:19), Max: 36.5 (18 Mar 2018 12:19)  T(F): 97.7 (18 Mar 2018 12:19), Max: 97.7 (18 Mar 2018 12:19)  HR: 103 (18 Mar 2018 12:19) (103 - 106)  BP: 131/95 (18 Mar 2018 12:19) (115/79 - 131/95)  BP(mean): --  RR: 24 (18 Mar 2018 12:19) (24 - 26)  SpO2: 98% (18 Mar 2018 12:19) (98% - 98%) Daily Height in cm: 167.64 (18 Mar 2018 09:18)    Daily   CAPILLARY BLOOD GLUCOSE        I&O's Summary      LABS:                        12.9   9.9   )-----------( 289      ( 18 Mar 2018 10:18 )             37.6     03-18    134<L>  |  104  |  46.0<H>  ----------------------------<  125<H>  5.4<H>   |  20.0<L>  |  0.84    Ca    9.1      18 Mar 2018 10:18    TPro  6.1<L>  /  Alb  2.3<L>  /  TBili  0.4  /  DBili  x   /  AST  49<H>  /  ALT  44<H>  /  AlkPhos  203<H>  03-18    PT/INR - ( 18 Mar 2018 10:18 )   PT: 12.3 sec;   INR: 1.12 ratio         PTT - ( 18 Mar 2018 10:18 )  PTT:32.4 sec  LIVER FUNCTIONS - ( 18 Mar 2018 10:18 )  Alb: 2.3 g/dL / Pro: 6.1 g/dL / ALK PHOS: 203 U/L / ALT: 44 U/L / AST: 49 U/L / GGT: x             CARDIAC MARKERS ( 18 Mar 2018 10:18 )  x     / <0.01 ng/mL / 99 U/L / x     / x

## 2018-03-18 NOTE — H&P ADULT - ASSESSMENT
> Large R pleural effusion with Mediastinal Shift - attributed to liver disease.  discussed at bedside with patient and critical care CT Surgery PA.  Monitored bed with continuous SaO2.  Chest tube to be placed at present.  Repeat scan to ensure resolution of Mediastinal Shift.  If symptoms do not improve, would consider upgrade to CTICU.    > Alcoholic Cirrhosis with ascites - Continue diuretics.  Will d/w GI regarding role for transfer to [place] for TIPS.  PPI.  > LLL PE - continue anticoagulation with Eliquis.   > Diabetes - monitor fingersticks.  Insulin coverage for hyperglycemia.  > Hyperkalemia - monitor lytes, on diuretics.   DVT Proph - on anticoagulation.

## 2018-03-18 NOTE — H&P ADULT - HISTORY OF PRESENT ILLNESS
58 yo M with h/o ETOH cirrhosis, C diff (completed abx therapy), LLL PE, DM, presents c/o worsening SOB x 3 days with orthopnea.  He had been recently discharged in past week from [facility] after hospitalization for R pleural effusion, and was awaiting TIPS in [place].  In interim, pt reports presenting to [facility] with transfer to [facility], where TIPS was felt not to be indicated.  He now presented with worsening SOB and orthopnea.  Symptoms per pt identical to prior presentation.  No cough / fever / chills.  No chest pain / palpitations.  Reports being compliant with BID anticoagulation, stopped this am expecting chest tube placement.  No additional complaints.     FAMILY HISTORY: reviewed and negative.  SOCIAL HISTORY: + alcohol, - IVDA, - smoking.  REVIEW OF SYSTEMS: General: - fatigue, - weight loss, - fevers, - chills.  HEENT: - headache, - hearing disturbances.  EYES: - visual disturbances, - diplopia.  CARDIOVASCULAR: - chest pain, - palpitations.  PULMONARY: + SOB, - cough, - hemoptysis.  GI: - abdominal pain, - nausea, - vomiting, - diarrhea, - constipation.  : - urinary urgency, - frequency, - dysuria.  MUSCULOSKELETAL: - arthralgias, - myalgias.  NEURO:  - weakness, - numbness.  PSYCH: - depression, - anxiety, - suicidal thoughts. SKIN: - rashes, - lesions.  All remaining ROS are negative

## 2018-03-18 NOTE — CONSULT NOTE ADULT - SUBJECTIVE AND OBJECTIVE BOX
Surgeon: Dr. Love     Consult requesting by: Dr. Monzon    HISTORY OF PRESENT ILLNESS:      62 year old male with PMH of ETOH abuse, liver cirrhosis, c diff, DM who presents from home with worsening shortness of breath over the past few days. Patient is known to the service line last seen in early February with similar presentation where he was found to have right side pleural effusion which was drained at the time. Currently his only complaint is shortness of breath with minimal exertion. Patient recently found to have PE now on Eliquis. Deneis fevers, chills, chest pain, abdominal pain. Admits to lower extremity swelling R>L.  (3/7/18 CT surgery consult Paul MACIEL 1054am)     Pt present to ED today with similar complaint of SOB. Pt reports he went to [facility] for evaluation for TIPS procedure at which time he reports the 'Medical Doctor" felt the procedure would not benefit him. Pt presenting to [facility] ED with SOB with CT scan of chest with evidence of large right sided pleural effusion with mediastinal shift to left side likely secondary to pleural effusion. Pt seen at bedside c/o SOB and difficulty breathing. Pt endorses he took Eliquis last night. Discussed case with Dr. Love and it was felt given the CT findings of mediastinal shift secondary to the effusion and 4-5 L NC needed to maintain SPo2% above 95% it would benefit pt to receive pigtail catheter for drainage of effusion. Risk and benefits were discussed in detail with pt and given the risk of further decompensation it was decided he would benefit from pigtail catheter despite the greater risk for bleeding secondary to an iatrogenic injury and him taking elequis last night at 8pm. Pt understood risks and agreed he should receive pigtail catheter. Pt tolerated procedure well with out complication. Pt will be admitted to [address] [place] [address] under hospitalist service.            PAST MEDICAL & SURGICAL HISTORY:  Pulmonary embolism  Cirrhosis  DM (diabetes mellitus)  S/P thoracentesis      MEDICATIONS  (STANDING):  apixaban 5 milliGRAM(s) Oral every 12 hours  dextrose 5%. 1000 milliLiter(s) (50 mL/Hr) IV Continuous <Continuous>  dextrose 50% Injectable 12.5 Gram(s) IV Push once  dextrose 50% Injectable 25 Gram(s) IV Push once  dextrose 50% Injectable 25 Gram(s) IV Push once  furosemide   Injectable 40 milliGRAM(s) IntraMuscular every 12 hours  insulin lispro (HumaLOG) corrective regimen sliding scale   SubCutaneous three times a day before meals  insulin lispro (HumaLOG) corrective regimen sliding scale   SubCutaneous at bedtime  pantoprazole    Tablet 40 milliGRAM(s) Oral before breakfast  spironolactone 50 milliGRAM(s) Oral two times a day    MEDICATIONS  (PRN):  dextrose Gel 1 Dose(s) Oral once PRN Blood Glucose LESS THAN 70 milliGRAM(s)/deciliter  glucagon  Injectable 1 milliGRAM(s) IntraMuscular once PRN Glucose LESS THAN 70 milligrams/deciliter    Antiplatelet therapy:                           Last dose/amt:    Allergies    No Known Allergies    Intolerances        SOCIAL HISTORY:  Smoker: [ ] Yes  [ x] No         ETOH use: [x ] Yes  [ ] No               Ilicit Drug use:  [ ] Yes  [x ] No  Occupation:  Live with:  Assisted device use:    FAMILY HISTORY:  No pertinent family history in first degree relatives      Review of Systems  CONSTITUTIONAL:  Fevers[ ] chills[ ] sweats[ ] fatigue[x ] weight loss[ ] weight gain [ ]                                     NEGATIVE [x ]   NEURO:  parathesias[ ] seizures [ ]  syncope [ ]  confusion [ ]                                                                                NEGATIVE[ x]   EYES: glasses[ ]  blurry vision[ ]  discharge[ ] pain[ ] glaucoma [ ]                                                                          NEGATIVE[x ]   ENMT:  difficulty hearing [ ]  vertigo[ ]  dysphagia[ ] epistaxis[ ] recent dental work [ ]                                    NEGATIVE[x ]   CV:  chest pain[ ] palpitations[ ] NGUYEN [x ] diaphoresis [ ]                                                                                           NEGATIVE[ ]   RESPIRATORY:  wheezing[x ] SOB[x ] cough [ ] sputum[ ] hemoptysis[ ]                                                                  NEGATIVE[ ]   GI:  nausea[ ]  vommiting [ ]  diarrhea[ ] constipation [ ] melena [ ]                                                                      NEGATIVE[x ]   : hematuria[ ]  dysuria[ ] urgency[ ] incontinence[ ]                                                                                            NEGATIVE[x ]   MUSKULOSKELETAL:  arthritis[ ]  joint swelling [ ] muscle weakness [ ]                                                                NEGATIVE[x ]   SKIN/BREAST:  rash[ ] itching [ ]  hair loss[ ] masses[ ]                                                                                              NEGATIVE[x ]   PSYCH:  dementia [ ] depresion [ ] anxiety[ ]                                                                                                               NEGATIVE[x ]   HEME/LYMPH:  bruises easily[ ] enlarged lymph nodes[ ] tender lymph nodes[ ]                                               NEGATIVE[x ]   ENDOCRINE:  cold intolerance[ ] heat intolerance[ ] polydipsia[ ]                                                                          NEGATIVE[ x]     PHYSICAL EXAM  Vital Signs Last 24 Hrs  T(C): 36.5 (18 Mar 2018 12:19), Max: 36.5 (18 Mar 2018 12:19)  T(F): 97.7 (18 Mar 2018 12:19), Max: 97.7 (18 Mar 2018 12:19)  HR: 102 (18 Mar 2018 15:35) (102 - 106)  BP: 119/69 (18 Mar 2018 15:35) (115/79 - 131/95)  BP(mean): --  RR: 24 (18 Mar 2018 15:35) (24 - 26)  SpO2: 99% (18 Mar 2018 15:35) (98% - 99%)                                                              LABS:                        12.9   9.9   )-----------( 289      ( 18 Mar 2018 10:18 )             37.6     03-18    134<L>  |  104  |  46.0<H>  ----------------------------<  125<H>  5.4<H>   |  20.0<L>  |  0.84    Ca    9.1      18 Mar 2018 10:18    TPro  6.1<L>  /  Alb  2.3<L>  /  TBili  0.4  /  DBili  x   /  AST  49<H>  /  ALT  44<H>  /  AlkPhos  203<H>  03-18    PT/INR - ( 18 Mar 2018 10:18 )   PT: 12.3 sec;   INR: 1.12 ratio         PTT - ( 18 Mar 2018 10:18 )  PTT:32.4 sec    CARDIAC MARKERS ( 18 Mar 2018 10:18 )  x     / <0.01 ng/mL / 99 U/L / x     / x            Serum Pro-Brain Natriuretic Peptide: 347 pg/mL (03-18 @ 10:18)      < from: CT Angio Chest w/ IV Cont (03.18.18 @ 13:46) >  EXAM:  CT ANGIO CHEST (W)AW IC                          PROCEDURE DATE:  03/18/2018          INTERPRETATION:  CLINICAL INFORMATION: Pleural effusion. History of PE.    COMPARISON: February 8, 2008.    PROCEDURE:   CT Angiography of the Chest.  Intravenous contrast: 78 mL Omnipaque 350.  Sagittal and coronal reformats were performed as well as MIPS.    FINDINGS:    LUNGS AND LARGE AIRWAYS: Near complete atelectasis of the right lung.  PLEURA: Large right pleural effusion with leftward mediastinal shift.  VESSELS: Poor contrast opacification of the pulmonary arteries which are   only visualized to the level of the central main arteries. No central   pulmonary embolus. Atherosclerotic calcifications.  HEART: Heart size is normal. No pericardial effusion.  MEDIASTINUM AND ALPA: No lymphadenopathy.  CHEST WALL AND LOWER NECK: Within normal limits.  VISUALIZED UPPER ABDOMEN: Small volume upper abdominal ascites. Nodular   contour to the liver.  BONES: Degenerative changes of the spine.    IMPRESSION:   Poor contrast opacification of the pulmonary arteries on the left side.   Previously seen left pulmonary emboli are not well visualized. No central   pulmonary embolus in the main pulmonary arteries.    Large right pleural effusion with leftward mediastinal shift and near   complete atelectasis of the right lung.                HARINDER OLIVEIRA M.D., ATTENDING RADIOLOGIST  This document has been electronically signed. Mar 18 2018  2:03PM    < end of copied text >        Physical Exam  Neuro: AAOx3 in NAD  HEENT: PERRLA  Pulm: right sided breath sounds absent , positive breath sounds on left, no rales or wheezes   CV: RRR, positiveS1/S2  Abd: NT, positive bowel sounds, significant distension, positive fluid wave test , hepatomegaly    Extremities: DP 2+, no pitting edema

## 2018-03-19 LAB
ALBUMIN SERPL ELPH-MCNC: 1.9 G/DL — LOW (ref 3.3–5.2)
ALP SERPL-CCNC: 168 U/L — HIGH (ref 40–120)
ALT FLD-CCNC: 41 U/L — HIGH
ANION GAP SERPL CALC-SCNC: 10 MMOL/L — SIGNIFICANT CHANGE UP (ref 5–17)
AST SERPL-CCNC: 46 U/L — HIGH
BILIRUB SERPL-MCNC: 0.3 MG/DL — LOW (ref 0.4–2)
BUN SERPL-MCNC: 53 MG/DL — HIGH (ref 8–20)
CALCIUM SERPL-MCNC: 8.6 MG/DL — SIGNIFICANT CHANGE UP (ref 8.6–10.2)
CHLORIDE SERPL-SCNC: 104 MMOL/L — SIGNIFICANT CHANGE UP (ref 98–107)
CO2 SERPL-SCNC: 20 MMOL/L — LOW (ref 22–29)
CREAT SERPL-MCNC: 1.05 MG/DL — SIGNIFICANT CHANGE UP (ref 0.5–1.3)
GLUCOSE BLDC GLUCOMTR-MCNC: 117 MG/DL — HIGH (ref 70–99)
GLUCOSE BLDC GLUCOMTR-MCNC: 119 MG/DL — HIGH (ref 70–99)
GLUCOSE BLDC GLUCOMTR-MCNC: 121 MG/DL — HIGH (ref 70–99)
GLUCOSE BLDC GLUCOMTR-MCNC: 126 MG/DL — HIGH (ref 70–99)
GLUCOSE SERPL-MCNC: 114 MG/DL — SIGNIFICANT CHANGE UP (ref 70–115)
HCT VFR BLD CALC: 33.1 % — LOW (ref 42–52)
HGB BLD-MCNC: 11.5 G/DL — LOW (ref 14–18)
MCHC RBC-ENTMCNC: 33.2 PG — HIGH (ref 27–31)
MCHC RBC-ENTMCNC: 34.7 G/DL — SIGNIFICANT CHANGE UP (ref 32–36)
MCV RBC AUTO: 95.7 FL — HIGH (ref 80–94)
PLATELET # BLD AUTO: 235 K/UL — SIGNIFICANT CHANGE UP (ref 150–400)
POTASSIUM SERPL-MCNC: 5.4 MMOL/L — HIGH (ref 3.5–5.3)
POTASSIUM SERPL-SCNC: 5.4 MMOL/L — HIGH (ref 3.5–5.3)
PROT SERPL-MCNC: 5.2 G/DL — LOW (ref 6.6–8.7)
RBC # BLD: 3.46 M/UL — LOW (ref 4.6–6.2)
RBC # FLD: 14 % — SIGNIFICANT CHANGE UP (ref 11–15.6)
SODIUM SERPL-SCNC: 134 MMOL/L — LOW (ref 135–145)
WBC # BLD: 8.2 K/UL — SIGNIFICANT CHANGE UP (ref 4.8–10.8)
WBC # FLD AUTO: 8.2 K/UL — SIGNIFICANT CHANGE UP (ref 4.8–10.8)

## 2018-03-19 PROCEDURE — 99233 SBSQ HOSP IP/OBS HIGH 50: CPT

## 2018-03-19 PROCEDURE — 74019 RADEX ABDOMEN 2 VIEWS: CPT | Mod: 26

## 2018-03-19 PROCEDURE — 99254 IP/OBS CNSLTJ NEW/EST MOD 60: CPT

## 2018-03-19 PROCEDURE — 71045 X-RAY EXAM CHEST 1 VIEW: CPT | Mod: 26

## 2018-03-19 RX ORDER — OXYCODONE AND ACETAMINOPHEN 5; 325 MG/1; MG/1
2 TABLET ORAL ONCE
Qty: 0 | Refills: 0 | Status: DISCONTINUED | OUTPATIENT
Start: 2018-03-19 | End: 2018-03-19

## 2018-03-19 RX ORDER — ONDANSETRON 8 MG/1
4 TABLET, FILM COATED ORAL EVERY 6 HOURS
Qty: 0 | Refills: 0 | Status: COMPLETED | OUTPATIENT
Start: 2018-03-19 | End: 2018-03-20

## 2018-03-19 RX ORDER — APIXABAN 2.5 MG/1
5 TABLET, FILM COATED ORAL EVERY 12 HOURS
Qty: 0 | Refills: 0 | Status: DISCONTINUED | OUTPATIENT
Start: 2018-03-19 | End: 2018-03-22

## 2018-03-19 RX ORDER — METOCLOPRAMIDE HCL 10 MG
10 TABLET ORAL ONCE
Qty: 0 | Refills: 0 | Status: COMPLETED | OUTPATIENT
Start: 2018-03-19 | End: 2018-03-19

## 2018-03-19 RX ADMIN — SPIRONOLACTONE 50 MILLIGRAM(S): 25 TABLET, FILM COATED ORAL at 17:43

## 2018-03-19 RX ADMIN — Medication 40 MILLIGRAM(S): at 05:04

## 2018-03-19 RX ADMIN — SPIRONOLACTONE 50 MILLIGRAM(S): 25 TABLET, FILM COATED ORAL at 11:17

## 2018-03-19 RX ADMIN — APIXABAN 5 MILLIGRAM(S): 2.5 TABLET, FILM COATED ORAL at 17:43

## 2018-03-19 RX ADMIN — OXYCODONE AND ACETAMINOPHEN 2 TABLET(S): 5; 325 TABLET ORAL at 06:41

## 2018-03-19 RX ADMIN — OXYCODONE AND ACETAMINOPHEN 2 TABLET(S): 5; 325 TABLET ORAL at 06:05

## 2018-03-19 RX ADMIN — PANTOPRAZOLE SODIUM 40 MILLIGRAM(S): 20 TABLET, DELAYED RELEASE ORAL at 05:05

## 2018-03-19 RX ADMIN — Medication 40 MILLIGRAM(S): at 17:43

## 2018-03-19 RX ADMIN — Medication 10 MILLIGRAM(S): at 15:34

## 2018-03-19 NOTE — PROGRESS NOTE ADULT - ASSESSMENT
> Large R pleural effusion with Mediastinal Shift - attributed to liver disease.  S/p Chest tube placement with significant improvement in symptoms.  CT surgery input appreciated.    > Alcoholic Cirrhosis with ascites - Continue diuretics.  Discussed with GI and pt regarding possible transfer to [facility] for further evaluation and possible TIPS.    > LLL PE - Anticoagulation held for now per CT Surgery recommendations.    > Diabetes - monitor fingersticks.  Insulin coverage for hyperglycemia.  > Hyperkalemia - monitor lytes, on diuretics.   > DVT Prophylaxis - Lower extremity intermittent compression devices. > Large R pleural effusion with Mediastinal Shift - attributed to liver disease.  S/p Chest tube placement with significant improvement in symptoms.  CT surgery input appreciated.    > Nausea - Zofran prn.  Check AXR.   > Alcoholic Cirrhosis with ascites - Continue diuretics.  Discussed with GI and pt regarding possible transfer to [facility] for further evaluation and possible TIPS.    > LLL PE - Anticoagulation held for now per CT Surgery recommendations.    > Diabetes - monitor fingersticks.  Insulin coverage for hyperglycemia.  > Hyperkalemia - monitor lytes, on diuretics.   > DVT Prophylaxis - Lower extremity intermittent compression devices.

## 2018-03-19 NOTE — CONSULT NOTE ADULT - SUBJECTIVE AND OBJECTIVE BOX
HPI:  58 yo M with h/o ETOH cirrhosis, C diff (completed abx therapy), LLL PE, DM, presents c/o worsening SOB x 3 days with orthopnea. The patient reported presenting to [facility] with transfer to [facility], where TIPS was felt not to be indicated.  He had been admitted twice to [facility] after that. He had liver biopsy performed which revealed cirrhosis. His condition was discussed  with [facility] and patient refused transfer on index hospitalization. Next time, he signed out AMA from ER after thoracentesis. He now presented with worsening SOB and orthopnea.  Symptoms per pt identical to prior presentation.  No cough / fever / chills.  No chest pain / palpitations.  Reports being compliant with BID anticoagulation for PE.     He underwent chest tube placement and now feels better from shortness of breath point of view. Had some nausea/vomiting. No fever.      PAST MEDICAL & SURGICAL HISTORY:  Pulmonary embolism  Cirrhosis  DM (diabetes mellitus)  S/P thoracentesis      ROS:  No Heartburn, regurgitation, dysphagia, odynophagia.  No dyspepsia  No abdominal pain.    No Nausea, vomiting.  No Bleeding.  No hematemesis.   + diarrhea.    No hematochezia  No weight loss, anorexia.  No edema.      MEDICATIONS  (STANDING):  apixaban 5 milliGRAM(s) Oral every 12 hours  dextrose 5%. 1000 milliLiter(s) (50 mL/Hr) IV Continuous <Continuous>  dextrose 50% Injectable 12.5 Gram(s) IV Push once  dextrose 50% Injectable 25 Gram(s) IV Push once  dextrose 50% Injectable 25 Gram(s) IV Push once  furosemide   Injectable 40 milliGRAM(s) IntraMuscular every 12 hours  insulin lispro (HumaLOG) corrective regimen sliding scale   SubCutaneous three times a day before meals  insulin lispro (HumaLOG) corrective regimen sliding scale   SubCutaneous at bedtime  pantoprazole    Tablet 40 milliGRAM(s) Oral before breakfast  spironolactone 50 milliGRAM(s) Oral two times a day    MEDICATIONS  (PRN):  dextrose Gel 1 Dose(s) Oral once PRN Blood Glucose LESS THAN 70 milliGRAM(s)/deciliter  glucagon  Injectable 1 milliGRAM(s) IntraMuscular once PRN Glucose LESS THAN 70 milligrams/deciliter  ondansetron Injectable 4 milliGRAM(s) IV Push every 6 hours PRN Nausea and/or Vomiting      Allergies    No Known Allergies    Intolerances        SOCIAL HISTORY: Alcohol +, - IVDA, - smoking.    ENDOSCOPIC/GI HISTORY:Colonoscopy last year in [place] reported 6 polyps    FAMILY HISTORY:  No pertinent family history in first degree relatives      Vital Signs Last 24 Hrs  T(C): 36.4 (19 Mar 2018 15:54), Max: 37 (19 Mar 2018 08:01)  T(F): 97.5 (19 Mar 2018 15:54), Max: 98.6 (19 Mar 2018 08:01)  HR: 95 (19 Mar 2018 15:54) (77 - 102)  BP: 105/66 (19 Mar 2018 15:54) (104/60 - 111/55)  BP(mean): --  RR: 20 (19 Mar 2018 15:54) (18 - 20)  SpO2: 99% (19 Mar 2018 15:54) (97% - 100%)    PHYSICAL EXAM:    GENERAL: NAD, well-groomed, well-developed  HEAD:  Atraumatic, Normocephalic  EYES: EOMI, PERRLA, conjunctiva and sclera clear  ENMT: No tonsillar erythema, exudates, or enlargement; Moist mucous membranes, Good dentition, No lesions  NECK: Supple, No JVD, Normal thyroid  CHEST/LUNG: Clear to percussion bilaterally; No rales, rhonchi, wheezing, or rubs. Right sided pig tail chest tube in place  HEART: Regular rate and rhythm; No murmurs, rubs, or gallops  ABDOMEN: Soft, Nontender, Nondistended; Bowel sounds present  EXTREMITIES:  2+ Peripheral Pulses, No clubbing, cyanosis,++edema  LYMPH: No lymphadenopathy noted  SKIN: No rashes or lesions      LABS:                        11.5   8.2   )-----------( 235      ( 19 Mar 2018 06:47 )             33.1     03-19    134<L>  |  104  |  53.0<H>  ----------------------------<  114  5.4<H>   |  20.0<L>  |  1.05    Ca    8.6      19 Mar 2018 06:47    TPro  5.2<L>  /  Alb  1.9<L>  /  TBili  0.3<L>  /  DBili  x   /  AST  46<H>  /  ALT  41<H>  /  AlkPhos  168<H>  03-19    PT/INR - ( 18 Mar 2018 10:18 )   PT: 12.3 sec;   INR: 1.12 ratio         PTT - ( 18 Mar 2018 10:18 )  PTT:32.4 sec       LIVER FUNCTIONS - ( 19 Mar 2018 06:47 )  Alb: 1.9 g/dL / Pro: 5.2 g/dL / ALK PHOS: 168 U/L / ALT: 41 U/L / AST: 46 U/L / GGT: x           Surgical Pathology Final Report -  (02.14.18 @ 09:30)    Surgical Pathology Final Report - :   ACCESSION No:  [id number]    ERIC ARIAS                       2        Surgical Final Report          Final Diagnosis  Liver, biopsy  - Probable cirrhosis. See note  Note: The specimen includes 3 cores of hepatic parenchyma (blocks  A, B andC) with widespread areas of nodularity and bridging  fibrosis. Interlobular bile ducts are present, and some portal  areas also have a ductular reaction with a lymphocytic  infiltrate. Rare apoptotic cells are present in the lobules.    Special stains were performed on one core (block A); the  Trichrome stain and Reticulin stain support the extensive  bridging fibrosis and nodule formation; the mild variations in  cord thickness on the Reticulin stain are not notable for a  neoplastic process. Stainable iron is increased in a patchy  distribution in hepatocytes and sinusoidal macrophages. The PASD  stain shows intracytoplasmic aggregates of globules of varying  sizes, primarily within periseptal hepatocytes, raising the  possibility of alpha-1 antitrypsin deficiency.    The overall changes are consistent with a probable cirrhosis. The  etiology of these changes is uncertain. Given the increased iron  and the PASD globules, appropriate investigations for iron  overload disease (hemochromatosis) and alpha-1 antritrypsin  deficiency should be considered. Evidence of steatohepatitis is  not appreciated; drug history,  autoimmune markers and viral serologies should be obtained.  Clinical correlation is essential.    Jennifer Murray M.D.  (Electronic Signature)  Reported on: 02/27/18    Clinical History  Cirrhosis; ETOH; abnormal liver    Specimen(s) Submitted  1     Liver biopsy    Gross Description  1.  The specimen is received in formalin and the specimen  container is labeled: Liver biopsy.  It consists of three cores  of pale yellow soft tissue ranging from 0.8-1.0 cm in length with  a maximum diameter measuring up to 0.1 cm.  Entirely submitted.  Three cassettes.    In addition to other data that may appear on the specimen  container, the label has been inspected to confirm the presence  of the patient's name and date of birth.            ERIC ARIAS                       2        Surgical Final Report            DON 02/14/18 14:25        RADIOLOGY & ADDITIONAL STUDIES:  < from: Xray Chest 1 View AP/PA. (03.19.18 @ 07:30) >    IMPRESSION:    Support Devices: Right basal chest tube  Heart:  Unremarkable  Mediastinum:  Unremarkable  Lungs/Airways: Decreased right pleural effusion  Bones/Soft tissues: Unremarkable    < end of copied text >    < from: Xray Chest 1 View- PORTABLE-Urgent (03.18.18 @ 20:44) >  Findings:  Again noted is a right pigtail chest tube. There is marked reduction in   size of right pleural effusion since the prior exam. No evidence of   pneumothorax. The left lung remains clear.    Impression:  Marked decrease in right pleural effusion since the prior study..    < end of copied text >

## 2018-03-19 NOTE — CONSULT NOTE ADULT - ASSESSMENT
Patient with alcohol related cirrhosis, recurrent right sided pleural effusion refractory to diuretics, requiring repeated chest tube/thoracentesis, pulmonary embolism and admitted for right sided pleural effusion and symptomatic dyspnea    1. Doing better after chest tube insertion  2. Will call tomorrow to [facility] if patient can be transferred for further care-TIPS or any definitive therapy.  3. Continue current management

## 2018-03-19 NOTE — PROGRESS NOTE ADULT - SUBJECTIVE AND OBJECTIVE BOX
Patient: ERIC ARIAS [id number] 62y Male                 Internal Medicine Hospitalist Progress Note - Dr. Fish Burk  Chief Complaint: Patient is a 62y old  Male who presents with a chief complaint of SOB (18 Mar 2018 15:20)    HPI:  58 yo M with h/o ETOH cirrhosis, C diff (completed abx therapy), LLL PE, DM, presents c/o worsening SOB x 3 days with orthopnea.  He had been recently discharged in past week from [facility] after hospitalization for R pleural effusion, and was awaiting TIPS in [place].  In interim, pt reports presenting to [facility] with transfer to [facility], where TIPS was felt not to be indicated.  He now presented with worsening SOB and orthopnea.  Symptoms per pt identical to prior presentation.  No cough / fever / chills.  No chest pain / palpitations.  Reports being compliant with BID anticoagulation, stopped this am expecting chest tube placement.  No additional complaints.     FAMILY HISTORY: reviewed and negative.  SOCIAL HISTORY: + alcohol, - IVDA, - smoking.  REVIEW OF SYSTEMS: General: - fatigue, - weight loss, - fevers, - chills.  HEENT: - headache, - hearing disturbances.  EYES: - visual disturbances, - diplopia.  CARDIOVASCULAR: - chest pain, - palpitations.  PULMONARY: + SOB, - cough, - hemoptysis.  GI: - abdominal pain, - nausea, - vomiting, - diarrhea, - constipation.  : - urinary urgency, - frequency, - dysuria.  MUSCULOSKELETAL: - arthralgias, - myalgias.  NEURO:  - weakness, - numbness.  PSYCH: - depression, - anxiety, - suicidal thoughts. SKIN: - rashes, - lesions.  All remaining ROS are negative (18 Mar 2018 15:20)    ____________________PHYSICAL EXAM:  Vitals reviewed as indicated below  GENERAL:  NAD  HEENT: NCAT  CARDIOVASCULAR:  S1, S2  LUNGS: CTAB  ABDOMEN:  soft, (-) tenderness, (-) distension, (+) bowel sounds, (-) guarding, (-) rebound (-) rigidity  EXTREMITIES:  no cyanosis / clubbing / edema.   ____________________      BACKGROUND:  HEALTH ISSUES - PROBLEM Dx:  Cirrhosis: Cirrhosis  Pleural effusion: Pleural effusion        Allergies    No Known Allergies    Intolerances      PAST MEDICAL & SURGICAL HISTORY:  Pulmonary embolism  Cirrhosis  DM (diabetes mellitus)  S/P thoracentesis        VITALS:  Vital Signs Last 24 Hrs  T(C): 36.7 (19 Mar 2018 11:38), Max: 37 (19 Mar 2018 08:01)  T(F): 98 (19 Mar 2018 11:38), Max: 98.6 (19 Mar 2018 08:01)  HR: 86 (19 Mar 2018 11:38) (77 - 102)  BP: 111/55 (19 Mar 2018 11:38) (104/60 - 119/69)  BP(mean): --  RR: 18 (19 Mar 2018 11:38) (18 - 24)  SpO2: 100% (19 Mar 2018 11:38) (97% - 100%) Daily     Daily   CAPILLARY BLOOD GLUCOSE      POCT Blood Glucose.: 119 mg/dL (19 Mar 2018 11:16)  POCT Blood Glucose.: 117 mg/dL (19 Mar 2018 08:11)  POCT Blood Glucose.: 89 mg/dL (18 Mar 2018 21:38)  POCT Blood Glucose.: 79 mg/dL (18 Mar 2018 17:09)    I&O's Summary    18 Mar 2018 07:01  -  19 Mar 2018 07:00  --------------------------------------------------------  IN: 0 mL / OUT: 6400 mL / NET: -6400 mL          LABS:                        11.5   8.2   )-----------( 235      ( 19 Mar 2018 06:47 )             33.1     03-19    134<L>  |  104  |  53.0<H>  ----------------------------<  114  5.4<H>   |  20.0<L>  |  1.05    Ca    8.6      19 Mar 2018 06:47    TPro  5.2<L>  /  Alb  1.9<L>  /  TBili  0.3<L>  /  DBili  x   /  AST  46<H>  /  ALT  41<H>  /  AlkPhos  168<H>  03-19    PT/INR - ( 18 Mar 2018 10:18 )   PT: 12.3 sec;   INR: 1.12 ratio         PTT - ( 18 Mar 2018 10:18 )  PTT:32.4 sec  LIVER FUNCTIONS - ( 19 Mar 2018 06:47 )  Alb: 1.9 g/dL / Pro: 5.2 g/dL / ALK PHOS: 168 U/L / ALT: 41 U/L / AST: 46 U/L / GGT: x             CARDIAC MARKERS ( 18 Mar 2018 10:18 )  x     / <0.01 ng/mL / 99 U/L / x     / x              MEDICATIONS:  MEDICATIONS  (STANDING):  dextrose 5%. 1000 milliLiter(s) (50 mL/Hr) IV Continuous <Continuous>  dextrose 50% Injectable 12.5 Gram(s) IV Push once  dextrose 50% Injectable 25 Gram(s) IV Push once  dextrose 50% Injectable 25 Gram(s) IV Push once  furosemide   Injectable 40 milliGRAM(s) IntraMuscular every 12 hours  insulin lispro (HumaLOG) corrective regimen sliding scale   SubCutaneous three times a day before meals  insulin lispro (HumaLOG) corrective regimen sliding scale   SubCutaneous at bedtime  metoclopramide Injectable 10 milliGRAM(s) IV Push once  pantoprazole    Tablet 40 milliGRAM(s) Oral before breakfast  spironolactone 50 milliGRAM(s) Oral two times a day    MEDICATIONS  (PRN):  dextrose Gel 1 Dose(s) Oral once PRN Blood Glucose LESS THAN 70 milliGRAM(s)/deciliter  glucagon  Injectable 1 milliGRAM(s) IntraMuscular once PRN Glucose LESS THAN 70 milligrams/deciliter  ondansetron Injectable 4 milliGRAM(s) IV Push every 6 hours PRN Nausea and/or Vomiting Patient: ERIC ARIAS [id number] 62y Male                 Internal Medicine Hospitalist Progress Note - Dr. Fish Burk  Chief Complaint: Patient is a 62y old  Male who presents with a chief complaint of SOB (18 Mar 2018 15:20)    HPI:  58 yo M with h/o ETOH cirrhosis, C diff (completed abx therapy), LLL PE, DM, presents c/o worsening SOB x 3 days with orthopnea.  He had been recently discharged in past week from [facility] after hospitalization for R pleural effusion, and was awaiting TIPS in [place] but refused.  Represented with SOB, found to have large R pleural effusion with mediastinal shift.  S/p Chest tube placement.      C/o mild nausea and episode of vomiting.  No abdominal pain.     ____________________PHYSICAL EXAM:  Vitals reviewed as indicated below  GENERAL:  NAD Alert and Oriented x 3   HEENT: NCAT  CARDIOVASCULAR:  S1, S2  LUNGS: decreased BS R base.  Chest tube in place.   ABDOMEN:  soft, (-) tenderness, (mild) distension, (+) bowel sounds, (-) guarding, (-) rebound (-) rigidity + ascites  EXTREMITIES:  no cyanosis / clubbing / edema.   ____________________      BACKGROUND:  HEALTH ISSUES - PROBLEM Dx:  Cirrhosis: Cirrhosis  Pleural effusion: Pleural effusion        Allergies    No Known Allergies    Intolerances      PAST MEDICAL & SURGICAL HISTORY:  Pulmonary embolism  Cirrhosis  DM (diabetes mellitus)  S/P thoracentesis        VITALS:  Vital Signs Last 24 Hrs  T(C): 36.7 (19 Mar 2018 11:38), Max: 37 (19 Mar 2018 08:01)  T(F): 98 (19 Mar 2018 11:38), Max: 98.6 (19 Mar 2018 08:01)  HR: 86 (19 Mar 2018 11:38) (77 - 102)  BP: 111/55 (19 Mar 2018 11:38) (104/60 - 119/69)  BP(mean): --  RR: 18 (19 Mar 2018 11:38) (18 - 24)  SpO2: 100% (19 Mar 2018 11:38) (97% - 100%) Daily     Daily   CAPILLARY BLOOD GLUCOSE      POCT Blood Glucose.: 119 mg/dL (19 Mar 2018 11:16)  POCT Blood Glucose.: 117 mg/dL (19 Mar 2018 08:11)  POCT Blood Glucose.: 89 mg/dL (18 Mar 2018 21:38)  POCT Blood Glucose.: 79 mg/dL (18 Mar 2018 17:09)    I&O's Summary    18 Mar 2018 07:01  -  19 Mar 2018 07:00  --------------------------------------------------------  IN: 0 mL / OUT: 6400 mL / NET: -6400 mL          LABS:                        11.5   8.2   )-----------( 235      ( 19 Mar 2018 06:47 )             33.1     03-19    134<L>  |  104  |  53.0<H>  ----------------------------<  114  5.4<H>   |  20.0<L>  |  1.05    Ca    8.6      19 Mar 2018 06:47    TPro  5.2<L>  /  Alb  1.9<L>  /  TBili  0.3<L>  /  DBili  x   /  AST  46<H>  /  ALT  41<H>  /  AlkPhos  168<H>  03-19    PT/INR - ( 18 Mar 2018 10:18 )   PT: 12.3 sec;   INR: 1.12 ratio         PTT - ( 18 Mar 2018 10:18 )  PTT:32.4 sec  LIVER FUNCTIONS - ( 19 Mar 2018 06:47 )  Alb: 1.9 g/dL / Pro: 5.2 g/dL / ALK PHOS: 168 U/L / ALT: 41 U/L / AST: 46 U/L / GGT: x             CARDIAC MARKERS ( 18 Mar 2018 10:18 )  x     / <0.01 ng/mL / 99 U/L / x     / x              MEDICATIONS:  MEDICATIONS  (STANDING):  dextrose 5%. 1000 milliLiter(s) (50 mL/Hr) IV Continuous <Continuous>  dextrose 50% Injectable 12.5 Gram(s) IV Push once  dextrose 50% Injectable 25 Gram(s) IV Push once  dextrose 50% Injectable 25 Gram(s) IV Push once  furosemide   Injectable 40 milliGRAM(s) IntraMuscular every 12 hours  insulin lispro (HumaLOG) corrective regimen sliding scale   SubCutaneous three times a day before meals  insulin lispro (HumaLOG) corrective regimen sliding scale   SubCutaneous at bedtime  metoclopramide Injectable 10 milliGRAM(s) IV Push once  pantoprazole    Tablet 40 milliGRAM(s) Oral before breakfast  spironolactone 50 milliGRAM(s) Oral two times a day    MEDICATIONS  (PRN):  dextrose Gel 1 Dose(s) Oral once PRN Blood Glucose LESS THAN 70 milliGRAM(s)/deciliter  glucagon  Injectable 1 milliGRAM(s) IntraMuscular once PRN Glucose LESS THAN 70 milligrams/deciliter  ondansetron Injectable 4 milliGRAM(s) IV Push every 6 hours PRN Nausea and/or Vomiting

## 2018-03-19 NOTE — PATIENT PROFILE ADULT. - NS TRANSFER PATIENT BELONGINGS
Jewelry/Money (specify)/Cell Phone/PDA (specify)/jeans, shoes shirt cap sweatshirt gold chain/Clothing

## 2018-03-20 DIAGNOSIS — K74.60 UNSPECIFIED CIRRHOSIS OF LIVER: ICD-10-CM

## 2018-03-20 LAB
GLUCOSE BLDC GLUCOMTR-MCNC: 118 MG/DL — HIGH (ref 70–99)
GLUCOSE BLDC GLUCOMTR-MCNC: 122 MG/DL — HIGH (ref 70–99)
GLUCOSE BLDC GLUCOMTR-MCNC: 127 MG/DL — HIGH (ref 70–99)
GLUCOSE BLDC GLUCOMTR-MCNC: 90 MG/DL — SIGNIFICANT CHANGE UP (ref 70–99)
GLUCOSE FLD-MCNC: 154 MG/DL — SIGNIFICANT CHANGE UP
LDH SERPL L TO P-CCNC: 80 U/L — SIGNIFICANT CHANGE UP
NIGHT BLUE STAIN TISS: SIGNIFICANT CHANGE UP
SPECIMEN SOURCE: SIGNIFICANT CHANGE UP

## 2018-03-20 PROCEDURE — 99233 SBSQ HOSP IP/OBS HIGH 50: CPT

## 2018-03-20 PROCEDURE — 71045 X-RAY EXAM CHEST 1 VIEW: CPT | Mod: 26

## 2018-03-20 RX ORDER — SPIRONOLACTONE 25 MG/1
2 TABLET, FILM COATED ORAL
Qty: 0 | Refills: 0 | COMMUNITY
Start: 2018-03-20

## 2018-03-20 RX ORDER — OXYCODONE AND ACETAMINOPHEN 5; 325 MG/1; MG/1
1 TABLET ORAL EVERY 4 HOURS
Qty: 0 | Refills: 0 | Status: DISCONTINUED | OUTPATIENT
Start: 2018-03-20 | End: 2018-03-20

## 2018-03-20 RX ORDER — PANTOPRAZOLE SODIUM 20 MG/1
1 TABLET, DELAYED RELEASE ORAL
Qty: 0 | Refills: 0 | COMMUNITY
Start: 2018-03-20

## 2018-03-20 RX ORDER — OXYCODONE AND ACETAMINOPHEN 5; 325 MG/1; MG/1
2 TABLET ORAL ONCE
Qty: 0 | Refills: 0 | Status: DISCONTINUED | OUTPATIENT
Start: 2018-03-20 | End: 2018-03-20

## 2018-03-20 RX ORDER — OXYCODONE HYDROCHLORIDE 5 MG/1
5 TABLET ORAL EVERY 4 HOURS
Qty: 0 | Refills: 0 | Status: DISCONTINUED | OUTPATIENT
Start: 2018-03-20 | End: 2018-03-22

## 2018-03-20 RX ORDER — FUROSEMIDE 40 MG
40 TABLET ORAL
Qty: 0 | Refills: 0 | COMMUNITY
Start: 2018-03-20

## 2018-03-20 RX ADMIN — APIXABAN 5 MILLIGRAM(S): 2.5 TABLET, FILM COATED ORAL at 18:33

## 2018-03-20 RX ADMIN — OXYCODONE AND ACETAMINOPHEN 1 TABLET(S): 5; 325 TABLET ORAL at 14:39

## 2018-03-20 RX ADMIN — OXYCODONE AND ACETAMINOPHEN 2 TABLET(S): 5; 325 TABLET ORAL at 00:58

## 2018-03-20 RX ADMIN — OXYCODONE AND ACETAMINOPHEN 2 TABLET(S): 5; 325 TABLET ORAL at 00:18

## 2018-03-20 RX ADMIN — OXYCODONE AND ACETAMINOPHEN 1 TABLET(S): 5; 325 TABLET ORAL at 17:45

## 2018-03-20 RX ADMIN — OXYCODONE HYDROCHLORIDE 5 MILLIGRAM(S): 5 TABLET ORAL at 18:34

## 2018-03-20 RX ADMIN — Medication 40 MILLIGRAM(S): at 05:32

## 2018-03-20 RX ADMIN — OXYCODONE HYDROCHLORIDE 5 MILLIGRAM(S): 5 TABLET ORAL at 23:15

## 2018-03-20 RX ADMIN — APIXABAN 5 MILLIGRAM(S): 2.5 TABLET, FILM COATED ORAL at 05:32

## 2018-03-20 RX ADMIN — ONDANSETRON 4 MILLIGRAM(S): 8 TABLET, FILM COATED ORAL at 22:43

## 2018-03-20 RX ADMIN — SPIRONOLACTONE 50 MILLIGRAM(S): 25 TABLET, FILM COATED ORAL at 05:32

## 2018-03-20 RX ADMIN — OXYCODONE HYDROCHLORIDE 5 MILLIGRAM(S): 5 TABLET ORAL at 19:36

## 2018-03-20 RX ADMIN — OXYCODONE HYDROCHLORIDE 5 MILLIGRAM(S): 5 TABLET ORAL at 22:43

## 2018-03-20 NOTE — DISCHARGE NOTE ADULT - PATIENT PORTAL LINK FT
You can access the [facility] Patient Portal, offered by [facility], by registering with the following website: [email or web address]

## 2018-03-20 NOTE — PROGRESS NOTE ADULT - SUBJECTIVE AND OBJECTIVE BOX
pt know to service.  Cirrhosis with recurrent pleural effusions.  Would rec transfer out for ?TIPS eval

## 2018-03-20 NOTE — PROGRESS NOTE ADULT - PROBLEM SELECTOR PLAN 1
Pt. 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  Case and management d/w pt and hospitalist and Dr. Anthony Pruitt. Arrangements to be made for the pt. to be transferred to [facility] for eventual TIPS procedure.

## 2018-03-20 NOTE — DISCHARGE NOTE ADULT - CARE PROVIDER_API CALL
Richy Saez), Surgery; Thoracic Surgery  [address] [place] [address]  Phone: [phone]  Fax: [phone]    Sameer Carson), Gastroenterology; Internal Medicine  [address] [place] [address]  Phone: [phone]  Fax: [phone]

## 2018-03-20 NOTE — DISCHARGE NOTE ADULT - PLAN OF CARE
stable for transfer to [facility] It is important to see your primary physician as well as the physicians noted below within the next week to perform a comprehensive medical review.  Call their offices for an appointment as soon as you leave the hospital.  If you do not have a primary physician, contact the [facility] [place] [phone] located on [address] [place] [address].  Your medical issues appear to be stable at this time, but if your symptoms recur or worsen, contact your physicians and/or return to the hospital if necessary.  If you encounter any issues or questions with your medication, call your physicians before stopping the medication.  Do not drive.  Limit your diet to 2 grams of sodium daily.

## 2018-03-20 NOTE — DISCHARGE NOTE ADULT - CARE PLAN
Principal Discharge DX:	Pleural effusion  Goal:	stable for transfer to [facility]  Assessment and plan of treatment:	It is important to see your primary physician as well as the physicians noted below within the next week to perform a comprehensive medical review.  Call their offices for an appointment as soon as you leave the hospital.  If you do not have a primary physician, contact the [facility] [place] [phone] located on [address] [place] [address], [place] [address].  Your medical issues appear to be stable at this time, but if your symptoms recur or worsen, contact your physicians and/or return to the hospital if necessary.  If you encounter any issues or questions with your medication, call your physicians before stopping the medication.  Do not drive.  Limit your diet to 2 grams of sodium daily.  Secondary Diagnosis:	Alcoholic cirrhosis of liver with ascites  Secondary Diagnosis:	Other chronic pulmonary embolism without acute cor pulmonale  Secondary Diagnosis:	Type 2 diabetes mellitus without complication, without long-term current use of insulin

## 2018-03-20 NOTE — DISCHARGE NOTE ADULT - MEDICATION SUMMARY - MEDICATIONS TO TAKE
I will START or STAY ON the medications listed below when I get home from the hospital:    spironolactone 25 mg oral tablet  -- 2 tab(s) by mouth 2 times a day  -- Indication: For Cirrhosis    apixaban 5 mg oral tablet  -- 1 tab(s) by mouth 2 times a day   -- Check with your doctor before becoming pregnant.  It is very important that you take or use this exactly as directed.  Do not skip doses or discontinue unless directed by your doctor.  Obtain medical advice before taking any non-prescription drugs as some may affect the action of this medication.    -- Indication: For LLL PE    metFORMIN 850 mg oral tablet  -- 1 tab(s) by mouth 2 times a day  -- Indication: For Diabetes    furosemide 10 mg/mL injectable solution  -- 40 milligram(s) injectable every 12 hours  -- Indication: For Cirrhosis    pantoprazole 40 mg oral delayed release tablet  -- 1 tab(s) by mouth once a day (before a meal)  -- Indication: For Gastritis    Multiple Vitamins oral tablet  -- 1 tab(s) by mouth once a day  -- Indication: For Supplement I will START or STAY ON the medications listed below when I get home from the hospital:    spironolactone 25 mg oral tablet  -- 2 tab(s) by mouth 2 times a day  -- Indication: For Cirrhosis    oxyCODONE 5 mg oral tablet  -- 1 tab(s) by mouth every 4 hours, As needed, Pain  -- Indication: For Pain    apixaban 5 mg oral tablet  -- 1 tab(s) by mouth 2 times a day   -- Check with your doctor before becoming pregnant.  It is very important that you take or use this exactly as directed.  Do not skip doses or discontinue unless directed by your doctor.  Obtain medical advice before taking any non-prescription drugs as some may affect the action of this medication.    -- Indication: For LLL PE    metFORMIN 850 mg oral tablet  -- 1 tab(s) by mouth 2 times a day  -- Indication: For Diabetes    furosemide 40 mg oral tablet  -- 1 tab(s) by mouth once a day  -- Indication: For Ascites    pantoprazole 40 mg oral delayed release tablet  -- 1 tab(s) by mouth once a day (before a meal)  -- Indication: For Gastritis    Multiple Vitamins oral tablet  -- 1 tab(s) by mouth once a day  -- Indication: For Supplement

## 2018-03-20 NOTE — DISCHARGE NOTE ADULT - CARE PROVIDERS DIRECT ADDRESSES
[email or web address]

## 2018-03-20 NOTE — DISCHARGE NOTE ADULT - MEDICATION SUMMARY - MEDICATIONS TO CHANGE
I will SWITCH the dose or number of times a day I take the medications listed below when I get home from the hospital:    furosemide 20 mg oral tablet  -- 3 tab(s) by mouth once a day

## 2018-03-20 NOTE — PROGRESS NOTE ADULT - ASSESSMENT
> Large R pleural effusion with Mediastinal Shift - Hepatohydrothorax - discussed with GI.  They have discussed pt's condition with Dr. Anthony Pruitt for possible TIPS.  I contacted [facility] for transfer to their hospitalist service.    > Nausea - resolved.   > Alcoholic Cirrhosis with ascites - Continue diuretics.  Monitor I&Os.  > LLL PE - Anticoagulation restarted per CT Surgery Recommendations.   > Diabetes - monitor fingersticks.  Insulin coverage for hyperglycemia.  FS stable.   > Hyperkalemia - monitor lytes, on diuretics.   > DVT Prophylaxis - on anticoagulation.     Awaiting transfer to [place].  Pt in agreement.

## 2018-03-20 NOTE — PROGRESS NOTE ADULT - SUBJECTIVE AND OBJECTIVE BOX
Patient: ERIC ARIAS [id number] 62y Male                 Internal Medicine Hospitalist Progress Note - Dr. Fish Burk  Chief Complaint: Patient is a 62y old  Male who presents with a chief complaint of SOB (18 Mar 2018 15:20)    HPI:  58 yo M with h/o ETOH cirrhosis, C diff (completed abx therapy), LLL PE, DM, presents c/o worsening SOB x 3 days with orthopnea.  He had been recently discharged in past week from [facility] after hospitalization for R pleural effusion, and was awaiting evaluation for TIPS in [place] but refused.  Presented again on 3/18 with SOB, found to have large R pleural effusion with mediastinal shift.  S/p Chest tube placement yielding approximately total of 6.4L cloudy fluid, total nucleated cell 163, RBCs 920.  Pt reports resolution of SOB.  GI consulted - contacted Dr. Anthony Pruitt at [facility] regarding evaluation of pt for TIPS.  Pt in agreement with plan, as he has had multiple hospitalizations over past month.    Reports feeling much better this am.  No SOB.  No abdominal pain.  No fever / chills.  No additional complaints.     ____________________PHYSICAL EXAM:  Vitals reviewed as indicated below  GENERAL:  NAD Alert and Oriented x 3   HEENT: NCAT  CARDIOVASCULAR:  S1, S2  LUNGS: decreased BS R base.  Chest tube in place.   ABDOMEN:  soft, (-) tenderness, (mild) distension, (+) bowel sounds, (-) guarding, (-) rebound (-) rigidity + ascites  EXTREMITIES:  no cyanosis / clubbing / edema.   ____________________    VITALS:  Vital Signs Last 24 Hrs  T(C): 36.7 (20 Mar 2018 05:29), Max: 36.7 (19 Mar 2018 11:38)  T(F): 98 (20 Mar 2018 05:29), Max: 98.1 (19 Mar 2018 22:50)  HR: 92 (20 Mar 2018 05:29) (86 - 95)  BP: 100/64 (20 Mar 2018 05:29) (98/56 - 111/55)  BP(mean): --  RR: 16 (20 Mar 2018 05:29) (16 - 20)  SpO2: 100% (20 Mar 2018 05:29) (99% - 100%) Daily     Daily Weight in k.7 (20 Mar 2018 04:45)  CAPILLARY BLOOD GLUCOSE      POCT Blood Glucose.: 90 mg/dL (20 Mar 2018 08:21)  POCT Blood Glucose.: 121 mg/dL (19 Mar 2018 22:03)  POCT Blood Glucose.: 126 mg/dL (19 Mar 2018 17:47)  POCT Blood Glucose.: 119 mg/dL (19 Mar 2018 11:16)    I&O's Summary    20 Mar 2018 07:01  -  20 Mar 2018 11:00  --------------------------------------------------------  IN: 0 mL / OUT: 1330 mL / NET: -1330 mL        LABS:                        11.5   8.2   )-----------( 235      ( 19 Mar 2018 06:47 )             33.1     03-    134<L>  |  104  |  53.0<H>  ----------------------------<  114  5.4<H>   |  20.0<L>  |  1.05    Ca    8.6      19 Mar 2018 06:47    TPro  5.2<L>  /  Alb  1.9<L>  /  TBili  0.3<L>  /  DBili  x   /  AST  46<H>  /  ALT  41<H>  /  AlkPhos  168<H>  03-19      LIVER FUNCTIONS - ( 19 Mar 2018 06:47 )  Alb: 1.9 g/dL / Pro: 5.2 g/dL / ALK PHOS: 168 U/L / ALT: 41 U/L / AST: 46 U/L / GGT: x                   MEDICATIONS:  apixaban 5 milliGRAM(s) Oral every 12 hours  dextrose 5%. 1000 milliLiter(s) IV Continuous <Continuous>  dextrose 50% Injectable 12.5 Gram(s) IV Push once  dextrose 50% Injectable 25 Gram(s) IV Push once  dextrose 50% Injectable 25 Gram(s) IV Push once  dextrose Gel 1 Dose(s) Oral once PRN  furosemide   Injectable 40 milliGRAM(s) IntraMuscular every 12 hours  glucagon  Injectable 1 milliGRAM(s) IntraMuscular once PRN  insulin lispro (HumaLOG) corrective regimen sliding scale   SubCutaneous three times a day before meals  insulin lispro (HumaLOG) corrective regimen sliding scale   SubCutaneous at bedtime  ondansetron Injectable 4 milliGRAM(s) IV Push every 6 hours PRN  pantoprazole    Tablet 40 milliGRAM(s) Oral before breakfast  spironolactone 50 milliGRAM(s) Oral two times a day

## 2018-03-20 NOTE — DISCHARGE NOTE ADULT - HOSPITAL COURSE
Patient: ERIC ARIAS [id number]                 Internal Medicine Hospitalist Discharge Note - Dr. Fish Burk    Chief Complaint: Patient is a 62y old  Male who presents with a chief complaint of SOB (18 Mar 2018 15:20)    HPI:  60 yo M with h/o ETOH cirrhosis, C diff (completed abx therapy), LLL PE, DM, presents c/o worsening SOB x 3 days with orthopnea.  He had been recently discharged in past week from [facility] after hospitalization for R pleural effusion, and was awaiting evaluation for TIPS in [place] but refused.  Presented again on 3/18 with SOB, found to have large R pleural effusion with mediastinal shift.  S/p Chest tube placement yielding approximately total of 6.4L cloudy fluid, total nucleated cell 163, RBCs 920.  Pt reports resolution of SOB.  GI consulted - contacted Dr. Anthony Pruitt at [facility] regarding evaluation of pt for TIPS.  Pt in agreement with plan, as he has had multiple hospitalizations over past month.    Reports feeling much better this am.  No SOB.  No abdominal pain.  No fever / chills.  No additional complaints.     > Large R pleural effusion with Mediastinal Shift - Hepatohydrothorax - discussed with GI.  They have discussed pt's condition with Dr. Anthony Pruitt for possible TIPS.  I contacted [facility] for transfer to their hospitalist service.    > Nausea - resolved.   > Alcoholic Cirrhosis with ascites - Continue diuretics.  Monitor I&Os.  > LLL PE - Anticoagulation restarted per CT Surgery Recommendations.   > Diabetes - monitor fingersticks.  Insulin coverage for hyperglycemia.  FS stable.   > Hyperkalemia - monitor lytes, on diuretics.       Disposition: stable for transfer to [facility].  Outpatient followup as above.  Patient was advised to return if they experience any recurrence or worsening of symptoms.  Total time spent on discharge was 35 minutes.  -Fish Burk D.O., Hospitalist, [facility] [place] Patient: ERIC ARIAS [id number]                 Internal Medicine Hospitalist Discharge Note - Dr. Fish Burk    Chief Complaint: Patient is a 62y old  Male who presents with a chief complaint of SOB (18 Mar 2018 15:20)    HPI:  58 yo M with h/o ETOH cirrhosis, C diff (completed abx therapy), LLL PE, DM, presents c/o worsening SOB x 3 days with orthopnea.  He had been recently discharged in past week from [facility] after hospitalization for R pleural effusion, and was awaiting evaluation for TIPS in [place] but refused.  Presented again on 3/18 with SOB, found to have large R pleural effusion with mediastinal shift.  S/p Chest tube placement yielding approximately total of 6.4L cloudy fluid, total nucleated cell 163, RBCs 920.  Pt reports resolution of SOB.  GI consulted - contacted Dr. Anthony Pruitt at [facility] regarding evaluation of pt for TIPS.  Pt in agreement with plan, as he has had multiple hospitalizations over past month.  Noted Chest tube output ~ 2.3L this am, net output 2.1L    Chest tube in place.  Denies complaints.  No pain.  No SOB.  No fever / chills.  No additional complaints.      > Large R pleural effusion with Mediastinal Shift - Hepatohydrothorax - awaiting transfer to [facility] to hospitalist service and consult with Dr. Anthony Pruitt for possible TIPS.  Medically stable for transfer.   > Alcoholic Cirrhosis with ascites - Continue diuretics.  Pt negative 2.1L. Will change Lasix to once daily.  Monitor I&Os.  > Hyperkalemia - Dose of Kayexalate to be given.    > LLL PE - On full dose anticoagulation.    > Nausea - resolved.   > Diabetes - monitor fingersticks.  Insulin coverage for hyperglycemia.  FS stable.       Disposition: stable for transfer to [facility].  Outpatient followup as above.  Patient was advised to return if they experience any recurrence or worsening of symptoms.  Total time spent on discharge was 35 minutes.  -Fish Burk D.O., Hospitalist, [facility]

## 2018-03-20 NOTE — DISCHARGE NOTE ADULT - SECONDARY DIAGNOSIS.
Alcoholic cirrhosis of liver with ascites Other chronic pulmonary embolism without acute cor pulmonale Type 2 diabetes mellitus without complication, without long-term current use of insulin

## 2018-03-20 NOTE — PROGRESS NOTE ADULT - SUBJECTIVE AND OBJECTIVE BOX
Pt seen and examined. I spoke with Dr. Anthony Pruitt, Chief of Hepatology @ [facility] this AM regarding this pt. and he agrees that the pt. should have TIPS procedure for definitive treatment of his hepatohydrothorax which is refractory to diuretics. The pt. was also informed of this and is now agreeable to transfer there for TIPS procedure.    REVIEW OF SYSTEMS:  Constitutional: No fever, weight loss or fatigue  Cardiovascular: No chest pain, palpitations, dizziness or leg swelling  Gastrointestinal: No abdominal or epigastric pain. No nausea, vomiting or hematemesis; No diarrhea or constipation. No melena or hematochezia.  Skin: No itching, burning, rashes or lesions       MEDICATIONS:  MEDICATIONS  (STANDING):  apixaban 5 milliGRAM(s) Oral every 12 hours  dextrose 5%. 1000 milliLiter(s) (50 mL/Hr) IV Continuous <Continuous>  dextrose 50% Injectable 12.5 Gram(s) IV Push once  dextrose 50% Injectable 25 Gram(s) IV Push once  dextrose 50% Injectable 25 Gram(s) IV Push once  furosemide   Injectable 40 milliGRAM(s) IntraMuscular every 12 hours  insulin lispro (HumaLOG) corrective regimen sliding scale   SubCutaneous three times a day before meals  insulin lispro (HumaLOG) corrective regimen sliding scale   SubCutaneous at bedtime  pantoprazole    Tablet 40 milliGRAM(s) Oral before breakfast  spironolactone 50 milliGRAM(s) Oral two times a day    MEDICATIONS  (PRN):  dextrose Gel 1 Dose(s) Oral once PRN Blood Glucose LESS THAN 70 milliGRAM(s)/deciliter  glucagon  Injectable 1 milliGRAM(s) IntraMuscular once PRN Glucose LESS THAN 70 milligrams/deciliter  ondansetron Injectable 4 milliGRAM(s) IV Push every 6 hours PRN Nausea and/or Vomiting      Allergies    No Known Allergies    Intolerances        Vital Signs Last 24 Hrs  T(C): 36.7 (20 Mar 2018 05:29), Max: 36.7 (19 Mar 2018 11:38)  T(F): 98 (20 Mar 2018 05:29), Max: 98.1 (19 Mar 2018 22:50)  HR: 92 (20 Mar 2018 05:29) (86 - 95)  BP: 100/64 (20 Mar 2018 05:29) (98/56 - 111/55)  BP(mean): --  RR: 16 (20 Mar 2018 05:29) (16 - 20)  SpO2: 100% (20 Mar 2018 05:29) (99% - 100%)      PHYSICAL EXAM:    General: Well developed; well nourished; in no acute distress  HEENT: MMM, conjunctiva pink and sclera anicteric.  Lungs: Decreased breath sounds on right. Clear on left  Cor: RRR S1, S2 only w/o mrg or clicks  Gastrointestinal: Abdomen: Soft non-tender non-distended; Normal bowel sounds; No hepatosplenomegaly  Extremities: no cyanosis, clubbing or edema.  Skin: Warm and dry. No obvious rash  Neuro: Pt. a + o x 3, no tremulousness or asterixsis    LABS:      CBC Full  -  ( 19 Mar 2018 06:47 )  WBC Count : 8.2 K/uL  Hemoglobin : 11.5 g/dL  Hematocrit : 33.1 %  Platelet Count - Automated : 235 K/uL  Mean Cell Volume : 95.7 fl  Mean Cell Hemoglobin : 33.2 pg  Mean Cell Hemoglobin Concentration : 34.7 g/dL  Auto Neutrophil # : x  Auto Lymphocyte # : x  Auto Monocyte # : x  Auto Eosinophil # : x  Auto Basophil # : x  Auto Neutrophil % : x  Auto Lymphocyte % : x  Auto Monocyte % : x  Auto Eosinophil % : x  Auto Basophil % : x    03-19    134<L>  |  104  |  53.0<H>  ----------------------------<  114  5.4<H>   |  20.0<L>  |  1.05    Ca    8.6      19 Mar 2018 06:47    TPro  5.2<L>  /  Alb  1.9<L>  /  TBili  0.3<L>  /  DBili  x   /  AST  46<H>  /  ALT  41<H>  /  AlkPhos  168<H>  03-19    PT/INR - ( 18 Mar 2018 10:18 )   PT: 12.3 sec;   INR: 1.12 ratio         PTT - ( 18 Mar 2018 10:18 )  PTT:32.4 sec                  RADIOLOGY & ADDITIONAL STUDIES (The following images were personally reviewed):

## 2018-03-21 ENCOUNTER — APPOINTMENT (OUTPATIENT)
Dept: PULMONOLOGY | Facility: CLINIC | Age: 63
End: 2018-03-21

## 2018-03-21 DIAGNOSIS — K70.31 ALCOHOLIC CIRRHOSIS OF LIVER WITH ASCITES: ICD-10-CM

## 2018-03-21 LAB
ANION GAP SERPL CALC-SCNC: 9 MMOL/L — SIGNIFICANT CHANGE UP (ref 5–17)
BUN SERPL-MCNC: 51 MG/DL — HIGH (ref 8–20)
CALCIUM SERPL-MCNC: 8.2 MG/DL — LOW (ref 8.6–10.2)
CHLORIDE SERPL-SCNC: 104 MMOL/L — SIGNIFICANT CHANGE UP (ref 98–107)
CO2 SERPL-SCNC: 21 MMOL/L — LOW (ref 22–29)
CREAT SERPL-MCNC: 1.16 MG/DL — SIGNIFICANT CHANGE UP (ref 0.5–1.3)
GLUCOSE BLDC GLUCOMTR-MCNC: 102 MG/DL — HIGH (ref 70–99)
GLUCOSE BLDC GLUCOMTR-MCNC: 128 MG/DL — HIGH (ref 70–99)
GLUCOSE BLDC GLUCOMTR-MCNC: 129 MG/DL — HIGH (ref 70–99)
GLUCOSE BLDC GLUCOMTR-MCNC: 98 MG/DL — SIGNIFICANT CHANGE UP (ref 70–99)
GLUCOSE SERPL-MCNC: 102 MG/DL — SIGNIFICANT CHANGE UP (ref 70–115)
NON-GYN CYTOLOGY SPEC: SIGNIFICANT CHANGE UP
POTASSIUM SERPL-MCNC: 5 MMOL/L — SIGNIFICANT CHANGE UP (ref 3.5–5.3)
POTASSIUM SERPL-SCNC: 5 MMOL/L — SIGNIFICANT CHANGE UP (ref 3.5–5.3)
SODIUM SERPL-SCNC: 134 MMOL/L — LOW (ref 135–145)

## 2018-03-21 PROCEDURE — 99232 SBSQ HOSP IP/OBS MODERATE 35: CPT

## 2018-03-21 PROCEDURE — 71045 X-RAY EXAM CHEST 1 VIEW: CPT | Mod: 26

## 2018-03-21 RX ORDER — ONDANSETRON 8 MG/1
4 TABLET, FILM COATED ORAL ONCE
Qty: 0 | Refills: 0 | Status: COMPLETED | OUTPATIENT
Start: 2018-03-21 | End: 2018-03-21

## 2018-03-21 RX ADMIN — Medication 40 MILLIGRAM(S): at 17:45

## 2018-03-21 RX ADMIN — APIXABAN 5 MILLIGRAM(S): 2.5 TABLET, FILM COATED ORAL at 06:52

## 2018-03-21 RX ADMIN — APIXABAN 5 MILLIGRAM(S): 2.5 TABLET, FILM COATED ORAL at 17:45

## 2018-03-21 RX ADMIN — OXYCODONE HYDROCHLORIDE 5 MILLIGRAM(S): 5 TABLET ORAL at 15:28

## 2018-03-21 RX ADMIN — SPIRONOLACTONE 50 MILLIGRAM(S): 25 TABLET, FILM COATED ORAL at 17:45

## 2018-03-21 RX ADMIN — SPIRONOLACTONE 50 MILLIGRAM(S): 25 TABLET, FILM COATED ORAL at 06:52

## 2018-03-21 RX ADMIN — Medication 40 MILLIGRAM(S): at 06:52

## 2018-03-21 RX ADMIN — ONDANSETRON 4 MILLIGRAM(S): 8 TABLET, FILM COATED ORAL at 14:19

## 2018-03-21 RX ADMIN — PANTOPRAZOLE SODIUM 40 MILLIGRAM(S): 20 TABLET, DELAYED RELEASE ORAL at 06:52

## 2018-03-21 RX ADMIN — OXYCODONE HYDROCHLORIDE 5 MILLIGRAM(S): 5 TABLET ORAL at 14:04

## 2018-03-21 NOTE — PROGRESS NOTE ADULT - ASSESSMENT
> Large R pleural effusion with Mediastinal Shift - Hepatohydrothorax - discussed with GI.  They have discussed pt's condition with Dr. Anthony Pruitt for possible TIPS.  I contacted [facility] for transfer to their hospitalist service.  Plan transfer in am.      > Nausea - resolved.   > Alcoholic Cirrhosis with ascites - Continue diuretics.  Monitor I&Os.  > LLL PE - Anticoagulation restarted per CT Surgery Recommendations.   > Diabetes - monitor fingersticks.  Insulin coverage for hyperglycemia.  FS stable.   > Hyperkalemia - monitor lytes, on diuretics.   > DVT Prophylaxis - on anticoagulation.     Awaiting transfer to [place] 3/22.  Pt in agreement.

## 2018-03-21 NOTE — PROGRESS NOTE ADULT - SUBJECTIVE AND OBJECTIVE BOX
Patient: ERIC ARIAS [id number] 62y Male                 Internal Medicine Hospitalist Progress Note - Dr. Fish Burk  Chief Complaint: Patient is a 62y old  Male who presents with a chief complaint of SOB (18 Mar 2018 15:20)    HPI:  58 yo M with h/o ETOH cirrhosis, C diff (completed abx therapy), LLL PE, DM, presents c/o worsening SOB x 3 days with orthopnea.  He had been recently discharged in past week from [facility] after hospitalization for R pleural effusion, and was awaiting evaluation for TIPS in [place] but refused.  Presented again on 3/18 with SOB, found to have large R pleural effusion with mediastinal shift.  S/p Chest tube placement yielding approximately total of 6.4L cloudy fluid, total nucleated cell 163, RBCs 920.  Pt reports resolution of SOB.  GI consulted - contacted Dr. Anthony Pruitt at [facility] regarding evaluation of pt for TIPS.  Pt in agreement with plan, as he has had multiple hospitalizations over past month.    Chest tube in place.  Denies complaints.  No pain.  No SOB.  No fever / chills.  No additional complaints.     ____________________PHYSICAL EXAM:  Vitals reviewed as indicated below  GENERAL:  NAD Alert and Oriented x 3   HEENT: NCAT  CARDIOVASCULAR:  S1, S2  LUNGS: decreased BS R base.  Chest tube in place.   ABDOMEN:  soft, (-) tenderness, (mild) distension, (+) bowel sounds, (-) guarding, (-) rebound (-) rigidity + ascites  EXTREMITIES:  no cyanosis / clubbing / edema.   ____________________    VITALS:  Vital Signs Last 24 Hrs  T(C): 36.7 (21 Mar 2018 10:01), Max: 36.9 (20 Mar 2018 21:00)  T(F): 98 (21 Mar 2018 10:01), Max: 98.5 (20 Mar 2018 21:00)  HR: 101 (21 Mar 2018 10:01) (81 - 101)  BP: 100/57 (21 Mar 2018 10:01) (100/56 - 110/60)  BP(mean): --  RR: 18 (21 Mar 2018 10:01) (18 - 18)  SpO2: 100% (21 Mar 2018 10:01) (100% - 100%) Daily     Daily Weight in k.5 (20 Mar 2018 23:00)  CAPILLARY BLOOD GLUCOSE      POCT Blood Glucose.: 129 mg/dL (21 Mar 2018 12:21)  POCT Blood Glucose.: 98 mg/dL (21 Mar 2018 08:17)  POCT Blood Glucose.: 127 mg/dL (20 Mar 2018 20:56)  POCT Blood Glucose.: 118 mg/dL (20 Mar 2018 17:47)    I&O's Summary    20 Mar 2018 07:01  -  21 Mar 2018 07:00  --------------------------------------------------------  IN: 0 mL / OUT: 2380 mL / NET: -2380 mL        LABS:        134<L>  |  104  |  51.0<H>  ----------------------------<  102  5.0   |  21.0<L>  |  1.16    Ca    8.2<L>      21 Mar 2018 08:54                  MEDICATIONS:  apixaban 5 milliGRAM(s) Oral every 12 hours  dextrose 5%. 1000 milliLiter(s) IV Continuous <Continuous>  dextrose 50% Injectable 12.5 Gram(s) IV Push once  dextrose 50% Injectable 25 Gram(s) IV Push once  dextrose 50% Injectable 25 Gram(s) IV Push once  dextrose Gel 1 Dose(s) Oral once PRN  furosemide   Injectable 40 milliGRAM(s) IntraMuscular every 12 hours  glucagon  Injectable 1 milliGRAM(s) IntraMuscular once PRN  insulin lispro (HumaLOG) corrective regimen sliding scale   SubCutaneous three times a day before meals  insulin lispro (HumaLOG) corrective regimen sliding scale   SubCutaneous at bedtime  oxyCODONE    IR 5 milliGRAM(s) Oral every 4 hours PRN  pantoprazole    Tablet 40 milliGRAM(s) Oral before breakfast  spironolactone 50 milliGRAM(s) Oral two times a day

## 2018-03-21 NOTE — PROGRESS NOTE ADULT - PROBLEM SELECTOR PLAN 2
presumably due to cirrhosis and ascites that goes immediately into the chest and does not accumulate in the abdomen. Will get stat BMP. Awaiting transfer to [facility] for TIPS.

## 2018-03-21 NOTE — PROGRESS NOTE ADULT - SUBJECTIVE AND OBJECTIVE BOX
Pt seen and examined f/u cirrhosis and recurrant large right pleural effusion  This morning his only c/o is nausea. No abdominal pain, vomiting or SOB. No labs for two days.    REVIEW OF SYSTEMS:    CONSTITUTIONAL: No fever, weight loss, or fatigue  EYES: No eye pain, visual disturbances, or discharge  ENMT:  No difficulty hearing, tinnitus, vertigo; No sinus or throat pain  RESPIRATORY: No cough, wheezing, chills or hemoptysis; No shortness of breath  CARDIOVASCULAR: No chest pain, palpitations, dizziness, or leg swelling  GASTROINTESTINAL: as above    MEDICATIONS:  MEDICATIONS  (STANDING):  apixaban 5 milliGRAM(s) Oral every 12 hours  dextrose 5%. 1000 milliLiter(s) (50 mL/Hr) IV Continuous <Continuous>  dextrose 50% Injectable 12.5 Gram(s) IV Push once  dextrose 50% Injectable 25 Gram(s) IV Push once  dextrose 50% Injectable 25 Gram(s) IV Push once  furosemide   Injectable 40 milliGRAM(s) IntraMuscular every 12 hours  insulin lispro (HumaLOG) corrective regimen sliding scale   SubCutaneous three times a day before meals  insulin lispro (HumaLOG) corrective regimen sliding scale   SubCutaneous at bedtime  pantoprazole    Tablet 40 milliGRAM(s) Oral before breakfast  spironolactone 50 milliGRAM(s) Oral two times a day    MEDICATIONS  (PRN):  dextrose Gel 1 Dose(s) Oral once PRN Blood Glucose LESS THAN 70 milliGRAM(s)/deciliter  glucagon  Injectable 1 milliGRAM(s) IntraMuscular once PRN Glucose LESS THAN 70 milligrams/deciliter  oxyCODONE    IR 5 milliGRAM(s) Oral every 4 hours PRN Moderate Pain (4 - 6)      Allergies    No Known Allergies    Intolerances        Vital Signs Last 24 Hrs  T(C): 36.6 (21 Mar 2018 06:50), Max: 36.9 (20 Mar 2018 21:00)  T(F): 97.8 (21 Mar 2018 06:50), Max: 98.5 (20 Mar 2018 21:00)  HR: 90 (21 Mar 2018 06:50) (81 - 90)  BP: 110/60 (21 Mar 2018 06:50) (100/56 - 110/60)  BP(mean): --  RR: 18 (21 Mar 2018 06:50) (18 - 18)  SpO2: 100% (21 Mar 2018 06:50) (100% - 100%)    03-20 @ 07:01  -  03-21 @ 07:00  --------------------------------------------------------  IN: 0 mL / OUT: 2380 mL / NET: -2380 mL        PHYSICAL EXAM:    General: Well developed; well nourished; in no acute distress, right chest tube present  HEENT: MMM, conjunctiva and sclera clear  Gastrointestinal:Abdomen: Soft non-tender non-distended; Normal bowel sounds; No hepatosplenomegaly  Extremities: no cyanosis, clubbing or edema.  Skin: Warm and dry. No obvious rash    LABS:                                RADIOLOGY & ADDITIONAL STUDIES (The following images were personally reviewed):

## 2018-03-22 ENCOUNTER — INPATIENT (INPATIENT)
Facility: HOSPITAL | Age: 63
LOS: 11 days | Discharge: HOME | DRG: 981 | End: 2018-04-03
Attending: HOSPITALIST | Admitting: INTERNAL MEDICINE
Payer: SELF-PAY

## 2018-03-22 VITALS
HEART RATE: 85 BPM | OXYGEN SATURATION: 99 % | TEMPERATURE: 99 F | WEIGHT: 149.47 LBS | DIASTOLIC BLOOD PRESSURE: 72 MMHG | RESPIRATION RATE: 21 BRPM | HEIGHT: 66 IN | SYSTOLIC BLOOD PRESSURE: 109 MMHG

## 2018-03-22 VITALS
TEMPERATURE: 98 F | SYSTOLIC BLOOD PRESSURE: 102 MMHG | RESPIRATION RATE: 18 BRPM | OXYGEN SATURATION: 97 % | DIASTOLIC BLOOD PRESSURE: 64 MMHG | HEART RATE: 88 BPM

## 2018-03-22 DIAGNOSIS — Z29.9 ENCOUNTER FOR PROPHYLACTIC MEASURES, UNSPECIFIED: ICD-10-CM

## 2018-03-22 DIAGNOSIS — K74.60 UNSPECIFIED CIRRHOSIS OF LIVER: ICD-10-CM

## 2018-03-22 DIAGNOSIS — I26.99 OTHER PULMONARY EMBOLISM WITHOUT ACUTE COR PULMONALE: ICD-10-CM

## 2018-03-22 DIAGNOSIS — K76.9 LIVER DISEASE, UNSPECIFIED: ICD-10-CM

## 2018-03-22 DIAGNOSIS — Z98.890 OTHER SPECIFIED POSTPROCEDURAL STATES: Chronic | ICD-10-CM

## 2018-03-22 DIAGNOSIS — K70.30 ALCOHOLIC CIRRHOSIS OF LIVER WITHOUT ASCITES: ICD-10-CM

## 2018-03-22 DIAGNOSIS — E11.9 TYPE 2 DIABETES MELLITUS WITHOUT COMPLICATIONS: ICD-10-CM

## 2018-03-22 LAB
ALBUMIN SERPL ELPH-MCNC: 1.7 G/DL — LOW (ref 3.3–5.2)
ALP SERPL-CCNC: 167 U/L — HIGH (ref 40–120)
ALT FLD-CCNC: 39 U/L — SIGNIFICANT CHANGE UP
ANION GAP SERPL CALC-SCNC: 9 MMOL/L — SIGNIFICANT CHANGE UP (ref 5–17)
AST SERPL-CCNC: 41 U/L — HIGH
BILIRUB SERPL-MCNC: <0.2 MG/DL — LOW (ref 0.4–2)
BUN SERPL-MCNC: 46 MG/DL — HIGH (ref 8–20)
CALCIUM SERPL-MCNC: 8.4 MG/DL — LOW (ref 8.6–10.2)
CHLORIDE SERPL-SCNC: 106 MMOL/L — SIGNIFICANT CHANGE UP (ref 98–107)
CO2 SERPL-SCNC: 23 MMOL/L — SIGNIFICANT CHANGE UP (ref 22–29)
CREAT SERPL-MCNC: 1.04 MG/DL — SIGNIFICANT CHANGE UP (ref 0.5–1.3)
GLUCOSE BLDC GLUCOMTR-MCNC: 116 MG/DL — HIGH (ref 70–99)
GLUCOSE SERPL-MCNC: 107 MG/DL — SIGNIFICANT CHANGE UP (ref 70–115)
HCT VFR BLD CALC: 31.9 % — LOW (ref 42–52)
HGB BLD-MCNC: 10.9 G/DL — LOW (ref 14–18)
MCHC RBC-ENTMCNC: 32.2 PG — HIGH (ref 27–31)
MCHC RBC-ENTMCNC: 34.2 G/DL — SIGNIFICANT CHANGE UP (ref 32–36)
MCV RBC AUTO: 94.1 FL — HIGH (ref 80–94)
PLATELET # BLD AUTO: 198 K/UL — SIGNIFICANT CHANGE UP (ref 150–400)
POTASSIUM SERPL-MCNC: 5.4 MMOL/L — HIGH (ref 3.5–5.3)
POTASSIUM SERPL-SCNC: 5.4 MMOL/L — HIGH (ref 3.5–5.3)
PROT SERPL-MCNC: 4.7 G/DL — LOW (ref 6.6–8.7)
RBC # BLD: 3.39 M/UL — LOW (ref 4.6–6.2)
RBC # FLD: 14 % — SIGNIFICANT CHANGE UP (ref 11–15.6)
SODIUM SERPL-SCNC: 138 MMOL/L — SIGNIFICANT CHANGE UP (ref 135–145)
WBC # BLD: 5.4 K/UL — SIGNIFICANT CHANGE UP (ref 4.8–10.8)
WBC # FLD AUTO: 5.4 K/UL — SIGNIFICANT CHANGE UP (ref 4.8–10.8)

## 2018-03-22 PROCEDURE — 93005 ELECTROCARDIOGRAM TRACING: CPT

## 2018-03-22 PROCEDURE — 85027 COMPLETE CBC AUTOMATED: CPT

## 2018-03-22 PROCEDURE — 87070 CULTURE OTHR SPECIMN AEROBIC: CPT

## 2018-03-22 PROCEDURE — 87116 MYCOBACTERIA CULTURE: CPT

## 2018-03-22 PROCEDURE — 36415 COLL VENOUS BLD VENIPUNCTURE: CPT

## 2018-03-22 PROCEDURE — 85610 PROTHROMBIN TIME: CPT

## 2018-03-22 PROCEDURE — 99223 1ST HOSP IP/OBS HIGH 75: CPT | Mod: GC

## 2018-03-22 PROCEDURE — 80048 BASIC METABOLIC PNL TOTAL CA: CPT

## 2018-03-22 PROCEDURE — 99231 SBSQ HOSP IP/OBS SF/LOW 25: CPT

## 2018-03-22 PROCEDURE — 85730 THROMBOPLASTIN TIME PARTIAL: CPT

## 2018-03-22 PROCEDURE — 71275 CT ANGIOGRAPHY CHEST: CPT

## 2018-03-22 PROCEDURE — 87206 SMEAR FLUORESCENT/ACID STAI: CPT

## 2018-03-22 PROCEDURE — 83880 ASSAY OF NATRIURETIC PEPTIDE: CPT

## 2018-03-22 PROCEDURE — 87075 CULTR BACTERIA EXCEPT BLOOD: CPT

## 2018-03-22 PROCEDURE — 99239 HOSP IP/OBS DSCHRG MGMT >30: CPT

## 2018-03-22 PROCEDURE — 87205 SMEAR GRAM STAIN: CPT

## 2018-03-22 PROCEDURE — 82945 GLUCOSE OTHER FLUID: CPT

## 2018-03-22 PROCEDURE — 88112 CYTOPATH CELL ENHANCE TECH: CPT

## 2018-03-22 PROCEDURE — 82550 ASSAY OF CK (CPK): CPT

## 2018-03-22 PROCEDURE — 82140 ASSAY OF AMMONIA: CPT

## 2018-03-22 PROCEDURE — 89051 BODY FLUID CELL COUNT: CPT

## 2018-03-22 PROCEDURE — 99285 EMERGENCY DEPT VISIT HI MDM: CPT | Mod: 25

## 2018-03-22 PROCEDURE — 83986 ASSAY PH BODY FLUID NOS: CPT

## 2018-03-22 PROCEDURE — 71045 X-RAY EXAM CHEST 1 VIEW: CPT

## 2018-03-22 PROCEDURE — 80053 COMPREHEN METABOLIC PANEL: CPT

## 2018-03-22 PROCEDURE — 84484 ASSAY OF TROPONIN QUANT: CPT

## 2018-03-22 PROCEDURE — 83615 LACTATE (LD) (LDH) ENZYME: CPT

## 2018-03-22 PROCEDURE — 87015 SPECIMEN INFECT AGNT CONCNTJ: CPT

## 2018-03-22 PROCEDURE — 74018 RADEX ABDOMEN 1 VIEW: CPT

## 2018-03-22 PROCEDURE — 82962 GLUCOSE BLOOD TEST: CPT

## 2018-03-22 RX ORDER — ENOXAPARIN SODIUM 100 MG/ML
70 INJECTION SUBCUTANEOUS
Qty: 0 | Refills: 0 | Status: DISCONTINUED | OUTPATIENT
Start: 2018-03-22 | End: 2018-03-28

## 2018-03-22 RX ORDER — OXYCODONE HYDROCHLORIDE 5 MG/1
1 TABLET ORAL
Qty: 0 | Refills: 0 | COMMUNITY
Start: 2018-03-22

## 2018-03-22 RX ORDER — GLUCAGON INJECTION, SOLUTION 0.5 MG/.1ML
1 INJECTION, SOLUTION SUBCUTANEOUS ONCE
Qty: 0 | Refills: 0 | Status: DISCONTINUED | OUTPATIENT
Start: 2018-03-22 | End: 2018-03-29

## 2018-03-22 RX ORDER — FUROSEMIDE 40 MG
40 TABLET ORAL
Qty: 0 | Refills: 0 | Status: DISCONTINUED | OUTPATIENT
Start: 2018-03-22 | End: 2018-03-22

## 2018-03-22 RX ORDER — FUROSEMIDE 40 MG
1 TABLET ORAL
Qty: 0 | Refills: 0 | COMMUNITY
Start: 2018-03-22

## 2018-03-22 RX ORDER — SPIRONOLACTONE 25 MG/1
50 TABLET, FILM COATED ORAL
Qty: 0 | Refills: 0 | Status: DISCONTINUED | OUTPATIENT
Start: 2018-03-22 | End: 2018-03-23

## 2018-03-22 RX ORDER — DEXTROSE 50 % IN WATER 50 %
25 SYRINGE (ML) INTRAVENOUS ONCE
Qty: 0 | Refills: 0 | Status: DISCONTINUED | OUTPATIENT
Start: 2018-03-22 | End: 2018-03-29

## 2018-03-22 RX ORDER — SODIUM CHLORIDE 9 MG/ML
1000 INJECTION, SOLUTION INTRAVENOUS
Qty: 0 | Refills: 0 | Status: DISCONTINUED | OUTPATIENT
Start: 2018-03-22 | End: 2018-03-29

## 2018-03-22 RX ORDER — PANTOPRAZOLE SODIUM 20 MG/1
40 TABLET, DELAYED RELEASE ORAL
Qty: 0 | Refills: 0 | Status: DISCONTINUED | OUTPATIENT
Start: 2018-03-22 | End: 2018-03-29

## 2018-03-22 RX ORDER — FUROSEMIDE 40 MG
40 TABLET ORAL DAILY
Qty: 0 | Refills: 0 | Status: DISCONTINUED | OUTPATIENT
Start: 2018-03-22 | End: 2018-03-22

## 2018-03-22 RX ORDER — DEXTROSE 50 % IN WATER 50 %
1 SYRINGE (ML) INTRAVENOUS ONCE
Qty: 0 | Refills: 0 | Status: DISCONTINUED | OUTPATIENT
Start: 2018-03-22 | End: 2018-03-29

## 2018-03-22 RX ORDER — SODIUM POLYSTYRENE SULFONATE 4.1 MEQ/G
15 POWDER, FOR SUSPENSION ORAL ONCE
Qty: 0 | Refills: 0 | Status: COMPLETED | OUTPATIENT
Start: 2018-03-22 | End: 2018-03-22

## 2018-03-22 RX ORDER — INSULIN LISPRO 100/ML
VIAL (ML) SUBCUTANEOUS AT BEDTIME
Qty: 0 | Refills: 0 | Status: DISCONTINUED | OUTPATIENT
Start: 2018-03-22 | End: 2018-03-29

## 2018-03-22 RX ORDER — FUROSEMIDE 40 MG
40 TABLET ORAL DAILY
Qty: 0 | Refills: 0 | Status: DISCONTINUED | OUTPATIENT
Start: 2018-03-22 | End: 2018-03-23

## 2018-03-22 RX ORDER — APIXABAN 2.5 MG/1
5 TABLET, FILM COATED ORAL EVERY 12 HOURS
Qty: 0 | Refills: 0 | Status: DISCONTINUED | OUTPATIENT
Start: 2018-03-22 | End: 2018-03-22

## 2018-03-22 RX ORDER — DEXTROSE 50 % IN WATER 50 %
12.5 SYRINGE (ML) INTRAVENOUS ONCE
Qty: 0 | Refills: 0 | Status: DISCONTINUED | OUTPATIENT
Start: 2018-03-22 | End: 2018-03-29

## 2018-03-22 RX ORDER — INSULIN LISPRO 100/ML
VIAL (ML) SUBCUTANEOUS
Qty: 0 | Refills: 0 | Status: DISCONTINUED | OUTPATIENT
Start: 2018-03-22 | End: 2018-03-29

## 2018-03-22 RX ORDER — ENOXAPARIN SODIUM 100 MG/ML
65 INJECTION SUBCUTANEOUS EVERY 12 HOURS
Qty: 0 | Refills: 0 | Status: DISCONTINUED | OUTPATIENT
Start: 2018-03-22 | End: 2018-03-22

## 2018-03-22 RX ADMIN — SPIRONOLACTONE 50 MILLIGRAM(S): 25 TABLET, FILM COATED ORAL at 06:49

## 2018-03-22 RX ADMIN — Medication 1: at 18:05

## 2018-03-22 RX ADMIN — ENOXAPARIN SODIUM 70 MILLIGRAM(S): 100 INJECTION SUBCUTANEOUS at 18:05

## 2018-03-22 RX ADMIN — SPIRONOLACTONE 50 MILLIGRAM(S): 25 TABLET, FILM COATED ORAL at 22:32

## 2018-03-22 RX ADMIN — APIXABAN 5 MILLIGRAM(S): 2.5 TABLET, FILM COATED ORAL at 06:49

## 2018-03-22 RX ADMIN — Medication 40 MILLIGRAM(S): at 06:48

## 2018-03-22 RX ADMIN — PANTOPRAZOLE SODIUM 40 MILLIGRAM(S): 20 TABLET, DELAYED RELEASE ORAL at 06:49

## 2018-03-22 RX ADMIN — SODIUM POLYSTYRENE SULFONATE 15 GRAM(S): 4.1 POWDER, FOR SUSPENSION ORAL at 10:59

## 2018-03-22 NOTE — PROGRESS NOTE ADULT - SUBJECTIVE AND OBJECTIVE BOX
Pt seen and examined f/u cirrhosis with recurrant pleural effusion  This morning there are no new c/p and continues with intermittatn nausea but no vomiting or abdominal pain. H/H stable, WBC normal and BUN and creatinine stable with potassium down to 5.    REVIEW OF SYSTEMS:    CONSTITUTIONAL: No fever, weight loss, or fatigue  EYES: No eye pain, visual disturbances, or discharge  ENMT:  No difficulty hearing, tinnitus, vertigo; No sinus or throat pain  RESPIRATORY: No cough, wheezing, chills or hemoptysis; No shortness of breath  CARDIOVASCULAR: No chest pain, palpitations, dizziness, or leg swelling  GASTROINTESTINAL: No abdominal or epigastric pain. No nausea, vomiting, or hematemesis; No diarrhea or constipation. No melena or hematochezia.    MEDICATIONS:  MEDICATIONS  (STANDING):  apixaban 5 milliGRAM(s) Oral every 12 hours  dextrose 5%. 1000 milliLiter(s) (50 mL/Hr) IV Continuous <Continuous>  dextrose 50% Injectable 12.5 Gram(s) IV Push once  dextrose 50% Injectable 25 Gram(s) IV Push once  dextrose 50% Injectable 25 Gram(s) IV Push once  furosemide   Injectable 40 milliGRAM(s) IntraMuscular every 12 hours  insulin lispro (HumaLOG) corrective regimen sliding scale   SubCutaneous three times a day before meals  insulin lispro (HumaLOG) corrective regimen sliding scale   SubCutaneous at bedtime  pantoprazole    Tablet 40 milliGRAM(s) Oral before breakfast  spironolactone 50 milliGRAM(s) Oral two times a day    MEDICATIONS  (PRN):  dextrose Gel 1 Dose(s) Oral once PRN Blood Glucose LESS THAN 70 milliGRAM(s)/deciliter  glucagon  Injectable 1 milliGRAM(s) IntraMuscular once PRN Glucose LESS THAN 70 milligrams/deciliter  oxyCODONE    IR 5 milliGRAM(s) Oral every 4 hours PRN Moderate Pain (4 - 6)      Allergies    No Known Allergies    Intolerances        Vital Signs Last 24 Hrs  T(C): 37.1 (22 Mar 2018 06:55), Max: 37.1 (21 Mar 2018 15:07)  T(F): 98.8 (22 Mar 2018 06:55), Max: 98.8 (21 Mar 2018 15:07)  HR: 84 (22 Mar 2018 06:55) (75 - 101)  BP: 102/64 (22 Mar 2018 06:55) (100/57 - 110/70)  BP(mean): --  RR: 18 (22 Mar 2018 06:55) (18 - 18)  SpO2: 98% (22 Mar 2018 06:55) (98% - 100%)    03-21 @ 07:01  -  03-22 @ 07:00  --------------------------------------------------------  IN: 720 mL / OUT: 1932 mL / NET: -1212 mL        PHYSICAL EXAM:    General: Well developed; well nourished; in no acute distress  HEENT: MMM, conjunctiva and sclera clear, chest tube present right posterior  Gastrointestinal:Abdomen: Soft non-tender non-distended; Normal bowel sounds; No hepatosplenomegaly  Extremities: no cyanosis, clubbing , 1+ edema.  Skin: Warm and dry. No obvious rash    LABS:      CBC Full  -  ( 22 Mar 2018 06:35 )  WBC Count : 5.4 K/uL  Hemoglobin : 10.9 g/dL  Hematocrit : 31.9 %  Platelet Count - Automated : 198 K/uL  Mean Cell Volume : 94.1 fl  Mean Cell Hemoglobin : 32.2 pg  Mean Cell Hemoglobin Concentration : 34.2 g/dL  Auto Neutrophil # : x  Auto Lymphocyte # : x  Auto Monocyte # : x  Auto Eosinophil # : x  Auto Basophil # : x  Auto Neutrophil % : x  Auto Lymphocyte % : x  Auto Monocyte % : x  Auto Eosinophil % : x  Auto Basophil % : x    03-21    134<L>  |  104  |  51.0<H>  ----------------------------<  102  5.0   |  21.0<L>  |  1.16    Ca    8.2<L>      21 Mar 2018 08:54                        RADIOLOGY & ADDITIONAL STUDIES (The following images were personally reviewed):

## 2018-03-22 NOTE — CONSULT NOTE ADULT - SUBJECTIVE AND OBJECTIVE BOX
HPI: Mr. Gilbert is a 59 year old man with alcoholic cirrhosis, pulmonary embolism [on eliquis], h/o c.diff, DM II, presented to OSH with worsening SOB x 3 d.     His dyspnea is in the setting of significant R pleural effusion, with mediastinal shift. He required 4-5 L NC. He has already received multiple thoracentesis in the past. Thus, CT surgery at [facility] - placed a pigtail catheter and monitor that the output was not drained significantly. Patient was tried to optimized as far as his diuretics but BUN started to rise.     No cough. No fevers. No chills. No N/V/D.    Patient was transferred here for evaluation and potential placement of TIPS.     Liver biopsy 2/14: +cirrhosis    PAST MEDICAL & SURGICAL HISTORY:  Pulmonary embolism  Cirrhosis  DM (diabetes mellitus)  S/P thoracentesis      FAMILY HISTORY:  No pertinent family history in first degree relatives      SOCIAL HISTORY:  Smoking: __ packs x ___ years  EtOH Use:  Marital Status:  Occupation:  Exposures:  Recent Travel:    Allergies  No Known Allergies    HOME MEDICATIONS: reviewed    REVIEW OF SYSTEMS:  Constitutional: No fevers or chills. No weight loss. No fatigue or generalised malaise.  Eyes: No itching or discharge from the eyes  ENT: No ear pain. No ear discharge. No nasal congestion. No post nasal drip. No epistaxis. No throat pain. No sore throat. No difficulty swallowing.   CV: No chest pain. No palpitations. No lightheadedness or dizziness.   Resp: No dyspnea at rest. No dyspnea on exertion. No orthopnea. No wheezing. No cough. No stridor. No sputum production. No chest pain with respiration.  GI: No nausea. No vomiting. No diarrhea.  MSK: No joint pain or pain in any extremities  Integumentary: No skin lesions. No pedal edema.  Neurological: No gross motor weakness. No sensory changes.  [x] All other systems negative  [ ] Unable to assess ROS because ________    OBJECTIVE:  ICU Vital Signs Last 24 Hrs  T(C): 37.2 (22 Mar 2018 13:00), Max: 37.2 (22 Mar 2018 13:00)  T(F): 99 (22 Mar 2018 13:00), Max: 99 (22 Mar 2018 13:00)  HR: 85 (22 Mar 2018 13:00) (75 - 111)  BP: 109/72 (22 Mar 2018 13:00) (102/64 - 112/62)  RR: 21 (22 Mar 2018 13:00) (18 - 21)  SpO2: 99% (22 Mar 2018 13:00) (97% - 100%)    PHYSICAL EXAM:  General: Awake, alert, oriented X 3.   HEENT: Atraumatic, normocephalic.                 Mallampatti Grade                 No nasal congestion.                No tonsillar or pharyngeal exudates.  Lymph Nodes: No palpable lymphadenopathy  Neck: No JVD. No carotid bruit.   Respiratory: Normal chest expansion                         Normal percussion                         Normal and equal air entry                         No wheeze, rhonchi or rales.  Cardiovascular: S1 S2 normal. No murmurs, rubs or gallops.   Abdomen: Soft, non-tender, non-distended. No organomegaly.  Extremities: Warm to touch. Peripheral pulse palpable. No pedal edema.   Skin: No rashes or skin lesions  Neurological: Motor and sensory examination equal and normal in all four extremities.  Psychiatry: Appropriate mood and affect.    HOSPITAL MEDICATIONS:  MEDICATIONS  (STANDING):  dextrose 5%. 1000 milliLiter(s) (50 mL/Hr) IV Continuous <Continuous>  dextrose 50% Injectable 12.5 Gram(s) IV Push once  dextrose 50% Injectable 25 Gram(s) IV Push once  dextrose 50% Injectable 25 Gram(s) IV Push once  enoxaparin Injectable 70 milliGRAM(s) SubCutaneous two times a day  furosemide    Tablet 40 milliGRAM(s) Oral daily  insulin lispro (HumaLOG) corrective regimen sliding scale   SubCutaneous three times a day before meals  insulin lispro (HumaLOG) corrective regimen sliding scale   SubCutaneous at bedtime  pantoprazole    Tablet 40 milliGRAM(s) Oral before breakfast  spironolactone 50 milliGRAM(s) Oral two times a day    MEDICATIONS  (PRN):  dextrose Gel 1 Dose(s) Oral once PRN Blood Glucose LESS THAN 70 milliGRAM(s)/deciliter  glucagon  Injectable 1 milliGRAM(s) IntraMuscular once PRN Glucose LESS THAN 70 milligrams/deciliter      LABS:                        10.9   5.4   )-----------( 198      ( 22 Mar 2018 06:35 )             31.9     03-22    138  |  106  |  46.0<H>  ----------------------------<  107  5.4<H>   |  23.0  |  1.04    Ca    8.4<L>      22 Mar 2018 06:35    TPro  4.7<L>  /  Alb  1.7<L>  /  TBili  <0.2<L>  /  DBili  x   /  AST  41<H>  /  ALT  39  /  AlkPhos  167<H>  03-22      RADIOLOGY:  [x] Reviewed and interpreted by me    ECHO 2/2018:   Left Ventricle:  Global LV systolic function was normal.  Right Ventricle: The right ventricular size is moderately enlarged. RV   systolic function is mildly reduced. RV free wall appears hyopkinetic.  Pericardium: Small to moderate pericardial effusion. The pericardial   effusion is localized near the right atrium. There is no evidence of   cardiac tamponade. Mild ascites is noted. There is a small pleural   effusion in the right lateral region.  Venous: The inferior vena cava is normal.       Summary:   1. Follow-up study to assess for pericardial effusion. See previous   studies for comparison.   2. Small to moderate pericardial effusion.   3. Normal global left ventricular systolic function.   4. Moderately enlarged right ventricle.   5. Mild ascites. HPI: Mr. Gilbert is a 59 year old man with alcoholic cirrhosis [portal HTN - refractory ascites/hepatic hydrothorax, no variceal bleeding ?unknown previous EGD, no known encephalopathy], pulmonary embolism in 2/2018 [on eliquis], h/o c.diff, DM II, presented to OSH with worsening SOB x 3 d.     His dyspnea is in the setting of significant R pleural effusion, with mediastinal shift. He required 4-5 L NC. He has already received multiple thoracentesis in the past. Thus, CT surgery at [facility] - placed a pigtail catheter and monitor that the output was not drained significantly. Patient was tried to optimized as far as his diuretics but BUN started to rise.    Patient was transferred here for evaluation and potential placement of TIPS. Currently, he is asymptomatic. States that he is usually ok after drainage and the only time he gets sx is when the pleural effusion returns. Per pt, he has been getting monthly thoracentesis for the last 4 months. No cough. No fevers. No chills. No N/V/D. He always gets LE edema -- right > left.     Liver biopsy 2/14: +cirrhosis    PAST MEDICAL & SURGICAL HISTORY:  Pulmonary embolism  Cirrhosis  DM (diabetes mellitus)  S/P thoracentesis      FAMILY HISTORY:  No pertinent family history in first degree relatives      SOCIAL HISTORY:  EtOH Use: former; significant; states he used to drink every day -- stopped 1 yr ago.   Marital Status:   Occupation: retired; was in construction  Exposures: none  Recent Travel: none     Allergies  No Known Allergies    HOME MEDICATIONS: reviewed    REVIEW OF SYSTEMS:  Constitutional: No fevers or chills. No weight loss. No fatigue or generalised malaise.  Eyes: No itching or discharge from the eyes  ENT: No ear pain. No ear discharge. No nasal congestion. No post nasal drip. No epistaxis. No throat pain. No sore throat. No difficulty swallowing.   CV: No chest pain. No palpitations. No lightheadedness or dizziness.   Resp: see HPI  GI: see HPI  MSK: No joint pain or pain in any extremities  Integumentary: No skin lesions. No pedal edema.  Neurological: No gross motor weakness. No sensory changes.  [x] All other systems negative  [ ] Unable to assess ROS because ________    OBJECTIVE:  ICU Vital Signs Last 24 Hrs  T(C): 37.2 (22 Mar 2018 13:00), Max: 37.2 (22 Mar 2018 13:00)  T(F): 99 (22 Mar 2018 13:00), Max: 99 (22 Mar 2018 13:00)  HR: 85 (22 Mar 2018 13:00) (75 - 111)  BP: 109/72 (22 Mar 2018 13:00) (102/64 - 112/62)  RR: 21 (22 Mar 2018 13:00) (18 - 21)  SpO2: 99% (22 Mar 2018 13:00) (97% - 100%)    PHYSICAL EXAM:  General: Awake, alert, oriented X 3. pleasant. comfortable lying in bed.   HEENT: Atraumatic, normocephalic.  Lymph Nodes: No palpable lymphadenopathy  Neck: No JVD. No carotid bruit.   Respiratory: Normal chest expansion. CTA b/l. good aeration. no wheezing.   Chest: 14 Fr pigtail catheter in the R posterior pleural space.   Cardiovascular: S1 S2 normal. No murmurs, rubs or gallops.   Abdomen: Soft, non-tender, non-distended.  Extremities: Warm to touch. Peripheral pulse palpable. +2 pitting edema [R>L}  Skin: No rashes or skin lesions  Neurological: Motor and sensory examination equal and normal in all four extremities.  Psychiatry: Appropriate mood and affect.    HOSPITAL MEDICATIONS:  MEDICATIONS  (STANDING):  dextrose 5%. 1000 milliLiter(s) (50 mL/Hr) IV Continuous <Continuous>  dextrose 50% Injectable 12.5 Gram(s) IV Push once  dextrose 50% Injectable 25 Gram(s) IV Push once  dextrose 50% Injectable 25 Gram(s) IV Push once  enoxaparin Injectable 70 milliGRAM(s) SubCutaneous two times a day  furosemide    Tablet 40 milliGRAM(s) Oral daily  insulin lispro (HumaLOG) corrective regimen sliding scale   SubCutaneous three times a day before meals  insulin lispro (HumaLOG) corrective regimen sliding scale   SubCutaneous at bedtime  pantoprazole    Tablet 40 milliGRAM(s) Oral before breakfast  spironolactone 50 milliGRAM(s) Oral two times a day    MEDICATIONS  (PRN):  dextrose Gel 1 Dose(s) Oral once PRN Blood Glucose LESS THAN 70 milliGRAM(s)/deciliter  glucagon  Injectable 1 milliGRAM(s) IntraMuscular once PRN Glucose LESS THAN 70 milligrams/deciliter      LABS:                        10.9   5.4   )-----------( 198      ( 22 Mar 2018 06:35 )             31.9     03-22    138  |  106  |  46.0<H>  ----------------------------<  107  5.4<H>   |  23.0  |  1.04    Ca    8.4<L>      22 Mar 2018 06:35    TPro  4.7<L>  /  Alb  1.7<L>  /  TBili  <0.2<L>  /  DBili  x   /  AST  41<H>  /  ALT  39  /  AlkPhos  167<H>  03-22    Pleural fluid:   transudative      RADIOLOGY:  [x] Reviewed and interpreted by me    ECHO 2/2018:   Left Ventricle:  Global LV systolic function was normal.  Right Ventricle: The right ventricular size is moderately enlarged. RV   systolic function is mildly reduced. RV free wall appears hyopkinetic.  Pericardium: Small to moderate pericardial effusion. The pericardial   effusion is localized near the right atrium. There is no evidence of   cardiac tamponade. Mild ascites is noted. There is a small pleural   effusion in the right lateral region.  Venous: The inferior vena cava is normal.    Summary:   1. Follow-up study to assess for pericardial effusion. See previous   studies for comparison.   2. Small to moderate pericardial effusion.   3. Normal global left ventricular systolic function.   4. Moderately enlarged right ventricle.   5. Mild ascites. HPI: Mr. Gilbert is a 62 year old man with alcoholic cirrhosis [portal HTN - refractory ascites/hepatic hydrothorax, no variceal bleeding ?unknown previous EGD, no known encephalopathy], pulmonary embolism in 2/2018 [on eliquis], h/o c.diff, DM II, presented to OSH with worsening SOB x 3 d.     His dyspnea is in the setting of significant R pleural effusion, with mediastinal shift. He required 4-5 L NC. He has already received multiple thoracentesis in the past. Thus, CT surgery at [facility] - placed a pigtail catheter and monitor that the output was not drained significantly. Patient was tried to optimized as far as his diuretics but BUN started to rise.    Patient was transferred here for evaluation and potential placement of TIPS. Currently, he is asymptomatic. States that he is usually ok after drainage and the only time he gets sx is when the pleural effusion returns. Per pt, he has been getting monthly thoracentesis for the last 4 months. No cough. No fevers. No chills. No N/V/D. He always gets LE edema -- right > left.     Liver biopsy 2/14: +cirrhosis    PAST MEDICAL & SURGICAL HISTORY:  Pulmonary embolism  Cirrhosis  DM (diabetes mellitus)  S/P thoracentesis      FAMILY HISTORY:  No pertinent family history in first degree relatives      SOCIAL HISTORY:  EtOH Use: former; significant; states he used to drink every day -- stopped 1 yr ago.   Marital Status:   Occupation: retired; was in construction  Exposures: none  Recent Travel: none     Allergies  No Known Allergies    HOME MEDICATIONS: reviewed    REVIEW OF SYSTEMS:  Constitutional: No fevers or chills. No weight loss. No fatigue or generalised malaise.  Eyes: No itching or discharge from the eyes  ENT: No ear pain. No ear discharge. No nasal congestion. No post nasal drip. No epistaxis. No throat pain. No sore throat. No difficulty swallowing.   CV: No chest pain. No palpitations. No lightheadedness or dizziness.   Resp: see HPI  GI: see HPI  MSK: No joint pain or pain in any extremities  Integumentary: No skin lesions. No pedal edema.  Neurological: No gross motor weakness. No sensory changes.  [x] All other systems negative  [ ] Unable to assess ROS because ________    OBJECTIVE:  ICU Vital Signs Last 24 Hrs  T(C): 37.2 (22 Mar 2018 13:00), Max: 37.2 (22 Mar 2018 13:00)  T(F): 99 (22 Mar 2018 13:00), Max: 99 (22 Mar 2018 13:00)  HR: 85 (22 Mar 2018 13:00) (75 - 111)  BP: 109/72 (22 Mar 2018 13:00) (102/64 - 112/62)  RR: 21 (22 Mar 2018 13:00) (18 - 21)  SpO2: 99% (22 Mar 2018 13:00) (97% - 100%)    PHYSICAL EXAM:  General: Awake, alert, oriented X 3. pleasant. comfortable lying in bed.   HEENT: Atraumatic, normocephalic.  Lymph Nodes: No palpable lymphadenopathy  Neck: No JVD. No carotid bruit.   Respiratory: Normal chest expansion. CTA b/l. good aeration. no wheezing.   Chest: 14 Fr pigtail catheter in the R posterior pleural space.   Cardiovascular: S1 S2 normal. No murmurs, rubs or gallops.   Abdomen: Soft, non-tender, non-distended.  Extremities: Warm to touch. Peripheral pulse palpable. +2 pitting edema [R>L}  Skin: No rashes or skin lesions  Neurological: Motor and sensory examination equal and normal in all four extremities.  Psychiatry: Appropriate mood and affect.    HOSPITAL MEDICATIONS:  MEDICATIONS  (STANDING):  dextrose 5%. 1000 milliLiter(s) (50 mL/Hr) IV Continuous <Continuous>  dextrose 50% Injectable 12.5 Gram(s) IV Push once  dextrose 50% Injectable 25 Gram(s) IV Push once  dextrose 50% Injectable 25 Gram(s) IV Push once  enoxaparin Injectable 70 milliGRAM(s) SubCutaneous two times a day  furosemide    Tablet 40 milliGRAM(s) Oral daily  insulin lispro (HumaLOG) corrective regimen sliding scale   SubCutaneous three times a day before meals  insulin lispro (HumaLOG) corrective regimen sliding scale   SubCutaneous at bedtime  pantoprazole    Tablet 40 milliGRAM(s) Oral before breakfast  spironolactone 50 milliGRAM(s) Oral two times a day    MEDICATIONS  (PRN):  dextrose Gel 1 Dose(s) Oral once PRN Blood Glucose LESS THAN 70 milliGRAM(s)/deciliter  glucagon  Injectable 1 milliGRAM(s) IntraMuscular once PRN Glucose LESS THAN 70 milligrams/deciliter      LABS:                        10.9   5.4   )-----------( 198      ( 22 Mar 2018 06:35 )             31.9     03-22    138  |  106  |  46.0<H>  ----------------------------<  107  5.4<H>   |  23.0  |  1.04    Ca    8.4<L>      22 Mar 2018 06:35    TPro  4.7<L>  /  Alb  1.7<L>  /  TBili  <0.2<L>  /  DBili  x   /  AST  41<H>  /  ALT  39  /  AlkPhos  167<H>  03-22    Pleural fluid:   transudative      RADIOLOGY:  [x] Reviewed and interpreted by me    ECHO 2/2018:   Left Ventricle:  Global LV systolic function was normal.  Right Ventricle: The right ventricular size is moderately enlarged. RV   systolic function is mildly reduced. RV free wall appears hyopkinetic.  Pericardium: Small to moderate pericardial effusion. The pericardial   effusion is localized near the right atrium. There is no evidence of   cardiac tamponade. Mild ascites is noted. There is a small pleural   effusion in the right lateral region.  Venous: The inferior vena cava is normal.    Summary:   1. Follow-up study to assess for pericardial effusion. See previous   studies for comparison.   2. Small to moderate pericardial effusion.   3. Normal global left ventricular systolic function.   4. Moderately enlarged right ventricle.   5. Mild ascites.

## 2018-03-22 NOTE — H&P ADULT - REASON FOR ADMISSION
transfer from [facility] for TIPS Transfer from [facility] for TIPS, Recurrent Pleural effusion

## 2018-03-22 NOTE — PROGRESS NOTE ADULT - PROBLEM SELECTOR PLAN 1
stable. Unable to increase diuretics further due to elevated BUN which is nonetheless stable at this level.

## 2018-03-22 NOTE — CONSULT NOTE ADULT - ASSESSMENT
Mr. Gilbert is a 59 year old man with alcoholic cirrhosis [portal HTN - refractory ascites/hepatic hydrothorax, no variceal bleeding ?unknown previous EGD, no known encephalopathy], pulmonary embolism in 2/2018 [on eliquis], h/o c.diff, DM II, presented to OSH with worsening SOB x 3 d. 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 s/p chest tube placement at OSH. Consulted for chest tube management.     Patient is currently undergoing TIPS evaluation. He has had multiple thoracentesis for this effusion. Therefore, will opt to leave the chest tube in right now until the decision of the TIPS is finalized. Will plan on removing the chest tube post TIPS. That said, we have to monitor the outpt carefully as removing too much fluid can cause significant fluid shifts, lytes abnormalities, risk of hypotension and BRYNN.     Also, patient last ECHO in 2/2018 showed significant RV dysfunction and elevated pulm pressures. This was in the setting of an acute PE. And he also significant pleural effusion at the time of the ECHO. Now that the PE is >1 month and all the fluid is drained [no tamponade effect on the heart from the effusion], recommend repeating the ECHO now. This will effect the eval for TIPS if he truly has elevation of R sided pressures.     - will leave the chest tube clamped through the night. will monitor angel.   - can unclamp it if the patient becomes dyspneic.    - please obtain an ECHO now.   - continue with his diuretics per GI.   - monitor lytes.     plan discussed with the team.  will discuss with attg angel FRENCH.   thank you.     Robert Jorgensen MD   Pulmonary Fellow

## 2018-03-22 NOTE — H&P ADULT - PROBLEM SELECTOR PLAN 3
Patient w/ known PE on CTA 2/8/18.  - c/w eliquis 5 mg bid Patient w/ known PE on CTA 2/8/18. Given hepatic impairment, will switch from eliquis to lovenox.   - SQ lovenox 65 mg bid Patient w/ known PE on CTA 2/8/18. Given hepatic impairment, will switch from eliquis to lovenox.   - SQ lovenox 70 mg bid

## 2018-03-22 NOTE — PROGRESS NOTE ADULT - ASSESSMENT
> Large R pleural effusion with Mediastinal Shift - Hepatohydrothorax - awaiting transfer to [facility] to hospitalist service and consult with Dr. Anthony Pruitt for possible TIPS.  Medically stable for transfer.   > Alcoholic Cirrhosis with ascites - Continue diuretics.  Pt negative 2.1L. Will change Lasix to once daily.  Monitor I&Os.  > Hyperkalemia - Dose of Kayexalate to be given.    > LLL PE - On full dose anticoagulation.    > Nausea - resolved.   > Diabetes - monitor fingersticks.  Insulin coverage for hyperglycemia.  FS stable.   > DVT Prophylaxis - on anticoagulation.

## 2018-03-22 NOTE — H&P ADULT - PROBLEM SELECTOR PLAN 5
DVT ppx: on eugenie Jorgensen, PGY-1  Internal Medicine  Pager# [phone] DVT ppx: on therapeutic lovenox    Richar Jorgensen, PGY-1  Internal Medicine  Pager# [phone]

## 2018-03-22 NOTE — H&P ADULT - NSHPREVIEWOFSYSTEMS_GEN_ALL_CORE
CONSTITUTIONAL: No weakness, fevers or chills  EYES/ENT: No visual changes, no throat pain   RESPIRATORY: No cough, wheezing, hemoptysis; No shortness of breath  CARDIOVASCULAR: No chest pain or palpitations  GASTROINTESTINAL: No abdominal pain, nausea, vomiting, or hematemesis; No diarrhea or constipation. No melena or hematochezia.  GENITOURINARY: No dysuria, frequency or hematuria  NEUROLOGICAL: No dizziness, numbness, or weakness  SKIN: No itching, burning, rashes, or lesions   All other review of systems is negative unless indicated above.

## 2018-03-22 NOTE — PROGRESS NOTE ADULT - PROBLEM SELECTOR PLAN 2
presumably due to ascites and awaiting transfer for TIPS procedure. Nothing further to do from GI standpoint while at [facility]. Will see prn your request only till transfer.

## 2018-03-22 NOTE — H&P ADULT - PROBLEM SELECTOR PLAN 4
A1c is 5.6% from last month. On metformin at home.  - ELVI qAC tid and qhs  - monitor FSG  - consistent carb, DASH/TLC diet

## 2018-03-22 NOTE — H&P ADULT - NSHPLABSRESULTS_GEN_ALL_CORE
LABS personally reviewed: normocytic anemia, elevated BUN, hypoalbuminemia                        10.9   5.4   )-----------( 198      ( 22 Mar 2018 06:35 )             31.9      03-22    138  |  106  |  46.0<H>  ----------------------------<  107  5.4<H>   |  23.0  |  1.04    Ca    8.4<L>      22 Mar 2018 06:35    TPro  4.7<L>  /  Alb  1.7<L>  /  TBili  <0.2<L>  /  DBili  x   /  AST  41<H>  /  ALT  39  /  AlkPhos  167<H>  03-22       CAPILLARY BLOOD GLUCOSE      POCT Blood Glucose.: 180 mg/dL (22 Mar 2018 18:03)  POCT Blood Glucose.: 116 mg/dL (22 Mar 2018 08:27)  POCT Blood Glucose.: 128 mg/dL (21 Mar 2018 20:59)

## 2018-03-22 NOTE — PATIENT PROFILE ADULT. - NS TRANSFER PATIENT BELONGINGS
Cell Phone/PDA (specify)/jeans, shoes shirt cap sweatshirt gold chain/Clothing/Jewelry/Money (specify)

## 2018-03-22 NOTE — H&P ADULT - NSHPPHYSICALEXAM_GEN_ALL_CORE
Vital Signs Last 24 Hrs  T(C): 37.2 (22 Mar 2018 13:00), Max: 37.2 (22 Mar 2018 13:00)  T(F): 99 (22 Mar 2018 13:00), Max: 99 (22 Mar 2018 13:00)  HR: 85 (22 Mar 2018 13:00) (75 - 111)  BP: 109/72 (22 Mar 2018 13:00) (102/64 - 112/62)  BP(mean): --  RR: 21 (22 Mar 2018 13:00) (18 - 21)  SpO2: 99% (22 Mar 2018 13:00) (97% - 100%)    GENERAL: no acute distress  HEENT: NC/AT, EOMI, neck supple, MMM  RESPIRATORY: RLL w/ mildly diminished BS and intermittent crackles, otherwise CTAB  CARDIOVASCULAR: RRR, no murmurs, gallops, rubs  ABDOMINAL: soft, non-tender, non-distended, positive bowel sounds   EXTREMITIES: no clubbing, cyanosis; +trace edema b/l LE  NEUROLOGICAL: alert and oriented x 3, non-focal  SKIN: no rashes or lesions   MUSCULOSKELETAL: no gross joint deformity

## 2018-03-22 NOTE — H&P ADULT - PROBLEM SELECTOR PLAN 2
Currently without ascites. MELD of 8 and Child Colvin of 7.   - plan as above Currently without ascites. MELD of 8 and Child Colvin of 8.   - plan as above

## 2018-03-22 NOTE — H&P ADULT - NSHPSOCIALHISTORY_GEN_ALL_CORE
Former heavy EtOH abuse, quit >1 year ago,  lives w/ wife, retired, denies tobacco or illicit drug use.

## 2018-03-22 NOTE — PROGRESS NOTE ADULT - SUBJECTIVE AND OBJECTIVE BOX
Patient: ERIC ARIAS [id number] 62y Male                 Internal Medicine Hospitalist Progress Note - Dr. Fish Burk  Chief Complaint: Patient is a 62y old  Male who presents with a chief complaint of SOB (18 Mar 2018 15:20)    HPI:  60 yo M with h/o ETOH cirrhosis, C diff (completed abx therapy), LLL PE, DM, presents c/o worsening SOB x 3 days with orthopnea.  He had been recently discharged in past week from [facility] after hospitalization for R pleural effusion, and was awaiting evaluation for TIPS in [place] but refused.  Presented again on 3/18 with SOB, found to have large R pleural effusion with mediastinal shift.  S/p Chest tube placement yielding approximately total of 6.4L cloudy fluid, total nucleated cell 163, RBCs 920.  Pt reports resolution of SOB.  GI consulted - contacted Dr. Anthony Pruitt at [facility] regarding evaluation of pt for TIPS.  Pt in agreement with plan, as he has had multiple hospitalizations over past month.  Noted Chest tube output ~ 2.3L this am, net output 2.1L    Chest tube in place.  Denies complaints.  No pain.  No SOB.  No fever / chills.  No additional complaints.     ____________________PHYSICAL EXAM:  Vitals reviewed as indicated below  GENERAL:  NAD Alert and Oriented x 3   HEENT: NCAT  CARDIOVASCULAR:  S1, S2  LUNGS: decreased BS R base.  Chest tube in place.   ABDOMEN:  soft, (-) tenderness, (mild) distension, (+) bowel sounds, (-) guarding, (-) rebound (-) rigidity + ascites  EXTREMITIES:  no cyanosis / clubbing / edema.   ____________________    VITALS:  Vital Signs Last 24 Hrs  T(C): 37.1 (22 Mar 2018 06:55), Max: 37.1 (21 Mar 2018 15:07)  T(F): 98.8 (22 Mar 2018 06:55), Max: 98.8 (21 Mar 2018 15:07)  HR: 111 (22 Mar 2018 07:46) (75 - 111)  BP: 102/64 (22 Mar 2018 06:55) (102/60 - 110/70)  BP(mean): --  RR: 18 (22 Mar 2018 06:55) (18 - 18)  SpO2: 98% (22 Mar 2018 06:55) (98% - 100%) Daily     Daily Weight in k.7 (22 Mar 2018 04:51)  CAPILLARY BLOOD GLUCOSE      POCT Blood Glucose.: 116 mg/dL (22 Mar 2018 08:27)  POCT Blood Glucose.: 128 mg/dL (21 Mar 2018 20:59)  POCT Blood Glucose.: 102 mg/dL (21 Mar 2018 17:13)  POCT Blood Glucose.: 129 mg/dL (21 Mar 2018 12:21)    I&O's Summary    21 Mar 2018 07:01  -  22 Mar 2018 07:00  --------------------------------------------------------  IN: 720 mL / OUT: 2882 mL / NET: -2162 mL        LABS:                        10.9   5.4   )-----------( 198      ( 22 Mar 2018 06:35 )             31.9     03-22    138  |  106  |  46.0<H>  ----------------------------<  107  5.4<H>   |  23.0  |  1.04    Ca    8.4<L>      22 Mar 2018 06:35    TPro  4.7<L>  /  Alb  1.7<L>  /  TBili  <0.2<L>  /  DBili  x   /  AST  41<H>  /  ALT  39  /  AlkPhos  167<H>  03-22      LIVER FUNCTIONS - ( 22 Mar 2018 06:35 )  Alb: 1.7 g/dL / Pro: 4.7 g/dL / ALK PHOS: 167 U/L / ALT: 39 U/L / AST: 41 U/L / GGT: x                   MEDICATIONS:  apixaban 5 milliGRAM(s) Oral every 12 hours  dextrose 5%. 1000 milliLiter(s) IV Continuous <Continuous>  dextrose 50% Injectable 12.5 Gram(s) IV Push once  dextrose 50% Injectable 25 Gram(s) IV Push once  dextrose 50% Injectable 25 Gram(s) IV Push once  dextrose Gel 1 Dose(s) Oral once PRN  furosemide    Tablet 40 milliGRAM(s) Oral two times a day  glucagon  Injectable 1 milliGRAM(s) IntraMuscular once PRN  insulin lispro (HumaLOG) corrective regimen sliding scale   SubCutaneous three times a day before meals  insulin lispro (HumaLOG) corrective regimen sliding scale   SubCutaneous at bedtime  oxyCODONE    IR 5 milliGRAM(s) Oral every 4 hours PRN  pantoprazole    Tablet 40 milliGRAM(s) Oral before breakfast  sodium polystyrene sulfonate Suspension 15 Gram(s) Oral once  spironolactone 50 milliGRAM(s) Oral two times a day

## 2018-03-22 NOTE — H&P ADULT - ASSESSMENT
63 y/o M with a PMH significant for alcoholic cirrhosis, PE on eliquis, NIDDM2, and recurrent pleural effusions in the setting of hepatic hydrothorax, now with chest tube. Patient presented to [facility] on 3/18/18 with worsening dyspnea and orthopnea x3 days, found to have a large right pleural effusion s/p chest tube placement. Transferred to [facility] for possible TIPS.

## 2018-03-22 NOTE — H&P ADULT - HISTORY OF PRESENT ILLNESS
This is a 63 y/o M with a PMH significant for alcoholic cirrhosis, PE on eliquis, NIDDM2, and recurrent pleural effusions in the setting of hepatic hydrothorax, now with chest tube. Patient presented to [facility] on 3/18/18 with worsening dyspnea and orthopnea x3 days, found to have a large right pleural effusion s/p chest tube placement. Of note, patient has had multiple admissions in the past few months for hepatic hydrothorax, requiring thoracenteses, refractory to diuretics. He is being transferred to [facility] for possible TIPS procedure, in consultation with Dr. Anthony Pruitt. At this time, patient states that he has no complaints. Denies fevers/chills, n/v/d/c, CP, SOB.

## 2018-03-22 NOTE — H&P ADULT - PROBLEM SELECTOR PLAN 1
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 Transferred to [facility] for TIPS procedure. MELD score 8 from most recent labs, Child-Colvin score 7, also from most recent labs. Has chest tube in right hemithorax.   - f/u IR and Hepatology recs regarding TIPS  - continue to monitor outputs from chest tube  - c/w lasix 40 mg daily and spironolactone 50 mg BID Patient with recurrent pleural effusions in the setting of hepatic hydrothorax. Transferred to [facility] for TIPS procedure. MELD score 8 from most recent labs, Child-Colvin score 8, also from most recent labs. Has chest tube in right hemithorax.   - f/u IR and Hepatology recs regarding TIPS  - continue to monitor outputs from chest tube, will consult Pulmonology for chest tube management   - c/w lasix 40 mg daily and spironolactone 50 mg BID

## 2018-03-23 LAB
ALBUMIN SERPL ELPH-MCNC: 1.9 G/DL — LOW (ref 3.3–5)
ALP SERPL-CCNC: 179 U/L — HIGH (ref 40–120)
ALT FLD-CCNC: 41 U/L RC — SIGNIFICANT CHANGE UP (ref 10–45)
ANION GAP SERPL CALC-SCNC: 9 MMOL/L — SIGNIFICANT CHANGE UP (ref 5–17)
APTT BLD: 37.5 SEC — HIGH (ref 27.5–37.4)
AST SERPL-CCNC: 46 U/L — HIGH (ref 10–40)
BILIRUB SERPL-MCNC: 0.3 MG/DL — SIGNIFICANT CHANGE UP (ref 0.2–1.2)
BUN SERPL-MCNC: 44 MG/DL — HIGH (ref 7–23)
CALCIUM SERPL-MCNC: 8.5 MG/DL — SIGNIFICANT CHANGE UP (ref 8.4–10.5)
CHLORIDE SERPL-SCNC: 104 MMOL/L — SIGNIFICANT CHANGE UP (ref 96–108)
CO2 SERPL-SCNC: 23 MMOL/L — SIGNIFICANT CHANGE UP (ref 22–31)
CREAT SERPL-MCNC: 1.27 MG/DL — SIGNIFICANT CHANGE UP (ref 0.5–1.3)
CULTURE RESULTS: SIGNIFICANT CHANGE UP
GLUCOSE SERPL-MCNC: 135 MG/DL — HIGH (ref 70–99)
HCT VFR BLD CALC: 35.3 % — LOW (ref 39–50)
HGB BLD-MCNC: 12.1 G/DL — LOW (ref 13–17)
INR BLD: 1.08 RATIO — SIGNIFICANT CHANGE UP (ref 0.88–1.16)
MAGNESIUM SERPL-MCNC: 1.7 MG/DL — SIGNIFICANT CHANGE UP (ref 1.6–2.6)
MCHC RBC-ENTMCNC: 33.7 PG — SIGNIFICANT CHANGE UP (ref 27–34)
MCHC RBC-ENTMCNC: 34.4 GM/DL — SIGNIFICANT CHANGE UP (ref 32–36)
MCV RBC AUTO: 98.1 FL — SIGNIFICANT CHANGE UP (ref 80–100)
PHOSPHATE SERPL-MCNC: 2.5 MG/DL — SIGNIFICANT CHANGE UP (ref 2.5–4.5)
PLATELET # BLD AUTO: 217 K/UL — SIGNIFICANT CHANGE UP (ref 150–400)
POTASSIUM SERPL-MCNC: 4.7 MMOL/L — SIGNIFICANT CHANGE UP (ref 3.5–5.3)
POTASSIUM SERPL-SCNC: 4.7 MMOL/L — SIGNIFICANT CHANGE UP (ref 3.5–5.3)
PROT SERPL-MCNC: 5.3 G/DL — LOW (ref 6–8.3)
PROTHROM AB SERPL-ACNC: 11.8 SEC — SIGNIFICANT CHANGE UP (ref 9.8–12.7)
RBC # BLD: 3.6 M/UL — LOW (ref 4.2–5.8)
RBC # FLD: 12.7 % — SIGNIFICANT CHANGE UP (ref 10.3–14.5)
SODIUM SERPL-SCNC: 136 MMOL/L — SIGNIFICANT CHANGE UP (ref 135–145)
SPECIMEN SOURCE: SIGNIFICANT CHANGE UP
WBC # BLD: 6.5 K/UL — SIGNIFICANT CHANGE UP (ref 3.8–10.5)
WBC # FLD AUTO: 6.5 K/UL — SIGNIFICANT CHANGE UP (ref 3.8–10.5)

## 2018-03-23 PROCEDURE — 99233 SBSQ HOSP IP/OBS HIGH 50: CPT | Mod: GC

## 2018-03-23 PROCEDURE — 99223 1ST HOSP IP/OBS HIGH 75: CPT | Mod: GC

## 2018-03-23 PROCEDURE — 93306 TTE W/DOPPLER COMPLETE: CPT | Mod: 26

## 2018-03-23 RX ORDER — LACTULOSE 10 G/15ML
10 SOLUTION ORAL THREE TIMES A DAY
Qty: 0 | Refills: 0 | Status: DISCONTINUED | OUTPATIENT
Start: 2018-03-23 | End: 2018-03-25

## 2018-03-23 RX ORDER — SPIRONOLACTONE 25 MG/1
100 TABLET, FILM COATED ORAL
Qty: 0 | Refills: 0 | Status: DISCONTINUED | OUTPATIENT
Start: 2018-03-23 | End: 2018-03-29

## 2018-03-23 RX ORDER — FUROSEMIDE 40 MG
40 TABLET ORAL
Qty: 0 | Refills: 0 | Status: DISCONTINUED | OUTPATIENT
Start: 2018-03-23 | End: 2018-03-29

## 2018-03-23 RX ORDER — SPIRONOLACTONE 25 MG/1
75 TABLET, FILM COATED ORAL
Qty: 0 | Refills: 0 | Status: DISCONTINUED | OUTPATIENT
Start: 2018-03-23 | End: 2018-03-23

## 2018-03-23 RX ORDER — ONDANSETRON 8 MG/1
4 TABLET, FILM COATED ORAL ONCE
Qty: 0 | Refills: 0 | Status: COMPLETED | OUTPATIENT
Start: 2018-03-23 | End: 2018-03-23

## 2018-03-23 RX ORDER — FUROSEMIDE 40 MG
60 TABLET ORAL DAILY
Qty: 0 | Refills: 0 | Status: DISCONTINUED | OUTPATIENT
Start: 2018-03-23 | End: 2018-03-23

## 2018-03-23 RX ADMIN — LACTULOSE 10 GRAM(S): 10 SOLUTION ORAL at 22:20

## 2018-03-23 RX ADMIN — SPIRONOLACTONE 100 MILLIGRAM(S): 25 TABLET, FILM COATED ORAL at 17:54

## 2018-03-23 RX ADMIN — ONDANSETRON 4 MILLIGRAM(S): 8 TABLET, FILM COATED ORAL at 22:32

## 2018-03-23 RX ADMIN — SPIRONOLACTONE 50 MILLIGRAM(S): 25 TABLET, FILM COATED ORAL at 05:57

## 2018-03-23 RX ADMIN — ENOXAPARIN SODIUM 70 MILLIGRAM(S): 100 INJECTION SUBCUTANEOUS at 05:58

## 2018-03-23 RX ADMIN — Medication 40 MILLIGRAM(S): at 17:54

## 2018-03-23 RX ADMIN — PANTOPRAZOLE SODIUM 40 MILLIGRAM(S): 20 TABLET, DELAYED RELEASE ORAL at 06:01

## 2018-03-23 RX ADMIN — ENOXAPARIN SODIUM 70 MILLIGRAM(S): 100 INJECTION SUBCUTANEOUS at 17:51

## 2018-03-23 NOTE — CONSULT NOTE ADULT - SUBJECTIVE AND OBJECTIVE BOX
HEPATOLOGY INITIAL CONSULT NOTE    Chief Complaint:  Patient is a 62y old  Male who presents with a chief complaint of Transfer from [facility] for TIPS, Recurrent Pleural effusion (22 Mar 2018 14:24)      HPI: 62y Male with cirrhosis secondary to alcohol (+ascites, -EVB, -PSE) complicated by recurrent hepatic hydrothorax, PE on Eliquis, DM2 transferred to [facility] for TIPS evaluation. Patient presented to [facility] on 3/18 with dyspnea and orthopnea and was found to have a large right pleural effusion. At that time, a chest tube was placed with improvement in symptoms. Patient has had numerous admissions over the last few months and has had recurrent thoracentesis and chest tube placements. He also has been on diuretics (Lasix 40mg, Aldactone 100mg) with continued reaccumulation of fluid.     Allergies:  No Known Allergies      Hospital Medications:  dextrose 5%. 1000 milliLiter(s) IV Continuous <Continuous>  dextrose 50% Injectable 12.5 Gram(s) IV Push once  dextrose 50% Injectable 25 Gram(s) IV Push once  dextrose 50% Injectable 25 Gram(s) IV Push once  dextrose Gel 1 Dose(s) Oral once PRN  enoxaparin Injectable 70 milliGRAM(s) SubCutaneous two times a day  furosemide    Tablet 40 milliGRAM(s) Oral daily  glucagon  Injectable 1 milliGRAM(s) IntraMuscular once PRN  insulin lispro (HumaLOG) corrective regimen sliding scale   SubCutaneous three times a day before meals  insulin lispro (HumaLOG) corrective regimen sliding scale   SubCutaneous at bedtime  ondansetron Injectable 4 milliGRAM(s) IV Push once  pantoprazole    Tablet 40 milliGRAM(s) Oral before breakfast  spironolactone 50 milliGRAM(s) Oral two times a day      PMHX/PSHX:  Pulmonary embolism  Cirrhosis  DM (diabetes mellitus)  No pertinent past medical history  S/P thoracentesis  No significant past surgical history      Family history:  No pertinent family history in first degree relatives      Social History:     ROS:     General:  No wt loss, fevers, chills, night sweats, fatigue,   Eyes:  Good vision, no reported pain  ENT:  No sore throat, pain, runny nose, dysphagia  CV:  No pain, palpitations, hypo/hypertension  Resp:  No dyspnea, cough, tachypnea, wheezing  GI:  see HPI  :  No pain, bleeding, incontinence, nocturia  Muscle:  No pain, weakness  Neuro:  No weakness, tingling, memory problems  Psych:  No fatigue, insomnia, mood problems, depression  Endocrine:  No polyuria, polydipsia, cold/heat intolerance  Heme:  No petechiae, ecchymosis, easy bruisability  Skin:  No rash, tattoos, scars, edema      PHYSICAL EXAM:   Vital Signs:  Vital Signs Last 24 Hrs  T(C): 36.7 (23 Mar 2018 05:06), Max: 37.2 (22 Mar 2018 13:00)  T(F): 98 (23 Mar 2018 05:06), Max: 99 (22 Mar 2018 13:00)  HR: 85 (23 Mar 2018 05:06) (84 - 94)  BP: 103/68 (23 Mar 2018 05:06) (101/70 - 112/62)  BP(mean): --  RR: 18 (23 Mar 2018 05:06) (18 - 21)  SpO2: 98% (23 Mar 2018 05:06) (97% - 100%)  Daily Height in cm: 167.64 (22 Mar 2018 13:00)    Daily     GENERAL:  no acute distress  HEENT:  -icterus   CHEST:  clear bilaterally, no wheezes or rales  HEART:  RRR, S1S2  ABDOMEN:  soft, non-tender, non-distended, normoactive bowel sounds,  no hepato-splenomegaly  EXTEREMITIES:  no  edema  SKIN:  No rash/erythema/ecchymoses/petechiae/wounds/abscess/warm/dry  NEURO:  alert, oriented, -asterixis    LABS:                        12.1   6.5   )-----------( 217      ( 23 Mar 2018 06:23 )             35.3     03-23    136  |  104  |  44<H>  ----------------------------<  135<H>  4.7   |  23  |  1.27    Ca    8.5      23 Mar 2018 06:23  Phos  2.5     03-23  Mg     1.7     03-23    TPro  5.3<L>  /  Alb  1.9<L>  /  TBili  0.3  /  DBili  x   /  AST  46<H>  /  ALT  41  /  AlkPhos  179<H>  03-23    LIVER FUNCTIONS - ( 23 Mar 2018 06:23 )  Alb: 1.9 g/dL / Pro: 5.3 g/dL / ALK PHOS: 179 U/L / ALT: 41 U/L RC / AST: 46 U/L / GGT: x           PT/INR - ( 23 Mar 2018 06:23 )   PT: 11.8 sec;   INR: 1.08 ratio         PTT - ( 23 Mar 2018 06:23 )  PTT:37.5 sec      Imaging:    CT chest (3/18/18):  LUNGS AND LARGE AIRWAYS: Near complete atelectasis of the right lung.  PLEURA: Large right pleural effusion with leftward mediastinal shift.  VESSELS: Poor contrast opacification of the pulmonary arteries which are   only visualized to the level of the central main arteries. No central   pulmonary embolus. Atherosclerotic calcifications.  HEART: Heart size is normal. No pericardial effusion.  MEDIASTINUM AND ALPA: No lymphadenopathy.  CHEST WALL AND LOWER NECK: Within normal limits.  VISUALIZED UPPER ABDOMEN: Small volume upper abdominal ascites. Nodular   contour to the liver.  BONES: Degenerative changes of the spine.    IMPRESSION:   Poor contrast opacification of the pulmonary arteries on the left side.   Previously seen left pulmonary emboli are not well visualized. No central   pulmonary embolus in the main pulmonary arteries.    Large right pleural effusion with leftward mediastinal shift and near   complete atelectasis of the right lung. HEPATOLOGY INITIAL CONSULT NOTE    Chief Complaint:  Patient is a 62y old  Male who presents with a chief complaint of Transfer from [facility] for TIPS, Recurrent Pleural effusion (22 Mar 2018 14:24)      HPI: 62y Male with cirrhosis secondary to alcohol (+ascites, ?EV/-EVB, -PSE) complicated by recurrent hepatic hydrothorax, PE on Eliquis, DM2 transferred to [facility] for TIPS evaluation. Patient presented to [facility] on 3/18 with dyspnea and orthopnea and was found to have a large right pleural effusion. At that time, a chest tube was placed with improvement in symptoms. Patient has had numerous admissions (he believs 4-5) over the last few months and has required recurrent thoracentesis and chest tube placements. He also has been on diuretics (Lasix 40mg, Aldactone 100mg) with continued reaccumulation of fluid. He states he was diagnosed with cirrhosis about 1 year ago. His last drink of alcohol was around that time. He does not follow with a liver doctor and has not been listed for transplant. He denies abdominal pain, nausea, vomiting, confusion, or yellowing of the skin or eyes.     Allergies:  No Known Allergies      Hospital Medications:  dextrose 5%. 1000 milliLiter(s) IV Continuous <Continuous>  dextrose 50% Injectable 12.5 Gram(s) IV Push once  dextrose 50% Injectable 25 Gram(s) IV Push once  dextrose 50% Injectable 25 Gram(s) IV Push once  dextrose Gel 1 Dose(s) Oral once PRN  enoxaparin Injectable 70 milliGRAM(s) SubCutaneous two times a day  furosemide    Tablet 40 milliGRAM(s) Oral daily  glucagon  Injectable 1 milliGRAM(s) IntraMuscular once PRN  insulin lispro (HumaLOG) corrective regimen sliding scale   SubCutaneous three times a day before meals  insulin lispro (HumaLOG) corrective regimen sliding scale   SubCutaneous at bedtime  ondansetron Injectable 4 milliGRAM(s) IV Push once  pantoprazole    Tablet 40 milliGRAM(s) Oral before breakfast  spironolactone 50 milliGRAM(s) Oral two times a day      PMHX/PSHX:  Pulmonary embolism  Cirrhosis  DM (diabetes mellitus)  No pertinent past medical history  S/P thoracentesis  No significant past surgical history      Family history:  No pertinent family history in first degree relatives      Social History:     ROS:     General:  No wt loss, fevers, chills, night sweats, fatigue,   Eyes:  Good vision, no reported pain  ENT:  No sore throat, pain, runny nose, dysphagia  CV:  No pain, palpitations, hypo/hypertension  Resp:  No dyspnea, cough, tachypnea, wheezing  GI:  see HPI  :  No pain, bleeding, incontinence, nocturia  Muscle:  No pain, weakness  Neuro:  No weakness, tingling, memory problems  Psych:  No fatigue, insomnia, mood problems, depression  Endocrine:  No polyuria, polydipsia, cold/heat intolerance  Heme:  No petechiae, ecchymosis, easy bruisability  Skin:  No rash, tattoos, scars, edema      PHYSICAL EXAM:   Vital Signs:  Vital Signs Last 24 Hrs  T(C): 36.7 (23 Mar 2018 05:06), Max: 37.2 (22 Mar 2018 13:00)  T(F): 98 (23 Mar 2018 05:06), Max: 99 (22 Mar 2018 13:00)  HR: 85 (23 Mar 2018 05:06) (84 - 94)  BP: 103/68 (23 Mar 2018 05:06) (101/70 - 112/62)  BP(mean): --  RR: 18 (23 Mar 2018 05:06) (18 - 21)  SpO2: 98% (23 Mar 2018 05:06) (97% - 100%)  Daily Height in cm: 167.64 (22 Mar 2018 13:00)    Daily     GENERAL:  no acute distress  HEENT:  -icterus   CHEST:  clear bilaterally, no wheezes or rales  HEART:  RRR, S1S2  ABDOMEN:  soft, non-tender, non-distended, normoactive bowel sounds,  no hepato-splenomegaly  EXTEREMITIES:  no  edema  SKIN:  No rash/erythema/ecchymoses/petechiae/wounds/abscess/warm/dry  NEURO:  alert, oriented, -asterixis    LABS:                        12.1   6.5   )-----------( 217      ( 23 Mar 2018 06:23 )             35.3     03-23    136  |  104  |  44<H>  ----------------------------<  135<H>  4.7   |  23  |  1.27    Ca    8.5      23 Mar 2018 06:23  Phos  2.5     03-23  Mg     1.7     03-23    TPro  5.3<L>  /  Alb  1.9<L>  /  TBili  0.3  /  DBili  x   /  AST  46<H>  /  ALT  41  /  AlkPhos  179<H>  03-23    LIVER FUNCTIONS - ( 23 Mar 2018 06:23 )  Alb: 1.9 g/dL / Pro: 5.3 g/dL / ALK PHOS: 179 U/L / ALT: 41 U/L RC / AST: 46 U/L / GGT: x           PT/INR - ( 23 Mar 2018 06:23 )   PT: 11.8 sec;   INR: 1.08 ratio         PTT - ( 23 Mar 2018 06:23 )  PTT:37.5 sec      Imaging:    CT chest (3/18/18):  LUNGS AND LARGE AIRWAYS: Near complete atelectasis of the right lung.  PLEURA: Large right pleural effusion with leftward mediastinal shift.  VESSELS: Poor contrast opacification of the pulmonary arteries which are   only visualized to the level of the central main arteries. No central   pulmonary embolus. Atherosclerotic calcifications.  HEART: Heart size is normal. No pericardial effusion.  MEDIASTINUM AND ALPA: No lymphadenopathy.  CHEST WALL AND LOWER NECK: Within normal limits.  VISUALIZED UPPER ABDOMEN: Small volume upper abdominal ascites. Nodular   contour to the liver.  BONES: Degenerative changes of the spine.    IMPRESSION:   Poor contrast opacification of the pulmonary arteries on the left side.   Previously seen left pulmonary emboli are not well visualized. No central   pulmonary embolus in the main pulmonary arteries.    Large right pleural effusion with leftward mediastinal shift and near   complete atelectasis of the right lung. HEPATOLOGY INITIAL CONSULT NOTE    Chief Complaint:  Patient is a 62y old  Male who presents with a chief complaint of Transfer from [facility] for TIPS, Recurrent Pleural effusion (22 Mar 2018 14:24)      HPI: 62y Male with cirrhosis secondary to alcohol (+ascites, ?EV/-EVB, -PSE) complicated by recurrent hepatic hydrothorax, PE on Eliquis, DM2 transferred to [facility] for TIPS evaluation. Patient presented to [facility] on 3/18 with dyspnea and orthopnea and was found to have a large right pleural effusion. At that time, a chest tube was placed with improvement in symptoms. Patient has had numerous admissions (he believs 4-5) over the last few months and has required recurrent thoracentesis and chest tube placements. He also has been on diuretics (Lasix 40mg, Aldactone 100mg) with continued reaccumulation of fluid. He states he was diagnosed with cirrhosis about 1 year ago. His last drink of alcohol was around that time. He does not follow with a liver doctor and has not been listed for transplant. He denies abdominal pain, nausea, vomiting, confusion, or yellowing of the skin or eyes.     Allergies:  No Known Allergies      Hospital Medications:  dextrose 5%. 1000 milliLiter(s) IV Continuous <Continuous>  dextrose 50% Injectable 12.5 Gram(s) IV Push once  dextrose 50% Injectable 25 Gram(s) IV Push once  dextrose 50% Injectable 25 Gram(s) IV Push once  dextrose Gel 1 Dose(s) Oral once PRN  enoxaparin Injectable 70 milliGRAM(s) SubCutaneous two times a day  furosemide    Tablet 40 milliGRAM(s) Oral daily  glucagon  Injectable 1 milliGRAM(s) IntraMuscular once PRN  insulin lispro (HumaLOG) corrective regimen sliding scale   SubCutaneous three times a day before meals  insulin lispro (HumaLOG) corrective regimen sliding scale   SubCutaneous at bedtime  ondansetron Injectable 4 milliGRAM(s) IV Push once  pantoprazole    Tablet 40 milliGRAM(s) Oral before breakfast  spironolactone 50 milliGRAM(s) Oral two times a day      PMHX/PSHX:  Pulmonary embolism  Cirrhosis  DM (diabetes mellitus)  No pertinent past medical history  S/P thoracentesis  No significant past surgical history      Family history:  No pertinent family history in first degree relatives      Social History:     ROS:     General:  No wt loss, fevers, chills, night sweats, fatigue,   Eyes:  Good vision, no reported pain  ENT:  No sore throat, pain, runny nose, dysphagia  CV:  No pain, palpitations, hypo/hypertension  Resp:  No dyspnea, cough, tachypnea, wheezing  GI:  see HPI  :  No pain, bleeding, incontinence, nocturia  Muscle:  No pain, weakness  Neuro:  No weakness, tingling, memory problems  Psych:  No fatigue, insomnia, mood problems, depression  Endocrine:  No polyuria, polydipsia, cold/heat intolerance  Heme:  No petechiae, ecchymosis, easy bruisability  Skin:  No rash, tattoos, scars, edema      PHYSICAL EXAM:   Vital Signs:  Vital Signs Last 24 Hrs  T(C): 36.7 (23 Mar 2018 05:06), Max: 37.2 (22 Mar 2018 13:00)  T(F): 98 (23 Mar 2018 05:06), Max: 99 (22 Mar 2018 13:00)  HR: 85 (23 Mar 2018 05:06) (84 - 94)  BP: 103/68 (23 Mar 2018 05:06) (101/70 - 112/62)  BP(mean): --  RR: 18 (23 Mar 2018 05:06) (18 - 21)  SpO2: 98% (23 Mar 2018 05:06) (97% - 100%)  Daily Height in cm: 167.64 (22 Mar 2018 13:00)    Daily     GENERAL:  no acute distress  HEENT:  -icterus   CHEST:  clear bilaterally, no wheezes or rales; R pigtail catheter in place, suction container filled with 2.2L of clear yellowish fluid  HEART:  RRR, S1S2  ABDOMEN:  soft, non-tender, non-distended, normoactive bowel sounds,  no hepato-splenomegaly  EXTEREMITIES:  no  edema  SKIN:  No rash/erythema/ecchymoses/petechiae/wounds/abscess/warm/dry  NEURO:  alert, oriented, -asterixis    LABS:                        12.1   6.5   )-----------( 217      ( 23 Mar 2018 06:23 )             35.3     03-23    136  |  104  |  44<H>  ----------------------------<  135<H>  4.7   |  23  |  1.27    Ca    8.5      23 Mar 2018 06:23  Phos  2.5     03-23  Mg     1.7     03-23    TPro  5.3<L>  /  Alb  1.9<L>  /  TBili  0.3  /  DBili  x   /  AST  46<H>  /  ALT  41  /  AlkPhos  179<H>  03-23    LIVER FUNCTIONS - ( 23 Mar 2018 06:23 )  Alb: 1.9 g/dL / Pro: 5.3 g/dL / ALK PHOS: 179 U/L / ALT: 41 U/L RC / AST: 46 U/L / GGT: x           PT/INR - ( 23 Mar 2018 06:23 )   PT: 11.8 sec;   INR: 1.08 ratio         PTT - ( 23 Mar 2018 06:23 )  PTT:37.5 sec      Imaging:    CT chest (3/18/18):  LUNGS AND LARGE AIRWAYS: Near complete atelectasis of the right lung.  PLEURA: Large right pleural effusion with leftward mediastinal shift.  VESSELS: Poor contrast opacification of the pulmonary arteries which are   only visualized to the level of the central main arteries. No central   pulmonary embolus. Atherosclerotic calcifications.  HEART: Heart size is normal. No pericardial effusion.  MEDIASTINUM AND ALPA: No lymphadenopathy.  CHEST WALL AND LOWER NECK: Within normal limits.  VISUALIZED UPPER ABDOMEN: Small volume upper abdominal ascites. Nodular   contour to the liver.  BONES: Degenerative changes of the spine.    IMPRESSION:   Poor contrast opacification of the pulmonary arteries on the left side.   Previously seen left pulmonary emboli are not well visualized. No central   pulmonary embolus in the main pulmonary arteries.    Large right pleural effusion with leftward mediastinal shift and near   complete atelectasis of the right lung.

## 2018-03-23 NOTE — PROGRESS NOTE ADULT - SUBJECTIVE AND OBJECTIVE BOX
CHIEF COMPLAINT:    Interval Events:    REVIEW OF SYSTEMS:  Constitutional: No fevers or chills. No weight loss. No fatigue or generalised malaise.  Eyes: No itching or discharge from the eyes  ENT: No ear pain. No ear discharge. No nasal congestion. No post nasal drip. No epistaxis. No throat pain. No sore throat. No difficulty swallowing.   CV: No chest pain. No palpitations. No lightheadedness or dizziness.   Resp: No dyspnea at rest. No dyspnea on exertion. No orthopnea. No wheezing. No cough. No stridor. No sputum production. No chest pain with respiration.  GI: No nausea. No vomiting. No diarrhea.  MSK: No joint pain or pain in any extremities  Integumentary: No skin lesions. No pedal edema.  Neurological: No gross motor weakness. No sensory changes.  [ ] All other systems negative  [ ] Unable to assess ROS because ________    OBJECTIVE:  ICU Vital Signs Last 24 Hrs  T(C): 36.7 (23 Mar 2018 05:06), Max: 37.2 (22 Mar 2018 13:00)  T(F): 98 (23 Mar 2018 05:06), Max: 99 (22 Mar 2018 13:00)  HR: 85 (23 Mar 2018 05:06) (84 - 85)  BP: 103/68 (23 Mar 2018 05:06) (101/70 - 109/72)  BP(mean): --  ABP: --  ABP(mean): --  RR: 18 (23 Mar 2018 05:06) (18 - 21)  SpO2: 98% (23 Mar 2018 05:06) (98% - 100%)        03-22 @ 07:01  -  03-23 @ 07:00  --------------------------------------------------------  IN: 840 mL / OUT: 500 mL / NET: 340 mL      CAPILLARY BLOOD GLUCOSE      POCT Blood Glucose.: 111 mg/dL (23 Mar 2018 09:44)      PHYSICAL EXAM:  General: Awake, alert, oriented X 3.   HEENT: Atraumatic, normocephalic.                 Mallampatti Grade                 No nasal congestion.                No tonsillar or pharyngeal exudates.  Lymph Nodes: No palpable lymphadenopathy  Neck: No JVD. No carotid bruit.   Respiratory: Normal chest expansion                         Normal percussion                         Normal and equal air entry                         No wheeze, rhonchi or rales.  Cardiovascular: S1 S2 normal. No murmurs, rubs or gallops.   Abdomen: Soft, non-tender, non-distended. No organomegaly.  Extremities: Warm to touch. Peripheral pulse palpable. No pedal edema.   Skin: No rashes or skin lesions  Neurological: Motor and sensory examination equal and normal in all four extremities.  Psychiatry: Appropriate mood and affect.    HOSPITAL MEDICATIONS:  MEDICATIONS  (STANDING):  dextrose 5%. 1000 milliLiter(s) (50 mL/Hr) IV Continuous <Continuous>  dextrose 50% Injectable 12.5 Gram(s) IV Push once  dextrose 50% Injectable 25 Gram(s) IV Push once  dextrose 50% Injectable 25 Gram(s) IV Push once  enoxaparin Injectable 70 milliGRAM(s) SubCutaneous two times a day  furosemide    Tablet 60 milliGRAM(s) Oral daily  insulin lispro (HumaLOG) corrective regimen sliding scale   SubCutaneous three times a day before meals  insulin lispro (HumaLOG) corrective regimen sliding scale   SubCutaneous at bedtime  ondansetron Injectable 4 milliGRAM(s) IV Push once  pantoprazole    Tablet 40 milliGRAM(s) Oral before breakfast  spironolactone 75 milliGRAM(s) Oral two times a day    MEDICATIONS  (PRN):  dextrose Gel 1 Dose(s) Oral once PRN Blood Glucose LESS THAN 70 milliGRAM(s)/deciliter  glucagon  Injectable 1 milliGRAM(s) IntraMuscular once PRN Glucose LESS THAN 70 milligrams/deciliter      LABS:                        12.1   6.5   )-----------( 217      ( 23 Mar 2018 06:23 )             35.3     03-23    136  |  104  |  44<H>  ----------------------------<  135<H>  4.7   |  23  |  1.27    Ca    8.5      23 Mar 2018 06:23  Phos  2.5     03-23  Mg     1.7     03-23    TPro  5.3<L>  /  Alb  1.9<L>  /  TBili  0.3  /  DBili  x   /  AST  46<H>  /  ALT  41  /  AlkPhos  179<H>  03-23    PT/INR - ( 23 Mar 2018 06:23 )   PT: 11.8 sec;   INR: 1.08 ratio         PTT - ( 23 Mar 2018 06:23 )  PTT:37.5 sec          MICROBIOLOGY:     RADIOLOGY:  [ ] Reviewed and interpreted by me    Point of Care Ultrasound Findings:    PFT:    EKG: CHIEF COMPLAINT: R pleural effusion     Interval Events: chest tube was clamped overnight. he feels ok. no dyspnea. no cough.     REVIEW OF SYSTEMS:  Constitutional: No fevers or chills. No weight loss. No fatigue or generalised malaise.  Eyes: No itching or discharge from the eyes  ENT: No ear pain. No ear discharge. No nasal congestion. No post nasal drip. No epistaxis. No throat pain. No sore throat. No difficulty swallowing.   CV: No chest pain. No palpitations. No lightheadedness or dizziness.   Resp: No dyspnea at rest. No dyspnea on exertion. No orthopnea. No wheezing. No cough. No stridor. No sputum production. No chest pain with respiration.  GI: No nausea. No vomiting. No diarrhea.  MSK: No joint pain or pain in any extremities  Integumentary: No skin lesions. No pedal edema.  Neurological: No gross motor weakness. No sensory changes.  [x] All other systems negative  [ ] Unable to assess ROS because ________    OBJECTIVE:  ICU Vital Signs Last 24 Hrs  T(C): 36.7 (23 Mar 2018 05:06), Max: 37.2 (22 Mar 2018 13:00)  T(F): 98 (23 Mar 2018 05:06), Max: 99 (22 Mar 2018 13:00)  HR: 85 (23 Mar 2018 05:06) (84 - 85)  BP: 103/68 (23 Mar 2018 05:06) (101/70 - 109/72)  RR: 18 (23 Mar 2018 05:06) (18 - 21)  SpO2: 98% (23 Mar 2018 05:06) (98% - 100%)    PHYSICAL EXAM:  General: Awake, alert, oriented X 3. pleasant. comfortable lying in bed.   HEENT: Atraumatic, normocephalic.  Lymph Nodes: No palpable lymphadenopathy  Neck: No JVD. No carotid bruit.   Respiratory: Normal chest expansion. CTA b/l. good aeration. no wheezing.   Chest: 14 Fr pigtail catheter in the R posterior pleural space.   Cardiovascular: S1 S2 normal. No murmurs, rubs or gallops.   Abdomen: Soft, non-tender, non-distended.  Extremities: Warm to touch. Peripheral pulse palpable. +2 pitting edema [R>L}  Skin: No rashes or skin lesions  Neurological: Motor and sensory examination equal and normal in all four extremities.  Psychiatry: Appropriate mood and affect.    HOSPITAL MEDICATIONS:  MEDICATIONS  (STANDING):  dextrose 5%. 1000 milliLiter(s) (50 mL/Hr) IV Continuous <Continuous>  dextrose 50% Injectable 12.5 Gram(s) IV Push once  dextrose 50% Injectable 25 Gram(s) IV Push once  dextrose 50% Injectable 25 Gram(s) IV Push once  enoxaparin Injectable 70 milliGRAM(s) SubCutaneous two times a day  furosemide    Tablet 60 milliGRAM(s) Oral daily  insulin lispro (HumaLOG) corrective regimen sliding scale   SubCutaneous three times a day before meals  insulin lispro (HumaLOG) corrective regimen sliding scale   SubCutaneous at bedtime  ondansetron Injectable 4 milliGRAM(s) IV Push once  pantoprazole    Tablet 40 milliGRAM(s) Oral before breakfast  spironolactone 75 milliGRAM(s) Oral two times a day    MEDICATIONS  (PRN):  dextrose Gel 1 Dose(s) Oral once PRN Blood Glucose LESS THAN 70 milliGRAM(s)/deciliter  glucagon  Injectable 1 milliGRAM(s) IntraMuscular once PRN Glucose LESS THAN 70 milligrams/deciliter      LABS:                        12.1   6.5   )-----------( 217      ( 23 Mar 2018 06:23 )             35.3     03-23    136  |  104  |  44<H>  ----------------------------<  135<H>  4.7   |  23  |  1.27    Ca    8.5      23 Mar 2018 06:23  Phos  2.5     03-23  Mg     1.7     03-23    TPro  5.3<L>  /  Alb  1.9<L>  /  TBili  0.3  /  DBili  x   /  AST  46<H>  /  ALT  41  /  AlkPhos  179<H>  03-23    PT/INR - ( 23 Mar 2018 06:23 )   PT: 11.8 sec;   INR: 1.08 ratio         PTT - ( 23 Mar 2018 06:23 )  PTT:37.5 sec    RADIOLOGY:  [x] Reviewed and interpreted by me    Point of Care Ultrasound Findings:  small to moderate pleural effusion on the r side -- simple.   good RV contractility -- normal TAPSE; not dilated RV.

## 2018-03-23 NOTE — PROGRESS NOTE ADULT - SUBJECTIVE AND OBJECTIVE BOX
Overnight Events: No acute events o/n. Chest tube remained clamped o/n.     REVIEW OF SYSTEMS:      VITAL SIGNS:  Vital Signs Last 24 Hrs  T(C): 36.7 (23 Mar 2018 05:06), Max: 37.2 (22 Mar 2018 13:00)  T(F): 98 (23 Mar 2018 05:06), Max: 99 (22 Mar 2018 13:00)  HR: 85 (23 Mar 2018 05:06) (84 - 94)  BP: 103/68 (23 Mar 2018 05:06) (101/70 - 112/62)  BP(mean): --  RR: 18 (23 Mar 2018 05:06) (18 - 21)  SpO2: 98% (23 Mar 2018 05:06) (97% - 100%)    PHYSICAL EXAM:   GENERAL: no acute distress  HEENT: NC/AT, EOMI, neck supple, MMM  RESPIRATORY: mildly diminished BS RLL; intermittent crackles RLL; otherwise clear  CARDIOVASCULAR: RRR, no murmurs, gallops, rubs  ABDOMINAL: soft, non-tender, non-distended, positive bowel sounds   EXTREMITIES: no clubbing, cyanosis, or edema  NEUROLOGICAL: alert and oriented x 3, non-focal  SKIN: no rashes or lesions   MUSCULOSKELETAL: no gross joint deformity                          12.1   6.5   )-----------( 217      ( 23 Mar 2018 06:23 )             35.3     03-23    136  |  104  |  44<H>  ----------------------------<  135<H>  4.7   |  23  |  1.27    Ca    8.5      23 Mar 2018 06:23  Phos  2.5     03-23  Mg     1.7     03-23    TPro  5.3<L>  /  Alb  1.9<L>  /  TBili  0.3  /  DBili  x   /  AST  46<H>  /  ALT  41  /  AlkPhos  179<H>  03-23    PT/INR - ( 23 Mar 2018 06:23 )   PT: 11.8 sec;   INR: 1.08 ratio         PTT - ( 23 Mar 2018 06:23 )  PTT:37.5 sec    CAPILLARY BLOOD GLUCOSE      POCT Blood Glucose.: 117 mg/dL (22 Mar 2018 21:41)  POCT Blood Glucose.: 180 mg/dL (22 Mar 2018 18:03)  POCT Blood Glucose.: 116 mg/dL (22 Mar 2018 08:27)    I&O's Summary    22 Mar 2018 07:01  -  23 Mar 2018 07:00  --------------------------------------------------------  IN: 840 mL / OUT: 250 mL / NET: 590 mL        MEDICATIONS  (STANDING):  dextrose 5%. 1000 milliLiter(s) (50 mL/Hr) IV Continuous <Continuous>  dextrose 50% Injectable 12.5 Gram(s) IV Push once  dextrose 50% Injectable 25 Gram(s) IV Push once  dextrose 50% Injectable 25 Gram(s) IV Push once  enoxaparin Injectable 70 milliGRAM(s) SubCutaneous two times a day  furosemide    Tablet 40 milliGRAM(s) Oral daily  insulin lispro (HumaLOG) corrective regimen sliding scale   SubCutaneous three times a day before meals  insulin lispro (HumaLOG) corrective regimen sliding scale   SubCutaneous at bedtime  ondansetron Injectable 4 milliGRAM(s) IV Push once  pantoprazole    Tablet 40 milliGRAM(s) Oral before breakfast  spironolactone 50 milliGRAM(s) Oral two times a day Overnight Events: No acute events o/n. Chest tube remained clamped o/n.     REVIEW OF SYSTEMS:  Denies SOB, pain at chest tube site. Endorses nausea, but no emesis.     VITAL SIGNS:  Vital Signs Last 24 Hrs  T(C): 36.7 (23 Mar 2018 05:06), Max: 37.2 (22 Mar 2018 13:00)  T(F): 98 (23 Mar 2018 05:06), Max: 99 (22 Mar 2018 13:00)  HR: 85 (23 Mar 2018 05:06) (84 - 94)  BP: 103/68 (23 Mar 2018 05:06) (101/70 - 112/62)  BP(mean): --  RR: 18 (23 Mar 2018 05:06) (18 - 21)  SpO2: 98% (23 Mar 2018 05:06) (97% - 100%)    PHYSICAL EXAM:   GENERAL: no acute distress  HEENT: NC/AT, EOMI, neck supple, MMM  RESPIRATORY: mildly diminished BS RLL; intermittent crackles RLL; otherwise clear  CARDIOVASCULAR: RRR, no murmurs, gallops, rubs  ABDOMINAL: soft, non-tender, non-distended, positive bowel sounds; minimal ascites   EXTREMITIES: no clubbing, cyanosis; 1+ to 2+ LE edema  NEUROLOGICAL: alert and oriented x 3, non-focal  SKIN: no rashes or lesions   MUSCULOSKELETAL: no gross joint deformity                          12.1   6.5   )-----------( 217      ( 23 Mar 2018 06:23 )             35.3     03-23    136  |  104  |  44<H>  ----------------------------<  135<H>  4.7   |  23  |  1.27    Ca    8.5      23 Mar 2018 06:23  Phos  2.5     03-23  Mg     1.7     03-23    TPro  5.3<L>  /  Alb  1.9<L>  /  TBili  0.3  /  DBili  x   /  AST  46<H>  /  ALT  41  /  AlkPhos  179<H>  03-23    PT/INR - ( 23 Mar 2018 06:23 )   PT: 11.8 sec;   INR: 1.08 ratio         PTT - ( 23 Mar 2018 06:23 )  PTT:37.5 sec    CAPILLARY BLOOD GLUCOSE      POCT Blood Glucose.: 117 mg/dL (22 Mar 2018 21:41)  POCT Blood Glucose.: 180 mg/dL (22 Mar 2018 18:03)  POCT Blood Glucose.: 116 mg/dL (22 Mar 2018 08:27)    I&O's Summary    22 Mar 2018 07:01  -  23 Mar 2018 07:00  --------------------------------------------------------  IN: 840 mL / OUT: 250 mL / NET: 590 mL        MEDICATIONS  (STANDING):  dextrose 5%. 1000 milliLiter(s) (50 mL/Hr) IV Continuous <Continuous>  dextrose 50% Injectable 12.5 Gram(s) IV Push once  dextrose 50% Injectable 25 Gram(s) IV Push once  dextrose 50% Injectable 25 Gram(s) IV Push once  enoxaparin Injectable 70 milliGRAM(s) SubCutaneous two times a day  furosemide    Tablet 40 milliGRAM(s) Oral daily  insulin lispro (HumaLOG) corrective regimen sliding scale   SubCutaneous three times a day before meals  insulin lispro (HumaLOG) corrective regimen sliding scale   SubCutaneous at bedtime  ondansetron Injectable 4 milliGRAM(s) IV Push once  pantoprazole    Tablet 40 milliGRAM(s) Oral before breakfast  spironolactone 50 milliGRAM(s) Oral two times a day

## 2018-03-23 NOTE — PROGRESS NOTE ADULT - ASSESSMENT
63 y/o M with a PMH significant for alcoholic cirrhosis, PE on eliquis, NIDDM2, and recurrent pleural effusions in the setting of hepatic hydrothorax, now with chest tube. Patient presented to [facility] on 3/18/18 with worsening dyspnea and orthopnea x3 days, found to have a large right pleural effusion s/p chest tube placement. Transferred to [facility] for possible TIPS.

## 2018-03-23 NOTE — CONSULT NOTE ADULT - ASSESSMENT
Impression:  1) Refractory ascites/hepatic hydrothorax - pending TIPS evaluation by IR  2) Cirrhosis secondary to EtOH. MELD-Na 13          Ascites: +2/2018 ultrasound; on Lasix 40mg, Aldactone 100mg          PSE: none          EV: none  3) Pulmonary embolism on Eliquis (diagnosed 2/8/18)    Plan:  - IR for possible TIPS   - continue home diuretic regimen (Lasix 40/Aldactone 100)   - daily CMP and INR Impression:  1) Refractory ascites/hepatic hydrothorax - pending TIPS evaluation by IR  2) Cirrhosis secondary to EtOH. MELD-Na 13          Ascites: +2/2018 ultrasound; on Lasix 40mg, Aldactone 100mg          PSE: mild asterixis on exam           EV: no prior EGD  3) Pulmonary embolism on Eliquis (diagnosed 2/8/18)    Plan:  - IR for possible TIPS   - continue home diuretic regimen (Lasix 40/Aldactone 100)   - start lactulose; titrate to 2-3 BM/day  - daily CMP and INR Impression:  1) Refractory ascites/hepatic hydrothorax - pending TIPS evaluation by IR  2) Cirrhosis secondary to EtOH. MELD-Na 13          Ascites: +2/2018 ultrasound; on Lasix 40mg, Aldactone 100mg          PSE: mild asterixis on exam           EV: no prior EGD  3) Pulmonary embolism on Eliquis (diagnosed 2/8/18)    Plan:  - IR consult for possible TIPS  - increase home diuretic regimen to Lasix 40 BID /Aldactone 200 - given mild asterixis on exam ideally would avoid TIPS and give trial of increased diuretics   - start lactulose; titrate to 2-3 BM/day  - daily CMP and INR  - outpatient followup with hepatology for EGD (varices screening) Impression:  1) Refractory ascites/hepatic hydrothorax - pending TIPS evaluation by IR  2) Cirrhosis secondary to EtOH. MELD-Na 13          Ascites: +2/2018 ultrasound; on Lasix 40mg, Aldactone 100mg          PSE: mild asterixis on exam           EV: no prior EGD  3) Pulmonary embolism on Eliquis (diagnosed 2/8/18)    Plan:  - IR consult for possible TIPS  - increase home diuretic regimen to Lasix 60 /Aldactone 150 - given mild asterixis on exam ideally would avoid TIPS and give trial of increased diuretics   - start lactulose; titrate to 2-3 BM/day  - daily CMP and INR  - low sodium diet   - outpatient followup with hepatology for EGD (varices screening) Impression:  1) Refractory ascites/hepatic hydrothorax - pending TIPS evaluation by IR  2) Cirrhosis secondary to EtOH. MELD-Na 13          Ascites: +2/2018 ultrasound; on Lasix 40mg, Aldactone 100mg          PSE: mild asterixis on exam           EV: no prior EGD  3) Pulmonary embolism on Eliquis (diagnosed 2/8/18)    Plan:  - IR consult for possible TIPS  - increase home diuretic regimen to Lasix 40mg BID and Aldactone 200mg daily - given mild asterixis on exam ideally would avoid TIPS and give trial of increased diuretics   - start lactulose; titrate to 2-3 BM/day  - send alpha-1-antitrypsin (stool), Celiac serologies, B12 + folate levels   - daily CMP and INR  - low sodium diet (<2g/day)   - outpatient followup with hepatology for EGD (varices screening)

## 2018-03-23 NOTE — PROGRESS NOTE ADULT - PROBLEM SELECTOR PLAN 1
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 Transferred to [facility] for TIPS procedure. MELD score 8 from most recent labs, Child-Colvin score 8, also from most recent labs. Has chest tube in right hemithorax.   - f/u IR and Hepatology recs regarding TIPS  - will obtain TTE given previous TTE suggests pulmonary hypertension but that was in the acute setting of a PE  - continue to monitor outputs from chest tube, f/u Pulmonology for chest tube management   - c/w lasix 40 mg daily and spironolactone 50 mg BID Patient with recurrent pleural effusions in the setting of hepatic hydrothorax. Transferred to [facility] for TIPS procedure. MELD score 13 from most recent labs. Has chest tube in right hemithorax.   - f/u IR and Hepatology recs regarding TIPS  - will obtain TTE given previous TTE suggests pulmonary hypertension but that was in the acute setting of a PE  - continue to monitor outputs from chest tube, f/u Pulmonology for chest tube management   - increase to lasix 60 mg daily and spironolactone 75 mg BID Patient with recurrent pleural effusions in the setting of hepatic hydrothorax. Transferred to [facility] for TIPS procedure. MELD score 13 from most recent labs. Has chest tube in right hemithorax.   - f/u IR and Hepatology recs regarding TIPS  - will obtain TTE given previous TTE suggests pulmonary hypertension but that was in the acute setting of a PE  - continue to monitor outputs from chest tube, f/u Pulmonology for chest tube management   - increase to lasix 40 mg bid and spironolactone 100 mg BID

## 2018-03-23 NOTE — PROGRESS NOTE ADULT - PROBLEM SELECTOR PLAN 5
DVT ppx: on therapeutic lovenox    Richar Jorgensen, PGY-1  Internal Medicine  Pager# [phone]

## 2018-03-23 NOTE — CONSULT NOTE ADULT - ATTENDING COMMENTS
- cirrhotic-appearing liver on imaging, h/o alcohol abuse until 1y ago. MELD-Na 7-13 (borderline elevation of Creat and INR. PLT mostly >200 and no splenomegaly on liver spleen scan 2/16/18. Never had and EGD.  - large right hepatic hydrothorax (albumin gradient > 1.2 g/dL on 2/10/18, now with 2.2L fluid in the drain - accumulated since placement 5d ago  - 3+ leg edema  - small to mod. ascites on US, apparently never enough to be tapped  - serum albumin 1.9 - this is likely the cause of his fluid overload; it is out of proportion of other hepatic parameters - his INR is 1.08 indicating fairly normal synthetic function of the respective proteins, and PLT of 200 to >300 and lack of splenomegaly do not confirm significant portal HTN, although not excluded. No mediastinal mass on CTAs - had a PE with resolved clots; No nephrotic syndrome was found on analysis of his urine on 2/21 - 700 mL had <28 mg total protein;   - has never been on higher dose of Lasix/Spironolactone than 40/100 mg, Creat 1.2  - diet: almost exclusively eats out, i.e. has not been on a low-sodium diet. He named chicken and turkey "mainly" as his diet. Has never seen a hepatologist  - does have fatigue and sleep disturbance; no asterixis on my exam, but earlier today on the fellow's exam  - borderline macrocytosis, anemia Hb 11-12    -- Would evaluate for protein loosing enteropathy and order "alpha 1 antitrypsin, stool"  -- would try to optimize diuretics and dietary Na-intake; as he has some ascites and 3+ leg edema, he should tolerate Lasix 40 mg bid and Spironolactone 200 mg/d at least for several days; monitor Na, K, Creat.  -- sodium restricted diet, 2g Na/d; normal-protein diet.  -- if above negative/fails, can discuss TIPS, but he may already have mild hepatic encephalopathy, which could worsen by TIPS placement.  -- folate, VitB12 levels, iron panel, ferritin

## 2018-03-23 NOTE — PROGRESS NOTE ADULT - PROBLEM SELECTOR PLAN 2
Currently without ascites. MELD of 8 and Child Colvin of 8.   - plan as above Currently without ascites. MELD of 13.   - plan as above

## 2018-03-23 NOTE — PROGRESS NOTE ADULT - ASSESSMENT
Mr. Gilbert is a 59 year old man with alcoholic cirrhosis [portal HTN - refractory ascites/hepatic hydrothorax, no variceal bleeding ?unknown previous EGD, no known encephalopathy], pulmonary embolism in 2/2018 [on eliquis], h/o c.diff, DM II, presented to OSH with worsening SOB x 3 d. 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 s/p chest tube placement at OSH. Consulted for chest tube management.     Patient is currently undergoing TIPS evaluation. He has had multiple thoracentesis for this effusion. Therefore, will opt to leave the chest tube in right now until the decision of the TIPS is finalized. Will plan on removing the chest tube post TIPS. That said, we have to monitor the outpt carefully as removing too much fluid can cause significant fluid shifts, lytes abnormalities, risk of hypotension and BRYNN.     Also, patient last ECHO in 2/2018 showed significant RV dysfunction and elevated pulm pressures. This was in the setting of an acute PE. And he also significant pleural effusion at the time of the ECHO. Now that the PE is >1 month and all the fluid is drained [no tamponade effect on the heart from the effusion], recommend repeating the ECHO now. This will effect the eval for TIPS if he truly has elevation of R sided pressures.     - will continue to the chest tube clamped -- to limit all the fluid shifts.   - if he becomes SOB, then would unclamp the chest tube. would NOT leave the tube to suction at any point. if clamping, then leave to water seal.   - please obtain an ECHO now.   - continue with his diuretics per GI.   - monitor lytes.     plan discussed with the team.  thank you.     Robert Jorgensen MD   Pulmonary Fellow

## 2018-03-24 LAB
ALBUMIN SERPL ELPH-MCNC: 1.9 G/DL — LOW (ref 3.3–5)
ALP SERPL-CCNC: 168 U/L — HIGH (ref 40–120)
ALT FLD-CCNC: 36 U/L RC — SIGNIFICANT CHANGE UP (ref 10–45)
ANION GAP SERPL CALC-SCNC: 11 MMOL/L — SIGNIFICANT CHANGE UP (ref 5–17)
APTT BLD: 41.7 SEC — HIGH (ref 27.5–37.4)
AST SERPL-CCNC: 47 U/L — HIGH (ref 10–40)
BILIRUB SERPL-MCNC: 0.2 MG/DL — SIGNIFICANT CHANGE UP (ref 0.2–1.2)
BUN SERPL-MCNC: 39 MG/DL — HIGH (ref 7–23)
CALCIUM SERPL-MCNC: 8.4 MG/DL — SIGNIFICANT CHANGE UP (ref 8.4–10.5)
CHLORIDE SERPL-SCNC: 104 MMOL/L — SIGNIFICANT CHANGE UP (ref 96–108)
CO2 SERPL-SCNC: 22 MMOL/L — SIGNIFICANT CHANGE UP (ref 22–31)
CREAT SERPL-MCNC: 1.14 MG/DL — SIGNIFICANT CHANGE UP (ref 0.5–1.3)
FERRITIN SERPL-MCNC: 836 NG/ML — HIGH (ref 30–400)
FOLATE SERPL-MCNC: 11.1 NG/ML — SIGNIFICANT CHANGE UP (ref 4.8–24.2)
GLUCOSE SERPL-MCNC: 107 MG/DL — HIGH (ref 70–99)
INR BLD: 1.09 RATIO — SIGNIFICANT CHANGE UP (ref 0.88–1.16)
IRON SATN MFR SERPL: 31 % — SIGNIFICANT CHANGE UP (ref 16–55)
IRON SATN MFR SERPL: 57 UG/DL — SIGNIFICANT CHANGE UP (ref 45–165)
MAGNESIUM SERPL-MCNC: 1.7 MG/DL — SIGNIFICANT CHANGE UP (ref 1.6–2.6)
PHOSPHATE SERPL-MCNC: 2.6 MG/DL — SIGNIFICANT CHANGE UP (ref 2.5–4.5)
POTASSIUM SERPL-MCNC: 4.4 MMOL/L — SIGNIFICANT CHANGE UP (ref 3.5–5.3)
POTASSIUM SERPL-SCNC: 4.4 MMOL/L — SIGNIFICANT CHANGE UP (ref 3.5–5.3)
PROT SERPL-MCNC: 5.2 G/DL — LOW (ref 6–8.3)
PROTHROM AB SERPL-ACNC: 11.8 SEC — SIGNIFICANT CHANGE UP (ref 9.8–12.7)
SODIUM SERPL-SCNC: 137 MMOL/L — SIGNIFICANT CHANGE UP (ref 135–145)
TIBC SERPL-MCNC: 184 UG/DL — LOW (ref 220–430)
UIBC SERPL-MCNC: 127 UG/DL — SIGNIFICANT CHANGE UP (ref 110–370)
VIT B12 SERPL-MCNC: 586 PG/ML — SIGNIFICANT CHANGE UP (ref 232–1245)

## 2018-03-24 PROCEDURE — 99233 SBSQ HOSP IP/OBS HIGH 50: CPT | Mod: GC

## 2018-03-24 RX ADMIN — ENOXAPARIN SODIUM 70 MILLIGRAM(S): 100 INJECTION SUBCUTANEOUS at 18:06

## 2018-03-24 RX ADMIN — LACTULOSE 10 GRAM(S): 10 SOLUTION ORAL at 21:57

## 2018-03-24 RX ADMIN — SPIRONOLACTONE 100 MILLIGRAM(S): 25 TABLET, FILM COATED ORAL at 05:53

## 2018-03-24 RX ADMIN — LACTULOSE 10 GRAM(S): 10 SOLUTION ORAL at 16:11

## 2018-03-24 RX ADMIN — ENOXAPARIN SODIUM 70 MILLIGRAM(S): 100 INJECTION SUBCUTANEOUS at 05:52

## 2018-03-24 RX ADMIN — Medication 40 MILLIGRAM(S): at 05:53

## 2018-03-24 RX ADMIN — LACTULOSE 10 GRAM(S): 10 SOLUTION ORAL at 05:54

## 2018-03-24 RX ADMIN — Medication 40 MILLIGRAM(S): at 18:06

## 2018-03-24 RX ADMIN — SPIRONOLACTONE 100 MILLIGRAM(S): 25 TABLET, FILM COATED ORAL at 18:06

## 2018-03-24 RX ADMIN — PANTOPRAZOLE SODIUM 40 MILLIGRAM(S): 20 TABLET, DELAYED RELEASE ORAL at 06:06

## 2018-03-24 NOTE — PHYSICAL THERAPY INITIAL EVALUATION ADULT - PERTINENT HX OF CURRENT PROBLEM, REHAB EVAL
61 y/o M with a PMH significant for alcoholic cirrhosis, PE on eliquis, NIDDM2, and recurrent pleural effusions in the setting of hepatic hydrothorax, now with chest tube. Patient presented to [facility] on 3/18/18 with worsening dyspnea and orthopnea x3 days, found to have a large right pleural effusion s/p chest tube placement. Transferred to [facility] for possible TIPS

## 2018-03-24 NOTE — PHYSICAL THERAPY INITIAL EVALUATION ADULT - CRITERIA FOR SKILLED THERAPEUTIC INTERVENTIONS
impairments found/therapy frequency/anticipated discharge recommendation/functional limitations in following categories/risk reduction/prevention/rehab potential/predicted duration of therapy intervention/anticipated equipment needs at discharge

## 2018-03-24 NOTE — PROVIDER CONTACT NOTE (OTHER) - ASSESSMENT
Pt is AO x4, ambulates to bathroom independently. LE +3 edema bilateral. VS stable /67, HR 72, SpO2 100% RA, T 36.7. clamped CT on admission on 3/21/2018 no drainage. Dr Cohen ordered to unclamp CT. chambers filled 1900mL. Dr Cohen order to re-clamp CT. After re-clamping CT pt c/o 8/10 pain at the site. no redness, or swelling, no resp distress noted.

## 2018-03-24 NOTE — PROGRESS NOTE ADULT - SUBJECTIVE AND OBJECTIVE BOX
Patient is a 62y old  Male who presents with a chief complaint of Transfer from [facility] for TIPS, Recurrent Pleural effusion (22 Mar 2018 14:24)      SUBJECTIVE / OVERNIGHT EVENTS: Patient endorses low mood; appetite ok; denies confusion, fever, shortness of breath. Overnight, had 2 L output from chest tube, reclamped; denies any shortness of breath, but does endorse mild pleurisy when taking a deep breath.    MEDICATIONS  (STANDING):  dextrose 5%. 1000 milliLiter(s) (50 mL/Hr) IV Continuous <Continuous>  dextrose 50% Injectable 12.5 Gram(s) IV Push once  dextrose 50% Injectable 25 Gram(s) IV Push once  dextrose 50% Injectable 25 Gram(s) IV Push once  enoxaparin Injectable 70 milliGRAM(s) SubCutaneous two times a day  furosemide    Tablet 40 milliGRAM(s) Oral two times a day  insulin lispro (HumaLOG) corrective regimen sliding scale   SubCutaneous three times a day before meals  insulin lispro (HumaLOG) corrective regimen sliding scale   SubCutaneous at bedtime  lactulose Syrup 10 Gram(s) Oral three times a day  pantoprazole    Tablet 40 milliGRAM(s) Oral before breakfast  spironolactone 100 milliGRAM(s) Oral two times a day    MEDICATIONS  (PRN):  dextrose Gel 1 Dose(s) Oral once PRN Blood Glucose LESS THAN 70 milliGRAM(s)/deciliter  glucagon  Injectable 1 milliGRAM(s) IntraMuscular once PRN Glucose LESS THAN 70 milligrams/deciliter        CAPILLARY BLOOD GLUCOSE      POCT Blood Glucose.: 139 mg/dL (24 Mar 2018 12:24)  POCT Blood Glucose.: 108 mg/dL (24 Mar 2018 08:33)  POCT Blood Glucose.: 162 mg/dL (23 Mar 2018 22:12)  POCT Blood Glucose.: 128 mg/dL (23 Mar 2018 18:22)    I&O's Summary    23 Mar 2018 07:01  -  24 Mar 2018 07:00  --------------------------------------------------------  IN: 750 mL / OUT: 2050 mL / NET: -1300 mL    24 Mar 2018 07:01  -  24 Mar 2018 15:11  --------------------------------------------------------  IN: 500 mL / OUT: 300 mL / NET: 200 mL        PHYSICAL EXAM:   GENERAL: no acute distress  HEENT: NC/AT, EOMI, neck supple, MMM  RESPIRATORY: mildly diminished BS RLL; intermittent crackles RLL; otherwise clear; chest tube site clean and dry; transluscent fluid in chest tube lumen no blood  CARDIOVASCULAR: RRR, no murmurs, gallops, rubs  ABDOMINAL: soft, non-tender, non-distended, positive bowel sounds; minimal ascites   EXTREMITIES: no clubbing, cyanosis; 1+ to 2+ LE edema  NEUROLOGICAL: alert and oriented x 3, non-focal  SKIN: no rashes or lesions   MUSCULOSKELETAL: no gross joint deformity    LABS:                        12.1   6.5   )-----------( 217      ( 23 Mar 2018 06:23 )             35.3     WBC Trend: 6.5<--, 5.4<--, 8.2<--  03-24    137  |  104  |  39<H>  ----------------------------<  107<H>  4.4   |  22  |  1.14    Ca    8.4      24 Mar 2018 06:49  Phos  2.6     03-24  Mg     1.7     03-24    TPro  5.2<L>  /  Alb  1.9<L>  /  TBili  0.2  /  DBili  x   /  AST  47<H>  /  ALT  36  /  AlkPhos  168<H>  03-24    Creatinine Trend: 1.14<--, 1.27<--, 1.04<--, 1.16<--, 1.05<--, 0.84<--  PT/INR - ( 24 Mar 2018 06:49 )   PT: 11.8 sec;   INR: 1.09 ratio         PTT - ( 24 Mar 2018 06:49 )  PTT:41.7 sec            RADIOLOGY & ADDITIONAL TESTS:    Imaging Personally Reviewed: TTE    Consultant(s) Notes Reviewed:      Care Discussed with Consultants/Other Providers:

## 2018-03-24 NOTE — PROGRESS NOTE ADULT - PROBLEM SELECTOR PLAN 3
Patient w/ known PE on CTA 2/8/18. Given hepatic impairment, will switch from eliquis to lovenox.   - SQ lovenox 70 mg bid

## 2018-03-24 NOTE — PHYSICAL THERAPY INITIAL EVALUATION ADULT - ADDITIONAL COMMENTS
62 year old male who PTA pt was living in a   and was independent in all functional mobility and ADL's. no AD for gait.

## 2018-03-24 NOTE — PROGRESS NOTE ADULT - PROBLEM SELECTOR PLAN 1
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 Transferred to [facility] for TIPS procedure. MELD score 13 from most recent labs. Has chest tube in right hemithorax.   - f/u IR and Hepatology recs regarding TIPS  - TTE on 3/23 showing mass effect / compression of right ventricle 2/2 pleural effusion; unclamped tube, put out 2 L, which patient tolerated. Will attempt to remove 1 L tomorrow  -need repeat TTE (utility may be limited also due to PE, which may result in increased PA pressures), plan for Monday  - continue to monitor outputs from chest tube, f/u Pulmonology for chest tube management   - increase to lasix 40 mg bid and spironolactone 100 mg BID Patient with recurrent pleural effusions in the setting of hepatic hydrothorax. Transferred to [facility] for TIPS procedure. MELD score 13 from most recent labs. Has chest tube in right hemithorax.   - f/u IR and Hepatology recs regarding TIPS  - TTE on 3/23 showing mass effect / compression of right ventricle 2/2 pleural effusion; unclamped tube, put out 2 L, which patient tolerated. Will attempt to remove 1 L tomorrow  -need repeat TTE (utility may be limited also due to PE, which may result in increased PA pressures), plan for Monday  - continue to monitor outputs from chest tube, f/u Pulmonology for chest tube management   - c/w lasix 40 mg bid and spironolactone 100 mg BID

## 2018-03-24 NOTE — PROVIDER CONTACT NOTE (OTHER) - BACKGROUND
Pt admitted for hepatic cirrhosis to [address] [place] [address] . hx type 2 diabetes, HTN, pleural effusion.

## 2018-03-24 NOTE — PROGRESS NOTE ADULT - ASSESSMENT
61 y/o M with a PMH significant for alcoholic cirrhosis, PE on eliquis, NIDDM2, and recurrent pleural effusions in the setting of hepatic hydrothorax, now with chest tube. Patient presented to [facility] on 3/18/18 with worsening dyspnea and orthopnea x3 days, found to have a large right pleural effusion s/p chest tube placement. Transferred to [facility] for possible TIPS; currently stable. 61 y/o M with a PMH significant for alcoholic cirrhosis, PE on eliquis, NIDDM2, and recurrent pleural effusions in the setting of hepatic hydrothorax, now with chest tube. Patient presented to [facility] on 3/18/18 with worsening dyspnea and orthopnea x3 days, found to have a large right pleural effusion s/p chest tube placement. Transferred to [facility] for TIPS evaluation.

## 2018-03-24 NOTE — PROVIDER CONTACT NOTE (OTHER) - SITUATION
unclamped CT@ 0045 filled up 1900mL by 0105. (chamber filled up too quickly). NF Dr Aguilar was notified and suggested contact the provider (Dr Cohen) who placed the order to unclamp CT and reclamp CT

## 2018-03-25 LAB
ALBUMIN SERPL ELPH-MCNC: 2.2 G/DL — LOW (ref 3.3–5)
ALP SERPL-CCNC: 192 U/L — HIGH (ref 40–120)
ALT FLD-CCNC: 47 U/L RC — HIGH (ref 10–45)
ANION GAP SERPL CALC-SCNC: 9 MMOL/L — SIGNIFICANT CHANGE UP (ref 5–17)
APTT BLD: 49.1 SEC — HIGH (ref 27.5–37.4)
AST SERPL-CCNC: 56 U/L — HIGH (ref 10–40)
BILIRUB SERPL-MCNC: 0.4 MG/DL — SIGNIFICANT CHANGE UP (ref 0.2–1.2)
BUN SERPL-MCNC: 35 MG/DL — HIGH (ref 7–23)
CALCIUM SERPL-MCNC: 9.2 MG/DL — SIGNIFICANT CHANGE UP (ref 8.4–10.5)
CHLORIDE SERPL-SCNC: 102 MMOL/L — SIGNIFICANT CHANGE UP (ref 96–108)
CO2 SERPL-SCNC: 26 MMOL/L — SIGNIFICANT CHANGE UP (ref 22–31)
CREAT SERPL-MCNC: 1.11 MG/DL — SIGNIFICANT CHANGE UP (ref 0.5–1.3)
GLUCOSE SERPL-MCNC: 122 MG/DL — HIGH (ref 70–99)
INR BLD: 1.11 RATIO — SIGNIFICANT CHANGE UP (ref 0.88–1.16)
MAGNESIUM SERPL-MCNC: 1.8 MG/DL — SIGNIFICANT CHANGE UP (ref 1.6–2.6)
PHOSPHATE SERPL-MCNC: 3.4 MG/DL — SIGNIFICANT CHANGE UP (ref 2.5–4.5)
POTASSIUM SERPL-MCNC: 5.1 MMOL/L — SIGNIFICANT CHANGE UP (ref 3.5–5.3)
POTASSIUM SERPL-SCNC: 5.1 MMOL/L — SIGNIFICANT CHANGE UP (ref 3.5–5.3)
PROT SERPL-MCNC: 5.7 G/DL — LOW (ref 6–8.3)
PROTHROM AB SERPL-ACNC: 12 SEC — SIGNIFICANT CHANGE UP (ref 9.8–12.7)
SODIUM SERPL-SCNC: 137 MMOL/L — SIGNIFICANT CHANGE UP (ref 135–145)

## 2018-03-25 PROCEDURE — 99233 SBSQ HOSP IP/OBS HIGH 50: CPT | Mod: GC

## 2018-03-25 RX ORDER — LACTULOSE 10 G/15ML
10 SOLUTION ORAL THREE TIMES A DAY
Qty: 0 | Refills: 0 | Status: DISCONTINUED | OUTPATIENT
Start: 2018-03-25 | End: 2018-03-29

## 2018-03-25 RX ADMIN — PANTOPRAZOLE SODIUM 40 MILLIGRAM(S): 20 TABLET, DELAYED RELEASE ORAL at 06:23

## 2018-03-25 RX ADMIN — Medication 40 MILLIGRAM(S): at 18:12

## 2018-03-25 RX ADMIN — ENOXAPARIN SODIUM 70 MILLIGRAM(S): 100 INJECTION SUBCUTANEOUS at 18:11

## 2018-03-25 RX ADMIN — ENOXAPARIN SODIUM 70 MILLIGRAM(S): 100 INJECTION SUBCUTANEOUS at 06:23

## 2018-03-25 RX ADMIN — Medication 40 MILLIGRAM(S): at 06:23

## 2018-03-25 RX ADMIN — LACTULOSE 10 GRAM(S): 10 SOLUTION ORAL at 06:24

## 2018-03-25 RX ADMIN — SPIRONOLACTONE 100 MILLIGRAM(S): 25 TABLET, FILM COATED ORAL at 06:23

## 2018-03-25 RX ADMIN — SPIRONOLACTONE 100 MILLIGRAM(S): 25 TABLET, FILM COATED ORAL at 18:12

## 2018-03-25 NOTE — PROGRESS NOTE ADULT - ASSESSMENT
63 y/o M with a PMH significant for alcoholic cirrhosis, PE on eliquis, NIDDM2, and recurrent pleural effusions in the setting of hepatic hydrothorax, now with chest tube. Patient presented to [facility] on 3/18/18 with worsening dyspnea and orthopnea x3 days, found to have a large right pleural effusion s/p chest tube placement. Transferred to [facility] for TIPS evaluation.

## 2018-03-25 NOTE — PROGRESS NOTE ADULT - PROBLEM SELECTOR PLAN 1
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 Transferred to [facility] for TIPS procedure. MELD score 13 from most recent labs. Has chest tube in right hemithorax.   - f/u IR and Hepatology recs regarding TIPS  - TTE on 3/23 showing mass effect / compression of right ventricle 2/2 pleural effusion; unclamped tube, put out 2 L, which patient tolerated. Will attempt to remove 1 L today  - need repeat TTE (utility may be limited also due to PE, which may result in increased PA pressures), plan for Monday  - continue to monitor outputs from chest tube, f/u Pulmonology for chest tube management   - c/w lasix 40 mg bid and spironolactone 100 mg BID

## 2018-03-25 NOTE — PROGRESS NOTE ADULT - SUBJECTIVE AND OBJECTIVE BOX
Overnight Events: No acute events o/n.     REVIEW OF SYSTEMS:      VITAL SIGNS:  Vital Signs Last 24 Hrs  T(C): 36.7 (25 Mar 2018 05:10), Max: 36.8 (24 Mar 2018 21:12)  T(F): 98.1 (25 Mar 2018 05:10), Max: 98.2 (24 Mar 2018 21:12)  HR: 91 (25 Mar 2018 05:10) (82 - 91)  BP: 103/66 (25 Mar 2018 05:10) (103/66 - 110/75)  BP(mean): --  RR: 18 (25 Mar 2018 05:10) (18 - 18)  SpO2: 100% (25 Mar 2018 05:10) (99% - 100%)    PHYSICAL EXAM:   GENERAL: no acute distress  HEENT: NC/AT, EOMI, neck supple, MMM  RESPIRATORY: LCTAB/L, no rhonchi, rales, or wheezing; +chest tube in right hemithorax  CARDIOVASCULAR: RRR, no murmurs, gallops, rubs  ABDOMINAL: soft, non-tender, non-distended, positive bowel sounds   EXTREMITIES: no clubbing, cyanosis; 1+ b/l LE edema  NEUROLOGICAL: alert and oriented x 3, non-focal  SKIN: no rashes or lesions   MUSCULOSKELETAL: no gross joint deformity      03-24    137  |  104  |  39<H>  ----------------------------<  107<H>  4.4   |  22  |  1.14    Ca    8.4      24 Mar 2018 06:49  Phos  2.6     03-24  Mg     1.7     03-24    TPro  5.2<L>  /  Alb  1.9<L>  /  TBili  0.2  /  DBili  x   /  AST  47<H>  /  ALT  36  /  AlkPhos  168<H>  03-24    PT/INR - ( 24 Mar 2018 06:49 )   PT: 11.8 sec;   INR: 1.09 ratio         PTT - ( 24 Mar 2018 06:49 )  PTT:41.7 sec    CAPILLARY BLOOD GLUCOSE      POCT Blood Glucose.: 156 mg/dL (24 Mar 2018 22:04)  POCT Blood Glucose.: 99 mg/dL (24 Mar 2018 17:33)  POCT Blood Glucose.: 139 mg/dL (24 Mar 2018 12:24)  POCT Blood Glucose.: 108 mg/dL (24 Mar 2018 08:33)    I&O's Summary    24 Mar 2018 07:01  -  25 Mar 2018 07:00  --------------------------------------------------------  IN: 860 mL / OUT: 550 mL / NET: 310 mL        MEDICATIONS  (STANDING):  dextrose 5%. 1000 milliLiter(s) (50 mL/Hr) IV Continuous <Continuous>  dextrose 50% Injectable 12.5 Gram(s) IV Push once  dextrose 50% Injectable 25 Gram(s) IV Push once  dextrose 50% Injectable 25 Gram(s) IV Push once  enoxaparin Injectable 70 milliGRAM(s) SubCutaneous two times a day  furosemide    Tablet 40 milliGRAM(s) Oral two times a day  insulin lispro (HumaLOG) corrective regimen sliding scale   SubCutaneous three times a day before meals  insulin lispro (HumaLOG) corrective regimen sliding scale   SubCutaneous at bedtime  lactulose Syrup 10 Gram(s) Oral three times a day  pantoprazole    Tablet 40 milliGRAM(s) Oral before breakfast  spironolactone 100 milliGRAM(s) Oral two times a day Overnight Events: No acute events o/n. Still asking why everything seems to be taking so long. Is weighing pros and cons of TIPS vs. keeping pigtail/pleurx  and daily drainage if optimized on diuretic regimen.     REVIEW OF SYSTEMS:  Denies, CP, SOB, n/v/d/c. Endorses increased urination on higher diuretic dose.     VITAL SIGNS:  Vital Signs Last 24 Hrs  T(C): 36.7 (25 Mar 2018 05:10), Max: 36.8 (24 Mar 2018 21:12)  T(F): 98.1 (25 Mar 2018 05:10), Max: 98.2 (24 Mar 2018 21:12)  HR: 91 (25 Mar 2018 05:10) (82 - 91)  BP: 103/66 (25 Mar 2018 05:10) (103/66 - 110/75)  BP(mean): --  RR: 18 (25 Mar 2018 05:10) (18 - 18)  SpO2: 100% (25 Mar 2018 05:10) (99% - 100%)    PHYSICAL EXAM:   GENERAL: no acute distress  HEENT: NC/AT, EOMI, neck supple, MMM  RESPIRATORY: LCTAB/L, no rhonchi, rales, or wheezing; +chest tube in right hemithorax  CARDIOVASCULAR: RRR, no murmurs, gallops, rubs  ABDOMINAL: soft, non-tender, non-distended, positive bowel sounds   EXTREMITIES: no clubbing, cyanosis; 1+ b/l LE edema  NEUROLOGICAL: alert and oriented x 3, non-focal  SKIN: no rashes or lesions   MUSCULOSKELETAL: no gross joint deformity      03-24    137  |  104  |  39<H>  ----------------------------<  107<H>  4.4   |  22  |  1.14    Ca    8.4      24 Mar 2018 06:49  Phos  2.6     03-24  Mg     1.7     03-24    TPro  5.2<L>  /  Alb  1.9<L>  /  TBili  0.2  /  DBili  x   /  AST  47<H>  /  ALT  36  /  AlkPhos  168<H>  03-24    PT/INR - ( 24 Mar 2018 06:49 )   PT: 11.8 sec;   INR: 1.09 ratio         PTT - ( 24 Mar 2018 06:49 )  PTT:41.7 sec    CAPILLARY BLOOD GLUCOSE      POCT Blood Glucose.: 156 mg/dL (24 Mar 2018 22:04)  POCT Blood Glucose.: 99 mg/dL (24 Mar 2018 17:33)  POCT Blood Glucose.: 139 mg/dL (24 Mar 2018 12:24)  POCT Blood Glucose.: 108 mg/dL (24 Mar 2018 08:33)    I&O's Summary    24 Mar 2018 07:01  -  25 Mar 2018 07:00  --------------------------------------------------------  IN: 860 mL / OUT: 550 mL / NET: 310 mL        MEDICATIONS  (STANDING):  dextrose 5%. 1000 milliLiter(s) (50 mL/Hr) IV Continuous <Continuous>  dextrose 50% Injectable 12.5 Gram(s) IV Push once  dextrose 50% Injectable 25 Gram(s) IV Push once  dextrose 50% Injectable 25 Gram(s) IV Push once  enoxaparin Injectable 70 milliGRAM(s) SubCutaneous two times a day  furosemide    Tablet 40 milliGRAM(s) Oral two times a day  insulin lispro (HumaLOG) corrective regimen sliding scale   SubCutaneous three times a day before meals  insulin lispro (HumaLOG) corrective regimen sliding scale   SubCutaneous at bedtime  lactulose Syrup 10 Gram(s) Oral three times a day  pantoprazole    Tablet 40 milliGRAM(s) Oral before breakfast  spironolactone 100 milliGRAM(s) Oral two times a day

## 2018-03-25 NOTE — PROGRESS NOTE ADULT - PROBLEM SELECTOR PLAN 2
Currently without ascites. MELD of 13.   - plan as above Currently without ascites. MELD of 13.   - plan as above  - f/u stool alpha 1 antitrypsin  - lactulose 10 g tid; titrate to 2-3 BM's/day Currently without ascites. MELD of 13.   - plan as above  - f/u stool alpha 1 antitrypsin  - lactulose 10 g tid; titrate to 1-2 BM's/day

## 2018-03-25 NOTE — PROGRESS NOTE ADULT - PROBLEM SELECTOR PLAN 5
DVT ppx: on therapeutic lovenox    Richar Jorgensen, PGY-1  Internal Medicine  Pager# [phone]

## 2018-03-26 DIAGNOSIS — I31.3 PERICARDIAL EFFUSION (NONINFLAMMATORY): ICD-10-CM

## 2018-03-26 LAB
A1AT STL-MCNC: <11 MG/DL — SIGNIFICANT CHANGE UP
A1AT STL-MCNC: <17 MG/DL — SIGNIFICANT CHANGE UP
ALBUMIN SERPL ELPH-MCNC: 1.9 G/DL — LOW (ref 3.3–5)
ALP SERPL-CCNC: 183 U/L — HIGH (ref 40–120)
ALT FLD-CCNC: 48 U/L RC — HIGH (ref 10–45)
ANION GAP SERPL CALC-SCNC: 11 MMOL/L — SIGNIFICANT CHANGE UP (ref 5–17)
APTT BLD: 43.6 SEC — HIGH (ref 27.5–37.4)
AST SERPL-CCNC: 57 U/L — HIGH (ref 10–40)
BILIRUB SERPL-MCNC: 0.4 MG/DL — SIGNIFICANT CHANGE UP (ref 0.2–1.2)
BUN SERPL-MCNC: 37 MG/DL — HIGH (ref 7–23)
CALCIUM SERPL-MCNC: 8.6 MG/DL — SIGNIFICANT CHANGE UP (ref 8.4–10.5)
CHLORIDE SERPL-SCNC: 100 MMOL/L — SIGNIFICANT CHANGE UP (ref 96–108)
CO2 SERPL-SCNC: 23 MMOL/L — SIGNIFICANT CHANGE UP (ref 22–31)
CREAT SERPL-MCNC: 1.1 MG/DL — SIGNIFICANT CHANGE UP (ref 0.5–1.3)
GLUCOSE SERPL-MCNC: 116 MG/DL — HIGH (ref 70–99)
INR BLD: 1.05 RATIO — SIGNIFICANT CHANGE UP (ref 0.88–1.16)
MAGNESIUM SERPL-MCNC: 1.8 MG/DL — SIGNIFICANT CHANGE UP (ref 1.6–2.6)
PHOSPHATE SERPL-MCNC: 3.4 MG/DL — SIGNIFICANT CHANGE UP (ref 2.5–4.5)
POTASSIUM SERPL-MCNC: 4.6 MMOL/L — SIGNIFICANT CHANGE UP (ref 3.5–5.3)
POTASSIUM SERPL-SCNC: 4.6 MMOL/L — SIGNIFICANT CHANGE UP (ref 3.5–5.3)
PROT SERPL-MCNC: 5.6 G/DL — LOW (ref 6–8.3)
PROTHROM AB SERPL-ACNC: 11.4 SEC — SIGNIFICANT CHANGE UP (ref 9.8–12.7)
SODIUM SERPL-SCNC: 134 MMOL/L — LOW (ref 135–145)

## 2018-03-26 PROCEDURE — 99233 SBSQ HOSP IP/OBS HIGH 50: CPT | Mod: GC

## 2018-03-26 PROCEDURE — 99232 SBSQ HOSP IP/OBS MODERATE 35: CPT | Mod: GC

## 2018-03-26 RX ADMIN — ENOXAPARIN SODIUM 70 MILLIGRAM(S): 100 INJECTION SUBCUTANEOUS at 06:18

## 2018-03-26 RX ADMIN — ENOXAPARIN SODIUM 70 MILLIGRAM(S): 100 INJECTION SUBCUTANEOUS at 17:59

## 2018-03-26 RX ADMIN — SPIRONOLACTONE 100 MILLIGRAM(S): 25 TABLET, FILM COATED ORAL at 06:17

## 2018-03-26 RX ADMIN — Medication 1: at 13:04

## 2018-03-26 RX ADMIN — Medication 40 MILLIGRAM(S): at 06:18

## 2018-03-26 RX ADMIN — Medication 40 MILLIGRAM(S): at 18:00

## 2018-03-26 RX ADMIN — SPIRONOLACTONE 100 MILLIGRAM(S): 25 TABLET, FILM COATED ORAL at 18:00

## 2018-03-26 NOTE — PROGRESS NOTE ADULT - ASSESSMENT
Impression:  1) Refractory ascites/hepatic hydrothorax - pending TIPS evaluation by IR; currently giving trial of increased diuretic regimen as patient concerned about proceeding with TIPS (specifically worsening encephalopathy).   2) Cirrhosis secondary to EtOH. MELD-Na 13          Ascites: +2/2018 ultrasound          PSE: mild asterixis on exam           EV: no prior EGD  3) Pulmonary embolism on Eliquis (diagnosed 2/8/18)    Plan:  - continue current diuretic regimen - Lasix 40mg BID, Aldactone 200mg daily although concerning that patient still has high volume chest tube output despite being on this regimen for 4 days now  - continue discussing possible TIPS with patient; if he chooses to proceed will need IR consult    - continue lactulose; titrate to 2-3 BM/day  - follow-up alpha-1-antitrypsin (stool), Celiac serologies  - daily CMP and INR  - low sodium diet (<2g/day)   - outpatient followup with hepatology for EGD (varices screening) Impression:  1) Refractory ascites/hepatic hydrothorax - pending TIPS evaluation by IR; currently giving trial of increased diuretic regimen as patient concerned about proceeding with TIPS (specifically worsening encephalopathy).   2) Cirrhosis secondary to EtOH. MELD-Na 13          Ascites: +2/2018 ultrasound          PSE: mild asterixis on exam           EV: no prior EGD  3) Pulmonary embolism on Eliquis (diagnosed 2/8/18)    Plan:  - continue current diuretic regimen - Lasix 40mg BID, Aldactone 200mg daily although concerning that patient still has high volume chest tube output despite being on this regimen for 4 days now  - continue discussing possible TIPS with patient; if he chooses to proceed will need IR consult    - patient asking about indwelling tunneled pleural catheter placement as opposed to TIPS; this is not recommended as it has a high complication rate including/especially infection   - continue lactulose; titrate to 2-3 BM/day  - follow-up alpha-1-antitrypsin (stool), Celiac serologies  - daily CMP and INR  - low sodium diet (<2g/day)   - outpatient followup with hepatology for EGD (varices screening) Impression:  1) Refractory ascites/hepatic hydrothorax - pending TIPS evaluation by IR; currently giving trial of increased diuretic regimen as patient concerned about proceeding with TIPS (specifically worsening encephalopathy).   2) Cirrhosis secondary to EtOH. MELD-Na 13          Ascites: +2/2018 ultrasound          PSE: mild asterixis on exam           EV: no prior EGD  3) Pulmonary embolism on Eliquis (diagnosed 2/8/18)    Plan:  - continue current diuretic regimen - Lasix 40mg BID, Aldactone 200mg daily   - continue discussing possible TIPS with patient; if he chooses to proceed will need IR consult; may benefit from wedged portal pressure measurement prior to TIPS to confirm portal hypertension and therefore possible benefit of TIPS  - patient asking about indwelling tunneled pleural catheter placement as opposed to TIPS; this is not recommended as it has a high complication rate including/especially infection   - if minimal drainage today from chest tube, can discharge on above diuretic regimen with close outpatient follow-up  - continue lactulose; titrate to 2-3 BM/day  - follow-up alpha-1-antitrypsin (stool), Celiac serologies  - daily CMP and INR  - low sodium diet (<2g/day)   - outpatient followup with hepatology for EGD (varices screening) - patient prefers MDs near his home in [place] ([facility] does not have offices there)

## 2018-03-26 NOTE — PROGRESS NOTE ADULT - PROBLEM SELECTOR PLAN 4
A1c is 5.6% from last month. On metformin at home.  - ELVI qAC tid and qhs  - monitor FSG  - consistent carb, DASH/TLC diet 2d echo shows  large loculated pericardial effusion adjacent to the right atrium causing compression of the right atrium and right ventricle.  now s/p diuresis and chest tube  will repeat echo to see if improvement in findings

## 2018-03-26 NOTE — PROGRESS NOTE ADULT - ASSESSMENT
59 year old man with alcoholic cirrhosis [portal HTN - refractory ascites/hepatic hydrothorax, no variceal bleeding ?unknown previous EGD, no known encephalopathy], pulmonary embolism in 2/2018 [on eliquis], h/o c.diff, DM II, presented to OSH with worsening SOB x 3 d. 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 s/p chest tube placement at OSH.    1) Refractory ascites/hepatic hydrothorax -  -Not interested TIPS at this time  -Continue current diuretic regimen - Lasix 40mg BID, Aldactone 200mg daily   -Given complication rate of pleural catheters in this setting, would defer at this time  - low sodium diet (<2g/day)     2) Pulmonary embolism on Eliquis (diagnosed 2/8/18) 59 year old man with alcoholic cirrhosis [portal HTN - refractory ascites/hepatic hydrothorax, no variceal bleeding ?unknown previous EGD, no known encephalopathy], pulmonary embolism in 2/2018 [on eliquis], h/o c.diff, DM II, presented to OSH with worsening SOB x 3 d. 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 s/p chest tube placement at OSH.    1) Refractory ascites/hepatic hydrothorax -  -secondary to underlying cirrhosis  - c/w diuretics   - agree with hepatology in obtaining hepatic venous wedge pressure   - patient would benefit from TIPS- patient is still concerned about risks of procedure  - would not recommend pleural catheter at this time, given risks of catheter   - low sodium diet (<2g/day)     2) Pulmonary embolism on Eliquis (diagnosed 2/8/18)  - c/w lovenox 70sq BID

## 2018-03-26 NOTE — PROGRESS NOTE ADULT - SUBJECTIVE AND OBJECTIVE BOX
Overnight Events: No acute events o/n.     REVIEW OF SYSTEMS:      VITAL SIGNS:  Vital Signs Last 24 Hrs  T(C): 36.7 (26 Mar 2018 06:07), Max: 36.9 (25 Mar 2018 20:58)  T(F): 98 (26 Mar 2018 06:07), Max: 98.4 (25 Mar 2018 20:58)  HR: 77 (26 Mar 2018 06:07) (77 - 85)  BP: 101/68 (26 Mar 2018 06:07) (101/68 - 110/75)  BP(mean): --  RR: 18 (26 Mar 2018 06:07) (16 - 18)  SpO2: 100% (26 Mar 2018 06:07) (99% - 100%)    PHYSICAL EXAM:   GENERAL: no acute distress  HEENT: NC/AT, EOMI, neck supple, MMM  RESPIRATORY: LCTAB/L, no rhonchi, rales, or wheezing; +chest tube in right hemithorax, area c/d/i  CARDIOVASCULAR: RRR, no murmurs, gallops, rubs  ABDOMINAL: soft, non-tender, non-distended, positive bowel sounds   EXTREMITIES: no clubbing, cyanosis, or edema  NEUROLOGICAL: alert and oriented x 3, non-focal  SKIN: no rashes or lesions   MUSCULOSKELETAL: no gross joint deformity      03-26    134<L>  |  100  |  37<H>  ----------------------------<  116<H>  4.6   |  23  |  1.10    Ca    8.6      26 Mar 2018 06:19  Phos  3.4     03-26  Mg     1.8     03-26    TPro  5.6<L>  /  Alb  1.9<L>  /  TBili  0.4  /  DBili  x   /  AST  57<H>  /  ALT  48<H>  /  AlkPhos  183<H>  03-26    PT/INR - ( 26 Mar 2018 06:19 )   PT: 11.4 sec;   INR: 1.05 ratio         PTT - ( 26 Mar 2018 06:19 )  PTT:43.6 sec    CAPILLARY BLOOD GLUCOSE      POCT Blood Glucose.: 124 mg/dL (25 Mar 2018 21:36)  POCT Blood Glucose.: 100 mg/dL (25 Mar 2018 17:41)  POCT Blood Glucose.: 90 mg/dL (25 Mar 2018 12:59)  POCT Blood Glucose.: 95 mg/dL (25 Mar 2018 09:06)    I&O's Summary    25 Mar 2018 07:01  -  26 Mar 2018 07:00  --------------------------------------------------------  IN: 1090 mL / OUT: 1000 mL / NET: 90 mL        MEDICATIONS  (STANDING):  dextrose 5%. 1000 milliLiter(s) (50 mL/Hr) IV Continuous <Continuous>  dextrose 50% Injectable 12.5 Gram(s) IV Push once  dextrose 50% Injectable 25 Gram(s) IV Push once  dextrose 50% Injectable 25 Gram(s) IV Push once  enoxaparin Injectable 70 milliGRAM(s) SubCutaneous two times a day  furosemide    Tablet 40 milliGRAM(s) Oral two times a day  insulin lispro (HumaLOG) corrective regimen sliding scale   SubCutaneous three times a day before meals  insulin lispro (HumaLOG) corrective regimen sliding scale   SubCutaneous at bedtime  lactulose Syrup 10 Gram(s) Oral three times a day  pantoprazole    Tablet 40 milliGRAM(s) Oral before breakfast  spironolactone 100 milliGRAM(s) Oral two times a day Overnight Events: No acute events o/n. Asking if pulm can change chest tube for pleurx so he can go home with it and avoid TIPS.     REVIEW OF SYSTEMS:  Denies CP, SOB, n/v/d/c, pain at tube site.     VITAL SIGNS:  Vital Signs Last 24 Hrs  T(C): 36.7 (26 Mar 2018 06:07), Max: 36.9 (25 Mar 2018 20:58)  T(F): 98 (26 Mar 2018 06:07), Max: 98.4 (25 Mar 2018 20:58)  HR: 77 (26 Mar 2018 06:07) (77 - 85)  BP: 101/68 (26 Mar 2018 06:07) (101/68 - 110/75)  BP(mean): --  RR: 18 (26 Mar 2018 06:07) (16 - 18)  SpO2: 100% (26 Mar 2018 06:07) (99% - 100%)    PHYSICAL EXAM:   GENERAL: no acute distress  HEENT: NC/AT, EOMI, neck supple, MMM  RESPIRATORY: LCTAB/L, no rhonchi, rales, or wheezing; +chest tube in right hemithorax, area c/d/i  CARDIOVASCULAR: RRR, no murmurs, gallops, rubs  ABDOMINAL: soft, non-tender, non-distended, positive bowel sounds   EXTREMITIES: no clubbing, cyanosis, or edema  NEUROLOGICAL: alert and oriented x 3, non-focal  SKIN: no rashes or lesions   MUSCULOSKELETAL: no gross joint deformity      03-26    134<L>  |  100  |  37<H>  ----------------------------<  116<H>  4.6   |  23  |  1.10    Ca    8.6      26 Mar 2018 06:19  Phos  3.4     03-26  Mg     1.8     03-26    TPro  5.6<L>  /  Alb  1.9<L>  /  TBili  0.4  /  DBili  x   /  AST  57<H>  /  ALT  48<H>  /  AlkPhos  183<H>  03-26    PT/INR - ( 26 Mar 2018 06:19 )   PT: 11.4 sec;   INR: 1.05 ratio         PTT - ( 26 Mar 2018 06:19 )  PTT:43.6 sec    CAPILLARY BLOOD GLUCOSE      POCT Blood Glucose.: 124 mg/dL (25 Mar 2018 21:36)  POCT Blood Glucose.: 100 mg/dL (25 Mar 2018 17:41)  POCT Blood Glucose.: 90 mg/dL (25 Mar 2018 12:59)  POCT Blood Glucose.: 95 mg/dL (25 Mar 2018 09:06)    I&O's Summary    25 Mar 2018 07:01  -  26 Mar 2018 07:00  --------------------------------------------------------  IN: 1090 mL / OUT: 1000 mL / NET: 90 mL        MEDICATIONS  (STANDING):  dextrose 5%. 1000 milliLiter(s) (50 mL/Hr) IV Continuous <Continuous>  dextrose 50% Injectable 12.5 Gram(s) IV Push once  dextrose 50% Injectable 25 Gram(s) IV Push once  dextrose 50% Injectable 25 Gram(s) IV Push once  enoxaparin Injectable 70 milliGRAM(s) SubCutaneous two times a day  furosemide    Tablet 40 milliGRAM(s) Oral two times a day  insulin lispro (HumaLOG) corrective regimen sliding scale   SubCutaneous three times a day before meals  insulin lispro (HumaLOG) corrective regimen sliding scale   SubCutaneous at bedtime  lactulose Syrup 10 Gram(s) Oral three times a day  pantoprazole    Tablet 40 milliGRAM(s) Oral before breakfast  spironolactone 100 milliGRAM(s) Oral two times a day Patient is a 62y old  Male who presents with a chief complaint of Transfer from [facility] for TIPS, Recurrent Pleural effusion (22 Mar 2018 14:24)      SUBJECTIVE / OVERNIGHT EVENTS: Patient wants to go home with pleurex, refusing TIPS due to risk of hepatic encephalopathy. Denies any n/v/d/f, chest pain or sob.    MEDICATIONS  (STANDING):  dextrose 5%. 1000 milliLiter(s) (50 mL/Hr) IV Continuous <Continuous>  dextrose 50% Injectable 12.5 Gram(s) IV Push once  dextrose 50% Injectable 25 Gram(s) IV Push once  dextrose 50% Injectable 25 Gram(s) IV Push once  enoxaparin Injectable 70 milliGRAM(s) SubCutaneous two times a day  furosemide    Tablet 40 milliGRAM(s) Oral two times a day  insulin lispro (HumaLOG) corrective regimen sliding scale   SubCutaneous three times a day before meals  insulin lispro (HumaLOG) corrective regimen sliding scale   SubCutaneous at bedtime  lactulose Syrup 10 Gram(s) Oral three times a day  pantoprazole    Tablet 40 milliGRAM(s) Oral before breakfast  spironolactone 100 milliGRAM(s) Oral two times a day    MEDICATIONS  (PRN):  dextrose Gel 1 Dose(s) Oral once PRN Blood Glucose LESS THAN 70 milliGRAM(s)/deciliter  glucagon  Injectable 1 milliGRAM(s) IntraMuscular once PRN Glucose LESS THAN 70 milligrams/deciliter      Vital Signs Last 24 Hrs  T(C): 36.5 (26 Mar 2018 12:00), Max: 36.9 (25 Mar 2018 20:58)  T(F): 97.7 (26 Mar 2018 12:00), Max: 98.4 (25 Mar 2018 20:58)  HR: 83 (26 Mar 2018 12:37) (77 - 84)  BP: 110/73 (26 Mar 2018 12:37) (101/68 - 110/73)  BP(mean): --  RR: 18 (26 Mar 2018 12:37) (16 - 18)  SpO2: 99% (26 Mar 2018 12:00) (99% - 100%)  CAPILLARY BLOOD GLUCOSE      POCT Blood Glucose.: 128 mg/dL (26 Mar 2018 17:09)  POCT Blood Glucose.: 157 mg/dL (26 Mar 2018 12:59)  POCT Blood Glucose.: 92 mg/dL (26 Mar 2018 09:13)  POCT Blood Glucose.: 124 mg/dL (25 Mar 2018 21:36)  POCT Blood Glucose.: 100 mg/dL (25 Mar 2018 17:41)    I&O's Summary    25 Mar 2018 07:01  -  26 Mar 2018 07:00  --------------------------------------------------------  IN: 1090 mL / OUT: 1000 mL / NET: 90 mL    26 Mar 2018 07:01  -  26 Mar 2018 17:19  --------------------------------------------------------  IN: 800 mL / OUT: 3000 mL / NET: -2200 mL        PHYSICAL EXAM:  GENERAL: NAD, well-developed  HEAD:  Atraumatic, Normocephalic  NECK: Supple, No JVD  CHEST/LUNG: decreased breath sounds at Right base  HEART: Regular rate and rhythm; No murmurs, rubs, or gallops  ABDOMEN: Soft, Nontender, Nondistended; Bowel sounds present  EXTREMITIES:  2+ Peripheral Pulses, trace ankle edema b/l  PSYCH: AAOx3  NEUROLOGY: non-focal    LABS:    03-26    134<L>  |  100  |  37<H>  ----------------------------<  116<H>  4.6   |  23  |  1.10    Ca    8.6      26 Mar 2018 06:19  Phos  3.4     03-26  Mg     1.8     03-26    TPro  5.6<L>  /  Alb  1.9<L>  /  TBili  0.4  /  DBili  x   /  AST  57<H>  /  ALT  48<H>  /  AlkPhos  183<H>  03-26    PT/INR - ( 26 Mar 2018 06:19 )   PT: 11.4 sec;   INR: 1.05 ratio         PTT - ( 26 Mar 2018 06:19 )  PTT:43.6 sec              RADIOLOGY & ADDITIONAL TESTS:    Imaging Personally Reviewed: 2d echo: Conclusions:  1. Normal mitral valve. Mild mitral regurgitation.  2. Normal trileaflet aortic valve. No aortic valve  regurgitation seen.  3. Normal left ventricular systolic function. No segmental  wall motion abnormalities.  4. Normal diastolic function  5. Normal right ventricular size and function.  6. Normal tricuspid valve. Mild tricuspid regurgitation.  7. Normal pericardium with trace pericardial effusion. The  pericaridum has an ahderent connection to the right  ventricle, causing a large loculated pericardial effusion  adjacent to the right atrium causing compression of the  right atrium and right ventricle.  Largest pocket measures  2.4 cm. There is significant respiratory variation of the  LVOT.  8. Bilateral pleural effusions. The right pleural effsion  apears to be causing increased intrathoracic pressure with  hemodynamic significance of the right ventricle.  *** No previous Echo exam.    Consultant(s) Notes Reviewed:  hepatology, pulm    Care Discussed with Consultants/Other Providers: Dr Kwan - needs to get pulm wedge pressure to see if TIPS would help

## 2018-03-26 NOTE — PROGRESS NOTE ADULT - PROBLEM SELECTOR PLAN 1
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 Transferred to [facility] for TIPS procedure. MELD score 13 from most recent labs. Has chest tube in right hemithorax.   - f/u IR and Hepatology recs regarding TIPS  - TTE on 3/23 showing mass effect / compression of right ventricle 2/2 pleural effusion; will unclamp tube to remove 1L daily  - need repeat TTE (utility may be limited also due to PE, which may result in increased PA pressures), plan for Monday  - continue to monitor outputs from chest tube, f/u Pulmonology for chest tube management   - c/w lasix 40 mg bid and spironolactone 100 mg BID Patient with recurrent pleural effusions in the setting of hepatic hydrothorax. Transferred to [facility] for TIPS procedure which patient is refusing  -1L drained today  -s/p chest tube in right hemithorax.   -patient is refusing TIPS due to HE risk and wants to go home with pleurex which pulm and hepatology are not recommending  - TTE on 3/23 showing mass effect / compression of right ventricle 2/2 pleural effusion;   - need repeat TTE (utility may be limited also due to PE, which may result in increased PA pressures), plan for tuesday  - continue to monitor outputs from chest tube, f/u Pulmonology for chest tube management   - c/w lasix 40 mg bid and spironolactone 100 mg BID

## 2018-03-26 NOTE — PROGRESS NOTE ADULT - PROBLEM SELECTOR PLAN 5
DVT ppx: on therapeutic lovenox    Richar Jorgensen, PGY-1  Internal Medicine  Pager# [phone] A1c is 5.6% from last month. On metformin at home.  - ELVI qAC tid and qhs  - monitor FSG  - consistent carb, DASH/TLC diet

## 2018-03-26 NOTE — PROGRESS NOTE ADULT - ASSESSMENT
61 y/o M with a PMH significant for alcoholic cirrhosis, PE on eliquis, NIDDM2, and recurrent pleural effusions in the setting of hepatic hydrothorax, now with chest tube. Patient presented to [facility] on 3/18/18 with worsening dyspnea and orthopnea x3 days, found to have a large right pleural effusion s/p chest tube placement. Transferred to [facility] for TIPS evaluation.

## 2018-03-26 NOTE — PROGRESS NOTE ADULT - SUBJECTIVE AND OBJECTIVE BOX
Interval Events:  No acute events o/n    REVIEW OF SYSTEMS:  Constitutional:   Eyes:  ENT:  CV:  Resp:  GI:  :  MSK:  Integumentary:  Neurological:  Psychiatric:  Endocrine:  Hematologic/Lymphatic:  Allergic/Immunologic:  [x] All other systems negative  [ ] Unable to assess ROS because ________    OBJECTIVE:  ICU Vital Signs Last 24 Hrs  T(C): 36.5 (26 Mar 2018 12:00), Max: 36.9 (25 Mar 2018 20:58)  T(F): 97.7 (26 Mar 2018 12:00), Max: 98.4 (25 Mar 2018 20:58)  HR: 83 (26 Mar 2018 12:37) (77 - 84)  BP: 110/73 (26 Mar 2018 12:37) (101/68 - 110/73)  BP(mean): --  ABP: --  ABP(mean): --  RR: 18 (26 Mar 2018 12:37) (16 - 18)  SpO2: 99% (26 Mar 2018 12:00) (99% - 100%)        03-25 @ 07:01  -  03-26 @ 07:00  --------------------------------------------------------  IN: 1090 mL / OUT: 1000 mL / NET: 90 mL      CAPILLARY BLOOD GLUCOSE      POCT Blood Glucose.: 157 mg/dL (26 Mar 2018 12:59)      PHYSICAL EXAM:  General: AAOx3  HEENT: EOMI, PERRL  Lymph Nodes: No LAD  Neck: Supple  Respiratory: decreased BS at right base  Cardiovascular: RRR   Abdomen: soft, NT/ND  Extremities: no c/c/e    HOSPITAL MEDICATIONS:  MEDICATIONS  (STANDING):  dextrose 5%. 1000 milliLiter(s) (50 mL/Hr) IV Continuous <Continuous>  dextrose 50% Injectable 12.5 Gram(s) IV Push once  dextrose 50% Injectable 25 Gram(s) IV Push once  dextrose 50% Injectable 25 Gram(s) IV Push once  enoxaparin Injectable 70 milliGRAM(s) SubCutaneous two times a day  furosemide    Tablet 40 milliGRAM(s) Oral two times a day  insulin lispro (HumaLOG) corrective regimen sliding scale   SubCutaneous three times a day before meals  insulin lispro (HumaLOG) corrective regimen sliding scale   SubCutaneous at bedtime  lactulose Syrup 10 Gram(s) Oral three times a day  pantoprazole    Tablet 40 milliGRAM(s) Oral before breakfast  spironolactone 100 milliGRAM(s) Oral two times a day    MEDICATIONS  (PRN):  dextrose Gel 1 Dose(s) Oral once PRN Blood Glucose LESS THAN 70 milliGRAM(s)/deciliter  glucagon  Injectable 1 milliGRAM(s) IntraMuscular once PRN Glucose LESS THAN 70 milligrams/deciliter      LABS:    03-26    134<L>  |  100  |  37<H>  ----------------------------<  116<H>  4.6   |  23  |  1.10    Ca    8.6      26 Mar 2018 06:19  Phos  3.4     03-26  Mg     1.8     03-26    TPro  5.6<L>  /  Alb  1.9<L>  /  TBili  0.4  /  DBili  x   /  AST  57<H>  /  ALT  48<H>  /  AlkPhos  183<H>  03-26    PT/INR - ( 26 Mar 2018 06:19 )   PT: 11.4 sec;   INR: 1.05 ratio         PTT - ( 26 Mar 2018 06:19 )  PTT:43.6 sec          RADIOLOGY:  [x] Reviewed and interpreted by me No acute events ON. Chest tube put out 1 L yesterday     OBJECTIVE:  ICU Vital Signs Last 24 Hrs  T(C): 36.5 (26 Mar 2018 12:00), Max: 36.9 (25 Mar 2018 20:58)  T(F): 97.7 (26 Mar 2018 12:00), Max: 98.4 (25 Mar 2018 20:58)  HR: 83 (26 Mar 2018 12:37) (77 - 84)  BP: 110/73 (26 Mar 2018 12:37) (101/68 - 110/73)  BP(mean): --  ABP: --  ABP(mean): --  RR: 18 (26 Mar 2018 12:37) (16 - 18)  SpO2: 99% (26 Mar 2018 12:00) (99% - 100%)        03-25 @ 07:01  - 03-26 @ 07:00  --------------------------------------------------------  IN: 1090 mL / OUT: 1000 mL / NET: 90 mL    03-26 @ 07:01  - 03-26 @ 16:31  --------------------------------------------------------  IN: 480 mL / OUT: 0 mL / NET: 480 mL      CAPILLARY BLOOD GLUCOSE      POCT Blood Glucose.: 157 mg/dL (26 Mar 2018 12:59)      PHYSICAL EXAM:  General: NAd  HEENT: AT/NC  Respiratory: clear to ascultation bilaterally, Chest tube present on R  Cardiovascular: RRR, (+) S1/S@  Abdomen: soft, non-tender, non-distended, (+) BS  Extremities: pitting edema of LE's  Skin: clean, dry, intact  Neurological: A&Ox3m non-focal    HOSPITAL MEDICATIONS:  enoxaparin Injectable 70 milliGRAM(s) SubCutaneous two times a day      furosemide    Tablet 40 milliGRAM(s) Oral two times a day  spironolactone 100 milliGRAM(s) Oral two times a day    dextrose 50% Injectable 12.5 Gram(s) IV Push once  dextrose 50% Injectable 25 Gram(s) IV Push once  dextrose 50% Injectable 25 Gram(s) IV Push once  dextrose Gel 1 Dose(s) Oral once PRN  glucagon  Injectable 1 milliGRAM(s) IntraMuscular once PRN  insulin lispro (HumaLOG) corrective regimen sliding scale   SubCutaneous three times a day before meals  insulin lispro (HumaLOG) corrective regimen sliding scale   SubCutaneous at bedtime        lactulose Syrup 10 Gram(s) Oral three times a day  pantoprazole    Tablet 40 milliGRAM(s) Oral before breakfast        dextrose 5%. 1000 milliLiter(s) IV Continuous <Continuous>            LABS:    Hgb Trend: 12.1<--, 10.9<--  03-26    134<L>  |  100  |  37<H>  ----------------------------<  116<H>  4.6   |  23  |  1.10    Ca    8.6      26 Mar 2018 06:19  Phos  3.4     03-26  Mg     1.8     03-26    TPro  5.6<L>  /  Alb  1.9<L>  /  TBili  0.4  /  DBili  x   /  AST  57<H>  /  ALT  48<H>  /  AlkPhos  183<H>  03-26    Creatinine Trend: 1.10<--, 1.11<--, 1.14<--, 1.27<--, 1.04<--, 1.16<--  PT/INR - ( 26 Mar 2018 06:19 )   PT: 11.4 sec;   INR: 1.05 ratio         PTT - ( 26 Mar 2018 06:19 )  PTT:43.6 sec

## 2018-03-26 NOTE — PROGRESS NOTE ADULT - SUBJECTIVE AND OBJECTIVE BOX
HEPATOLOGY FOLLOW-UP NOTE    Chief Complaint:  Patient is a 62y old  Male who presents with a chief complaint of Transfer from [facility] for TIPS, Recurrent Pleural effusion (22 Mar 2018 14:24)      Interval Events:   - 1L removed from chest tube this morning.   - Continued on Lasix 40mg BID, Aldactone 200mg daily; Cr stable at 1.1 this morning.    Allergies:  No Known Allergies      Hospital Medications:  dextrose 5%. 1000 milliLiter(s) IV Continuous <Continuous>  dextrose 50% Injectable 12.5 Gram(s) IV Push once  dextrose 50% Injectable 25 Gram(s) IV Push once  dextrose 50% Injectable 25 Gram(s) IV Push once  dextrose Gel 1 Dose(s) Oral once PRN  enoxaparin Injectable 70 milliGRAM(s) SubCutaneous two times a day  furosemide    Tablet 40 milliGRAM(s) Oral two times a day  glucagon  Injectable 1 milliGRAM(s) IntraMuscular once PRN  insulin lispro (HumaLOG) corrective regimen sliding scale   SubCutaneous three times a day before meals  insulin lispro (HumaLOG) corrective regimen sliding scale   SubCutaneous at bedtime  lactulose Syrup 10 Gram(s) Oral three times a day  pantoprazole    Tablet 40 milliGRAM(s) Oral before breakfast  spironolactone 100 milliGRAM(s) Oral two times a day      PMHX/PSHX:  Pulmonary embolism  Cirrhosis  DM (diabetes mellitus)  No pertinent past medical history  S/P thoracentesis  No significant past surgical history      Family history:  No pertinent family history in first degree relatives      ROS:     General:  No wt loss, fevers, chills, night sweats, fatigue,   Eyes:  Good vision, no reported pain  ENT:  No sore throat, pain, runny nose, dysphagia  CV:  No pain, palpitations, hypo/hypertension  Resp:  No dyspnea, cough, tachypnea, wheezing  GI:  see HPI  :  No pain, bleeding, incontinence, nocturia  Muscle:  No pain, weakness  Neuro:  No weakness, tingling, memory problems  Psych:  No fatigue, insomnia, mood problems, depression  Endocrine:  No polyuria, polydipsia, cold/heat intolerance  Heme:  No petechiae, ecchymosis, easy bruisability  Skin:  No rash, tattoos, scars, edema      PHYSICAL EXAM:   Vital Signs:  Vital Signs Last 24 Hrs  T(C): 36.7 (26 Mar 2018 06:07), Max: 36.9 (25 Mar 2018 20:58)  T(F): 98 (26 Mar 2018 06:07), Max: 98.4 (25 Mar 2018 20:58)  HR: 77 (26 Mar 2018 06:07) (77 - 85)  BP: 101/68 (26 Mar 2018 06:07) (101/68 - 110/75)  BP(mean): --  RR: 18 (26 Mar 2018 06:07) (16 - 18)  SpO2: 100% (26 Mar 2018 06:07) (99% - 100%)  Daily     Daily     GENERAL:  no acute distress  HEENT:  -icterus   CHEST:  R chest tube in place; decrease R-sided breath sounds   HEART:  RRR, S1S2  ABDOMEN:  soft, non-tender, non-distended  EXTEREMITIES:  2+ LE radha  SKIN:  No rash/erythema/ecchymoses/petechiae/wounds/abscess/warm/dry  NEURO:  alert, oriented, -asterixis     LABS:    03-26    134<L>  |  100  |  37<H>  ----------------------------<  116<H>  4.6   |  23  |  1.10    Ca    8.6      26 Mar 2018 06:19  Phos  3.4     03-26  Mg     1.8     03-26    TPro  5.6<L>  /  Alb  1.9<L>  /  TBili  0.4  /  DBili  x   /  AST  57<H>  /  ALT  48<H>  /  AlkPhos  183<H>  03-26    LIVER FUNCTIONS - ( 26 Mar 2018 06:19 )  Alb: 1.9 g/dL / Pro: 5.6 g/dL / ALK PHOS: 183 U/L / ALT: 48 U/L RC / AST: 57 U/L / GGT: x           PT/INR - ( 26 Mar 2018 06:19 )   PT: 11.4 sec;   INR: 1.05 ratio         PTT - ( 26 Mar 2018 06:19 )  PTT:43.6 sec        Imaging: HEPATOLOGY FOLLOW-UP NOTE    Chief Complaint:  Patient is a 62y old  Male who presents with a chief complaint of Transfer from [facility] for TIPS, Recurrent Pleural effusion (22 Mar 2018 14:24)      Interval Events:   - 1L removed from chest tube this morning.   - Continued on Lasix 40mg BID, Aldactone 200mg daily; Cr stable at 1.1 this morning.  - Patient asking about discharge today.     Allergies:  No Known Allergies      Hospital Medications:  dextrose 5%. 1000 milliLiter(s) IV Continuous <Continuous>  dextrose 50% Injectable 12.5 Gram(s) IV Push once  dextrose 50% Injectable 25 Gram(s) IV Push once  dextrose 50% Injectable 25 Gram(s) IV Push once  dextrose Gel 1 Dose(s) Oral once PRN  enoxaparin Injectable 70 milliGRAM(s) SubCutaneous two times a day  furosemide    Tablet 40 milliGRAM(s) Oral two times a day  glucagon  Injectable 1 milliGRAM(s) IntraMuscular once PRN  insulin lispro (HumaLOG) corrective regimen sliding scale   SubCutaneous three times a day before meals  insulin lispro (HumaLOG) corrective regimen sliding scale   SubCutaneous at bedtime  lactulose Syrup 10 Gram(s) Oral three times a day  pantoprazole    Tablet 40 milliGRAM(s) Oral before breakfast  spironolactone 100 milliGRAM(s) Oral two times a day      PMHX/PSHX:  Pulmonary embolism  Cirrhosis  DM (diabetes mellitus)  No pertinent past medical history  S/P thoracentesis  No significant past surgical history      Family history:  No pertinent family history in first degree relatives      ROS:     General:  No wt loss, fevers, chills, night sweats, fatigue,   Eyes:  Good vision, no reported pain  ENT:  No sore throat, pain, runny nose, dysphagia  CV:  No pain, palpitations, hypo/hypertension  Resp:  No dyspnea, cough, tachypnea, wheezing  GI:  see HPI  :  No pain, bleeding, incontinence, nocturia  Muscle:  No pain, weakness  Neuro:  No weakness, tingling, memory problems  Psych:  No fatigue, insomnia, mood problems, depression  Endocrine:  No polyuria, polydipsia, cold/heat intolerance  Heme:  No petechiae, ecchymosis, easy bruisability  Skin:  No rash, tattoos, scars, edema      PHYSICAL EXAM:   Vital Signs:  Vital Signs Last 24 Hrs  T(C): 36.7 (26 Mar 2018 06:07), Max: 36.9 (25 Mar 2018 20:58)  T(F): 98 (26 Mar 2018 06:07), Max: 98.4 (25 Mar 2018 20:58)  HR: 77 (26 Mar 2018 06:07) (77 - 85)  BP: 101/68 (26 Mar 2018 06:07) (101/68 - 110/75)  BP(mean): --  RR: 18 (26 Mar 2018 06:07) (16 - 18)  SpO2: 100% (26 Mar 2018 06:07) (99% - 100%)  Daily     Daily     GENERAL:  no acute distress  HEENT:  -icterus   CHEST:  R chest tube in place; decrease R-sided breath sounds   HEART:  RRR, S1S2  ABDOMEN:  soft, non-tender, non-distended  EXTEREMITIES:  2+ LE radha  SKIN:  No rash/erythema/ecchymoses/petechiae/wounds/abscess/warm/dry  NEURO:  alert, oriented, -asterixis     LABS:    03-26    134<L>  |  100  |  37<H>  ----------------------------<  116<H>  4.6   |  23  |  1.10    Ca    8.6      26 Mar 2018 06:19  Phos  3.4     03-26  Mg     1.8     03-26    TPro  5.6<L>  /  Alb  1.9<L>  /  TBili  0.4  /  DBili  x   /  AST  57<H>  /  ALT  48<H>  /  AlkPhos  183<H>  03-26    LIVER FUNCTIONS - ( 26 Mar 2018 06:19 )  Alb: 1.9 g/dL / Pro: 5.6 g/dL / ALK PHOS: 183 U/L / ALT: 48 U/L RC / AST: 57 U/L / GGT: x           PT/INR - ( 26 Mar 2018 06:19 )   PT: 11.4 sec;   INR: 1.05 ratio         PTT - ( 26 Mar 2018 06:19 )  PTT:43.6 sec        Imaging:

## 2018-03-26 NOTE — PROGRESS NOTE ADULT - PROBLEM SELECTOR PLAN 2
Currently without ascites. MELD of 13.   - plan as above  - f/u stool alpha 1 antitrypsin  - lactulose 10 g tid; titrate to 1-2 BM's/day

## 2018-03-26 NOTE — PROGRESS NOTE ADULT - PROBLEM SELECTOR PLAN 6
DVT ppx: on therapeutic lovenox    Richar Jorgensen, PGY-1  Internal Medicine  Pager# [phone]

## 2018-03-27 LAB
ALBUMIN SERPL ELPH-MCNC: 1.8 G/DL — LOW (ref 3.3–5)
ALP SERPL-CCNC: 173 U/L — HIGH (ref 40–120)
ALT FLD-CCNC: 48 U/L RC — HIGH (ref 10–45)
ANION GAP SERPL CALC-SCNC: 10 MMOL/L — SIGNIFICANT CHANGE UP (ref 5–17)
APTT BLD: 40 SEC — HIGH (ref 27.5–37.4)
AST SERPL-CCNC: 56 U/L — HIGH (ref 10–40)
BILIRUB SERPL-MCNC: 0.3 MG/DL — SIGNIFICANT CHANGE UP (ref 0.2–1.2)
BUN SERPL-MCNC: 41 MG/DL — HIGH (ref 7–23)
CALCIUM SERPL-MCNC: 8.6 MG/DL — SIGNIFICANT CHANGE UP (ref 8.4–10.5)
CHLORIDE SERPL-SCNC: 101 MMOL/L — SIGNIFICANT CHANGE UP (ref 96–108)
CO2 SERPL-SCNC: 23 MMOL/L — SIGNIFICANT CHANGE UP (ref 22–31)
CREAT SERPL-MCNC: 1.14 MG/DL — SIGNIFICANT CHANGE UP (ref 0.5–1.3)
GLUCOSE SERPL-MCNC: 123 MG/DL — HIGH (ref 70–99)
INR BLD: 1.04 RATIO — SIGNIFICANT CHANGE UP (ref 0.88–1.16)
MAGNESIUM SERPL-MCNC: 1.8 MG/DL — SIGNIFICANT CHANGE UP (ref 1.6–2.6)
PHOSPHATE SERPL-MCNC: 3.5 MG/DL — SIGNIFICANT CHANGE UP (ref 2.5–4.5)
POTASSIUM SERPL-MCNC: 5 MMOL/L — SIGNIFICANT CHANGE UP (ref 3.5–5.3)
POTASSIUM SERPL-SCNC: 5 MMOL/L — SIGNIFICANT CHANGE UP (ref 3.5–5.3)
PROT SERPL-MCNC: 5 G/DL — LOW (ref 6–8.3)
PROTHROM AB SERPL-ACNC: 11.4 SEC — SIGNIFICANT CHANGE UP (ref 9.8–12.7)
SODIUM SERPL-SCNC: 134 MMOL/L — LOW (ref 135–145)

## 2018-03-27 PROCEDURE — 99233 SBSQ HOSP IP/OBS HIGH 50: CPT | Mod: GC

## 2018-03-27 PROCEDURE — 93306 TTE W/DOPPLER COMPLETE: CPT | Mod: 26

## 2018-03-27 PROCEDURE — 74177 CT ABD & PELVIS W/CONTRAST: CPT | Mod: 26

## 2018-03-27 PROCEDURE — 71260 CT THORAX DX C+: CPT | Mod: 26

## 2018-03-27 PROCEDURE — 99232 SBSQ HOSP IP/OBS MODERATE 35: CPT | Mod: GC

## 2018-03-27 RX ORDER — ONDANSETRON 8 MG/1
4 TABLET, FILM COATED ORAL ONCE
Qty: 0 | Refills: 0 | Status: DISCONTINUED | OUTPATIENT
Start: 2018-03-27 | End: 2018-03-29

## 2018-03-27 RX ORDER — OXYCODONE HYDROCHLORIDE 5 MG/1
5 TABLET ORAL ONCE
Qty: 0 | Refills: 0 | Status: DISCONTINUED | OUTPATIENT
Start: 2018-03-27 | End: 2018-03-27

## 2018-03-27 RX ORDER — ONDANSETRON 8 MG/1
4 TABLET, FILM COATED ORAL ONCE
Qty: 0 | Refills: 0 | Status: COMPLETED | OUTPATIENT
Start: 2018-03-27 | End: 2018-03-27

## 2018-03-27 RX ADMIN — ENOXAPARIN SODIUM 70 MILLIGRAM(S): 100 INJECTION SUBCUTANEOUS at 17:35

## 2018-03-27 RX ADMIN — SPIRONOLACTONE 100 MILLIGRAM(S): 25 TABLET, FILM COATED ORAL at 17:36

## 2018-03-27 RX ADMIN — ONDANSETRON 4 MILLIGRAM(S): 8 TABLET, FILM COATED ORAL at 17:48

## 2018-03-27 RX ADMIN — Medication 40 MILLIGRAM(S): at 17:36

## 2018-03-27 RX ADMIN — Medication 40 MILLIGRAM(S): at 06:24

## 2018-03-27 RX ADMIN — ENOXAPARIN SODIUM 70 MILLIGRAM(S): 100 INJECTION SUBCUTANEOUS at 06:24

## 2018-03-27 RX ADMIN — OXYCODONE HYDROCHLORIDE 5 MILLIGRAM(S): 5 TABLET ORAL at 22:00

## 2018-03-27 RX ADMIN — Medication 1: at 13:29

## 2018-03-27 RX ADMIN — OXYCODONE HYDROCHLORIDE 5 MILLIGRAM(S): 5 TABLET ORAL at 21:13

## 2018-03-27 RX ADMIN — SPIRONOLACTONE 100 MILLIGRAM(S): 25 TABLET, FILM COATED ORAL at 06:24

## 2018-03-27 NOTE — PROGRESS NOTE ADULT - PROBLEM SELECTOR PLAN 1
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 Transferred to [facility] for TIPS procedure which patient is refusing  - 950 cc's drained o/n  - s/p chest tube in right hemithorax.   - patient is refusing TIPS due to HE risk and wants to go home with pleurex which pulm and hepatology are not recommending  - TTE on 3/23 showing mass effect / compression of right ventricle 2/2 pleural effusion;   - need repeat TTE (utility may be limited also due to PE, which may result in increased PA pressures), plan for tuesday  - continue to monitor outputs from chest tube, f/u Pulmonology for chest tube management   - c/w lasix 40 mg bid and spironolactone 100 mg BID Patient with recurrent pleural effusions in the setting of hepatic hydrothorax despite high dose diuretics, Transferred to [facility] for TIPS procedure  - 950 cc's drained o/n, with 4L since yesterday  - TTE on 3/23 showing mass effect / compression of right ventricle 2/2 pleural effusion  - need repeat TTE done today (utility may be limited also due to PE, which may result in increased PA pressures),   - per discussion Dr Lambert,  leave chest tube unclamped  - c/w lasix 40 mg bid and spironolactone 100 mg BID  - now patient agreeable for TIPS, needs wedged hepatic vein pressure consulted IR

## 2018-03-27 NOTE — PROGRESS NOTE ADULT - PROBLEM SELECTOR PLAN 3
Patient w/ known PE on CTA 2/8/18. Given hepatic impairment, will switch from eliquis to lovenox.   - SQ lovenox 70 mg bid Patient w/ known PE on CTA 2/8/18. Given hepatic impairment, will switch from eliquis to lovenox.   - c/w SQ lovenox 70 mg bid

## 2018-03-27 NOTE — PROGRESS NOTE ADULT - PROBLEM SELECTOR PLAN 2
Currently without ascites. MELD of 13.   - plan as above  - f/u stool alpha 1 antitrypsin  - lactulose 10 g tid; titrate to 2-3 BM's/day Currently without ascites. MELD of 13.   - plan as above  - lactulose 10 g tid; titrate to 2-3 BM's/day  - appreciate hepatology recs Currently without ascites. MELD of 13.   - plan as above  - lactulose 10 g tid; titrate to 2-3 BM's/day, refusing  - appreciate hepatology recs

## 2018-03-27 NOTE — PROGRESS NOTE ADULT - SUBJECTIVE AND OBJECTIVE BOX
HEPATOLOGY FOLLOW-UP NOTE    Chief Complaint:  Patient is a 62y old  Male who presents with a chief complaint of Transfer from [facility] for TIPS, Recurrent Pleural effusion (22 Mar 2018 14:24)      Interval Events:   - 900cc removed from chest tube this morning.   - Continued on Lasix 40mg BID, Aldactone 200mg daily; Cr stable at 1.1 this morning (same as yesterday).  - Patient now agreeable to TIPS.     Allergies:  No Known Allergies      Hospital Medications:  dextrose 5%. 1000 milliLiter(s) IV Continuous <Continuous>  dextrose 50% Injectable 12.5 Gram(s) IV Push once  dextrose 50% Injectable 25 Gram(s) IV Push once  dextrose 50% Injectable 25 Gram(s) IV Push once  dextrose Gel 1 Dose(s) Oral once PRN  enoxaparin Injectable 70 milliGRAM(s) SubCutaneous two times a day  furosemide    Tablet 40 milliGRAM(s) Oral two times a day  glucagon  Injectable 1 milliGRAM(s) IntraMuscular once PRN  insulin lispro (HumaLOG) corrective regimen sliding scale   SubCutaneous three times a day before meals  insulin lispro (HumaLOG) corrective regimen sliding scale   SubCutaneous at bedtime  lactulose Syrup 10 Gram(s) Oral three times a day  pantoprazole    Tablet 40 milliGRAM(s) Oral before breakfast  spironolactone 100 milliGRAM(s) Oral two times a day      PMHX/PSHX:  Pulmonary embolism  Cirrhosis  DM (diabetes mellitus)  No pertinent past medical history  S/P thoracentesis  No significant past surgical history      Family history:  No pertinent family history in first degree relatives      ROS:     General:  No wt loss, fevers, chills, night sweats, fatigue,   Eyes:  Good vision, no reported pain  ENT:  No sore throat, pain, runny nose, dysphagia  CV:  No pain, palpitations, hypo/hypertension  Resp:  No dyspnea, cough, tachypnea, wheezing  GI:  see HPI  :  No pain, bleeding, incontinence, nocturia  Muscle:  No pain, weakness  Neuro:  No weakness, tingling, memory problems  Psych:  No fatigue, insomnia, mood problems, depression  Endocrine:  No polyuria, polydipsia, cold/heat intolerance  Heme:  No petechiae, ecchymosis, easy bruisability  Skin:  No rash, tattoos, scars, edema      PHYSICAL EXAM:   Vital Signs:  Vital Signs Last 24 Hrs  T(C): 36.7 (26 Mar 2018 06:07), Max: 36.9 (25 Mar 2018 20:58)  T(F): 98 (26 Mar 2018 06:07), Max: 98.4 (25 Mar 2018 20:58)  HR: 77 (26 Mar 2018 06:07) (77 - 85)  BP: 101/68 (26 Mar 2018 06:07) (101/68 - 110/75)  BP(mean): --  RR: 18 (26 Mar 2018 06:07) (16 - 18)  SpO2: 100% (26 Mar 2018 06:07) (99% - 100%)  Daily     Daily     GENERAL:  no acute distress  HEENT:  -icterus   CHEST:  R chest tube in place; decrease R-sided breath sounds   HEART:  RRR, S1S2  ABDOMEN:  soft, non-tender, non-distended  EXTEREMITIES:  2+ LE radha  SKIN:  No rash/erythema/ecchymoses/petechiae/wounds/abscess/warm/dry  NEURO:  alert, oriented, -asterixis     LABS:            03-27    134<L>  |  101  |  41<H>  ----------------------------<  123<H>  5.0   |  23  |  1.14    Ca    8.6      27 Mar 2018 06:25  Phos  3.5     03-27  Mg     1.8     03-27    TPro  5.0<L>  /  Alb  1.8<L>  /  TBili  0.3  /  DBili  x   /  AST  56<H>  /  ALT  48<H>  /  AlkPhos  173<H>  03-27      LIVER FUNCTIONS - ( 27 Mar 2018 06:25 )  Alb: 1.8 g/dL / Pro: 5.0 g/dL / ALK PHOS: 173 U/L / ALT: 48 U/L RC / AST: 56 U/L / GGT: x             PT/INR - ( 27 Mar 2018 06:25 )   PT: 11.4 sec;   INR: 1.04 ratio         PTT - ( 27 Mar 2018 06:25 )  PTT:40.0 sec

## 2018-03-27 NOTE — PROGRESS NOTE ADULT - ASSESSMENT
Impression:  1) Refractory ascites/hepatic hydrothorax - pending TIPS evaluation by IR; currently giving trial of increased diuretic regimen.  2) Cirrhosis secondary to EtOH. MELD-Na 13          Ascites: +2/2018 ultrasound          PSE: mild asterixis on exam           EV: no prior EGD  3) Pulmonary embolism on Eliquis (diagnosed 2/8/18)    Plan:  - continue current diuretic regimen - Lasix 40mg BID, Aldactone 200mg daily   - IR consult for possible TIPS  - prior to TIPS, may benefit from hepatic venous wedge pressure to ensure hepatic hydrothorax is secondary to portal hypertension   - continue lactulose; titrate to 2-3 BM/day  - follow-up alpha-1-antitrypsin (stool), Celiac serologies  - daily CMP and INR  - low sodium diet (<2g/day)   - outpatient followup with hepatology for EGD (varices screening) - patient prefers MDs near his home in [place] ([facility] does not have offices there)

## 2018-03-27 NOTE — PROGRESS NOTE ADULT - SUBJECTIVE AND OBJECTIVE BOX
Patient is a 62y old  Male who presents with a chief complaint of Transfer from [facility] for TIPS, Recurrent Pleural effusion (22 Mar 2018 14:24)      SUBJECTIVE / OVERNIGHT EVENTS:  No acute events o/n. Chest tube drained approximately 950 cc's o/n.         MEDICATIONS  (STANDING):  dextrose 5%. 1000 milliLiter(s) (50 mL/Hr) IV Continuous <Continuous>  dextrose 50% Injectable 12.5 Gram(s) IV Push once  dextrose 50% Injectable 25 Gram(s) IV Push once  dextrose 50% Injectable 25 Gram(s) IV Push once  enoxaparin Injectable 70 milliGRAM(s) SubCutaneous two times a day  furosemide    Tablet 40 milliGRAM(s) Oral two times a day  insulin lispro (HumaLOG) corrective regimen sliding scale   SubCutaneous three times a day before meals  insulin lispro (HumaLOG) corrective regimen sliding scale   SubCutaneous at bedtime  lactulose Syrup 10 Gram(s) Oral three times a day  pantoprazole    Tablet 40 milliGRAM(s) Oral before breakfast  spironolactone 100 milliGRAM(s) Oral two times a day    MEDICATIONS  (PRN):  dextrose Gel 1 Dose(s) Oral once PRN Blood Glucose LESS THAN 70 milliGRAM(s)/deciliter  glucagon  Injectable 1 milliGRAM(s) IntraMuscular once PRN Glucose LESS THAN 70 milligrams/deciliter      Vital Signs Last 24 Hrs  T(C): 36.9 (27 Mar 2018 04:25), Max: 36.9 (27 Mar 2018 04:25)  T(F): 98.4 (27 Mar 2018 04:25), Max: 98.4 (27 Mar 2018 04:25)  HR: 88 (27 Mar 2018 04:25) (83 - 88)  BP: 107/72 (27 Mar 2018 04:25) (107/72 - 110/73)  BP(mean): --  RR: 18 (27 Mar 2018 04:25) (18 - 18)  SpO2: 100% (27 Mar 2018 04:25) (99% - 100%)      PHYSICAL EXAM  GENERAL: NAD, well-developed  HEAD:  Atraumatic, Normocephalic  EYES: EOMI, PERRLA, conjunctiva and sclera clear  NECK: Supple, No JVD  CHEST/LUNG: Clear to auscultation bilaterally; No wheeze  HEART: Regular rate and rhythm; No murmurs, rubs, or gallops  ABDOMEN: Soft, Nontender, Nondistended; Bowel sounds present  EXTREMITIES:  2+ Peripheral Pulses, No clubbing, cyanosis, or edema  PSYCH: AAOx3  SKIN: No rashes or lesions    CAPILLARY BLOOD GLUCOSE      POCT Blood Glucose.: 166 mg/dL (26 Mar 2018 21:40)  POCT Blood Glucose.: 128 mg/dL (26 Mar 2018 17:09)  POCT Blood Glucose.: 157 mg/dL (26 Mar 2018 12:59)  POCT Blood Glucose.: 92 mg/dL (26 Mar 2018 09:13)    I&O's Summary    26 Mar 2018 07:01  -  27 Mar 2018 07:00  --------------------------------------------------------  IN: 800 mL / OUT: 3950 mL / NET: -3150 mL        LABS:    03-27    134<L>  |  101  |  41<H>  ----------------------------<  123<H>  5.0   |  23  |  1.14    Ca    8.6      27 Mar 2018 06:25  Phos  3.5     03-27  Mg     1.8     03-27    TPro  5.0<L>  /  Alb  1.8<L>  /  TBili  0.3  /  DBili  x   /  AST  56<H>  /  ALT  48<H>  /  AlkPhos  173<H>  03-27    PT/INR - ( 27 Mar 2018 06:25 )   PT: 11.4 sec;   INR: 1.04 ratio         PTT - ( 27 Mar 2018 06:25 )  PTT:40.0 sec          RADIOLOGY & ADDITIONAL TESTS:     MICROBIOLOGY:    ANTIMICROBIALS:    CONSULTS: Patient is a 62y old  Male who presents with a chief complaint of Transfer from [facility] for TIPS, Recurrent Pleural effusion (22 Mar 2018 14:24)      SUBJECTIVE / OVERNIGHT EVENTS:  No acute events o/n. Chest tube drained approximately 950 cc's o/n. After extensive discussion, patient is agreeable to TIPS.         MEDICATIONS  (STANDING):  dextrose 5%. 1000 milliLiter(s) (50 mL/Hr) IV Continuous <Continuous>  dextrose 50% Injectable 12.5 Gram(s) IV Push once  dextrose 50% Injectable 25 Gram(s) IV Push once  dextrose 50% Injectable 25 Gram(s) IV Push once  enoxaparin Injectable 70 milliGRAM(s) SubCutaneous two times a day  furosemide    Tablet 40 milliGRAM(s) Oral two times a day  insulin lispro (HumaLOG) corrective regimen sliding scale   SubCutaneous three times a day before meals  insulin lispro (HumaLOG) corrective regimen sliding scale   SubCutaneous at bedtime  lactulose Syrup 10 Gram(s) Oral three times a day  pantoprazole    Tablet 40 milliGRAM(s) Oral before breakfast  spironolactone 100 milliGRAM(s) Oral two times a day    MEDICATIONS  (PRN):  dextrose Gel 1 Dose(s) Oral once PRN Blood Glucose LESS THAN 70 milliGRAM(s)/deciliter  glucagon  Injectable 1 milliGRAM(s) IntraMuscular once PRN Glucose LESS THAN 70 milligrams/deciliter      Vital Signs Last 24 Hrs  T(C): 36.9 (27 Mar 2018 04:25), Max: 36.9 (27 Mar 2018 04:25)  T(F): 98.4 (27 Mar 2018 04:25), Max: 98.4 (27 Mar 2018 04:25)  HR: 88 (27 Mar 2018 04:25) (83 - 88)  BP: 107/72 (27 Mar 2018 04:25) (107/72 - 110/73)  BP(mean): --  RR: 18 (27 Mar 2018 04:25) (18 - 18)  SpO2: 100% (27 Mar 2018 04:25) (99% - 100%)      PHYSICAL EXAM  GENERAL: NAD, well-developed  HEAD:  Atraumatic, Normocephalic  EYES: EOMI, PERRLA, conjunctiva and sclera clear  NECK: Supple, No JVD  CHEST/LUNG: Clear to auscultation bilaterally; No wheeze; + chest tube in place, c/d/i  HEART: Regular rate and rhythm; No murmurs, rubs, or gallops  ABDOMEN: Soft, Nontender, Nondistended; Bowel sounds present  EXTREMITIES:  2+ Peripheral Pulses, No clubbing, cyanosis; +trace LE edema  PSYCH: AAOx3  SKIN: No rashes or lesions    CAPILLARY BLOOD GLUCOSE      POCT Blood Glucose.: 166 mg/dL (26 Mar 2018 21:40)  POCT Blood Glucose.: 128 mg/dL (26 Mar 2018 17:09)  POCT Blood Glucose.: 157 mg/dL (26 Mar 2018 12:59)  POCT Blood Glucose.: 92 mg/dL (26 Mar 2018 09:13)    I&O's Summary    26 Mar 2018 07:01  -  27 Mar 2018 07:00  --------------------------------------------------------  IN: 800 mL / OUT: 3950 mL / NET: -3150 mL        LABS:    03-27    134<L>  |  101  |  41<H>  ----------------------------<  123<H>  5.0   |  23  |  1.14    Ca    8.6      27 Mar 2018 06:25  Phos  3.5     03-27  Mg     1.8     03-27    TPro  5.0<L>  /  Alb  1.8<L>  /  TBili  0.3  /  DBili  x   /  AST  56<H>  /  ALT  48<H>  /  AlkPhos  173<H>  03-27    PT/INR - ( 27 Mar 2018 06:25 )   PT: 11.4 sec;   INR: 1.04 ratio         PTT - ( 27 Mar 2018 06:25 )  PTT:40.0 sec          RADIOLOGY & ADDITIONAL TESTS:     MICROBIOLOGY:    ANTIMICROBIALS:    CONSULTS: Patient is a 62y old  Male who presents with a chief complaint of Transfer from [facility] for TIPS, Recurrent Pleural effusion (22 Mar 2018 14:24)      SUBJECTIVE / OVERNIGHT EVENTS:  No acute events o/n. Chest tube drained approximately 950 cc's o/n but 4L over 24 hours. After extensive discussion, patient is agreeable to TIPS. Denies any cp or sob.        MEDICATIONS  (STANDING):  dextrose 5%. 1000 milliLiter(s) (50 mL/Hr) IV Continuous <Continuous>  dextrose 50% Injectable 12.5 Gram(s) IV Push once  dextrose 50% Injectable 25 Gram(s) IV Push once  dextrose 50% Injectable 25 Gram(s) IV Push once  enoxaparin Injectable 70 milliGRAM(s) SubCutaneous two times a day  furosemide    Tablet 40 milliGRAM(s) Oral two times a day  insulin lispro (HumaLOG) corrective regimen sliding scale   SubCutaneous three times a day before meals  insulin lispro (HumaLOG) corrective regimen sliding scale   SubCutaneous at bedtime  lactulose Syrup 10 Gram(s) Oral three times a day  pantoprazole    Tablet 40 milliGRAM(s) Oral before breakfast  spironolactone 100 milliGRAM(s) Oral two times a day    MEDICATIONS  (PRN):  dextrose Gel 1 Dose(s) Oral once PRN Blood Glucose LESS THAN 70 milliGRAM(s)/deciliter  glucagon  Injectable 1 milliGRAM(s) IntraMuscular once PRN Glucose LESS THAN 70 milligrams/deciliter      Vital Signs Last 24 Hrs  T(C): 36.9 (27 Mar 2018 04:25), Max: 36.9 (27 Mar 2018 04:25)  T(F): 98.4 (27 Mar 2018 04:25), Max: 98.4 (27 Mar 2018 04:25)  HR: 88 (27 Mar 2018 04:25) (83 - 88)  BP: 107/72 (27 Mar 2018 04:25) (107/72 - 110/73)  BP(mean): --  RR: 18 (27 Mar 2018 04:25) (18 - 18)  SpO2: 100% (27 Mar 2018 04:25) (99% - 100%)      PHYSICAL EXAM  GENERAL: NAD, well-developed  HEAD:  Atraumatic, Normocephalic  NECK: Supple, No JVD  CHEST/LUNG: Clear to auscultation bilaterally; No wheeze; + chest tube in place, c/d/i  HEART: Regular rate and rhythm; No murmurs, rubs, or gallops  ABDOMEN: Soft, Nontender, Nondistended; Bowel sounds present  EXTREMITIES:  2+ Peripheral Pulses, No clubbing, cyanosis; +trace LE edema  PSYCH: AAOx3  SKIN: No rashes or lesions    CAPILLARY BLOOD GLUCOSE      POCT Blood Glucose.: 166 mg/dL (26 Mar 2018 21:40)  POCT Blood Glucose.: 128 mg/dL (26 Mar 2018 17:09)  POCT Blood Glucose.: 157 mg/dL (26 Mar 2018 12:59)  POCT Blood Glucose.: 92 mg/dL (26 Mar 2018 09:13)    I&O's Summary    26 Mar 2018 07:01  -  27 Mar 2018 07:00  --------------------------------------------------------  IN: 800 mL / OUT: 3950 mL / NET: -3150 mL        LABS:    03-27    134<L>  |  101  |  41<H>  ----------------------------<  123<H>  5.0   |  23  |  1.14    Ca    8.6      27 Mar 2018 06:25  Phos  3.5     03-27  Mg     1.8     03-27    TPro  5.0<L>  /  Alb  1.8<L>  /  TBili  0.3  /  DBili  x   /  AST  56<H>  /  ALT  48<H>  /  AlkPhos  173<H>  03-27    PT/INR - ( 27 Mar 2018 06:25 )   PT: 11.4 sec;   INR: 1.04 ratio         PTT - ( 27 Mar 2018 06:25 )  PTT:40.0 sec          RADIOLOGY & ADDITIONAL TESTS:     MICROBIOLOGY:    ANTIMICROBIALS:    CONSULTS:

## 2018-03-27 NOTE — PROGRESS NOTE ADULT - PROBLEM SELECTOR PLAN 6
DVT ppx: on therapeutic lovenox    Richar Jorgensen, PGY-1  Internal Medicine  Pager# [phone]

## 2018-03-27 NOTE — PROGRESS NOTE ADULT - ASSESSMENT
63 y/o M with a PMH significant for alcoholic cirrhosis, PE on eliquis, NIDDM2, and recurrent pleural effusions in the setting of hepatic hydrothorax, now with chest tube. Patient presented to [facility] on 3/18/18 with worsening dyspnea and orthopnea x3 days, found to have a large right pleural effusion s/p chest tube placement. Transferred to [facility] for TIPS evaluation.

## 2018-03-27 NOTE — PROGRESS NOTE ADULT - ASSESSMENT
59 year old man with alcoholic cirrhosis [portal HTN - refractory ascites/hepatic hydrothorax, no variceal bleeding ?unknown previous EGD, no known encephalopathy], pulmonary embolism in 2/2018 [on eliquis], h/o c.diff, DM II, presented to OSH with worsening SOB x 3 d. 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 s/p chest tube placement at OSH.    1) Refractory ascites/hepatic hydrothorax -  -secondary to underlying cirrhosis  - c/w diuretics   - patient now agreeable to TIPS- management as per IR, hepatology and primary team   - keep chest tube on drainage to gravity and continue to monitor output   - low sodium diet (<2g/day)     2) Pulmonary embolism on Eliquis (diagnosed 2/8/18)  - c/w lovenox 70sq BID

## 2018-03-27 NOTE — PROGRESS NOTE ADULT - PROBLEM SELECTOR PLAN 4
2d echo shows  large loculated pericardial effusion adjacent to the right atrium causing compression of the right atrium and right ventricle.  now s/p diuresis and chest tube  will repeat echo to see if improvement in findings 2d echo shows  large loculated pericardial effusion adjacent to the right atrium causing compression of the right atrium and right ventricle.  now s/p diuresis and chest tube  f/u repeat echo to see if improvement in findings

## 2018-03-27 NOTE — PROGRESS NOTE ADULT - SUBJECTIVE AND OBJECTIVE BOX
Patient chest tube put out 960ml ON. Patient now states he wants TIPS procedure.       OBJECTIVE:  ICU Vital Signs Last 24 Hrs  T(C): 36.9 (27 Mar 2018 04:25), Max: 36.9 (27 Mar 2018 04:25)  T(F): 98.4 (27 Mar 2018 04:25), Max: 98.4 (27 Mar 2018 04:25)  HR: 88 (27 Mar 2018 04:25) (83 - 88)  BP: 107/72 (27 Mar 2018 04:25) (107/72 - 110/73)  BP(mean): --  ABP: --  ABP(mean): --  RR: 18 (27 Mar 2018 04:25) (18 - 18)  SpO2: 100% (27 Mar 2018 04:25) (99% - 100%)        03-26 @ 07:01  -  03-27 @ 07:00  --------------------------------------------------------  IN: 800 mL / OUT: 3950 mL / NET: -3150 mL      CAPILLARY BLOOD GLUCOSE      POCT Blood Glucose.: 101 mg/dL (27 Mar 2018 09:17)      PHYSICAL EXAM:  General: NAD  HEAD: AT/NC  Respiratory: clear to ascultation bilaterally, Chest tube present on R  Cardiovascular: RRR, (+) S1/S2  Abdomen: soft, non-tender, non-distended, (+) BS  Extremities: pitting edema of LE's  Skin: no rash  Neurological: A&Ox3, non-focal        HOSPITAL MEDICATIONS:  enoxaparin Injectable 70 milliGRAM(s) SubCutaneous two times a day      furosemide    Tablet 40 milliGRAM(s) Oral two times a day  spironolactone 100 milliGRAM(s) Oral two times a day    dextrose 50% Injectable 12.5 Gram(s) IV Push once  dextrose 50% Injectable 25 Gram(s) IV Push once  dextrose 50% Injectable 25 Gram(s) IV Push once  dextrose Gel 1 Dose(s) Oral once PRN  glucagon  Injectable 1 milliGRAM(s) IntraMuscular once PRN  insulin lispro (HumaLOG) corrective regimen sliding scale   SubCutaneous three times a day before meals  insulin lispro (HumaLOG) corrective regimen sliding scale   SubCutaneous at bedtime        lactulose Syrup 10 Gram(s) Oral three times a day  pantoprazole    Tablet 40 milliGRAM(s) Oral before breakfast        dextrose 5%. 1000 milliLiter(s) IV Continuous <Continuous>            LABS:    Hgb Trend: 12.1<--, 10.9<--  03-27    134<L>  |  101  |  41<H>  ----------------------------<  123<H>  5.0   |  23  |  1.14    Ca    8.6      27 Mar 2018 06:25  Phos  3.5     03-27  Mg     1.8     03-27    TPro  5.0<L>  /  Alb  1.8<L>  /  TBili  0.3  /  DBili  x   /  AST  56<H>  /  ALT  48<H>  /  AlkPhos  173<H>  03-27    Creatinine Trend: 1.14<--, 1.10<--, 1.11<--, 1.14<--, 1.27<--, 1.04<--  PT/INR - ( 27 Mar 2018 06:25 )   PT: 11.4 sec;   INR: 1.04 ratio         PTT - ( 27 Mar 2018 06:25 )  PTT:40.0 sec

## 2018-03-28 LAB
ALBUMIN SERPL ELPH-MCNC: 1.9 G/DL — LOW (ref 3.3–5)
ALP SERPL-CCNC: 198 U/L — HIGH (ref 40–120)
ALT FLD-CCNC: 57 U/L RC — HIGH (ref 10–45)
ANION GAP SERPL CALC-SCNC: 8 MMOL/L — SIGNIFICANT CHANGE UP (ref 5–17)
APTT BLD: 43.5 SEC — HIGH (ref 27.5–37.4)
AST SERPL-CCNC: 61 U/L — HIGH (ref 10–40)
BILIRUB SERPL-MCNC: 0.4 MG/DL — SIGNIFICANT CHANGE UP (ref 0.2–1.2)
BUN SERPL-MCNC: 45 MG/DL — HIGH (ref 7–23)
CALCIUM SERPL-MCNC: 8.9 MG/DL — SIGNIFICANT CHANGE UP (ref 8.4–10.5)
CHLORIDE SERPL-SCNC: 98 MMOL/L — SIGNIFICANT CHANGE UP (ref 96–108)
CO2 SERPL-SCNC: 24 MMOL/L — SIGNIFICANT CHANGE UP (ref 22–31)
CREAT SERPL-MCNC: 1.35 MG/DL — HIGH (ref 0.5–1.3)
GLUCOSE SERPL-MCNC: 132 MG/DL — HIGH (ref 70–99)
INR BLD: 1.06 RATIO — SIGNIFICANT CHANGE UP (ref 0.88–1.16)
MAGNESIUM SERPL-MCNC: 1.7 MG/DL — SIGNIFICANT CHANGE UP (ref 1.6–2.6)
PHOSPHATE SERPL-MCNC: 3.7 MG/DL — SIGNIFICANT CHANGE UP (ref 2.5–4.5)
POTASSIUM SERPL-MCNC: 5.3 MMOL/L — SIGNIFICANT CHANGE UP (ref 3.5–5.3)
POTASSIUM SERPL-SCNC: 5.3 MMOL/L — SIGNIFICANT CHANGE UP (ref 3.5–5.3)
PROT SERPL-MCNC: 5.4 G/DL — LOW (ref 6–8.3)
PROTHROM AB SERPL-ACNC: 11.5 SEC — SIGNIFICANT CHANGE UP (ref 9.8–12.7)
SODIUM SERPL-SCNC: 130 MMOL/L — LOW (ref 135–145)

## 2018-03-28 PROCEDURE — 99232 SBSQ HOSP IP/OBS MODERATE 35: CPT | Mod: GC

## 2018-03-28 PROCEDURE — 99233 SBSQ HOSP IP/OBS HIGH 50: CPT | Mod: GC

## 2018-03-28 RX ORDER — OXYCODONE HYDROCHLORIDE 5 MG/1
5 TABLET ORAL ONCE
Qty: 0 | Refills: 0 | Status: DISCONTINUED | OUTPATIENT
Start: 2018-03-28 | End: 2018-03-28

## 2018-03-28 RX ADMIN — Medication 1: at 13:17

## 2018-03-28 RX ADMIN — Medication 1: at 17:43

## 2018-03-28 RX ADMIN — OXYCODONE HYDROCHLORIDE 5 MILLIGRAM(S): 5 TABLET ORAL at 10:17

## 2018-03-28 RX ADMIN — OXYCODONE HYDROCHLORIDE 5 MILLIGRAM(S): 5 TABLET ORAL at 22:24

## 2018-03-28 RX ADMIN — SPIRONOLACTONE 100 MILLIGRAM(S): 25 TABLET, FILM COATED ORAL at 17:42

## 2018-03-28 RX ADMIN — Medication 40 MILLIGRAM(S): at 06:14

## 2018-03-28 RX ADMIN — ENOXAPARIN SODIUM 70 MILLIGRAM(S): 100 INJECTION SUBCUTANEOUS at 06:15

## 2018-03-28 RX ADMIN — Medication 40 MILLIGRAM(S): at 17:43

## 2018-03-28 RX ADMIN — LACTULOSE 10 GRAM(S): 10 SOLUTION ORAL at 21:55

## 2018-03-28 RX ADMIN — LACTULOSE 10 GRAM(S): 10 SOLUTION ORAL at 11:24

## 2018-03-28 RX ADMIN — OXYCODONE HYDROCHLORIDE 5 MILLIGRAM(S): 5 TABLET ORAL at 09:40

## 2018-03-28 RX ADMIN — OXYCODONE HYDROCHLORIDE 5 MILLIGRAM(S): 5 TABLET ORAL at 21:54

## 2018-03-28 RX ADMIN — SPIRONOLACTONE 100 MILLIGRAM(S): 25 TABLET, FILM COATED ORAL at 06:14

## 2018-03-28 NOTE — PROGRESS NOTE ADULT - ASSESSMENT
63 y/o M with a PMH significant for alcoholic cirrhosis, PE on eliquis, NIDDM2, and recurrent pleural effusions in the setting of hepatic hydrothorax, now with chest tube. Patient presented to [facility] on 3/18/18 with worsening dyspnea and orthopnea x3 days, found to have a large right pleural effusion s/p chest tube placement. Transferred to [facility] for TIPS evaluation.

## 2018-03-28 NOTE — PROGRESS NOTE ADULT - SUBJECTIVE AND OBJECTIVE BOX
HEPATOLOGY FOLLOW-UP NOTE    Chief Complaint:  Patient is a 62y old  Male who presents with a chief complaint of Transfer from [facility] for TIPS, Recurrent Pleural effusion (22 Mar 2018 14:24)      Interval Events:   - Pending CT abdomen/pelvis per IR for better evaluation of anatomy prior to TIPS.     Allergies:  No Known Allergies      Hospital Medications:  dextrose 5%. 1000 milliLiter(s) IV Continuous <Continuous>  dextrose 50% Injectable 12.5 Gram(s) IV Push once  dextrose 50% Injectable 25 Gram(s) IV Push once  dextrose 50% Injectable 25 Gram(s) IV Push once  dextrose Gel 1 Dose(s) Oral once PRN  enoxaparin Injectable 70 milliGRAM(s) SubCutaneous two times a day  furosemide    Tablet 40 milliGRAM(s) Oral two times a day  glucagon  Injectable 1 milliGRAM(s) IntraMuscular once PRN  insulin lispro (HumaLOG) corrective regimen sliding scale   SubCutaneous three times a day before meals  insulin lispro (HumaLOG) corrective regimen sliding scale   SubCutaneous at bedtime  lactulose Syrup 10 Gram(s) Oral three times a day  pantoprazole    Tablet 40 milliGRAM(s) Oral before breakfast  spironolactone 100 milliGRAM(s) Oral two times a day      PMHX/PSHX:  Pulmonary embolism  Cirrhosis  DM (diabetes mellitus)  No pertinent past medical history  S/P thoracentesis  No significant past surgical history      Family history:  No pertinent family history in first degree relatives      ROS:     General:  No wt loss, fevers, chills, night sweats, fatigue,   Eyes:  Good vision, no reported pain  ENT:  No sore throat, pain, runny nose, dysphagia  CV:  No pain, palpitations, hypo/hypertension  Resp:  No dyspnea, cough, tachypnea, wheezing  GI:  see HPI  :  No pain, bleeding, incontinence, nocturia  Muscle:  No pain, weakness  Neuro:  No weakness, tingling, memory problems  Psych:  No fatigue, insomnia, mood problems, depression  Endocrine:  No polyuria, polydipsia, cold/heat intolerance  Heme:  No petechiae, ecchymosis, easy bruisability  Skin:  No rash, tattoos, scars, edema      PHYSICAL EXAM:   Vital Signs:  Vital Signs Last 24 Hrs  T(C): 36.7 (26 Mar 2018 06:07), Max: 36.9 (25 Mar 2018 20:58)  T(F): 98 (26 Mar 2018 06:07), Max: 98.4 (25 Mar 2018 20:58)  HR: 77 (26 Mar 2018 06:07) (77 - 85)  BP: 101/68 (26 Mar 2018 06:07) (101/68 - 110/75)  BP(mean): --  RR: 18 (26 Mar 2018 06:07) (16 - 18)  SpO2: 100% (26 Mar 2018 06:07) (99% - 100%)  Daily     Daily     GENERAL:  no acute distress  HEENT:  -icterus   CHEST:  R chest tube in place; decrease R-sided breath sounds   HEART:  RRR, S1S2  ABDOMEN:  soft, non-tender, non-distended  EXTEREMITIES:  2+ LE radha  SKIN:  No rash/erythema/ecchymoses/petechiae/wounds/abscess/warm/dry  NEURO:  alert, oriented, -asterixis     LABS:            03-27    134<L>  |  101  |  41<H>  ----------------------------<  123<H>  5.0   |  23  |  1.14    Ca    8.6      27 Mar 2018 06:25  Phos  3.5     03-27  Mg     1.8     03-27    TPro  5.0<L>  /  Alb  1.8<L>  /  TBili  0.3  /  DBili  x   /  AST  56<H>  /  ALT  48<H>  /  AlkPhos  173<H>  03-27      LIVER FUNCTIONS - ( 27 Mar 2018 06:25 )  Alb: 1.8 g/dL / Pro: 5.0 g/dL / ALK PHOS: 173 U/L / ALT: 48 U/L RC / AST: 56 U/L / GGT: x             PT/INR - ( 27 Mar 2018 06:25 )   PT: 11.4 sec;   INR: 1.04 ratio         PTT - ( 27 Mar 2018 06:25 )  PTT:40.0 sec

## 2018-03-28 NOTE — PROGRESS NOTE ADULT - ASSESSMENT
Impression:  1) Refractory ascites/hepatic hydrothorax - pending TIPS evaluation by IR; currently giving trial of increased diuretic regimen.  2) Cirrhosis secondary to EtOH. MELD-Na 13          Ascites: +2/2018 ultrasound          PSE: mild asterixis on exam           EV: no prior EGD  3) Pulmonary embolism on Eliquis (diagnosed 2/8/18)    Plan:  - continue current diuretic regimen - Lasix 40mg BID, Aldactone 200mg daily   - IR consult for TIPS; pending CT A/P   - prior to TIPS, may benefit from hepatic venous wedge pressure to ensure hepatic hydrothorax is secondary to portal hypertension   - continue lactulose; titrate to 2-3 BM/day  - daily CMP and INR  - low sodium diet (<2g/day)   - outpatient followup with hepatology for EGD (varices screening) - patient prefers MDs near his home in [place] ([facility] does not have offices there)

## 2018-03-28 NOTE — PROGRESS NOTE ADULT - PROBLEM SELECTOR PLAN 6
DVT ppx: on therapeutic lovenox    Richar Jorgensen, PGY-1  Internal Medicine  Pager# [phone] DVT ppx: on therapeutic lovenox on hold for SHAN Jorgensen, PGY-1  Internal Medicine  Pager# [phone]

## 2018-03-28 NOTE — PROGRESS NOTE ADULT - PROBLEM SELECTOR PLAN 3
Patient w/ known PE on CTA 2/8/18. Given hepatic impairment, will switch from eliquis to lovenox.   - c/w SQ lovenox 70 mg bid Patient w/ known PE on CTA 2/8/18. Given hepatic impairment, will switch from eliquis to lovenox.   -hold lovenox 70 mg bid, hold for TIPS as above per IR request,

## 2018-03-28 NOTE — PROGRESS NOTE ADULT - SUBJECTIVE AND OBJECTIVE BOX
Interval Events:  No acute events o.n    REVIEW OF SYSTEMS:  Constitutional:   Eyes:  ENT:  CV:  Resp:  GI:  :  MSK:  Integumentary:  Neurological:  Psychiatric:  Endocrine:  Hematologic/Lymphatic:  Allergic/Immunologic:  [x] All other systems negative  [ ] Unable to assess ROS because ________    OBJECTIVE:  ICU Vital Signs Last 24 Hrs  T(C): 36.5 (28 Mar 2018 05:36), Max: 37.1 (27 Mar 2018 14:34)  T(F): 97.7 (28 Mar 2018 05:36), Max: 98.7 (27 Mar 2018 14:34)  HR: 77 (28 Mar 2018 05:36) (77 - 110)  BP: 106/70 (28 Mar 2018 05:36) (104/74 - 106/71)  BP(mean): --  ABP: --  ABP(mean): --  RR: 18 (28 Mar 2018 05:36) (18 - 19)  SpO2: 99% (28 Mar 2018 05:36) (99% - 100%)        03-27 @ 07:01  -  03-28 @ 07:00  --------------------------------------------------------  IN: 0 mL / OUT: 1450 mL / NET: -1450 mL      CAPILLARY BLOOD GLUCOSE      POCT Blood Glucose.: 127 mg/dL (28 Mar 2018 08:54)      PHYSICAL EXAM:  General: AAOx3  HEENT: EOMI, PERRL  Lymph Nodes: No LAD  Neck: Supple  Respiratory: decreased BS at base on right  Cardiovascular: RRR   Abdomen: soft, NT/ND  Extremities: no c/c/e    HOSPITAL MEDICATIONS:  MEDICATIONS  (STANDING):  dextrose 5%. 1000 milliLiter(s) (50 mL/Hr) IV Continuous <Continuous>  dextrose 50% Injectable 12.5 Gram(s) IV Push once  dextrose 50% Injectable 25 Gram(s) IV Push once  dextrose 50% Injectable 25 Gram(s) IV Push once  enoxaparin Injectable 70 milliGRAM(s) SubCutaneous two times a day  furosemide    Tablet 40 milliGRAM(s) Oral two times a day  insulin lispro (HumaLOG) corrective regimen sliding scale   SubCutaneous three times a day before meals  insulin lispro (HumaLOG) corrective regimen sliding scale   SubCutaneous at bedtime  lactulose Syrup 10 Gram(s) Oral three times a day  pantoprazole    Tablet 40 milliGRAM(s) Oral before breakfast  spironolactone 100 milliGRAM(s) Oral two times a day    MEDICATIONS  (PRN):  dextrose Gel 1 Dose(s) Oral once PRN Blood Glucose LESS THAN 70 milliGRAM(s)/deciliter  glucagon  Injectable 1 milliGRAM(s) IntraMuscular once PRN Glucose LESS THAN 70 milligrams/deciliter  ondansetron Injectable 4 milliGRAM(s) IV Push once PRN Nausea and/or Vomiting      LABS:    03-28    130<L>  |  98  |  45<H>  ----------------------------<  132<H>  5.3   |  24  |  1.35<H>    Ca    8.9      28 Mar 2018 06:11  Phos  3.7     03-28  Mg     1.7     03-28    TPro  5.4<L>  /  Alb  1.9<L>  /  TBili  0.4  /  DBili  x   /  AST  61<H>  /  ALT  57<H>  /  AlkPhos  198<H>  03-28    PT/INR - ( 28 Mar 2018 06:11 )   PT: 11.5 sec;   INR: 1.06 ratio         PTT - ( 28 Mar 2018 06:11 )  PTT:43.5 sec          RADIOLOGY:  [x] Reviewed and interpreted by me

## 2018-03-28 NOTE — PROGRESS NOTE ADULT - PROBLEM SELECTOR PLAN 2
Currently without ascites. MELD of 13.   - plan as above  - lactulose 10 g tid; titrate to 2-3 BM's/day, refusing  - appreciate hepatology recs

## 2018-03-28 NOTE — PROGRESS NOTE ADULT - PROBLEM SELECTOR PLAN 1
Patient with recurrent pleural effusions in the setting of hepatic hydrothorax despite high dose diuretics, Transferred to [facility] for TIPS procedure  - 1.45L drained from chest tube in past 24 hours; will keep chest tube in following TIPS and can remove once outputs decreasing  - c/w lasix 40 mg bid and spironolactone 100 mg BID  - per d/w hepatology and IR, plan for TIPS tomorrow, hold Lovenox AM dose, NPO past midnight Patient with recurrent pleural effusions in the setting of hepatic hydrothorax despite high dose diuretics, Transferred to [facility] for TIPS procedure  - 1.45L drained from chest tube in past 24 hours; will keep chest tube in following TIPS and can remove once outputs decreasing  - despite creatinine bump today, discussed with hepatology, c/w lasix 40 mg bid and spironolactone 100 mg BID  - per d/w hepatology and IR, plan for TIPS tomorrow, hold Lovenox AM dose, NPO past midnight, will need MICU monitoring post procedure, MICU informed

## 2018-03-28 NOTE — PROGRESS NOTE ADULT - PROBLEM SELECTOR PLAN 4
2d echo shows  large loculated pericardial effusion adjacent to the right atrium causing compression of the right atrium and right ventricle.  now s/p diuresis and chest tube  f/u repeat echo to see if improvement in findings initial 2d echo shows  large loculated pericardial effusion adjacent to the right atrium causing compression of the right atrium and right ventricle  repeat 2d echo from 3/27 shows  echo-free space adjacent to the right heart appears smaller and the RA is no longer compressed. There is evidence of  compression of the RV apex during early diastole on apical  images.  CT with no acute findings aside from trace pericardial effusion

## 2018-03-28 NOTE — PROGRESS NOTE ADULT - ASSESSMENT
59 year old man with alcoholic cirrhosis [portal HTN - refractory ascites/hepatic hydrothorax, no variceal bleeding ?unknown previous EGD, no known encephalopathy], pulmonary embolism in 2/2018 [on eliquis], h/o c.diff, DM II, presented to OSH with worsening SOB x 3 d. 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 s/p chest tube placement at OSH.    1) Refractory ascites/hepatic hydrothorax -  -secondary to underlying cirrhosis  -c/w diuretics   -Plan for TIPS tomorrow with IR  -low sodium diet (<2g/day)  -Keep chest tube output no greater than one liter per shift bc of c/f re-expansion pulm edema    Marcelo Hines DO  Pulmonary and Critical Care Fellow

## 2018-03-28 NOTE — PROGRESS NOTE ADULT - SUBJECTIVE AND OBJECTIVE BOX
Patient is a 62y old  Male who presents with a chief complaint of Transfer from [facility] for TIPS, Recurrent Pleural effusion (22 Mar 2018 14:24)      SUBJECTIVE / OVERNIGHT EVENTS:  Patient c/o moderate pain near chest tube, improved with 5 mg oxycodone. This AM, he is eager to get TIPS done with.         MEDICATIONS  (STANDING):  dextrose 5%. 1000 milliLiter(s) (50 mL/Hr) IV Continuous <Continuous>  dextrose 50% Injectable 12.5 Gram(s) IV Push once  dextrose 50% Injectable 25 Gram(s) IV Push once  dextrose 50% Injectable 25 Gram(s) IV Push once  enoxaparin Injectable 70 milliGRAM(s) SubCutaneous two times a day  furosemide    Tablet 40 milliGRAM(s) Oral two times a day  insulin lispro (HumaLOG) corrective regimen sliding scale   SubCutaneous three times a day before meals  insulin lispro (HumaLOG) corrective regimen sliding scale   SubCutaneous at bedtime  lactulose Syrup 10 Gram(s) Oral three times a day  pantoprazole    Tablet 40 milliGRAM(s) Oral before breakfast  spironolactone 100 milliGRAM(s) Oral two times a day    MEDICATIONS  (PRN):  dextrose Gel 1 Dose(s) Oral once PRN Blood Glucose LESS THAN 70 milliGRAM(s)/deciliter  glucagon  Injectable 1 milliGRAM(s) IntraMuscular once PRN Glucose LESS THAN 70 milligrams/deciliter  ondansetron Injectable 4 milliGRAM(s) IV Push once PRN Nausea and/or Vomiting      Vital Signs Last 24 Hrs  T(C): 36.5 (28 Mar 2018 05:36), Max: 37.1 (27 Mar 2018 14:34)  T(F): 97.7 (28 Mar 2018 05:36), Max: 98.7 (27 Mar 2018 14:34)  HR: 77 (28 Mar 2018 05:36) (77 - 110)  BP: 106/70 (28 Mar 2018 05:36) (104/74 - 106/71)  BP(mean): --  RR: 18 (28 Mar 2018 05:36) (18 - 19)  SpO2: 99% (28 Mar 2018 05:36) (99% - 100%)      PHYSICAL EXAM  GENERAL: NAD, well-developed  HEAD:  Atraumatic, Normocephalic  EYES: EOMI, PERRLA, conjunctiva and sclera clear  NECK: Supple, No JVD  CHEST/LUNG: Clear to auscultation bilaterally; No wheeze; +chest tube in right hemithorax, c/d/i  HEART: Regular rate and rhythm; No murmurs, rubs, or gallops  ABDOMEN: Soft, Nontender, Nondistended; Bowel sounds present  EXTREMITIES:  2+ Peripheral Pulses, No clubbing, cyanosis, or edema  PSYCH: AAOx3  SKIN: No rashes or lesions    CAPILLARY BLOOD GLUCOSE      POCT Blood Glucose.: 127 mg/dL (28 Mar 2018 08:54)  POCT Blood Glucose.: 148 mg/dL (27 Mar 2018 21:44)  POCT Blood Glucose.: 132 mg/dL (27 Mar 2018 17:23)  POCT Blood Glucose.: 161 mg/dL (27 Mar 2018 12:38)    I&O's Summary    27 Mar 2018 07:01  -  28 Mar 2018 07:00  --------------------------------------------------------  IN: 0 mL / OUT: 1450 mL / NET: -1450 mL        LABS:    03-28    130<L>  |  98  |  45<H>  ----------------------------<  132<H>  5.3   |  24  |  1.35<H>    Ca    8.9      28 Mar 2018 06:11  Phos  3.7     03-28  Mg     1.7     03-28    TPro  5.4<L>  /  Alb  1.9<L>  /  TBili  0.4  /  DBili  x   /  AST  61<H>  /  ALT  57<H>  /  AlkPhos  198<H>  03-28    PT/INR - ( 28 Mar 2018 06:11 )   PT: 11.5 sec;   INR: 1.06 ratio         PTT - ( 28 Mar 2018 06:11 )  PTT:43.5 sec          RADIOLOGY & ADDITIONAL TESTS:     MICROBIOLOGY:    ANTIMICROBIALS:    CONSULTS: Patient is a 62y old  Male who presents with a chief complaint of Transfer from [facility] for TIPS, Recurrent Pleural effusion (22 Mar 2018 14:24)      SUBJECTIVE / OVERNIGHT EVENTS:  Patient c/o moderate pain near chest tube, improved with 5 mg oxycodone. This AM, he is eager to get TIPS done with. No cp or sob.        MEDICATIONS  (STANDING):  dextrose 5%. 1000 milliLiter(s) (50 mL/Hr) IV Continuous <Continuous>  dextrose 50% Injectable 12.5 Gram(s) IV Push once  dextrose 50% Injectable 25 Gram(s) IV Push once  dextrose 50% Injectable 25 Gram(s) IV Push once  enoxaparin Injectable 70 milliGRAM(s) SubCutaneous two times a day  furosemide    Tablet 40 milliGRAM(s) Oral two times a day  insulin lispro (HumaLOG) corrective regimen sliding scale   SubCutaneous three times a day before meals  insulin lispro (HumaLOG) corrective regimen sliding scale   SubCutaneous at bedtime  lactulose Syrup 10 Gram(s) Oral three times a day  pantoprazole    Tablet 40 milliGRAM(s) Oral before breakfast  spironolactone 100 milliGRAM(s) Oral two times a day    MEDICATIONS  (PRN):  dextrose Gel 1 Dose(s) Oral once PRN Blood Glucose LESS THAN 70 milliGRAM(s)/deciliter  glucagon  Injectable 1 milliGRAM(s) IntraMuscular once PRN Glucose LESS THAN 70 milligrams/deciliter  ondansetron Injectable 4 milliGRAM(s) IV Push once PRN Nausea and/or Vomiting      Vital Signs Last 24 Hrs  T(C): 36.5 (28 Mar 2018 05:36), Max: 37.1 (27 Mar 2018 14:34)  T(F): 97.7 (28 Mar 2018 05:36), Max: 98.7 (27 Mar 2018 14:34)  HR: 77 (28 Mar 2018 05:36) (77 - 110)  BP: 106/70 (28 Mar 2018 05:36) (104/74 - 106/71)  BP(mean): --  RR: 18 (28 Mar 2018 05:36) (18 - 19)  SpO2: 99% (28 Mar 2018 05:36) (99% - 100%)      PHYSICAL EXAM  GENERAL: NAD, well-developed  HEAD:  Atraumatic, Normocephalic  EYES: EOMI, PERRLA, conjunctiva and sclera clear  NECK: Supple, No JVD  CHEST/LUNG: Clear to auscultation bilaterally; No wheeze; +chest tube in right hemithorax, c/d/i  HEART: Regular rate and rhythm; No murmurs, rubs, or gallops  ABDOMEN: Soft, Nontender, Nondistended; Bowel sounds present  EXTREMITIES:  2+ Peripheral Pulses, No clubbing, cyanosis, or edema  PSYCH: AAOx3  SKIN: No rashes or lesions    CAPILLARY BLOOD GLUCOSE      POCT Blood Glucose.: 127 mg/dL (28 Mar 2018 08:54)  POCT Blood Glucose.: 148 mg/dL (27 Mar 2018 21:44)  POCT Blood Glucose.: 132 mg/dL (27 Mar 2018 17:23)  POCT Blood Glucose.: 161 mg/dL (27 Mar 2018 12:38)    I&O's Summary    27 Mar 2018 07:01  -  28 Mar 2018 07:00  --------------------------------------------------------  IN: 0 mL / OUT: 1450 mL / NET: -1450 mL        LABS:    03-28    130<L>  |  98  |  45<H>  ----------------------------<  132<H>  5.3   |  24  |  1.35<H>    Ca    8.9      28 Mar 2018 06:11  Phos  3.7     03-28  Mg     1.7     03-28    TPro  5.4<L>  /  Alb  1.9<L>  /  TBili  0.4  /  DBili  x   /  AST  61<H>  /  ALT  57<H>  /  AlkPhos  198<H>  03-28    PT/INR - ( 28 Mar 2018 06:11 )   PT: 11.5 sec;   INR: 1.06 ratio         PTT - ( 28 Mar 2018 06:11 )  PTT:43.5 sec          RADIOLOGY & ADDITIONAL TESTS: personally reviewed Ct chest/abd/pelvis: INTERPRETATION:  CLINICAL INFORMATION: History of cirrhosis and right   hepatic hydrothorax, rule out pericardial effusion and evaluate for   possible TIPS.    COMPARISON: CT chest 3/18/2013. CT abdomen pelvis 2/8/2018.    PROCEDURE:   CT of the Chest, Abdomen and Pelvis was performed with intravenous   contrast.   Imaging of the abdomen and pelvis was performed in the arterial phase.   Imaging was performed through the chest and abdomen in the venous phase   followed by imaging of the abdomen and pelvis in the delayed phase.   Intravenous contrast: 90 ml Omnipaque 350. 10 ml discarded.  Oral contrast: Positive oral contrast was administered.  Sagittal and coronal reformats were performed.    FINDINGS:    CHEST:     LUNGS AND LARGE AIRWAYS: Patent central airways. 4 mm subpleural right   upper lobe nodule (series 6 image 51) could be an intrapulmonary lymph   node.  PLEURA: Tiny right-sided pneumothorax with a right-sided chest tube in   place. Resolution of previously seen right-sided hydrothorax.   VESSELS: Atherosclerotic calcification of the aorta and coronary arteries.  HEART: Heart size is normal. Trace pericardial effusion. Aortic valvular   calcifications.  MEDIASTINUM AND ALPA: No lymphadenopathy.  CHEST WALL AND LOWER NECK: Bilateral gynecomastia.    ABDOMEN AND PELVIS:    LIVER: Cirrhosis. No evidence for hepatocellular carcinoma.  BILE DUCTS: Normal caliber.  GALLBLADDER: Within normal limits.  SPLEEN: Within normal limits.  PANCREAS: Within normal limits.  ADRENALS: Within normal limits.  KIDNEYS/URETERS: Within normal limits.    BLADDER: Within normal limits.  REPRODUCTIVE ORGANS: The prostate is within normal limits.    BOWEL: No bowel obstruction. Appendix is normal.  PERITONEUM: Mild ascites.  VESSELS:  Sequelae of portal hypertension evidenced by splenorenal,   umbilical, and esophageal varices. The portal, superior mesenteric, and   splenic veins are patent. The IVC and hepatic veins are patent. Mild   atherosclerotic disease of the aorta.  RETROPERITONEUM: No lymphadenopathy.    ABDOMINAL WALL: Subcutaneous foci of air in the ventral abdominal wall   likely related to medication injection. Small fat-containing umbilical   hernia.  BONES: Within normal limits.of the spine.    IMPRESSION:     Resolution of right-sided hydrothorax with a new tiny right pneumothorax   (right pleural catheter in place).    Cirrhosis with portal hypertension.    Patent portal and hepatic veins.        Findings were discussed by Dr. Vizcaino with  Dr. Cohen   on 3/27/2018 at   7:01 PM with readback.    2d echo 3/27: Conclusions:  1. Endocardium not well visualized; grossly preserved left  ventricular systolic function.  2. The right ventricle is not well visualized; grossly  normal right ventricular systolic function.  3. No pericardial effusion seen.  4. Echo-free space seen adjacent to the right heart with  lung tissue seen appears consistent with right-sided  pleural effusion. On apical views, there is evidence of  compression of the right ventricular apex in early  diastole.  Results discussed with Dr. Cohen from medicine team.  *** Compared with echocardiogram of 3/23/2018, the  echo-free space adjacent to the right heart appears smaller  and the RA is no longer compressed. There is evidence of  compression of the RV apex during early diastole on apical  images. Consider CT scan of the chest to better evaluate  the pericardial and pleural spaces if clinically indicated.       MICROBIOLOGY:    ANTIMICROBIALS:    CONSULTS: IR, pulm, hepatology Patient is a 62y old  Male who presents with a chief complaint of Transfer from [facility] for TIPS, Recurrent Pleural effusion (22 Mar 2018 14:24)      SUBJECTIVE / OVERNIGHT EVENTS:  Patient c/o moderate pain near chest tube, improved with 5 mg oxycodone. This AM, he is eager to get TIPS done with. No cp or sob.        MEDICATIONS  (STANDING):  dextrose 5%. 1000 milliLiter(s) (50 mL/Hr) IV Continuous <Continuous>  dextrose 50% Injectable 12.5 Gram(s) IV Push once  dextrose 50% Injectable 25 Gram(s) IV Push once  dextrose 50% Injectable 25 Gram(s) IV Push once  enoxaparin Injectable 70 milliGRAM(s) SubCutaneous two times a day  furosemide    Tablet 40 milliGRAM(s) Oral two times a day  insulin lispro (HumaLOG) corrective regimen sliding scale   SubCutaneous three times a day before meals  insulin lispro (HumaLOG) corrective regimen sliding scale   SubCutaneous at bedtime  lactulose Syrup 10 Gram(s) Oral three times a day  pantoprazole    Tablet 40 milliGRAM(s) Oral before breakfast  spironolactone 100 milliGRAM(s) Oral two times a day    MEDICATIONS  (PRN):  dextrose Gel 1 Dose(s) Oral once PRN Blood Glucose LESS THAN 70 milliGRAM(s)/deciliter  glucagon  Injectable 1 milliGRAM(s) IntraMuscular once PRN Glucose LESS THAN 70 milligrams/deciliter  ondansetron Injectable 4 milliGRAM(s) IV Push once PRN Nausea and/or Vomiting      Vital Signs Last 24 Hrs  T(C): 36.5 (28 Mar 2018 05:36), Max: 37.1 (27 Mar 2018 14:34)  T(F): 97.7 (28 Mar 2018 05:36), Max: 98.7 (27 Mar 2018 14:34)  HR: 77 (28 Mar 2018 05:36) (77 - 110)  BP: 106/70 (28 Mar 2018 05:36) (104/74 - 106/71)  BP(mean): --  RR: 18 (28 Mar 2018 05:36) (18 - 19)  SpO2: 99% (28 Mar 2018 05:36) (99% - 100%)      PHYSICAL EXAM  GENERAL: NAD, well-developed  HEAD:  Atraumatic, Normocephalic  NECK: Supple, No JVD  CHEST/LUNG: Clear to auscultation bilaterally; No wheeze; +chest tube in right hemithorax, c/d/i  HEART: Regular rate and rhythm; No murmurs, rubs, or gallops  ABDOMEN: Soft, Nontender, Nondistended; Bowel sounds present  EXTREMITIES:  1+ pitting edema b/l to knees  PSYCH: AAOx3  SKIN: No rashes or lesions    CAPILLARY BLOOD GLUCOSE      POCT Blood Glucose.: 127 mg/dL (28 Mar 2018 08:54)  POCT Blood Glucose.: 148 mg/dL (27 Mar 2018 21:44)  POCT Blood Glucose.: 132 mg/dL (27 Mar 2018 17:23)  POCT Blood Glucose.: 161 mg/dL (27 Mar 2018 12:38)    I&O's Summary    27 Mar 2018 07:01  -  28 Mar 2018 07:00  --------------------------------------------------------  IN: 0 mL / OUT: 1450 mL / NET: -1450 mL        LABS:    03-28    130<L>  |  98  |  45<H>  ----------------------------<  132<H>  5.3   |  24  |  1.35<H>    Ca    8.9      28 Mar 2018 06:11  Phos  3.7     03-28  Mg     1.7     03-28    TPro  5.4<L>  /  Alb  1.9<L>  /  TBili  0.4  /  DBili  x   /  AST  61<H>  /  ALT  57<H>  /  AlkPhos  198<H>  03-28    PT/INR - ( 28 Mar 2018 06:11 )   PT: 11.5 sec;   INR: 1.06 ratio         PTT - ( 28 Mar 2018 06:11 )  PTT:43.5 sec          RADIOLOGY & ADDITIONAL TESTS: personally reviewed Ct chest/abd/pelvis: INTERPRETATION:  CLINICAL INFORMATION: History of cirrhosis and right   hepatic hydrothorax, rule out pericardial effusion and evaluate for   possible TIPS.    COMPARISON: CT chest 3/18/2013. CT abdomen pelvis 2/8/2018.    PROCEDURE:   CT of the Chest, Abdomen and Pelvis was performed with intravenous   contrast.   Imaging of the abdomen and pelvis was performed in the arterial phase.   Imaging was performed through the chest and abdomen in the venous phase   followed by imaging of the abdomen and pelvis in the delayed phase.   Intravenous contrast: 90 ml Omnipaque 350. 10 ml discarded.  Oral contrast: Positive oral contrast was administered.  Sagittal and coronal reformats were performed.    FINDINGS:    CHEST:     LUNGS AND LARGE AIRWAYS: Patent central airways. 4 mm subpleural right   upper lobe nodule (series 6 image 51) could be an intrapulmonary lymph   node.  PLEURA: Tiny right-sided pneumothorax with a right-sided chest tube in   place. Resolution of previously seen right-sided hydrothorax.   VESSELS: Atherosclerotic calcification of the aorta and coronary arteries.  HEART: Heart size is normal. Trace pericardial effusion. Aortic valvular   calcifications.  MEDIASTINUM AND ALPA: No lymphadenopathy.  CHEST WALL AND LOWER NECK: Bilateral gynecomastia.    ABDOMEN AND PELVIS:    LIVER: Cirrhosis. No evidence for hepatocellular carcinoma.  BILE DUCTS: Normal caliber.  GALLBLADDER: Within normal limits.  SPLEEN: Within normal limits.  PANCREAS: Within normal limits.  ADRENALS: Within normal limits.  KIDNEYS/URETERS: Within normal limits.    BLADDER: Within normal limits.  REPRODUCTIVE ORGANS: The prostate is within normal limits.    BOWEL: No bowel obstruction. Appendix is normal.  PERITONEUM: Mild ascites.  VESSELS:  Sequelae of portal hypertension evidenced by splenorenal,   umbilical, and esophageal varices. The portal, superior mesenteric, and   splenic veins are patent. The IVC and hepatic veins are patent. Mild   atherosclerotic disease of the aorta.  RETROPERITONEUM: No lymphadenopathy.    ABDOMINAL WALL: Subcutaneous foci of air in the ventral abdominal wall   likely related to medication injection. Small fat-containing umbilical   hernia.  BONES: Within normal limits.of the spine.    IMPRESSION:     Resolution of right-sided hydrothorax with a new tiny right pneumothorax   (right pleural catheter in place).    Cirrhosis with portal hypertension.    Patent portal and hepatic veins.        Findings were discussed by Dr. Vizcaino with  Dr. Cohen   on 3/27/2018 at   7:01 PM with readback.    2d echo 3/27: Conclusions:  1. Endocardium not well visualized; grossly preserved left  ventricular systolic function.  2. The right ventricle is not well visualized; grossly  normal right ventricular systolic function.  3. No pericardial effusion seen.  4. Echo-free space seen adjacent to the right heart with  lung tissue seen appears consistent with right-sided  pleural effusion. On apical views, there is evidence of  compression of the right ventricular apex in early  diastole.  Results discussed with Dr. Cohen from medicine team.  *** Compared with echocardiogram of 3/23/2018, the  echo-free space adjacent to the right heart appears smaller  and the RA is no longer compressed. There is evidence of  compression of the RV apex during early diastole on apical  images. Consider CT scan of the chest to better evaluate  the pericardial and pleural spaces if clinically indicated.       MICROBIOLOGY:    ANTIMICROBIALS:    CONSULTS: IR, pulm, hepatology

## 2018-03-29 LAB
ALBUMIN SERPL ELPH-MCNC: 1.8 G/DL — LOW (ref 3.3–5)
ALBUMIN SERPL ELPH-MCNC: 1.9 G/DL — LOW (ref 3.3–5)
ALP SERPL-CCNC: 165 U/L — HIGH (ref 40–120)
ALP SERPL-CCNC: 168 U/L — HIGH (ref 40–120)
ALT FLD-CCNC: 47 U/L RC — HIGH (ref 10–45)
ALT FLD-CCNC: 60 U/L RC — HIGH (ref 10–45)
ANION GAP SERPL CALC-SCNC: 11 MMOL/L — SIGNIFICANT CHANGE UP (ref 5–17)
ANION GAP SERPL CALC-SCNC: 11 MMOL/L — SIGNIFICANT CHANGE UP (ref 5–17)
APTT BLD: 33.9 SEC — SIGNIFICANT CHANGE UP (ref 27.5–37.4)
APTT BLD: 51.7 SEC — HIGH (ref 27.5–37.4)
AST SERPL-CCNC: 56 U/L — HIGH (ref 10–40)
AST SERPL-CCNC: 73 U/L — HIGH (ref 10–40)
BASOPHILS # BLD AUTO: 0.1 K/UL — SIGNIFICANT CHANGE UP (ref 0–0.2)
BASOPHILS NFR BLD AUTO: 0.7 % — SIGNIFICANT CHANGE UP (ref 0–2)
BILIRUB SERPL-MCNC: 0.3 MG/DL — SIGNIFICANT CHANGE UP (ref 0.2–1.2)
BILIRUB SERPL-MCNC: 0.4 MG/DL — SIGNIFICANT CHANGE UP (ref 0.2–1.2)
BUN SERPL-MCNC: 39 MG/DL — HIGH (ref 7–23)
BUN SERPL-MCNC: 45 MG/DL — HIGH (ref 7–23)
CALCIUM SERPL-MCNC: 8.1 MG/DL — LOW (ref 8.4–10.5)
CALCIUM SERPL-MCNC: 8.4 MG/DL — SIGNIFICANT CHANGE UP (ref 8.4–10.5)
CHLORIDE SERPL-SCNC: 101 MMOL/L — SIGNIFICANT CHANGE UP (ref 96–108)
CHLORIDE SERPL-SCNC: 99 MMOL/L — SIGNIFICANT CHANGE UP (ref 96–108)
CO2 SERPL-SCNC: 20 MMOL/L — LOW (ref 22–31)
CO2 SERPL-SCNC: 22 MMOL/L — SIGNIFICANT CHANGE UP (ref 22–31)
CREAT SERPL-MCNC: 1.11 MG/DL — SIGNIFICANT CHANGE UP (ref 0.5–1.3)
CREAT SERPL-MCNC: 1.23 MG/DL — SIGNIFICANT CHANGE UP (ref 0.5–1.3)
EOSINOPHIL # BLD AUTO: 0.2 K/UL — SIGNIFICANT CHANGE UP (ref 0–0.5)
EOSINOPHIL NFR BLD AUTO: 2 % — SIGNIFICANT CHANGE UP (ref 0–6)
GLUCOSE SERPL-MCNC: 127 MG/DL — HIGH (ref 70–99)
GLUCOSE SERPL-MCNC: 168 MG/DL — HIGH (ref 70–99)
HCT VFR BLD CALC: 34.9 % — LOW (ref 39–50)
HCT VFR BLD CALC: 35.3 % — LOW (ref 39–50)
HGB BLD-MCNC: 12.3 G/DL — LOW (ref 13–17)
HGB BLD-MCNC: 12.5 G/DL — LOW (ref 13–17)
INR BLD: 0.98 RATIO — SIGNIFICANT CHANGE UP (ref 0.88–1.16)
INR BLD: 1.01 RATIO — SIGNIFICANT CHANGE UP (ref 0.88–1.16)
LYMPHOCYTES # BLD AUTO: 1.6 K/UL — SIGNIFICANT CHANGE UP (ref 1–3.3)
LYMPHOCYTES # BLD AUTO: 15.4 % — SIGNIFICANT CHANGE UP (ref 13–44)
MAGNESIUM SERPL-MCNC: 1.8 MG/DL — SIGNIFICANT CHANGE UP (ref 1.6–2.6)
MAGNESIUM SERPL-MCNC: 1.8 MG/DL — SIGNIFICANT CHANGE UP (ref 1.6–2.6)
MCHC RBC-ENTMCNC: 34.3 PG — HIGH (ref 27–34)
MCHC RBC-ENTMCNC: 34.7 GM/DL — SIGNIFICANT CHANGE UP (ref 32–36)
MCHC RBC-ENTMCNC: 35 PG — HIGH (ref 27–34)
MCHC RBC-ENTMCNC: 35.7 GM/DL — SIGNIFICANT CHANGE UP (ref 32–36)
MCV RBC AUTO: 98.1 FL — SIGNIFICANT CHANGE UP (ref 80–100)
MCV RBC AUTO: 98.7 FL — SIGNIFICANT CHANGE UP (ref 80–100)
MONOCYTES # BLD AUTO: 0.6 K/UL — SIGNIFICANT CHANGE UP (ref 0–0.9)
MONOCYTES NFR BLD AUTO: 5.9 % — SIGNIFICANT CHANGE UP (ref 2–14)
NEUTROPHILS # BLD AUTO: 7.8 K/UL — HIGH (ref 1.8–7.4)
NEUTROPHILS NFR BLD AUTO: 76 % — SIGNIFICANT CHANGE UP (ref 43–77)
PHOSPHATE SERPL-MCNC: 4.2 MG/DL — SIGNIFICANT CHANGE UP (ref 2.5–4.5)
PHOSPHATE SERPL-MCNC: 4.3 MG/DL — SIGNIFICANT CHANGE UP (ref 2.5–4.5)
PLATELET # BLD AUTO: 207 K/UL — SIGNIFICANT CHANGE UP (ref 150–400)
PLATELET # BLD AUTO: 215 K/UL — SIGNIFICANT CHANGE UP (ref 150–400)
POTASSIUM SERPL-MCNC: 4.5 MMOL/L — SIGNIFICANT CHANGE UP (ref 3.5–5.3)
POTASSIUM SERPL-MCNC: 4.9 MMOL/L — SIGNIFICANT CHANGE UP (ref 3.5–5.3)
POTASSIUM SERPL-SCNC: 4.5 MMOL/L — SIGNIFICANT CHANGE UP (ref 3.5–5.3)
POTASSIUM SERPL-SCNC: 4.9 MMOL/L — SIGNIFICANT CHANGE UP (ref 3.5–5.3)
PROT SERPL-MCNC: 5 G/DL — LOW (ref 6–8.3)
PROT SERPL-MCNC: 5.1 G/DL — LOW (ref 6–8.3)
PROTHROM AB SERPL-ACNC: 10.7 SEC — SIGNIFICANT CHANGE UP (ref 9.8–12.7)
PROTHROM AB SERPL-ACNC: 11 SEC — SIGNIFICANT CHANGE UP (ref 9.8–12.7)
RBC # BLD: 3.56 M/UL — LOW (ref 4.2–5.8)
RBC # BLD: 3.58 M/UL — LOW (ref 4.2–5.8)
RBC # FLD: 12.8 % — SIGNIFICANT CHANGE UP (ref 10.3–14.5)
RBC # FLD: 12.9 % — SIGNIFICANT CHANGE UP (ref 10.3–14.5)
SODIUM SERPL-SCNC: 132 MMOL/L — LOW (ref 135–145)
SODIUM SERPL-SCNC: 132 MMOL/L — LOW (ref 135–145)
WBC # BLD: 10.3 K/UL — SIGNIFICANT CHANGE UP (ref 3.8–10.5)
WBC # BLD: 8.1 K/UL — SIGNIFICANT CHANGE UP (ref 3.8–10.5)
WBC # FLD AUTO: 10.3 K/UL — SIGNIFICANT CHANGE UP (ref 3.8–10.5)
WBC # FLD AUTO: 8.1 K/UL — SIGNIFICANT CHANGE UP (ref 3.8–10.5)

## 2018-03-29 PROCEDURE — 99232 SBSQ HOSP IP/OBS MODERATE 35: CPT | Mod: GC

## 2018-03-29 PROCEDURE — 37182 INSERT HEPATIC SHUNT (TIPS): CPT

## 2018-03-29 PROCEDURE — 99233 SBSQ HOSP IP/OBS HIGH 50: CPT | Mod: GC

## 2018-03-29 PROCEDURE — 76937 US GUIDE VASCULAR ACCESS: CPT | Mod: 26

## 2018-03-29 RX ORDER — FENTANYL CITRATE 50 UG/ML
12.5 INJECTION INTRAVENOUS ONCE
Qty: 0 | Refills: 0 | Status: DISCONTINUED | OUTPATIENT
Start: 2018-03-29 | End: 2018-03-29

## 2018-03-29 RX ORDER — FUROSEMIDE 40 MG
20 TABLET ORAL EVERY 12 HOURS
Qty: 0 | Refills: 0 | Status: DISCONTINUED | OUTPATIENT
Start: 2018-03-29 | End: 2018-03-30

## 2018-03-29 RX ORDER — LACTULOSE 10 G/15ML
10 SOLUTION ORAL THREE TIMES A DAY
Qty: 0 | Refills: 0 | Status: DISCONTINUED | OUTPATIENT
Start: 2018-03-29 | End: 2018-04-03

## 2018-03-29 RX ORDER — ONDANSETRON 8 MG/1
4 TABLET, FILM COATED ORAL EVERY 8 HOURS
Qty: 0 | Refills: 0 | Status: DISCONTINUED | OUTPATIENT
Start: 2018-03-29 | End: 2018-04-03

## 2018-03-29 RX ORDER — INSULIN LISPRO 100/ML
VIAL (ML) SUBCUTANEOUS EVERY 4 HOURS
Qty: 0 | Refills: 0 | Status: DISCONTINUED | OUTPATIENT
Start: 2018-03-29 | End: 2018-03-30

## 2018-03-29 RX ORDER — SPIRONOLACTONE 25 MG/1
100 TABLET, FILM COATED ORAL
Qty: 0 | Refills: 0 | Status: DISCONTINUED | OUTPATIENT
Start: 2018-03-29 | End: 2018-04-01

## 2018-03-29 RX ORDER — PANTOPRAZOLE SODIUM 20 MG/1
40 TABLET, DELAYED RELEASE ORAL DAILY
Qty: 0 | Refills: 0 | Status: DISCONTINUED | OUTPATIENT
Start: 2018-03-29 | End: 2018-04-03

## 2018-03-29 RX ORDER — FENTANYL CITRATE 50 UG/ML
25 INJECTION INTRAVENOUS ONCE
Qty: 0 | Refills: 0 | Status: DISCONTINUED | OUTPATIENT
Start: 2018-03-29 | End: 2018-03-29

## 2018-03-29 RX ADMIN — SPIRONOLACTONE 100 MILLIGRAM(S): 25 TABLET, FILM COATED ORAL at 06:36

## 2018-03-29 RX ADMIN — FENTANYL CITRATE 12.5 MICROGRAM(S): 50 INJECTION INTRAVENOUS at 18:21

## 2018-03-29 RX ADMIN — LACTULOSE 10 GRAM(S): 10 SOLUTION ORAL at 21:21

## 2018-03-29 RX ADMIN — LACTULOSE 10 GRAM(S): 10 SOLUTION ORAL at 06:35

## 2018-03-29 RX ADMIN — Medication 40 MILLIGRAM(S): at 06:36

## 2018-03-29 RX ADMIN — FENTANYL CITRATE 25 MICROGRAM(S): 50 INJECTION INTRAVENOUS at 23:04

## 2018-03-29 RX ADMIN — ONDANSETRON 4 MILLIGRAM(S): 8 TABLET, FILM COATED ORAL at 21:21

## 2018-03-29 RX ADMIN — FENTANYL CITRATE 12.5 MICROGRAM(S): 50 INJECTION INTRAVENOUS at 18:38

## 2018-03-29 RX ADMIN — FENTANYL CITRATE 25 MICROGRAM(S): 50 INJECTION INTRAVENOUS at 21:04

## 2018-03-29 RX ADMIN — FENTANYL CITRATE 25 MICROGRAM(S): 50 INJECTION INTRAVENOUS at 20:49

## 2018-03-29 RX ADMIN — FENTANYL CITRATE 25 MICROGRAM(S): 50 INJECTION INTRAVENOUS at 22:49

## 2018-03-29 NOTE — PROGRESS NOTE ADULT - SUBJECTIVE AND OBJECTIVE BOX
HEPATOLOGY FOLLOW-UP NOTE    Chief Complaint:  Patient is a 62y old  Male who presents with a chief complaint of Transfer from [facility] for TIPS, Recurrent Pleural effusion (22 Mar 2018 14:24)      Interval Events:   - Plan for IR placement of TIPS today.    Allergies:  No Known Allergies      Hospital Medications:  dextrose 5%. 1000 milliLiter(s) IV Continuous <Continuous>  dextrose 50% Injectable 12.5 Gram(s) IV Push once  dextrose 50% Injectable 25 Gram(s) IV Push once  dextrose 50% Injectable 25 Gram(s) IV Push once  dextrose Gel 1 Dose(s) Oral once PRN  enoxaparin Injectable 70 milliGRAM(s) SubCutaneous two times a day  furosemide    Tablet 40 milliGRAM(s) Oral two times a day  glucagon  Injectable 1 milliGRAM(s) IntraMuscular once PRN  insulin lispro (HumaLOG) corrective regimen sliding scale   SubCutaneous three times a day before meals  insulin lispro (HumaLOG) corrective regimen sliding scale   SubCutaneous at bedtime  lactulose Syrup 10 Gram(s) Oral three times a day  pantoprazole    Tablet 40 milliGRAM(s) Oral before breakfast  spironolactone 100 milliGRAM(s) Oral two times a day      PMHX/PSHX:  Pulmonary embolism  Cirrhosis  DM (diabetes mellitus)  No pertinent past medical history  S/P thoracentesis  No significant past surgical history      Family history:  No pertinent family history in first degree relatives      ROS:     General:  No wt loss, fevers, chills, night sweats, fatigue,   Eyes:  Good vision, no reported pain  ENT:  No sore throat, pain, runny nose, dysphagia  CV:  No pain, palpitations, hypo/hypertension  Resp:  No dyspnea, cough, tachypnea, wheezing  GI:  see HPI  :  No pain, bleeding, incontinence, nocturia  Muscle:  No pain, weakness  Neuro:  No weakness, tingling, memory problems  Psych:  No fatigue, insomnia, mood problems, depression  Endocrine:  No polyuria, polydipsia, cold/heat intolerance  Heme:  No petechiae, ecchymosis, easy bruisability  Skin:  No rash, tattoos, scars, edema      PHYSICAL EXAM:   Vital Signs:  Vital Signs Last 24 Hrs  T(C): 36.7 (26 Mar 2018 06:07), Max: 36.9 (25 Mar 2018 20:58)  T(F): 98 (26 Mar 2018 06:07), Max: 98.4 (25 Mar 2018 20:58)  HR: 77 (26 Mar 2018 06:07) (77 - 85)  BP: 101/68 (26 Mar 2018 06:07) (101/68 - 110/75)  BP(mean): --  RR: 18 (26 Mar 2018 06:07) (16 - 18)  SpO2: 100% (26 Mar 2018 06:07) (99% - 100%)  Daily     Daily     GENERAL:  no acute distress  HEENT:  -icterus   CHEST:  R chest tube in place; decrease R-sided breath sounds   HEART:  RRR, S1S2  ABDOMEN:  soft, non-tender, non-distended  EXTEREMITIES:  2+ LE radha  SKIN:  No rash/erythema/ecchymoses/petechiae/wounds/abscess/warm/dry  NEURO:  alert, oriented, -asterixis     LABS:            03-27    134<L>  |  101  |  41<H>  ----------------------------<  123<H>  5.0   |  23  |  1.14    Ca    8.6      27 Mar 2018 06:25  Phos  3.5     03-27  Mg     1.8     03-27    TPro  5.0<L>  /  Alb  1.8<L>  /  TBili  0.3  /  DBili  x   /  AST  56<H>  /  ALT  48<H>  /  AlkPhos  173<H>  03-27      LIVER FUNCTIONS - ( 27 Mar 2018 06:25 )  Alb: 1.8 g/dL / Pro: 5.0 g/dL / ALK PHOS: 173 U/L / ALT: 48 U/L RC / AST: 56 U/L / GGT: x             PT/INR - ( 27 Mar 2018 06:25 )   PT: 11.4 sec;   INR: 1.04 ratio         PTT - ( 27 Mar 2018 06:25 )  PTT:40.0 sec

## 2018-03-29 NOTE — PROGRESS NOTE ADULT - ASSESSMENT
63 y/o M with a PMH significant for alcoholic cirrhosis, PE on eliquis, NIDDM2, and recurrent pleural effusions in the setting of hepatic hydrothorax, now with chest tube. Patient presented to [facility] on 3/18/18 with worsening dyspnea and orthopnea x3 days, found to have a large right pleural effusion s/p chest tube placement. Transferred to [facility] for TIPS evaluation. 61 y/o M with a PMH significant for alcoholic cirrhosis, PE on eliquis, NIDDM2, and recurrent pleural effusions in the setting of hepatic hydrothorax, now with chest tube. Patient presented to [facility] on 3/18/18 with worsening dyspnea and orthopnea x3 days, found to have a large right pleural effusion s/p chest tube placement now undergoing TIPS

## 2018-03-29 NOTE — PROGRESS NOTE ADULT - PROBLEM SELECTOR PLAN 3
Patient w/ known PE on CTA 2/8/18. Given hepatic impairment, will switch from eliquis to lovenox.   - hold lovenox 70 mg bid, hold for TIPS as above per IR request

## 2018-03-29 NOTE — PROGRESS NOTE ADULT - PROBLEM SELECTOR PLAN 6
DVT ppx: on therapeutic lovenox on hold for SHAN Jorgensen, PGY-1  Internal Medicine  Pager# [phone]

## 2018-03-29 NOTE — PROGRESS NOTE ADULT - PROBLEM SELECTOR PLAN 4
initial 2d echo shows  large loculated pericardial effusion adjacent to the right atrium causing compression of the right atrium and right ventricle  repeat 2d echo from 3/27 shows  echo-free space adjacent to the right heart appears smaller and the RA is no longer compressed. There is evidence of  compression of the RV apex during early diastole on apical  images.  CT with no acute findings aside from trace pericardial effusion

## 2018-03-29 NOTE — PROGRESS NOTE ADULT - ASSESSMENT
Impression:  1) Refractory ascites/hepatic hydrothorax - pending TIPS evaluation by IR; currently giving trial of increased diuretic regimen.  2) Cirrhosis secondary to EtOH. MELD-Na 13          Ascites: +2/2018 ultrasound          PSE: mild asterixis on exam           EV: no prior EGD  3) Pulmonary embolism on Eliquis (diagnosed 2/8/18)    Plan:  - continue current diuretic regimen - Lasix 40mg BID, Aldactone 200mg daily   - TIPS today with hepatic venous wedge pressure prior to placement to ensure hepatic hydrothorax is secondary to portal hypertension   - continue lactulose; titrate to 2-3 BM/day  - daily CMP and INR  - low sodium diet (<2g/day)   - outpatient followup with hepatology for EGD (varices screening) - patient prefers MDs near his home in [place] ([facility] does not have offices there)

## 2018-03-29 NOTE — PROGRESS NOTE ADULT - SUBJECTIVE AND OBJECTIVE BOX
Patient is a 62y old  Male who presents with a chief complaint of Transfer from [facility] for TIPS, Recurrent Pleural effusion (22 Mar 2018 14:24)      SUBJECTIVE / OVERNIGHT EVENTS:  Patient c/o moderate chest wall pain, relieved 5 mg oxycodone. No acute complaints this AM.        MEDICATIONS  (STANDING):  dextrose 5%. 1000 milliLiter(s) (50 mL/Hr) IV Continuous <Continuous>  dextrose 50% Injectable 12.5 Gram(s) IV Push once  dextrose 50% Injectable 25 Gram(s) IV Push once  dextrose 50% Injectable 25 Gram(s) IV Push once  furosemide    Tablet 40 milliGRAM(s) Oral two times a day  insulin lispro (HumaLOG) corrective regimen sliding scale   SubCutaneous three times a day before meals  insulin lispro (HumaLOG) corrective regimen sliding scale   SubCutaneous at bedtime  lactulose Syrup 10 Gram(s) Oral three times a day  pantoprazole    Tablet 40 milliGRAM(s) Oral before breakfast  spironolactone 100 milliGRAM(s) Oral two times a day    MEDICATIONS  (PRN):  dextrose Gel 1 Dose(s) Oral once PRN Blood Glucose LESS THAN 70 milliGRAM(s)/deciliter  glucagon  Injectable 1 milliGRAM(s) IntraMuscular once PRN Glucose LESS THAN 70 milligrams/deciliter  ondansetron Injectable 4 milliGRAM(s) IV Push once PRN Nausea and/or Vomiting      Vital Signs Last 24 Hrs  T(C): 36.7 (28 Mar 2018 20:38), Max: 37.2 (28 Mar 2018 17:36)  T(F): 98 (28 Mar 2018 20:38), Max: 98.9 (28 Mar 2018 17:36)  HR: 82 (28 Mar 2018 20:38) (73 - 82)  BP: 113/69 (28 Mar 2018 20:38) (113/69 - 118/76)  BP(mean): --  RR: 18 (28 Mar 2018 20:38) (18 - 18)  SpO2: 99% (28 Mar 2018 20:38) (99% - 99%)      PHYSICAL EXAM  GENERAL: NAD, well-developed  HEAD:  Atraumatic, Normocephalic  EYES: EOMI, PERRLA, conjunctiva and sclera clear  NECK: Supple, No JVD  CHEST/LUNG: Clear to auscultation bilaterally; No wheeze  HEART: Regular rate and rhythm; No murmurs, rubs, or gallops  ABDOMEN: Soft, Nontender, Nondistended; Bowel sounds present  EXTREMITIES:  2+ Peripheral Pulses, No clubbing, cyanosis, or edema  PSYCH: AAOx3  SKIN: No rashes or lesions    CAPILLARY BLOOD GLUCOSE      POCT Blood Glucose.: 114 mg/dL (29 Mar 2018 06:44)  POCT Blood Glucose.: 226 mg/dL (28 Mar 2018 21:40)  POCT Blood Glucose.: 152 mg/dL (28 Mar 2018 17:24)  POCT Blood Glucose.: 173 mg/dL (28 Mar 2018 13:13)  POCT Blood Glucose.: 127 mg/dL (28 Mar 2018 08:54)    I&O's Summary    28 Mar 2018 07:01  -  29 Mar 2018 07:00  --------------------------------------------------------  IN: 740 mL / OUT: 1110 mL / NET: -370 mL        LABS:                        12.5   8.1   )-----------( 207      ( 29 Mar 2018 06:37 )             34.9     03-29    132<L>  |  99  |  45<H>  ----------------------------<  127<H>  4.9   |  22  |  1.23    Ca    8.4      29 Mar 2018 06:36  Phos  4.2     03-29  Mg     1.8     03-29    TPro  5.0<L>  /  Alb  1.8<L>  /  TBili  0.3  /  DBili  x   /  AST  56<H>  /  ALT  47<H>  /  AlkPhos  168<H>  03-29    PT/INR - ( 29 Mar 2018 06:37 )   PT: 10.7 sec;   INR: 0.98 ratio         PTT - ( 29 Mar 2018 06:37 )  PTT:33.9 sec          RADIOLOGY & ADDITIONAL TESTS:     MICROBIOLOGY:    ANTIMICROBIALS:    CONSULTS: Patient is a 62y old  Male who presents with a chief complaint of Transfer from [facility] for TIPS, Recurrent Pleural effusion (22 Mar 2018 14:24)      SUBJECTIVE / OVERNIGHT EVENTS:  Patient c/o moderate chest wall pain at chest tube site, relieved 5 mg oxycodone. No sob. No acute complaints this AM.        MEDICATIONS  (STANDING):  dextrose 5%. 1000 milliLiter(s) (50 mL/Hr) IV Continuous <Continuous>  dextrose 50% Injectable 12.5 Gram(s) IV Push once  dextrose 50% Injectable 25 Gram(s) IV Push once  dextrose 50% Injectable 25 Gram(s) IV Push once  furosemide    Tablet 40 milliGRAM(s) Oral two times a day  insulin lispro (HumaLOG) corrective regimen sliding scale   SubCutaneous three times a day before meals  insulin lispro (HumaLOG) corrective regimen sliding scale   SubCutaneous at bedtime  lactulose Syrup 10 Gram(s) Oral three times a day  pantoprazole    Tablet 40 milliGRAM(s) Oral before breakfast  spironolactone 100 milliGRAM(s) Oral two times a day    MEDICATIONS  (PRN):  dextrose Gel 1 Dose(s) Oral once PRN Blood Glucose LESS THAN 70 milliGRAM(s)/deciliter  glucagon  Injectable 1 milliGRAM(s) IntraMuscular once PRN Glucose LESS THAN 70 milligrams/deciliter  ondansetron Injectable 4 milliGRAM(s) IV Push once PRN Nausea and/or Vomiting      Vital Signs Last 24 Hrs  T(C): 36.7 (28 Mar 2018 20:38), Max: 37.2 (28 Mar 2018 17:36)  T(F): 98 (28 Mar 2018 20:38), Max: 98.9 (28 Mar 2018 17:36)  HR: 82 (28 Mar 2018 20:38) (73 - 82)  BP: 113/69 (28 Mar 2018 20:38) (113/69 - 118/76)  BP(mean): --  RR: 18 (28 Mar 2018 20:38) (18 - 18)  SpO2: 99% (28 Mar 2018 20:38) (99% - 99%)      PHYSICAL EXAM  GENERAL: NAD, well-developed  HEAD:  Atraumatic, Normocephalic  NECK: Supple, No JVD  CHEST/LUNG: Clear to auscultation bilaterally; No wheeze. chest tube in place  HEART: Regular rate and rhythm; No murmurs, rubs, or gallops  ABDOMEN: Soft, Nontender, Nondistended; Bowel sounds present  EXTREMITIES:  1+ pitting edema b/l  PSYCH: AAOx3  SKIN: No rashes or lesion    CAPILLARY BLOOD GLUCOSE      POCT Blood Glucose.: 114 mg/dL (29 Mar 2018 06:44)  POCT Blood Glucose.: 226 mg/dL (28 Mar 2018 21:40)  POCT Blood Glucose.: 152 mg/dL (28 Mar 2018 17:24)  POCT Blood Glucose.: 173 mg/dL (28 Mar 2018 13:13)  POCT Blood Glucose.: 127 mg/dL (28 Mar 2018 08:54)    I&O's Summary    28 Mar 2018 07:01  -  29 Mar 2018 07:00  --------------------------------------------------------  IN: 740 mL / OUT: 1110 mL / NET: -370 mL        LABS:                        12.5   8.1   )-----------( 207      ( 29 Mar 2018 06:37 )             34.9     03-29    132<L>  |  99  |  45<H>  ----------------------------<  127<H>  4.9   |  22  |  1.23    Ca    8.4      29 Mar 2018 06:36  Phos  4.2     03-29  Mg     1.8     03-29    TPro  5.0<L>  /  Alb  1.8<L>  /  TBili  0.3  /  DBili  x   /  AST  56<H>  /  ALT  47<H>  /  AlkPhos  168<H>  03-29    PT/INR - ( 29 Mar 2018 06:37 )   PT: 10.7 sec;   INR: 0.98 ratio         PTT - ( 29 Mar 2018 06:37 )  PTT:33.9 sec          RADIOLOGY & ADDITIONAL TESTS:     MICROBIOLOGY:    ANTIMICROBIALS:    CONSULTS: IR, hepatology, MICU

## 2018-03-29 NOTE — PROGRESS NOTE ADULT - PROBLEM SELECTOR PLAN 1
Patient with recurrent pleural effusions in the setting of hepatic hydrothorax despite high dose diuretics, Transferred to [facility] for TIPS procedure  - 1.11L drained from chest tube in past 24 hours; will keep chest tube in following TIPS and can remove once outputs decreasing  - despite creatinine bump today, discussed with hepatology, c/w lasix 40 mg bid and spironolactone 100 mg BID  - per d/w hepatology and IR, plan for TIPS tomorrow, hold Lovenox AM dose, NPO past midnight, will need MICU monitoring post procedure, MICU informed Patient with recurrent pleural effusions in the setting of hepatic hydrothorax despite high dose diuretics, Transferred to [facility] for TIPS procedure  - 1.11L drained from chest tube in past 24 hours; will keep chest tube in following TIPS and can remove once outputs decreasing  - despite creatinine bump today, discussed with hepatology, c/w lasix 40 mg bid and spironolactone 100 mg BID  - plan for TIPS today, hold Lovenox AM dose, NPO past midnight, will need MICU monitoring post procedure, MICU informed Patient with recurrent pleural effusions in the setting of hepatic hydrothorax despite high dose diuretics and chest tube  - 1.11L drained from chest tube in past 24 hours; will keep chest tube in following TIPS and can remove once outputs decreasing  - c/w lasix 40 mg bid and spironolactone 100 mg BID  - plan for TIPS today with IR, held Lovenox AM dose, NPO past midnight, will need MICU monitoring post procedure, MICU informed, patient agreeable

## 2018-03-29 NOTE — CHART NOTE - NSCHARTNOTEFT_GEN_A_CORE
CHIEF COMPLAINT: sp TIPS    HPI:  This is a 63 yo M PMH alcoholic cirrhosis, PE on Lovenox, T2DM, recurrent hepatic hydrothorax who presented to OSH on 3/18 c/o worsening dyspena and orthopnea x 3 days.  Found to have large right pleural effusion that failed diuretic therapy     PAST MEDICAL & SURGICAL HISTORY:  Pulmonary embolism  Cirrhosis  DM (diabetes mellitus)  S/P thoracentesis      FAMILY HISTORY:  No pertinent family history in first degree relatives      SOCIAL HISTORY:  Smoking: [ ] Never Smoked [ ] Former Smoker (__ packs x ___ years) [ ] Current Smoker  (__ packs x ___ years)  Substance Use: [ ] Never Used [ ] Used ____  EtOH Use:  Marital Status: [ ] Single [ ]  [ ]  [ ]   Sexual History:   Occupation:  Recent Travel:  Country of Birth:  Advance Directives:    Allergies    No Known Allergies    Intolerances        HOME MEDICATIONS:    REVIEW OF SYSTEMS:  Constitutional: [ ] fevers [ ] chills [ ] weight loss [ ] weight gain  HEENT: [ ] dry eyes [ ] eye irritation [ ] postnasal drip [ ] nasal congestion  CV: [ ] chest pain [ ] orthopnea [ ] palpitations [ ] murmur  Resp: [ ] cough [ ] shortness of breath [ ] dyspnea [ ] wheezing [ ] sputum [ ] hemoptysis  GI: [ ] nausea [ ] vomiting [ ] diarrhea [ ] constipation [ ] abd pain [ ] dysphagia   : [ ] dysuria [ ] nocturia [ ] hematuria [ ] increased urinary frequency  Musculoskeletal: [ ] back pain [ ] myalgias [ ] arthralgias [ ] fracture  Skin: [ ] rash [ ] itch  Neurological: [ ] headache [ ] dizziness [ ] syncope [ ] weakness [ ] numbness  Psychiatric: [ ] anxiety [ ] depression  Endocrine: [ ] diabetes [ ] thyroid problem  Hematologic/Lymphatic: [ ] anemia [ ] bleeding problem  Allergic/Immunologic: [ ] itchy eyes [ ] nasal discharge [ ] hives [ ] angioedema  [ ] All other systems negative  [ ] Unable to assess ROS because ________    OBJECTIVE:  ICU Vital Signs Last 24 Hrs  T(C): 35.5 (29 Mar 2018 18:22), Max: 36.8 (29 Mar 2018 11:54)  T(F): 95.9 (29 Mar 2018 18:22), Max: 98.2 (29 Mar 2018 11:54)  HR: 101 (29 Mar 2018 18:22) (80 - 101)  BP: 119/60 (29 Mar 2018 18:22) (103/67 - 119/60)  BP(mean): 83 (29 Mar 2018 18:22) (83 - 83)  ABP: --  ABP(mean): --  RR: 14 (29 Mar 2018 18:22) (14 - 18)  SpO2: 99% (29 Mar 2018 18:22) (99% - 100%)        03-28 @ 07:01 - 03-29 @ 07:00  --------------------------------------------------------  IN: 740 mL / OUT: 1110 mL / NET: -370 mL    03-29 @ 07:01 - 03-29 @ 18:59  --------------------------------------------------------  IN: 0 mL / OUT: 475 mL / NET: -475 mL      CAPILLARY BLOOD GLUCOSE      POCT Blood Glucose.: 113 mg/dL (29 Mar 2018 12:01)      PHYSICAL EXAM:  General:   HEENT:   Lymph Nodes:  Neck:   Respiratory:   Cardiovascular:   Abdomen:   Extremities:   Skin:   Neurological:  Psychiatry:    LINES:     HOSPITAL MEDICATIONS:  MEDICATIONS  (STANDING):  furosemide   Injectable 20 milliGRAM(s) IV Push every 12 hours  insulin lispro (HumaLOG) corrective regimen sliding scale   SubCutaneous every 4 hours  lactulose Syrup 10 Gram(s) Oral three times a day  pantoprazole  Injectable 40 milliGRAM(s) IV Push daily  spironolactone 100 milliGRAM(s) Oral two times a day    MEDICATIONS  (PRN):  ondansetron Injectable 4 milliGRAM(s) IV Push every 8 hours PRN Nausea and/or Vomiting      LABS:                        12.5   8.1   )-----------( 207      ( 29 Mar 2018 06:37 )             34.9     Hgb Trend: 12.5<--, 12.1<--  03-29    132<L>  |  99  |  45<H>  ----------------------------<  127<H>  4.9   |  22  |  1.23    Ca    8.4      29 Mar 2018 06:36  Phos  4.2     03-29  Mg     1.8     03-29    TPro  5.0<L>  /  Alb  1.8<L>  /  TBili  0.3  /  DBili  x   /  AST  56<H>  /  ALT  47<H>  /  AlkPhos  168<H>  03-29    Creatinine Trend: 1.23<--, 1.35<--, 1.14<--, 1.10<--, 1.11<--, 1.14<--  PT/INR - ( 29 Mar 2018 06:37 )   PT: 10.7 sec;   INR: 0.98 ratio         PTT - ( 29 Mar 2018 06:37 )  PTT:33.9 sec          MICROBIOLOGY:     RADIOLOGY:  [ ] Reviewed and interpreted by me    EKG: CHIEF COMPLAINT: sp TIPS    HPI:  This is a 63 yo M PMH alcoholic cirrhosis, PE on Lovenox, T2DM, recurrent hepatic hydrothorax who presented to OSH on 3/18 c/o worsening dyspena and orthopnea x 3 days.  Found to have large right pleural effusion that failed diuretic therapy sp chest tube at OSH. Transferred to [facility] on 3/22 for TIPS. Evaluated by IR, MELD score 13. Taken to IR for TIPS, now in MICU for observation post procedure.     PAST MEDICAL & SURGICAL HISTORY:  Pulmonary embolism  Cirrhosis  DM (diabetes mellitus)  S/P thoracentesis      FAMILY HISTORY:  No pertinent family history in first degree relatives      Allergies    No Known Allergies      REVIEW OF SYSTEMS:  Constitutional: [ ] fevers [ ] chills [ ] weight loss [ ] weight gain  HEENT: [ ] dry eyes [ ] eye irritation [ ] postnasal drip [ ] nasal congestion  CV: [ ] chest pain [ ] orthopnea [ ] palpitations [ ] murmur  Resp: [ ] cough [ ] shortness of breath [ ] dyspnea [ ] wheezing [ ] sputum [ ] hemoptysis  GI: [ ] nausea [ ] vomiting [ ] diarrhea [ ] constipation [x] abd pain-mid epigastrum [ ] dysphagia   : [ ] dysuria [ ] nocturia [ ] hematuria [ ] increased urinary frequency  Musculoskeletal: [ ] back pain [ ] myalgias [ ] arthralgias [ ] fracture  Skin: [ ] rash [ ] itch  Neurological: [ ] headache [ ] dizziness [ ] syncope [ ] weakness [ ] numbness  Psychiatric: [ ] anxiety [ ] depression  Endocrine: [ ] diabetes [ ] thyroid problem  Hematologic/Lymphatic: [ ] anemia [ ] bleeding problem  Allergic/Immunologic: [ ] itchy eyes [ ] nasal discharge [ ] hives [ ] angioedema  [ ] All other systems negative  [ ] Unable to assess ROS because ________    OBJECTIVE:  ICU Vital Signs Last 24 Hrs  T(C): 35.5 (29 Mar 2018 18:22), Max: 36.8 (29 Mar 2018 11:54)  T(F): 95.9 (29 Mar 2018 18:22), Max: 98.2 (29 Mar 2018 11:54)  HR: 101 (29 Mar 2018 18:22) (80 - 101)  BP: 119/60 (29 Mar 2018 18:22) (103/67 - 119/60)  BP(mean): 83 (29 Mar 2018 18:22) (83 - 83)  ABP: --  ABP(mean): --  RR: 14 (29 Mar 2018 18:22) (14 - 18)  SpO2: 99% (29 Mar 2018 18:22) (99% - 100%)        03-28 @ 07:01 - 03-29 @ 07:00  --------------------------------------------------------  IN: 740 mL / OUT: 1110 mL / NET: -370 mL    03-29 @ 07:01 - 03-29 @ 18:59  --------------------------------------------------------  IN: 0 mL / OUT: 475 mL / NET: -475 mL      CAPILLARY BLOOD GLUCOSE      POCT Blood Glucose.: 113 mg/dL (29 Mar 2018 12:01)      PHYSICAL EXAM:  General: Lethargic, follows commands, appropriate  HEENT: Pupils equal  Respiratory: Clear bilaterally, R Anterior Chest tube with clear yellow drainage  Cardiovascular: S1, S2 no murmur, gallop appreciated  Abdomen: Soft, tender to palpation  Extremities: +1-2 pedal edema, pulses +2 all extremities  Skin: Intact, warm  Neurological: Lethargic, follow commands, moves all extremities    LINES:     HOSPITAL MEDICATIONS:  MEDICATIONS  (STANDING):  furosemide   Injectable 20 milliGRAM(s) IV Push every 12 hours  insulin lispro (HumaLOG) corrective regimen sliding scale   SubCutaneous every 4 hours  lactulose Syrup 10 Gram(s) Oral three times a day  pantoprazole  Injectable 40 milliGRAM(s) IV Push daily  spironolactone 100 milliGRAM(s) Oral two times a day    MEDICATIONS  (PRN):  ondansetron Injectable 4 milliGRAM(s) IV Push every 8 hours PRN Nausea and/or Vomiting      LABS:                        12.5   8.1   )-----------( 207      ( 29 Mar 2018 06:37 )             34.9     Hgb Trend: 12.5<--, 12.1<--  03-29    132<L>  |  99  |  45<H>  ----------------------------<  127<H>  4.9   |  22  |  1.23    Ca    8.4      29 Mar 2018 06:36  Phos  4.2     03-29  Mg     1.8     03-29    TPro  5.0<L>  /  Alb  1.8<L>  /  TBili  0.3  /  DBili  x   /  AST  56<H>  /  ALT  47<H>  /  AlkPhos  168<H>  03-29    Creatinine Trend: 1.23<--, 1.35<--, 1.14<--, 1.10<--, 1.11<--, 1.14<--  PT/INR - ( 29 Mar 2018 06:37 )   PT: 10.7 sec;   INR: 0.98 ratio         PTT - ( 29 Mar 2018 06:37 )  PTT:33.9 sec        A/P: This is 63 yo M PMH alcoholic cirrhosis CHIEF COMPLAINT: sp TIPS    HPI:  This is a 61 yo M PMH alcoholic cirrhosis, PE on Lovenox, T2DM, recurrent hepatic hydrothorax who presented to OSH on 3/18 c/o worsening dyspena and orthopnea x 3 days.  Found to have large right pleural effusion that failed diuretic therapy sp chest tube at OSH. Transferred to [facility] on 3/22 for TIPS. Evaluated by IR, MELD score 13. Taken to IR for TIPS, now in MICU for observation post procedure.     PAST MEDICAL & SURGICAL HISTORY:  Pulmonary embolism  Cirrhosis  DM (diabetes mellitus)  S/P thoracentesis      FAMILY HISTORY:  No pertinent family history in first degree relatives      Allergies    No Known Allergies      REVIEW OF SYSTEMS:  Constitutional: [ ] fevers [ ] chills [ ] weight loss [ ] weight gain  HEENT: [ ] dry eyes [ ] eye irritation [ ] postnasal drip [ ] nasal congestion  CV: [ ] chest pain [ ] orthopnea [ ] palpitations [ ] murmur  Resp: [ ] cough [ ] shortness of breath [ ] dyspnea [ ] wheezing [ ] sputum [ ] hemoptysis  GI: [ ] nausea [ ] vomiting [ ] diarrhea [ ] constipation [x] abd pain-mid epigastrum [ ] dysphagia   : [ ] dysuria [ ] nocturia [ ] hematuria [ ] increased urinary frequency  Musculoskeletal: [ ] back pain [ ] myalgias [ ] arthralgias [ ] fracture  Skin: [ ] rash [ ] itch  Neurological: [ ] headache [ ] dizziness [ ] syncope [ ] weakness [ ] numbness  Psychiatric: [ ] anxiety [ ] depression  Endocrine: [ ] diabetes [ ] thyroid problem  Hematologic/Lymphatic: [ ] anemia [ ] bleeding problem  Allergic/Immunologic: [ ] itchy eyes [ ] nasal discharge [ ] hives [ ] angioedema  [ ] All other systems negative  [ ] Unable to assess ROS because ________    OBJECTIVE:  ICU Vital Signs Last 24 Hrs  T(C): 35.5 (29 Mar 2018 18:22), Max: 36.8 (29 Mar 2018 11:54)  T(F): 95.9 (29 Mar 2018 18:22), Max: 98.2 (29 Mar 2018 11:54)  HR: 101 (29 Mar 2018 18:22) (80 - 101)  BP: 119/60 (29 Mar 2018 18:22) (103/67 - 119/60)  BP(mean): 83 (29 Mar 2018 18:22) (83 - 83)  ABP: --  ABP(mean): --  RR: 14 (29 Mar 2018 18:22) (14 - 18)  SpO2: 99% (29 Mar 2018 18:22) (99% - 100%)        03-28 @ 07:01 - 03-29 @ 07:00  --------------------------------------------------------  IN: 740 mL / OUT: 1110 mL / NET: -370 mL    03-29 @ 07:01 - 03-29 @ 18:59  --------------------------------------------------------  IN: 0 mL / OUT: 475 mL / NET: -475 mL      CAPILLARY BLOOD GLUCOSE      POCT Blood Glucose.: 113 mg/dL (29 Mar 2018 12:01)      PHYSICAL EXAM:  General: Lethargic, follows commands, appropriate  HEENT: Pupils equal  Respiratory: Clear bilaterally, R Anterior Chest tube with clear yellow drainage  Cardiovascular: S1, S2 no murmur, gallop appreciated  Abdomen: Soft, tender to palpation  Extremities: +1-2 pedal edema, pulses +2 all extremities  Skin: Intact, warm  Neurological: Lethargic, follow commands, moves all extremities    LINES:     HOSPITAL MEDICATIONS:  MEDICATIONS  (STANDING):  furosemide   Injectable 20 milliGRAM(s) IV Push every 12 hours  insulin lispro (HumaLOG) corrective regimen sliding scale   SubCutaneous every 4 hours  lactulose Syrup 10 Gram(s) Oral three times a day  pantoprazole  Injectable 40 milliGRAM(s) IV Push daily  spironolactone 100 milliGRAM(s) Oral two times a day    MEDICATIONS  (PRN):  ondansetron Injectable 4 milliGRAM(s) IV Push every 8 hours PRN Nausea and/or Vomiting      LABS:                        12.5   8.1   )-----------( 207      ( 29 Mar 2018 06:37 )             34.9     Hgb Trend: 12.5<--, 12.1<--  03-29    132<L>  |  99  |  45<H>  ----------------------------<  127<H>  4.9   |  22  |  1.23    Ca    8.4      29 Mar 2018 06:36  Phos  4.2     03-29  Mg     1.8     03-29    TPro  5.0<L>  /  Alb  1.8<L>  /  TBili  0.3  /  DBili  x   /  AST  56<H>  /  ALT  47<H>  /  AlkPhos  168<H>  03-29    Creatinine Trend: 1.23<--, 1.35<--, 1.14<--, 1.10<--, 1.11<--, 1.14<--  PT/INR - ( 29 Mar 2018 06:37 )   PT: 10.7 sec;   INR: 0.98 ratio         PTT - ( 29 Mar 2018 06:37 )  PTT:33.9 sec        A/P: This is 61 yo M PMH alcoholic cirrhosis, PE on Lovenox, T2DM, recurrent hepatic hydrothorax sp CT sp TIPS admitted to  MICU for observation.    1) Etoh Cirrhosis: sp TIPS procedure--monitor overnight                            Trend Liver Enzymes                            Neuro Checks                            Continue Diuretics: Lasix and Aldactone                            Continue Lacutlose as tolerated for 2-3 BMs per day                            Npo post procedure; Low Na diet when able                            Follow up IR/GI as needed    2) PE: Hold Lovenox post op           Trend Coags, observe for bleeding, plan to restart tomorrow if stable/ok per IR perspective            Supplemental O2 as needed     3) Hepatic Hydrothorax:  Continue chest tube to water seal for now                                       CXR in AM CHIEF COMPLAINT: sp TIPS    HPI:  This is a 61 yo M PMH alcoholic cirrhosis, PE on Lovenox, T2DM, recurrent hepatic hydrothorax who presented to OSH on 3/18 c/o worsening dyspena and orthopnea x 3 days.  Found to have large right pleural effusion that failed diuretic therapy sp chest tube at OSH. Transferred to [facility] on 3/22 for TIPS. Evaluated by IR, MELD score 13. Taken to IR for TIPS, now in MICU for observation post procedure.     PAST MEDICAL & SURGICAL HISTORY:  Pulmonary embolism  Cirrhosis  DM (diabetes mellitus)  S/P thoracentesis      FAMILY HISTORY:  No pertinent family history in first degree relatives      Allergies    No Known Allergies      REVIEW OF SYSTEMS:  Constitutional: [ ] fevers [ ] chills [ ] weight loss [ ] weight gain  HEENT: [ ] dry eyes [ ] eye irritation [ ] postnasal drip [ ] nasal congestion  CV: [ ] chest pain [ ] orthopnea [ ] palpitations [ ] murmur  Resp: [ ] cough [ ] shortness of breath [ ] dyspnea [ ] wheezing [ ] sputum [ ] hemoptysis  GI: [ ] nausea [ ] vomiting [ ] diarrhea [ ] constipation [x] abd pain-mid epigastrum [ ] dysphagia   : [ ] dysuria [ ] nocturia [ ] hematuria [ ] increased urinary frequency  Musculoskeletal: [ ] back pain [ ] myalgias [ ] arthralgias [ ] fracture  Skin: [ ] rash [ ] itch  Neurological: [ ] headache [ ] dizziness [ ] syncope [ ] weakness [ ] numbness  Psychiatric: [ ] anxiety [ ] depression  Endocrine: [ ] diabetes [ ] thyroid problem  Hematologic/Lymphatic: [ ] anemia [ ] bleeding problem  Allergic/Immunologic: [ ] itchy eyes [ ] nasal discharge [ ] hives [ ] angioedema  [ ] All other systems negative  [ ] Unable to assess ROS because ________    OBJECTIVE:  ICU Vital Signs Last 24 Hrs  T(C): 35.5 (29 Mar 2018 18:22), Max: 36.8 (29 Mar 2018 11:54)  T(F): 95.9 (29 Mar 2018 18:22), Max: 98.2 (29 Mar 2018 11:54)  HR: 101 (29 Mar 2018 18:22) (80 - 101)  BP: 119/60 (29 Mar 2018 18:22) (103/67 - 119/60)  BP(mean): 83 (29 Mar 2018 18:22) (83 - 83)  ABP: --  ABP(mean): --  RR: 14 (29 Mar 2018 18:22) (14 - 18)  SpO2: 99% (29 Mar 2018 18:22) (99% - 100%)        03-28 @ 07:01 - 03-29 @ 07:00  --------------------------------------------------------  IN: 740 mL / OUT: 1110 mL / NET: -370 mL    03-29 @ 07:01 - 03-29 @ 18:59  --------------------------------------------------------  IN: 0 mL / OUT: 475 mL / NET: -475 mL      CAPILLARY BLOOD GLUCOSE      POCT Blood Glucose.: 113 mg/dL (29 Mar 2018 12:01)      PHYSICAL EXAM:  General: Lethargic, follows commands, appropriate  HEENT: Pupils equal  Respiratory: Clear bilaterally, R Anterior Chest tube with clear yellow drainage  Cardiovascular: S1, S2 no murmur, gallop appreciated  Abdomen: Soft, tender to palpation  Extremities: +1-2 pedal edema, pulses +2 all extremities  Skin: Intact, warm  Neurological: Lethargic, follow commands, moves all extremities    LINES:     HOSPITAL MEDICATIONS:  MEDICATIONS  (STANDING):  furosemide   Injectable 20 milliGRAM(s) IV Push every 12 hours  insulin lispro (HumaLOG) corrective regimen sliding scale   SubCutaneous every 4 hours  lactulose Syrup 10 Gram(s) Oral three times a day  pantoprazole  Injectable 40 milliGRAM(s) IV Push daily  spironolactone 100 milliGRAM(s) Oral two times a day    MEDICATIONS  (PRN):  ondansetron Injectable 4 milliGRAM(s) IV Push every 8 hours PRN Nausea and/or Vomiting      LABS:                        12.5   8.1   )-----------( 207      ( 29 Mar 2018 06:37 )             34.9     Hgb Trend: 12.5<--, 12.1<--  03-29    132<L>  |  99  |  45<H>  ----------------------------<  127<H>  4.9   |  22  |  1.23    Ca    8.4      29 Mar 2018 06:36  Phos  4.2     03-29  Mg     1.8     03-29    TPro  5.0<L>  /  Alb  1.8<L>  /  TBili  0.3  /  DBili  x   /  AST  56<H>  /  ALT  47<H>  /  AlkPhos  168<H>  03-29    Creatinine Trend: 1.23<--, 1.35<--, 1.14<--, 1.10<--, 1.11<--, 1.14<--  PT/INR - ( 29 Mar 2018 06:37 )   PT: 10.7 sec;   INR: 0.98 ratio         PTT - ( 29 Mar 2018 06:37 )  PTT:33.9 sec        A/P: This is 61 yo M PMH alcoholic cirrhosis, PE on Lovenox, T2DM, recurrent hepatic hydrothorax sp CT sp TIPS admitted to  MICU for observation.    1) Etoh Cirrhosis: sp TIPS procedure--monitor overnight                            Trend Liver Enzymes                            Neuro Checks                            Continue Diuretics: Lasix and Aldactone                            Continue Lacutlose as tolerated for 2-3 BMs per day                            Npo post procedure; Low Na diet when able                            Follow up IR/GI as needed    2) PE: Hold Lovenox post op           Trend Coags, observe for bleeding, plan to restart tomorrow if stable/ok per IR perspective            Supplemental O2 as needed     3) Hepatic Hydrothorax:  Continue chest tube to water seal for now                                       CXR in AM                                       Continue diuretics for now                                       Monitor drainage hourly    4) Pain: Monitor Pain q4H               Fentanyl as needed    5) Prophylactic Measures:               Continue Protonix (home med)                Hold Lovenox as post op

## 2018-03-29 NOTE — PROGRESS NOTE ADULT - SUBJECTIVE AND OBJECTIVE BOX
Interventional Radiology Brief- Operative Note    Procedure: Transjugular intrahepatic portal systemic shunt    Operators: João Menjivar    Anesthesia (type): General administered by an anesth attg.    Contrast: 210cc Omni    EBL: minimal    Findings/Follow up Plan of Care: Initial RHV access and wedged-free pressure gradient of 14mmHG obtained. PV-systemic gradient 13mmHg confirmed after PV access obtained. Successful insertion of a portal-systemic shunt from RHV to right portal vein. Post TIPS gradient 8mmHg. Pt tolerated procedure without difficulty. Pt to go to MICU for monitoring. Findings discussed with Dr. Kwan.    Specimens Removed: none    Implants: Viatorr stent    Complications: none    Condition/Disposition: stable / MICU    Please call Interventional Radiology x [phone] with any questions, concerns, or issues.

## 2018-03-29 NOTE — PROGRESS NOTE ADULT - SUBJECTIVE AND OBJECTIVE BOX
Interventional Radiology Pre-Procedure Note    This is a 62y Male with cirrhosis and recurrent pleural effusion s/p chest tube at OSH. He presents for TIPS.    Procedure:    Diagnosis/Indication: Patient is a 62y old  Male who presents with a chief complaint of Transfer from [facility] for TIPS, Recurrent Pleural effusion (22 Mar 2018 14:24)      PAST MEDICAL & SURGICAL HISTORY:  Pulmonary embolism  Cirrhosis  DM (diabetes mellitus)  S/P thoracentesis       Male    Allergies: No Known Allergies      LABS:  CBC Full  -  ( 29 Mar 2018 06:37 )  WBC Count : 8.1 K/uL  Hemoglobin : 12.5 g/dL  Hematocrit : 34.9 %  Platelet Count - Automated : 207 K/uL  Mean Cell Volume : 98.1 fl  Mean Cell Hemoglobin : 35.0 pg  Mean Cell Hemoglobin Concentration : 35.7 gm/dL  Auto Neutrophil # : x  Auto Lymphocyte # : x  Auto Monocyte # : x  Auto Eosinophil # : x  Auto Basophil # : x  Auto Neutrophil % : x  Auto Lymphocyte % : x  Auto Monocyte % : x  Auto Eosinophil % : x  Auto Basophil % : x    03-29    132<L>  |  99  |  45<H>  ----------------------------<  127<H>  4.9   |  22  |  1.23    Ca    8.4      29 Mar 2018 06:36  Phos  4.2     03-29  Mg     1.8     03-29    TPro  5.0<L>  /  Alb  1.8<L>  /  TBili  0.3  /  DBili  x   /  AST  56<H>  /  ALT  47<H>  /  AlkPhos  168<H>  03-29    PT/INR - ( 29 Mar 2018 06:37 )   PT: 10.7 sec;   INR: 0.98 ratio         PTT - ( 29 Mar 2018 06:37 )  PTT:33.9 sec    A/P case discussed with Dr. Kwan, hepatology. The patient presents for TIPS. The full risks and benefits of the procedure were discussed with the patient. This included a discussion of the risks of bleeding, liver failure, renal failure, pulmonary hypertension, right heart failure, and death. Informed consent obtained from patient, verbalizes understanding.  Pt to go to MICU post TIPS.

## 2018-03-30 LAB
ALBUMIN SERPL ELPH-MCNC: 1.8 G/DL — LOW (ref 3.3–5)
ALBUMIN SERPL ELPH-MCNC: 1.9 G/DL — LOW (ref 3.3–5)
ALP SERPL-CCNC: 170 U/L — HIGH (ref 40–120)
ALP SERPL-CCNC: 245 U/L — HIGH (ref 40–120)
ALT FLD-CCNC: 150 U/L RC — HIGH (ref 10–45)
ALT FLD-CCNC: 64 U/L RC — HIGH (ref 10–45)
AMMONIA BLD-MCNC: 49 UMOL/L — SIGNIFICANT CHANGE UP (ref 11–55)
ANION GAP SERPL CALC-SCNC: 10 MMOL/L — SIGNIFICANT CHANGE UP (ref 5–17)
ANION GAP SERPL CALC-SCNC: 11 MMOL/L — SIGNIFICANT CHANGE UP (ref 5–17)
ANION GAP SERPL CALC-SCNC: 12 MMOL/L — SIGNIFICANT CHANGE UP (ref 5–17)
APTT BLD: 30.2 SEC — SIGNIFICANT CHANGE UP (ref 27.5–37.4)
APTT BLD: 31.6 SEC — SIGNIFICANT CHANGE UP (ref 27.5–37.4)
APTT BLD: 31.8 SEC — SIGNIFICANT CHANGE UP (ref 27.5–37.4)
AST SERPL-CCNC: 170 U/L — HIGH (ref 10–40)
AST SERPL-CCNC: 77 U/L — HIGH (ref 10–40)
BASOPHILS # BLD AUTO: 0 K/UL — SIGNIFICANT CHANGE UP (ref 0–0.2)
BASOPHILS # BLD AUTO: 0 K/UL — SIGNIFICANT CHANGE UP (ref 0–0.2)
BASOPHILS # BLD AUTO: 0.1 K/UL — SIGNIFICANT CHANGE UP (ref 0–0.2)
BASOPHILS NFR BLD AUTO: 0.2 % — SIGNIFICANT CHANGE UP (ref 0–2)
BASOPHILS NFR BLD AUTO: 0.4 % — SIGNIFICANT CHANGE UP (ref 0–2)
BASOPHILS NFR BLD AUTO: 0.6 % — SIGNIFICANT CHANGE UP (ref 0–2)
BILIRUB SERPL-MCNC: 0.5 MG/DL — SIGNIFICANT CHANGE UP (ref 0.2–1.2)
BILIRUB SERPL-MCNC: 0.5 MG/DL — SIGNIFICANT CHANGE UP (ref 0.2–1.2)
BUN SERPL-MCNC: 38 MG/DL — HIGH (ref 7–23)
BUN SERPL-MCNC: 42 MG/DL — HIGH (ref 7–23)
BUN SERPL-MCNC: 42 MG/DL — HIGH (ref 7–23)
CALCIUM SERPL-MCNC: 8.3 MG/DL — LOW (ref 8.4–10.5)
CALCIUM SERPL-MCNC: 8.5 MG/DL — SIGNIFICANT CHANGE UP (ref 8.4–10.5)
CALCIUM SERPL-MCNC: 8.7 MG/DL — SIGNIFICANT CHANGE UP (ref 8.4–10.5)
CHLORIDE SERPL-SCNC: 101 MMOL/L — SIGNIFICANT CHANGE UP (ref 96–108)
CHLORIDE SERPL-SCNC: 101 MMOL/L — SIGNIFICANT CHANGE UP (ref 96–108)
CHLORIDE SERPL-SCNC: 99 MMOL/L — SIGNIFICANT CHANGE UP (ref 96–108)
CO2 SERPL-SCNC: 18 MMOL/L — LOW (ref 22–31)
CO2 SERPL-SCNC: 20 MMOL/L — LOW (ref 22–31)
CO2 SERPL-SCNC: 21 MMOL/L — LOW (ref 22–31)
CREAT SERPL-MCNC: 1.09 MG/DL — SIGNIFICANT CHANGE UP (ref 0.5–1.3)
CREAT SERPL-MCNC: 1.1 MG/DL — SIGNIFICANT CHANGE UP (ref 0.5–1.3)
CREAT SERPL-MCNC: 1.17 MG/DL — SIGNIFICANT CHANGE UP (ref 0.5–1.3)
EOSINOPHIL # BLD AUTO: 0 K/UL — SIGNIFICANT CHANGE UP (ref 0–0.5)
EOSINOPHIL # BLD AUTO: 0.2 K/UL — SIGNIFICANT CHANGE UP (ref 0–0.5)
EOSINOPHIL # BLD AUTO: 0.2 K/UL — SIGNIFICANT CHANGE UP (ref 0–0.5)
EOSINOPHIL NFR BLD AUTO: 0.4 % — SIGNIFICANT CHANGE UP (ref 0–6)
EOSINOPHIL NFR BLD AUTO: 1.8 % — SIGNIFICANT CHANGE UP (ref 0–6)
EOSINOPHIL NFR BLD AUTO: 2.5 % — SIGNIFICANT CHANGE UP (ref 0–6)
GLUCOSE BLDC GLUCOMTR-MCNC: 105 MG/DL — HIGH (ref 70–99)
GLUCOSE SERPL-MCNC: 115 MG/DL — HIGH (ref 70–99)
GLUCOSE SERPL-MCNC: 166 MG/DL — HIGH (ref 70–99)
GLUCOSE SERPL-MCNC: 170 MG/DL — HIGH (ref 70–99)
HCT VFR BLD CALC: 34.8 % — LOW (ref 39–50)
HCT VFR BLD CALC: 35 % — LOW (ref 39–50)
HCT VFR BLD CALC: 35.4 % — LOW (ref 39–50)
HCT VFR BLD CALC: 35.9 % — LOW (ref 39–50)
HGB BLD-MCNC: 11.8 G/DL — LOW (ref 13–17)
HGB BLD-MCNC: 12.2 G/DL — LOW (ref 13–17)
HGB BLD-MCNC: 12.6 G/DL — LOW (ref 13–17)
HGB BLD-MCNC: 12.8 G/DL — LOW (ref 13–17)
INR BLD: 0.97 RATIO — SIGNIFICANT CHANGE UP (ref 0.88–1.16)
LYMPHOCYTES # BLD AUTO: 0.9 K/UL — LOW (ref 1–3.3)
LYMPHOCYTES # BLD AUTO: 1.3 K/UL — SIGNIFICANT CHANGE UP (ref 1–3.3)
LYMPHOCYTES # BLD AUTO: 1.6 K/UL — SIGNIFICANT CHANGE UP (ref 1–3.3)
LYMPHOCYTES # BLD AUTO: 14.7 % — SIGNIFICANT CHANGE UP (ref 13–44)
LYMPHOCYTES # BLD AUTO: 17.8 % — SIGNIFICANT CHANGE UP (ref 13–44)
LYMPHOCYTES # BLD AUTO: 8.9 % — LOW (ref 13–44)
MAGNESIUM SERPL-MCNC: 1.8 MG/DL — SIGNIFICANT CHANGE UP (ref 1.6–2.6)
MAGNESIUM SERPL-MCNC: 1.8 MG/DL — SIGNIFICANT CHANGE UP (ref 1.6–2.6)
MAGNESIUM SERPL-MCNC: 1.9 MG/DL — SIGNIFICANT CHANGE UP (ref 1.6–2.6)
MCHC RBC-ENTMCNC: 32.7 PG — SIGNIFICANT CHANGE UP (ref 27–34)
MCHC RBC-ENTMCNC: 33.2 GM/DL — SIGNIFICANT CHANGE UP (ref 32–36)
MCHC RBC-ENTMCNC: 34.5 PG — HIGH (ref 27–34)
MCHC RBC-ENTMCNC: 35 GM/DL — SIGNIFICANT CHANGE UP (ref 32–36)
MCHC RBC-ENTMCNC: 35.1 PG — HIGH (ref 27–34)
MCHC RBC-ENTMCNC: 35.5 GM/DL — SIGNIFICANT CHANGE UP (ref 32–36)
MCHC RBC-ENTMCNC: 35.5 PG — HIGH (ref 27–34)
MCHC RBC-ENTMCNC: 36 GM/DL — SIGNIFICANT CHANGE UP (ref 32–36)
MCV RBC AUTO: 98.4 FL — SIGNIFICANT CHANGE UP (ref 80–100)
MCV RBC AUTO: 98.4 FL — SIGNIFICANT CHANGE UP (ref 80–100)
MCV RBC AUTO: 98.7 FL — SIGNIFICANT CHANGE UP (ref 80–100)
MCV RBC AUTO: 98.7 FL — SIGNIFICANT CHANGE UP (ref 80–100)
MONOCYTES # BLD AUTO: 0.6 K/UL — SIGNIFICANT CHANGE UP (ref 0–0.9)
MONOCYTES # BLD AUTO: 1 K/UL — HIGH (ref 0–0.9)
MONOCYTES # BLD AUTO: 1 K/UL — HIGH (ref 0–0.9)
MONOCYTES NFR BLD AUTO: 10.9 % — SIGNIFICANT CHANGE UP (ref 2–14)
MONOCYTES NFR BLD AUTO: 11.4 % — SIGNIFICANT CHANGE UP (ref 2–14)
MONOCYTES NFR BLD AUTO: 5.9 % — SIGNIFICANT CHANGE UP (ref 2–14)
NEUTROPHILS # BLD AUTO: 6.1 K/UL — SIGNIFICANT CHANGE UP (ref 1.8–7.4)
NEUTROPHILS # BLD AUTO: 6.6 K/UL — SIGNIFICANT CHANGE UP (ref 1.8–7.4)
NEUTROPHILS # BLD AUTO: 8.8 K/UL — HIGH (ref 1.8–7.4)
NEUTROPHILS NFR BLD AUTO: 67.7 % — SIGNIFICANT CHANGE UP (ref 43–77)
NEUTROPHILS NFR BLD AUTO: 72.1 % — SIGNIFICANT CHANGE UP (ref 43–77)
NEUTROPHILS NFR BLD AUTO: 84.7 % — HIGH (ref 43–77)
PHOSPHATE SERPL-MCNC: 3.7 MG/DL — SIGNIFICANT CHANGE UP (ref 2.5–4.5)
PHOSPHATE SERPL-MCNC: 4.6 MG/DL — HIGH (ref 2.5–4.5)
PHOSPHATE SERPL-MCNC: 4.6 MG/DL — HIGH (ref 2.5–4.5)
PLATELET # BLD AUTO: 160 K/UL — SIGNIFICANT CHANGE UP (ref 150–400)
PLATELET # BLD AUTO: 163 K/UL — SIGNIFICANT CHANGE UP (ref 150–400)
PLATELET # BLD AUTO: 165 K/UL — SIGNIFICANT CHANGE UP (ref 150–400)
PLATELET # BLD AUTO: 209 K/UL — SIGNIFICANT CHANGE UP (ref 150–400)
POTASSIUM SERPL-MCNC: 4.4 MMOL/L — SIGNIFICANT CHANGE UP (ref 3.5–5.3)
POTASSIUM SERPL-MCNC: 4.9 MMOL/L — SIGNIFICANT CHANGE UP (ref 3.5–5.3)
POTASSIUM SERPL-MCNC: 5.1 MMOL/L — SIGNIFICANT CHANGE UP (ref 3.5–5.3)
POTASSIUM SERPL-SCNC: 4.4 MMOL/L — SIGNIFICANT CHANGE UP (ref 3.5–5.3)
POTASSIUM SERPL-SCNC: 4.9 MMOL/L — SIGNIFICANT CHANGE UP (ref 3.5–5.3)
POTASSIUM SERPL-SCNC: 5.1 MMOL/L — SIGNIFICANT CHANGE UP (ref 3.5–5.3)
PROT SERPL-MCNC: 5.2 G/DL — LOW (ref 6–8.3)
PROT SERPL-MCNC: 5.2 G/DL — LOW (ref 6–8.3)
PROTHROM AB SERPL-ACNC: 10.6 SEC — SIGNIFICANT CHANGE UP (ref 9.8–12.7)
RBC # BLD: 3.54 M/UL — LOW (ref 4.2–5.8)
RBC # BLD: 3.55 M/UL — LOW (ref 4.2–5.8)
RBC # BLD: 3.6 M/UL — LOW (ref 4.2–5.8)
RBC # BLD: 3.64 M/UL — LOW (ref 4.2–5.8)
RBC # FLD: 12.9 % — SIGNIFICANT CHANGE UP (ref 10.3–14.5)
RBC # FLD: 12.9 % — SIGNIFICANT CHANGE UP (ref 10.3–14.5)
RBC # FLD: 13 % — SIGNIFICANT CHANGE UP (ref 10.3–14.5)
RBC # FLD: 13.1 % — SIGNIFICANT CHANGE UP (ref 10.3–14.5)
SODIUM SERPL-SCNC: 129 MMOL/L — LOW (ref 135–145)
SODIUM SERPL-SCNC: 132 MMOL/L — LOW (ref 135–145)
SODIUM SERPL-SCNC: 132 MMOL/L — LOW (ref 135–145)
WBC # BLD: 10.4 K/UL — SIGNIFICANT CHANGE UP (ref 3.8–10.5)
WBC # BLD: 9 K/UL — SIGNIFICANT CHANGE UP (ref 3.8–10.5)
WBC # BLD: 9.1 K/UL — SIGNIFICANT CHANGE UP (ref 3.8–10.5)
WBC # BLD: 9.3 K/UL — SIGNIFICANT CHANGE UP (ref 3.8–10.5)
WBC # FLD AUTO: 10.4 K/UL — SIGNIFICANT CHANGE UP (ref 3.8–10.5)
WBC # FLD AUTO: 9 K/UL — SIGNIFICANT CHANGE UP (ref 3.8–10.5)
WBC # FLD AUTO: 9.1 K/UL — SIGNIFICANT CHANGE UP (ref 3.8–10.5)
WBC # FLD AUTO: 9.3 K/UL — SIGNIFICANT CHANGE UP (ref 3.8–10.5)

## 2018-03-30 PROCEDURE — 99233 SBSQ HOSP IP/OBS HIGH 50: CPT

## 2018-03-30 PROCEDURE — 71045 X-RAY EXAM CHEST 1 VIEW: CPT | Mod: 26

## 2018-03-30 PROCEDURE — 99232 SBSQ HOSP IP/OBS MODERATE 35: CPT | Mod: GC

## 2018-03-30 PROCEDURE — 99232 SBSQ HOSP IP/OBS MODERATE 35: CPT

## 2018-03-30 RX ORDER — FENTANYL CITRATE 50 UG/ML
25 INJECTION INTRAVENOUS ONCE
Qty: 0 | Refills: 0 | Status: DISCONTINUED | OUTPATIENT
Start: 2018-03-30 | End: 2018-03-30

## 2018-03-30 RX ORDER — SIMETHICONE 80 MG/1
80 TABLET, CHEWABLE ORAL ONCE
Qty: 0 | Refills: 0 | Status: COMPLETED | OUTPATIENT
Start: 2018-03-30 | End: 2018-03-30

## 2018-03-30 RX ORDER — POLYETHYLENE GLYCOL 3350 17 G/17G
17 POWDER, FOR SOLUTION ORAL ONCE
Qty: 0 | Refills: 0 | Status: COMPLETED | OUTPATIENT
Start: 2018-03-30 | End: 2018-03-30

## 2018-03-30 RX ORDER — FUROSEMIDE 40 MG
40 TABLET ORAL
Qty: 0 | Refills: 0 | Status: DISCONTINUED | OUTPATIENT
Start: 2018-03-30 | End: 2018-04-01

## 2018-03-30 RX ORDER — INSULIN LISPRO 100/ML
VIAL (ML) SUBCUTANEOUS
Qty: 0 | Refills: 0 | Status: DISCONTINUED | OUTPATIENT
Start: 2018-03-30 | End: 2018-04-03

## 2018-03-30 RX ORDER — ENOXAPARIN SODIUM 100 MG/ML
70 INJECTION SUBCUTANEOUS EVERY 12 HOURS
Qty: 0 | Refills: 0 | Status: DISCONTINUED | OUTPATIENT
Start: 2018-03-31 | End: 2018-04-01

## 2018-03-30 RX ORDER — SENNA PLUS 8.6 MG/1
2 TABLET ORAL AT BEDTIME
Qty: 0 | Refills: 0 | Status: DISCONTINUED | OUTPATIENT
Start: 2018-03-30 | End: 2018-04-03

## 2018-03-30 RX ADMIN — LACTULOSE 10 GRAM(S): 10 SOLUTION ORAL at 22:28

## 2018-03-30 RX ADMIN — POLYETHYLENE GLYCOL 3350 17 GRAM(S): 17 POWDER, FOR SOLUTION ORAL at 17:54

## 2018-03-30 RX ADMIN — FENTANYL CITRATE 25 MICROGRAM(S): 50 INJECTION INTRAVENOUS at 23:02

## 2018-03-30 RX ADMIN — PANTOPRAZOLE SODIUM 40 MILLIGRAM(S): 20 TABLET, DELAYED RELEASE ORAL at 12:13

## 2018-03-30 RX ADMIN — LACTULOSE 10 GRAM(S): 10 SOLUTION ORAL at 17:55

## 2018-03-30 RX ADMIN — Medication 20 MILLIGRAM(S): at 05:17

## 2018-03-30 RX ADMIN — Medication 20 MILLIGRAM(S): at 18:36

## 2018-03-30 RX ADMIN — FENTANYL CITRATE 25 MICROGRAM(S): 50 INJECTION INTRAVENOUS at 06:09

## 2018-03-30 RX ADMIN — FENTANYL CITRATE 25 MICROGRAM(S): 50 INJECTION INTRAVENOUS at 23:17

## 2018-03-30 RX ADMIN — FENTANYL CITRATE 25 MICROGRAM(S): 50 INJECTION INTRAVENOUS at 02:08

## 2018-03-30 RX ADMIN — SPIRONOLACTONE 100 MILLIGRAM(S): 25 TABLET, FILM COATED ORAL at 05:17

## 2018-03-30 RX ADMIN — FENTANYL CITRATE 25 MICROGRAM(S): 50 INJECTION INTRAVENOUS at 01:53

## 2018-03-30 RX ADMIN — ONDANSETRON 4 MILLIGRAM(S): 8 TABLET, FILM COATED ORAL at 12:13

## 2018-03-30 RX ADMIN — FENTANYL CITRATE 25 MICROGRAM(S): 50 INJECTION INTRAVENOUS at 17:55

## 2018-03-30 RX ADMIN — SENNA PLUS 2 TABLET(S): 8.6 TABLET ORAL at 22:29

## 2018-03-30 RX ADMIN — FENTANYL CITRATE 25 MICROGRAM(S): 50 INJECTION INTRAVENOUS at 18:18

## 2018-03-30 RX ADMIN — SIMETHICONE 80 MILLIGRAM(S): 80 TABLET, CHEWABLE ORAL at 09:20

## 2018-03-30 RX ADMIN — LACTULOSE 10 GRAM(S): 10 SOLUTION ORAL at 05:17

## 2018-03-30 RX ADMIN — SPIRONOLACTONE 100 MILLIGRAM(S): 25 TABLET, FILM COATED ORAL at 18:35

## 2018-03-30 RX ADMIN — FENTANYL CITRATE 25 MICROGRAM(S): 50 INJECTION INTRAVENOUS at 06:24

## 2018-03-30 NOTE — DIETITIAN INITIAL EVALUATION ADULT. - PHYSICAL APPEARANCE
BMI 24.1Kg/m2. Pt noted with mild muscle depletion of temples and clavicle, however arms and legs appear to have appropriate muscle tone.

## 2018-03-30 NOTE — DIETITIAN INITIAL EVALUATION ADULT. - NS AS NUTRI INTERV ED CONTENT
Reviewed Consistent Carbohydrate diet and Low Sodium Nutrition Therapy and provided written diet education materials. Reviewed increased protein-calorie needs with cirrhosis; encouraged intake of Glucerna supplements to help prevent catabolic weight loss. Pt was receptive and expressed understanding./Nutrition relationship to health/disease

## 2018-03-30 NOTE — DIETITIAN INITIAL EVALUATION ADULT. - FACTORS AFF FOOD INTAKE
Pt NPO with meds/ice chips/sips. Pt reports nausea with ice chips. Per team, plan to advance diet today. Last BM 3/26. Abdominal distention/discomfort noted.

## 2018-03-30 NOTE — PROGRESS NOTE ADULT - SUBJECTIVE AND OBJECTIVE BOX
Interventional Radiology Follow- Up Note    61 y/o M with a PMH significant for alcoholic cirrhosis, PE on lovenox 70mg, NIDDM2, and recurrent pleural effusions s/p chest tube s/p TIPS 3/29 with Dr. Menjivar. Reports abdominal discomfort. Denies abdominal pain, N/v, SOB.       Vitals: T(F): 98.2 (03-30-18 @ 04:00), Max: 98.2 (03-29-18 @ 11:54)  HR: 89 (03-30-18 @ 07:00) (80 - 101)  BP: 97/56 (03-30-18 @ 07:00) (95/50 - 119/60)  RR: 14 (03-30-18 @ 07:00) (11 - 17)  SpO2: 97% (03-30-18 @ 07:00) (96% - 100%)  Wt(kg): --    LABS:                        12.2   10.4  )-----------( 209      ( 30 Mar 2018 00:20 )             34.8     03-30    132<L>  |  101  |  42<H>  ----------------------------<  170<H>  5.1   |  20<L>  |  1.17    Ca    8.5      30 Mar 2018 00:20  Phos  4.6     03-30  Mg     1.8     03-30    TPro  5.2<L>  /  Alb  1.8<L>  /  TBili  0.5  /  DBili  x   /  AST  77<H>  /  ALT  64<H>  /  AlkPhos  170<H>  03-30    PT/INR - ( 29 Mar 2018 19:16 )   PT: 11.0 sec;   INR: 1.01 ratio         PTT - ( 30 Mar 2018 00:20 )  PTT:31.6 sec      PHYSICAL EXAM:  General: Nontoxic, in NAD  Neuro:  Alert & oriented x 3  right neck: 10 [place] sheath removed at bedside under sterile condition. Pressure held for 10 minutes. No bleeding noted.       Impression: 62y Male admitted with Hepatic cirrhosis s/p TIPS.     Plan:  -care per MICU  - D/w Dr. Menjivar.      Please call IR at extension [phone] with any questions, concerns, or issues regarding above. Interventional Radiology Follow- Up Note    61 y/o M with a PMH significant for alcoholic cirrhosis, PE on lovenox 70mg, NIDDM2, and recurrent pleural effusions s/p chest tube s/p TIPS 3/29 with Dr. Menjivar. Reports abdominal discomfort. Denies abdominal pain, N/v, SOB.       Vitals: T(F): 98.2 (03-30-18 @ 04:00), Max: 98.2 (03-29-18 @ 11:54)  HR: 89 (03-30-18 @ 07:00) (80 - 101)  BP: 97/56 (03-30-18 @ 07:00) (95/50 - 119/60)  RR: 14 (03-30-18 @ 07:00) (11 - 17)  SpO2: 97% (03-30-18 @ 07:00) (96% - 100%)  Wt(kg): --    LABS:                        12.2   10.4  )-----------( 209      ( 30 Mar 2018 00:20 )             34.8     03-30    132<L>  |  101  |  42<H>  ----------------------------<  170<H>  5.1   |  20<L>  |  1.17    Ca    8.5      30 Mar 2018 00:20  Phos  4.6     03-30  Mg     1.8     03-30    TPro  5.2<L>  /  Alb  1.8<L>  /  TBili  0.5  /  DBili  x   /  AST  77<H>  /  ALT  64<H>  /  AlkPhos  170<H>  03-30    PT/INR - ( 29 Mar 2018 19:16 )   PT: 11.0 sec;   INR: 1.01 ratio         PTT - ( 30 Mar 2018 00:20 )  PTT:31.6 sec      PHYSICAL EXAM:  General: Nontoxic, in NAD  Neuro:  Alert & oriented x 3  right neck: 10 [place]ili sheath removed at bedside under sterile condition. Pressure held for 10 minutes. No bleeding noted.       Impression: 62y Male admitted with Hepatic cirrhosis s/p TIPS.     Plan:  -care per MICU  - Can restart the Lovenox 70mg on 3/31 after 5PM.   - D/w Dr. Menjivar.      Please call IR at extension [phone] with any questions, concerns, or issues regarding above.

## 2018-03-30 NOTE — CHART NOTE - NSCHARTNOTEFT_GEN_A_CORE
Upon Nutritional Assessment by the Registered Dietitian your patient was determined to meet criteria / has evidence of the following diagnosis/diagnoses:          [X ]  Mild Protein Calorie Malnutrition        [ ]  Moderate Protein Calorie Malnutrition        [ ] Severe Protein Calorie Malnutrition        [ ] Unspecified Protein Calorie Malnutrition        [ ] Underweight / BMI <19        [ ] Morbid Obesity / BMI > 40      Findings as based on:  [X ] Comprehensive nutrition assessment; pt report of ~30 pounds fluid + weight loss in past 5 months   [X ] Nutrition Focused Physical Exam; mild muscle depletion of clavicles and temples  [X ] Other: inability to meet increased protein-calorie demands of cirrhosis, catabolic illness      Nutrition Plan/Recommendations:    1. Advance diet to Low Sodium; monitor need for Consistent Carbohydrate diet restriction  2. Add 2 servings Glucerna (220cal, 10Gm protein) daily to help meet increased protein-calorie needs for catabolic illness  3. Provided education on Consistent Carbohydrate diet and Low Sodium Nutrition Therapy      PROVIDER Section:     By signing this assessment you are acknowledging and agree with the diagnosis/diagnoses assigned by the Registered Dietitian    Comments:

## 2018-03-30 NOTE — PROGRESS NOTE ADULT - ASSESSMENT
HPI: This is a 63yo M with alcoholic cirrhosis s/p TIPPS procedure from IR  for monitoring. The pt has had recurrent hepatic hydrothorax is s/p Right Pigtail to pleura vac from an OSH (SS), hx of PE @ distal LLL pulmonary artery w extension to the segmental branch's, and DAQUAN dx on 2/8 remains on therapeutic Lovenox doses- held for the TIPPS Procedure, and has a HX of DMII. Mr Gilbert has remained hemodynamically stable, is A&O x3, H/H stable and is stable for transfer back to the floor.    Neuro: +A&O x 3  -c/w neuro checks as per protocol    Pulm: +recurrent hepatic hydrothorax s/p R Pigtail / CT to pleuravac - c/w serous drainage  -maintain CT to pleuravac  -stick output monitoring of CT drainage  -f/u CXR / POCUS  -c/w lasix and spirolactone    GI: s/p TIPPS for alcoholic cirrhosis  -start PO diet- clears to full liquid to regular- low Na  -c/w trending ammonia levels, lactulose, CBC  -f/u by GI / Liver    Heme: Recent HX PE has been on Lovenox therapeutic  -restart Lovenox 1mg/Kg BID starting tomorrow evening- as cleared by IR    Renal: no current issues  -d/c castellon cath  -c/w trending renal function  CV: Remains hemodynamically stable and w/o ectopy  -c/w with monitoring     OOB as tolerated and amubating

## 2018-03-30 NOTE — PROGRESS NOTE ADULT - ASSESSMENT
Impression:  1) Refractory ascites/hepatic hydrothorax - failed aggressive diuretic management; now s/p TIPS on 3/29/18.  2) Cirrhosis secondary to EtOH. MELD-Na 13          Ascites: +2/2018 ultrasound          PSE: mild asterixis on exam           EV: no prior EGD  3) Pulmonary embolism on Eliquis (diagnosed 2/8/18)    Plan:  - continue current diuretic regimen - Lasix 40mg BID, Aldactone 200mg daily   - continue lactulose; titrate to 2-3 BM/day  - daily CMP and INR  - low sodium diet (<2g/day)   - management of chest tube per pulmonary   - outpatient followup with hepatology for titration of diuretics (will need to be titrated down now that he is s/p TIPS), EGD (varices screening) - patient prefers MDs near his home in [place] ([facility] does not have offices there)

## 2018-03-30 NOTE — DIETITIAN INITIAL EVALUATION ADULT. - PROBLEM SELECTOR PLAN 5
DVT ppx: on therapeutic lovenox    Richar Jorgensen, PGY-1  Internal Medicine  Pager# [phone]

## 2018-03-30 NOTE — PROGRESS NOTE ADULT - SUBJECTIVE AND OBJECTIVE BOX
CHIEF COMPLAINT: S/P TIPPS procedure     Interval Events: No overnight events- Pt remained hemodynamically stable, H/H remains stable.    HPI: This is a 61yo M w a PMHX of alcoholic cirrhosis, with recurrent hepatic hydrothorax s/p Right Pigtail to pleura vac at OSH, hx of PE @ distal LLL pulmonary artery w extension to the segmental branch's, and DAQUAN dx on 2/8 remains on therapeutic Lovenox doses, and has a HX of DMII.  Pt is s/p TIPPS procedure yesterday, was admitted to MICU for monitoring, remains hemodynamically, and H/H remains stable. The pt has been cleared by IR (Dr. Menjivar) for transfer back to the floor, RIJ introducer d/c by IR, pt is has ambulated bed to chair w/o difficulty, and is now tolerating a diet.    REVIEW OF SYSTEMS:  Constitutional: [ ] negative [ ] fevers [ ] chills [ ] weight loss [ ] weight gain  HEENT: [ ] negative [ ] dry eyes [ ] eye irritation [ ] postnasal drip [ ] nasal congestion  CV: [ ] negative  [ ] chest pain [ ] orthopnea [ ] palpitations [ ] murmur  Resp: [ ] negative [ ] cough [ ] shortness of breath [ ] dyspnea [ ] wheezing [ ] sputum [ ] hemoptysis  GI: [ ] negative [ ] nausea [ ] vomiting [ ] diarrhea [ ] constipation [ ] abd pain [ ] dysphagia   : [ ] negative [ ] dysuria [ ] nocturia [ ] hematuria [ ] increased urinary frequency  Musculoskeletal: [ ] negative [ ] back pain [ ] myalgias [ ] arthralgias [ ] fracture  Skin: [ ] negative [ ] rash [ ] itch  Neurological: [ ] negative [ ] headache [ ] dizziness [ ] syncope [ ] weakness [ ] numbness  Psychiatric: [ ] negative [ ] anxiety [ ] depression  Endocrine: [ ] negative [ ] diabetes [ ] thyroid problem  Hematologic/Lymphatic: [ ] negative [ ] anemia [ ] bleeding problem  Allergic/Immunologic: [ ] negative [ ] itchy eyes [ ] nasal discharge [ ] hives [ ] angioedema  [ x] All other systems negative      OBJECTIVE:  ICU Vital Signs Last 24 Hrs  T(C): 36.8 (30 Mar 2018 08:00), Max: 36.8 (29 Mar 2018 11:54)  T(F): 98.3 (30 Mar 2018 08:00), Max: 98.3 (30 Mar 2018 08:00)  HR: 91 (30 Mar 2018 10:00) (80 - 101)  BP: 103/73 (30 Mar 2018 10:00) (95/50 - 119/60)  BP(mean): 85 (30 Mar 2018 10:00) (69 - 85)  ABP: --  ABP(mean): --  RR: 26 (30 Mar 2018 10:00) (11 - 26)  SpO2: 99% (30 Mar 2018 10:00) (96% - 100%)        03-29 @ 07:01  -  03-30 @ 07:00  --------------------------------------------------------  IN: 310 mL / OUT: 1170 mL / NET: -860 mL    03-30 @ 07:01  - 03-30 @ 10:56  --------------------------------------------------------  IN: 40 mL / OUT: 100 mL / NET: -60 mL      CAPILLARY BLOOD GLUCOSE      POCT Blood Glucose.: 105 mg/dL (30 Mar 2018 05:32)      PHYSICAL EXAM:  General:   HEENT:   Lymph Nodes:  Neck:   Respiratory:   Cardiovascular:   Abdomen:   Extremities:   Skin:   Neurological:  Psychiatry:    LINES: CHANEL, REBEKAH Introducer d/c'd    [facility] MEDICATIONS:  furosemide   Injectable 20 milliGRAM(s) IV Push every 12 hours  spironolactone 100 milliGRAM(s) Oral two times a day    insulin lispro (HumaLOG) corrective regimen sliding scale   SubCutaneous Before meals and at bedtime      ondansetron Injectable 4 milliGRAM(s) IV Push every 8 hours PRN    lactulose Syrup 10 Gram(s) Oral three times a day  pantoprazole  Injectable 40 milliGRAM(s) IV Push daily      LABS:                        12.2   10.4  )-----------( 209      ( 30 Mar 2018 00:20 )             34.8     Hgb Trend: 12.2<--, 12.3<--, 12.5<--  03-30    132<L>  |  101  |  42<H>  ----------------------------<  170<H>  5.1   |  20<L>  |  1.17    Ca    8.5      30 Mar 2018 00:20  Phos  4.6     03-30  Mg     1.8     03-30    TPro  5.2<L>  /  Alb  1.8<L>  /  TBili  0.5  /  DBili  x   /  AST  77<H>  /  ALT  64<H>  /  AlkPhos  170<H>  03-30    Creatinine Trend: 1.17<--, 1.11<--, 1.23<--, 1.35<--, 1.14<--, 1.10<--  PT/INR - ( 29 Mar 2018 19:16 )   PT: 11.0 sec;   INR: 1.01 ratio    PTT - ( 30 Mar 2018 00:20 )  PTT:31.6 sec      MICROBIOLOGY:     RADIOLOGY:  < from: CT Abdomen and Pelvis w/ Oral Cont and w/ IV Cont (03.27.18 @ 18:42) >  EXAM:  CT ABDOMEN AND PELVIS OC IC                        EXAM:  CT CHEST IC                       PROCEDURE DATE:  03/27/2018        INTERPRETATION:  CLINICAL INFORMATION: History of cirrhosis and right   hepatic hydrothorax, rule out pericardial effusion and evaluate for   possible TIPS.    COMPARISON: CT chest 3/18/2013. CT abdomen pelvis 2/8/2018.    PROCEDURE:   CT of the Chest, Abdomen and Pelvis was performed with intravenous   contrast.   Imaging of the abdomen and pelvis wasperformed in the arterial phase.   Imaging was performed through the chest and abdomen in the venous phase   followed by imaging of the abdomen and pelvis in the delayed phase.   Intravenous contrast: 90 ml Omnipaque 350. 10 ml discarded.  Oral contrast: Positive oral contrast was administered.  Sagittal and coronal reformats were performed.    FINDINGS:  CHEST:   LUNGS AND LARGE AIRWAYS: Patent central airways. 4 mm subpleural right   upper lobe nodule (series 6 image 51) could be an intrapulmonary lymph   node.  PLEURA: Tiny right-sided pneumothorax with a right-sided chest tube in   place. Resolution of previously seen right-sided hydrothorax.   VESSELS: Atherosclerotic calcification of the aorta and coronary arteries.  HEART: Heart size is normal. Trace pericardial effusion. Aortic valvular   calcifications.  MEDIASTINUM AND ALPA: No lymphadenopathy.  CHEST WALL AND LOWER NECK: Bilateral gynecomastia.    ABDOMEN AND PELVIS:  LIVER: Cirrhosis. No evidence for hepatocellular carcinoma.  BILE DUCTS: Normal caliber.  GALLBLADDER: Within normal limits.  SPLEEN: Within normal limits.  PANCREAS: Within normal limits.  ADRENALS: Within normal limits.  KIDNEYS/URETERS: Within normal limits.    BLADDER: Within normal limits.  REPRODUCTIVE ORGANS: The prostate is within normal limits.    BOWEL: No bowel obstruction. Appendix is normal.  PERITONEUM: Mild ascites.  VESSELS:  Sequelae of portal hypertension evidenced by splenorenal,   umbilical, and esophageal varices. The portal, superior mesenteric, and   splenic veins are patent. The IVC and hepatic veins are patent. Mild   atherosclerotic disease of the aorta.  RETROPERITONEUM: No lymphadenopathy.    ABDOMINAL WALL: Subcutaneous foci of air in the ventral abdominal wall   likely related to medication injection. Small fat-containing umbilical   hernia.  BONES: Within normal limits.of the spine.    IMPRESSION:   Resolution of right-sided hydrothorax with a new tiny right pneumothorax   (right pleural catheter in place).  Cirrhosis  with portal hypertension.  Patent portal and hepatic veins.    EKG: CHIEF COMPLAINT: S/P TIPPS procedure     Interval Events: No overnight events- Pt remained hemodynamically stable, H/H remains stable.    HPI: This is a 61yo M w a PMHX of alcoholic cirrhosis, with recurrent hepatic hydrothorax s/p Right Pigtail to pleura vac at OSH, hx of PE @ distal LLL pulmonary artery w extension to the segmental branch's, and DAQUAN dx on 2/8 remains on therapeutic Lovenox doses, and has a HX of DMII.  Pt is s/p TIPPS procedure yesterday, was admitted to MICU for monitoring, remains hemodynamically, and H/H remains stable. The pt has been cleared by IR (Dr. Menjivar) for transfer back to the floor, RIJ introducer d/c by IR, pt is has ambulated bed to chair w/o difficulty, and is now tolerating a diet.    REVIEW OF SYSTEMS:  [ x] All other systems negative    OBJECTIVE:  ICU Vital Signs Last 24 Hrs  T(C): 36.8 (30 Mar 2018 08:00), Max: 36.8 (29 Mar 2018 11:54)  T(F): 98.3 (30 Mar 2018 08:00), Max: 98.3 (30 Mar 2018 08:00)  HR: 91 (30 Mar 2018 10:00) (80 - 101)  BP: 103/73 (30 Mar 2018 10:00) (95/50 - 119/60)  BP(mean): 85 (30 Mar 2018 10:00) (69 - 85)  ABP: --  ABP(mean): --  RR: 26 (30 Mar 2018 10:00) (11 - 26)  SpO2: 99% (30 Mar 2018 10:00) (96% - 100%)        03-29 @ 07:01  -  03-30 @ 07:00  --------------------------------------------------------  IN: 310 mL / OUT: 1170 mL / NET: -860 mL    03-30 @ 07:01 - 03-30 @ 10:56  --------------------------------------------------------  IN: 40 mL / OUT: 100 mL / NET: -60 mL      CAPILLARY BLOOD GLUCOSE      POCT Blood Glucose.: 105 mg/dL (30 Mar 2018 05:32)      PHYSICAL EXAM:  General:   HEENT:   Lymph Nodes:  Neck:   Respiratory:   Cardiovascular:   Abdomen:   Extremities:   Skin:   Neurological:  Psychiatry:    LINES: PIV, RIJ Introducer d/c'd    [facility] MEDICATIONS:  furosemide   Injectable 20 milliGRAM(s) IV Push every 12 hours  spironolactone 100 milliGRAM(s) Oral two times a day    insulin lispro (HumaLOG) corrective regimen sliding scale   SubCutaneous Before meals and at bedtime      ondansetron Injectable 4 milliGRAM(s) IV Push every 8 hours PRN    lactulose Syrup 10 Gram(s) Oral three times a day  pantoprazole  Injectable 40 milliGRAM(s) IV Push daily      LABS:                        12.2   10.4  )-----------( 209      ( 30 Mar 2018 00:20 )             34.8     Hgb Trend: 12.2<--, 12.3<--, 12.5<--  03-30    132<L>  |  101  |  42<H>  ----------------------------<  170<H>  5.1   |  20<L>  |  1.17    Ca    8.5      30 Mar 2018 00:20  Phos  4.6     03-30  Mg     1.8     03-30    TPro  5.2<L>  /  Alb  1.8<L>  /  TBili  0.5  /  DBili  x   /  AST  77<H>  /  ALT  64<H>  /  AlkPhos  170<H>  03-30    Creatinine Trend: 1.17<--, 1.11<--, 1.23<--, 1.35<--, 1.14<--, 1.10<--  PT/INR - ( 29 Mar 2018 19:16 )   PT: 11.0 sec;   INR: 1.01 ratio    PTT - ( 30 Mar 2018 00:20 )  PTT:31.6 sec      MICROBIOLOGY:     RADIOLOGY:  < from: CT Abdomen and Pelvis w/ Oral Cont and w/ IV Cont (03.27.18 @ 18:42) >  EXAM:  CT ABDOMEN AND PELVIS OC IC                        EXAM:  CT CHEST IC                       PROCEDURE DATE:  03/27/2018        INTERPRETATION:  CLINICAL INFORMATION: History of cirrhosis and right   hepatic hydrothorax, rule out pericardial effusion and evaluate for   possible TIPS.    COMPARISON: CT chest 3/18/2013. CT abdomen pelvis 2/8/2018.    PROCEDURE:   CT of the Chest, Abdomen and Pelvis was performed with intravenous   contrast.   Imaging of the abdomen and pelvis wasperformed in the arterial phase.   Imaging was performed through the chest and abdomen in the venous phase   followed by imaging of the abdomen and pelvis in the delayed phase.   Intravenous contrast: 90 ml Omnipaque 350. 10 ml discarded.  Oral contrast: Positive oral contrast was administered.  Sagittal and coronal reformats were performed.    FINDINGS:  CHEST:   LUNGS AND LARGE AIRWAYS: Patent central airways. 4 mm subpleural right   upper lobe nodule (series 6 image 51) could be an intrapulmonary lymph   node.  PLEURA: Tiny right-sided pneumothorax with a right-sided chest tube in   place. Resolution of previously seen right-sided hydrothorax.   VESSELS: Atherosclerotic calcification of the aorta and coronary arteries.  HEART: Heart size is normal. Trace pericardial effusion. Aortic valvular   calcifications.  MEDIASTINUM AND ALPA: No lymphadenopathy.  CHEST WALL AND LOWER NECK: Bilateral gynecomastia.    ABDOMEN AND PELVIS:  LIVER: Cirrhosis. No evidence for hepatocellular carcinoma.  BILE DUCTS: Normal caliber.  GALLBLADDER: Within normal limits.  SPLEEN: Within normal limits.  PANCREAS: Within normal limits.  ADRENALS: Within normal limits.  KIDNEYS/URETERS: Within normal limits.    BLADDER: Within normal limits.  REPRODUCTIVE ORGANS: The prostate is within normal limits.    BOWEL: No bowel obstruction. Appendix is normal.  PERITONEUM: Mild ascites.  VESSELS:  Sequelae of portal hypertension evidenced by splenorenal,   umbilical, and esophageal varices. The portal, superior mesenteric, and   splenic veins are patent. The IVC and hepatic veins are patent. Mild   atherosclerotic disease of the aorta.  RETROPERITONEUM: No lymphadenopathy.    ABDOMINAL WALL: Subcutaneous foci of air in the ventral abdominal wall   likely related to medication injection. Small fat-containing umbilical   hernia.  BONES: Within normal limits.of the spine.    IMPRESSION:   Resolution of right-sided hydrothorax with a new tiny right pneumothorax   (right pleural catheter in place).  Cirrhosis  with portal hypertension.  Patent portal and hepatic veins.    EKG:

## 2018-03-30 NOTE — DIETITIAN INITIAL EVALUATION ADULT. - NUTRITION INTERVENTION
Collaboration and Referral of Nutrition Care/Medical Food Supplements/Meals and Snack/Nutrition Education

## 2018-03-30 NOTE — DIETITIAN INITIAL EVALUATION ADULT. - NS FNS REASON FOR WEIGHT CHANG
decreased po intake/fluid loss/Weight history: Pt reports UBW ~180 pounds in December 2017, with ~20 pound fluid/wt loss to ~160 pounds in February 2018. Pt currently 149 pounds, noted with ascites and aggressive diuresis. Weight loss may indicate ~ 10 pound weight loss and ~20 pound fluid loss per pt recall and past weights. fluid loss/decreased po intake/Weight history: Pt reports UBW ~180 pounds in December 2017, with ~20 pound fluid/wt loss to ~160 pounds in February 2018. Pt currently 149 pounds, noted with ascites and aggressive diuresis. Weight loss may indicate ~ 10 pound weight loss and ~20 pound fluid loss in past 5 months; unable to accurately quantify. decreased po intake/Weight history: Pt reports UBW ~180 pounds in December 2017, with ~20 pound fluid loss to ~160 pounds in February 2018. Pt currently 149 pounds, noted with ascites and aggressive diuresis. Weight loss may indicate ~ 10 pound weight loss and ~20 pound fluid loss in past 5 months; unable to accurately quantify./fluid loss

## 2018-03-30 NOTE — DIETITIAN INITIAL EVALUATION ADULT. - NS AS NUTRI INTERV MEALS SNACK
Recommend advance to Low Sodium diet; monitor need for Consistent Carbohydrate restriction if BG becomes elevated/Mineral - modified diet

## 2018-03-30 NOTE — DIETITIAN INITIAL EVALUATION ADULT. - NS AS NUTRI INTERV MEDICAL AND FOOD SUPPLEMENTS
Recommend add Glucerna (220Kcal, 9.9gm protein) bid to help meet increased protein-calorie needs; communicated with team/Commercial beverage Commercial beverage/Recommend add Glucerna (220cal, 10Gm protein) bid to help meet increased protein-calorie needs; communicated with team

## 2018-03-30 NOTE — CHART NOTE - NSCHARTNOTEFT_GEN_A_CORE
Interventional Radiology    IR contacted to evaluate pain. Pt is s/p TIPS yesterday with Dr. Menjivar. Pt seen and examined at bedside, reports pain 6-7/10 described as "crampy like something isn't settling right in my stomach". He states pain is worsening, previously rating 5/10. As per pt, had BM today. Patient hemodynamically stable, not on pressors, no blood products required and vitals are stable. Fentanyl 25mcg IVP given at 1755.     -Above d/w with Dr. Menjivar, recommend obtaining STAT cbc and US of abdomen.   -Spoke with Natalya (provider in MICU) regarding above  -F/U cbc and imaging  -Continue pain control per MICU team/ primary team   -Continue global care per MICU/ primary team  -Please contact IR with any questions/ concerns at [phone]

## 2018-03-30 NOTE — PROGRESS NOTE ADULT - SUBJECTIVE AND OBJECTIVE BOX
HEPATOLOGY FOLLOW-UP NOTE    Chief Complaint:  Patient is a 62y old  Male who presents with a chief complaint of Transfer from [facility] for TIPS, Recurrent Pleural effusion (22 Mar 2018 14:24)      Interval Events:   - Underwent TIPS placement yesterday. Monitored in MICU overnight without events.     Allergies:  No Known Allergies      Hospital Medications:  dextrose 5%. 1000 milliLiter(s) IV Continuous <Continuous>  dextrose 50% Injectable 12.5 Gram(s) IV Push once  dextrose 50% Injectable 25 Gram(s) IV Push once  dextrose 50% Injectable 25 Gram(s) IV Push once  dextrose Gel 1 Dose(s) Oral once PRN  enoxaparin Injectable 70 milliGRAM(s) SubCutaneous two times a day  furosemide    Tablet 40 milliGRAM(s) Oral two times a day  glucagon  Injectable 1 milliGRAM(s) IntraMuscular once PRN  insulin lispro (HumaLOG) corrective regimen sliding scale   SubCutaneous three times a day before meals  insulin lispro (HumaLOG) corrective regimen sliding scale   SubCutaneous at bedtime  lactulose Syrup 10 Gram(s) Oral three times a day  pantoprazole    Tablet 40 milliGRAM(s) Oral before breakfast  spironolactone 100 milliGRAM(s) Oral two times a day      PMHX/PSHX:  Pulmonary embolism  Cirrhosis  DM (diabetes mellitus)  No pertinent past medical history  S/P thoracentesis  No significant past surgical history      Family history:  No pertinent family history in first degree relatives      ROS:     General:  No wt loss, fevers, chills, night sweats, fatigue,   Eyes:  Good vision, no reported pain  ENT:  No sore throat, pain, runny nose, dysphagia  CV:  No pain, palpitations, hypo/hypertension  Resp:  No dyspnea, cough, tachypnea, wheezing  GI:  see HPI  :  No pain, bleeding, incontinence, nocturia  Muscle:  No pain, weakness  Neuro:  No weakness, tingling, memory problems  Psych:  No fatigue, insomnia, mood problems, depression  Endocrine:  No polyuria, polydipsia, cold/heat intolerance  Heme:  No petechiae, ecchymosis, easy bruisability  Skin:  No rash, tattoos, scars, edema      PHYSICAL EXAM:   Vital Signs:  Vital Signs Last 24 Hrs  T(C): 36.7 (26 Mar 2018 06:07), Max: 36.9 (25 Mar 2018 20:58)  T(F): 98 (26 Mar 2018 06:07), Max: 98.4 (25 Mar 2018 20:58)  HR: 77 (26 Mar 2018 06:07) (77 - 85)  BP: 101/68 (26 Mar 2018 06:07) (101/68 - 110/75)  BP(mean): --  RR: 18 (26 Mar 2018 06:07) (16 - 18)  SpO2: 100% (26 Mar 2018 06:07) (99% - 100%)  Daily     Daily     GENERAL:  no acute distress  HEENT:  -icterus   CHEST:  R chest tube in place; decrease R-sided breath sounds   HEART:  RRR, S1S2  ABDOMEN:  soft, non-tender, non-distended  EXTEREMITIES:  2+ LE radha  SKIN:  No rash/erythema/ecchymoses/petechiae/wounds/abscess/warm/dry  NEURO:  alert, oriented, -asterixis     LABS:                            12.2   10.4  )-----------( 209      ( 30 Mar 2018 00:20 )             34.8       03-30    132<L>  |  101  |  42<H>  ----------------------------<  170<H>  5.1   |  20<L>  |  1.17    Ca    8.5      30 Mar 2018 00:20  Phos  4.6     03-30  Mg     1.8     03-30    TPro  5.2<L>  /  Alb  1.8<L>  /  TBili  0.5  /  DBili  x   /  AST  77<H>  /  ALT  64<H>  /  AlkPhos  170<H>  03-30      LIVER FUNCTIONS - ( 30 Mar 2018 00:20 )  Alb: 1.8 g/dL / Pro: 5.2 g/dL / ALK PHOS: 170 U/L / ALT: 64 U/L RC / AST: 77 U/L / GGT: x             PT/INR - ( 29 Mar 2018 19:16 )   PT: 11.0 sec;   INR: 1.01 ratio         PTT - ( 30 Mar 2018 00:20 )  PTT:31.6 sec

## 2018-03-30 NOTE — DIETITIAN INITIAL EVALUATION ADULT. - PROBLEM SELECTOR PLAN 1
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 Transferred to [facility] for TIPS procedure. MELD score 8 from most recent labs, Child-Colvin score 8, also from most recent labs. Has chest tube in right hemithorax.   - f/u IR and Hepatology recs regarding TIPS  - continue to monitor outputs from chest tube, will consult Pulmonology for chest tube management   - c/w lasix 40 mg daily and spironolactone 50 mg BID

## 2018-03-30 NOTE — DIETITIAN INITIAL EVALUATION ADULT. - ADHERENCE
Pt manages DM2 with Metformin bid but does not check fingersticks; HbA1c 5.6% (2/9) indicates good BG control. Pt reports he avoids sugar, however drinks juice. Pt reports he avoids sodium./good

## 2018-03-30 NOTE — DIETITIAN INITIAL EVALUATION ADULT. - PERTINENT LABORATORY DATA
HbA1c 5.6%, Fingersticks x 24 hours: 105-138mg/dL, sodium 132 <L>, BUN 42, AST 77 <H>, ALT 64 <H>, phosphorus 4.6 <H>

## 2018-03-30 NOTE — DIETITIAN INITIAL EVALUATION ADULT. - OTHER INFO
Nutrition Assessment warranted for length of stay in MICU. Pt with h/o EROH cirrhosis and DM2, refractory ascites/hepatic hydrothorax and pulmonary embolism- failed aggressive diuretic management; now S/P TIPS on 3/29/18. Nutrition Assessment warranted for length of stay in MICU. Pt with h/o ETOH cirrhosis and DM2, refractory ascites/hepatic hydrothorax and pulmonary embolism, with failed aggressive diuretic management; now S/P TIPS on 3/29/18.

## 2018-03-30 NOTE — CHART NOTE - NSCHARTNOTEFT_GEN_A_CORE
: Dr. Mckeon, Dr. Lam    INDICATION: Evaluate Pleural Effusion    PROCEDURE:  [ ] LIMITED ECHO  [X] LIMITED CHEST  [ ] LIMITED RETROPERITONEAL  [ ] LIMITED ABDOMINAL  [ ] LIMITED DVT  [ ] NEEDLE GUIDANCE VASCULAR  [ ] NEEDLE GUIDANCE THORACENTESIS  [ ] NEEDLE GUIDANCE PARACENTESIS  [ ] NEEDLE GUIDANCE PERICARDIOCENTESIS  [ ] OTHER    FINDINGS: A-lines throughout. Right sided trace pleural effusion.       INTERPRETATION: Right sided trace pleural effusion.

## 2018-03-30 NOTE — DIETITIAN INITIAL EVALUATION ADULT. - ORAL INTAKE PTA
Pt reports he tries to eat well, however reports inadequate protein intake. Pt takes MVI at home but no protein shakes; reports NKFA./fair

## 2018-03-30 NOTE — DIETITIAN INITIAL EVALUATION ADULT. - ENERGY NEEDS
ht: 5 feet 6 inches, wt: 149pounds, BMI: 24.1 Kg/m2, IBW: 142 pounds (+/- 10%), 105% IBW. Edema: 2+ L/R ankle (3/29), 2+ ascites noted in chart; Skin: no pressure injuries.

## 2018-03-31 DIAGNOSIS — E87.1 HYPO-OSMOLALITY AND HYPONATREMIA: ICD-10-CM

## 2018-03-31 LAB
ALBUMIN SERPL ELPH-MCNC: 1.8 G/DL — LOW (ref 3.3–5)
ALP SERPL-CCNC: 255 U/L — HIGH (ref 40–120)
ALT FLD-CCNC: 191 U/L RC — HIGH (ref 10–45)
ANION GAP SERPL CALC-SCNC: 11 MMOL/L — SIGNIFICANT CHANGE UP (ref 5–17)
AST SERPL-CCNC: 204 U/L — HIGH (ref 10–40)
BASOPHILS # BLD AUTO: 0 K/UL — SIGNIFICANT CHANGE UP (ref 0–0.2)
BASOPHILS NFR BLD AUTO: 0.4 % — SIGNIFICANT CHANGE UP (ref 0–2)
BILIRUB DIRECT SERPL-MCNC: 0.2 MG/DL — SIGNIFICANT CHANGE UP (ref 0–0.2)
BILIRUB INDIRECT FLD-MCNC: 0.4 MG/DL — SIGNIFICANT CHANGE UP (ref 0.2–1)
BILIRUB SERPL-MCNC: 0.6 MG/DL — SIGNIFICANT CHANGE UP (ref 0.2–1.2)
BUN SERPL-MCNC: 39 MG/DL — HIGH (ref 7–23)
CALCIUM SERPL-MCNC: 8.3 MG/DL — LOW (ref 8.4–10.5)
CHLORIDE SERPL-SCNC: 99 MMOL/L — SIGNIFICANT CHANGE UP (ref 96–108)
CO2 SERPL-SCNC: 20 MMOL/L — LOW (ref 22–31)
CREAT SERPL-MCNC: 1.12 MG/DL — SIGNIFICANT CHANGE UP (ref 0.5–1.3)
EOSINOPHIL # BLD AUTO: 0.2 K/UL — SIGNIFICANT CHANGE UP (ref 0–0.5)
EOSINOPHIL NFR BLD AUTO: 1.9 % — SIGNIFICANT CHANGE UP (ref 0–6)
GLUCOSE SERPL-MCNC: 143 MG/DL — HIGH (ref 70–99)
HCT VFR BLD CALC: 36.1 % — LOW (ref 39–50)
HGB BLD-MCNC: 11.9 G/DL — LOW (ref 13–17)
LYMPHOCYTES # BLD AUTO: 1.2 K/UL — SIGNIFICANT CHANGE UP (ref 1–3.3)
LYMPHOCYTES # BLD AUTO: 14.6 % — SIGNIFICANT CHANGE UP (ref 13–44)
MAGNESIUM SERPL-MCNC: 1.8 MG/DL — SIGNIFICANT CHANGE UP (ref 1.6–2.6)
MCHC RBC-ENTMCNC: 32.5 PG — SIGNIFICANT CHANGE UP (ref 27–34)
MCHC RBC-ENTMCNC: 33.1 GM/DL — SIGNIFICANT CHANGE UP (ref 32–36)
MCV RBC AUTO: 98.2 FL — SIGNIFICANT CHANGE UP (ref 80–100)
MONOCYTES # BLD AUTO: 1 K/UL — HIGH (ref 0–0.9)
MONOCYTES NFR BLD AUTO: 11.8 % — SIGNIFICANT CHANGE UP (ref 2–14)
NEUTROPHILS # BLD AUTO: 6 K/UL — SIGNIFICANT CHANGE UP (ref 1.8–7.4)
NEUTROPHILS NFR BLD AUTO: 71.3 % — SIGNIFICANT CHANGE UP (ref 43–77)
PHOSPHATE SERPL-MCNC: 3.8 MG/DL — SIGNIFICANT CHANGE UP (ref 2.5–4.5)
PLATELET # BLD AUTO: 140 K/UL — LOW (ref 150–400)
POTASSIUM SERPL-MCNC: 4.9 MMOL/L — SIGNIFICANT CHANGE UP (ref 3.5–5.3)
POTASSIUM SERPL-SCNC: 4.9 MMOL/L — SIGNIFICANT CHANGE UP (ref 3.5–5.3)
PROT SERPL-MCNC: 5 G/DL — LOW (ref 6–8.3)
RBC # BLD: 3.67 M/UL — LOW (ref 4.2–5.8)
RBC # FLD: 13 % — SIGNIFICANT CHANGE UP (ref 10.3–14.5)
SODIUM SERPL-SCNC: 130 MMOL/L — LOW (ref 135–145)
WBC # BLD: 8.3 K/UL — SIGNIFICANT CHANGE UP (ref 3.8–10.5)
WBC # FLD AUTO: 8.3 K/UL — SIGNIFICANT CHANGE UP (ref 3.8–10.5)

## 2018-03-31 PROCEDURE — 99233 SBSQ HOSP IP/OBS HIGH 50: CPT | Mod: GC

## 2018-03-31 PROCEDURE — 99232 SBSQ HOSP IP/OBS MODERATE 35: CPT

## 2018-03-31 RX ORDER — IBUPROFEN 200 MG
400 TABLET ORAL ONCE
Qty: 0 | Refills: 0 | Status: COMPLETED | OUTPATIENT
Start: 2018-03-31 | End: 2018-03-31

## 2018-03-31 RX ADMIN — PANTOPRAZOLE SODIUM 40 MILLIGRAM(S): 20 TABLET, DELAYED RELEASE ORAL at 13:18

## 2018-03-31 RX ADMIN — Medication 1: at 17:52

## 2018-03-31 RX ADMIN — ENOXAPARIN SODIUM 70 MILLIGRAM(S): 100 INJECTION SUBCUTANEOUS at 17:51

## 2018-03-31 RX ADMIN — Medication 400 MILLIGRAM(S): at 16:15

## 2018-03-31 RX ADMIN — Medication 1: at 22:35

## 2018-03-31 RX ADMIN — Medication 40 MILLIGRAM(S): at 06:00

## 2018-03-31 RX ADMIN — SPIRONOLACTONE 100 MILLIGRAM(S): 25 TABLET, FILM COATED ORAL at 06:00

## 2018-03-31 RX ADMIN — Medication 400 MILLIGRAM(S): at 15:21

## 2018-03-31 RX ADMIN — Medication 3: at 13:18

## 2018-03-31 RX ADMIN — SPIRONOLACTONE 100 MILLIGRAM(S): 25 TABLET, FILM COATED ORAL at 18:41

## 2018-03-31 RX ADMIN — LACTULOSE 10 GRAM(S): 10 SOLUTION ORAL at 06:02

## 2018-03-31 RX ADMIN — Medication 40 MILLIGRAM(S): at 17:51

## 2018-03-31 NOTE — PROGRESS NOTE ADULT - ASSESSMENT
This is a 62yoM w a HX of alcoholic cirrhosis s/p TIPPS procedure, +recurrent hepatic hydrothorax s/p Right pigtail / CT to water seal now- continues to drain substantial amounts of clear straw colored pleural drainage, and has a Hx of a PE. The pt was admitted to MICU 3/29 for monitoring s/p IR TIPPS procedure. The pt remains hemodynamically stable, H/H stable, states that his abdomen feels better today. Pt is stable for transfer to the floor.    Neuro: Remains neurologically intact  -c/w neuro checks as per protocol    CV: Remains hemodynamically stable- no issues  -c/w to monitor      Pulm: Recurrent hepatic hydrothorax s/p R Pigtail placement at OSH (SS) / c/t drain large amounts of clear straw colored pleural drainage  -maintain pleuravac to water seal and accurate output   -c/w lasix and spirolactone  -c/w lovenox for PE    GI: s/p TIPPS for alcoholic cirrhosis  -f/u IR  -Abdominal US- pt had been c/o abdominal discomfort   -c/w monitor CBC - trend H/H- transfuse as needed-   -f/u IR and Hepatology    Renal: no current issues   -c/w monitoring renal function and U/O    -Ambulate as tolerated  -Transfer to Medicine- Accepted by Dr. Adria Bonilla

## 2018-03-31 NOTE — PROGRESS NOTE ADULT - PROBLEM SELECTOR PLAN 2
s/p TIPS 3/29 with no complications   - complaining of epigastric pain today, Vitals and Hgb stable, will follow up abdominal ultrasound  - lactulose 10 g tid; titrate to 2-3 BM's/day, refusing  - c/w lasix 40 mg bid and spironolactone 100 mg BID  - appreciate hepatology recs s/p TIPS 3/29 with no complications   - complaining of epigastric pain this morning, Vitals and Hgb stable, will follow up abdominal ultrasound  - lactulose 10 g tid; titrate to 2-3 BM's/day  - c/w lasix 40 mg bid and spironolactone 100 mg BID  - [facility] hepatology recs, will need outpatient followup with hepatology for titration of diuretics (will need to be titrated down now that he is s/p TIPS), EGD (varices screening) s/p TIPS 3/29 with no complications   - complaining of epigastric pain, Vitals and Hgb stable, will follow up abdominal ultrasound  - lactulose 10 g tid; titrate to 2-3 BM's/day  - c/w lasix 40 mg bid and spironolactone 100 mg BID  - Appreciate hepatology recs, will need outpatient follow up with hepatology for titration of diuretics (will need to be titrated down now that he is s/p TIPS), EGD (varices screening)

## 2018-03-31 NOTE — PROGRESS NOTE ADULT - PROBLEM SELECTOR PLAN 6
DVT ppx: on therapeutic lovenox     Jennifer Hernandez M.D.   PGY-1 | Internal Medicine   [phone] | [id number]  After 7 pm covering pager [phone] Hypervolemic hyponatremia in setting of cirrhosis. Na stable.   - continue Lasix and Spironolactone   - fluid restrict if worsens   - monitor Na

## 2018-03-31 NOTE — CHART NOTE - NSCHARTNOTEFT_GEN_A_CORE
Briefly, patient is a 62M w/ hx of DM, PE (on Lovenox) and alcoholic cirrhosis w/ recurrent R sided hydrothorax who initially presented to [facility] with dyspnea and orthopnea, was found to have a large R pleural effusion s/p chest tube placement and transferred for TIPS, now s/p procedure on 3/29. Pt tolerated the procedure without any complications and was transferred to the MICU for closer neuro monitoring. While in the MICU, patient has been hemodynamically stable and has been restarted on therapeutic Lovenox. Pt has reported epigastric pain, but his hgb has remained stable. An abdominal US is pending.     Follow up:   - f/u pending abdominal US  - Maintain chest tube to pleuravac and monitor drainage (approximately 1800cc in last 24hrs). Pulmonary team following.    - Continue diuresis w/ Lasix/spironolactone and lactulose for encephalopathy   - Continue Lovenox for PE       Eva Chatterjee, MAR   [id number]

## 2018-03-31 NOTE — PROGRESS NOTE ADULT - ASSESSMENT
63 y/o M with a PMH significant for alcoholic cirrhosis, PE on eliquis, DM2, and recurrent pleural effusions in the setting of hepatic hydrothorax. Patient presented to [facility] on 3/18/18 with worsening dyspnea and orthopnea x3 days, found to have a large right pleural effusion s/p chest tube placement at OSH, and TIPS here on 3/29 with no complications, transferred to MICU for monitoring, stable for transfer back to medicine floors. 61 y/o M with a PMH significant for alcoholic cirrhosis, PE on eliquis, DM2, and recurrent pleural effusions in the setting of hepatic hydrothorax. Patient presented to [facility] on 3/18/18 with worsening dyspnea and orthopnea x3 days, found to have a large right pleural effusion s/p chest tube placement at OSH, and TIPS HERE on 3/29 with no complications, transferred to MICU for monitoring, stable for transfer back to medicine floors. 61 y/o M with a PMH significant for alcoholic cirrhosis, PE on eliquis, DM2, and recurrent pleural effusions in the setting of hepatic hydrothorax. Patient presented to [facility] on 3/18/18 with worsening dyspnea and orthopnea x3 days, found to have a large right pleural effusion s/p chest tube placement at OSH, and TIPS here on 3/29 with no complications, transferred to MICU for monitoring, stable for transfer back to medicine floors with pigtail catheter in place. Abdominal ultrasound pending for abdominal pain.

## 2018-03-31 NOTE — PROGRESS NOTE ADULT - SUBJECTIVE AND OBJECTIVE BOX
CHIEF COMPLAINT:    Interval Events:    REVIEW OF SYSTEMS:  Constitutional: [ ] negative [ ] fevers [ ] chills [ ] weight loss [ ] weight gain  HEENT: [ ] negative [ ] dry eyes [ ] eye irritation [ ] postnasal drip [ ] nasal congestion  CV: [ ] negative  [ ] chest pain [ ] orthopnea [ ] palpitations [ ] murmur  Resp: [ ] negative [ ] cough [ ] shortness of breath [ ] dyspnea [ ] wheezing [ ] sputum [ ] hemoptysis  GI: [ ] negative [ ] nausea [ ] vomiting [ ] diarrhea [ ] constipation [ ] abd pain [ ] dysphagia   : [ ] negative [ ] dysuria [ ] nocturia [ ] hematuria [ ] increased urinary frequency  Musculoskeletal: [ ] negative [ ] back pain [ ] myalgias [ ] arthralgias [ ] fracture  Skin: [ ] negative [ ] rash [ ] itch  Neurological: [ ] negative [ ] headache [ ] dizziness [ ] syncope [ ] weakness [ ] numbness  Psychiatric: [ ] negative [ ] anxiety [ ] depression  Endocrine: [ ] negative [ ] diabetes [ ] thyroid problem  Hematologic/Lymphatic: [ ] negative [ ] anemia [ ] bleeding problem  Allergic/Immunologic: [ ] negative [ ] itchy eyes [ ] nasal discharge [ ] hives [ ] angioedema  [ ] All other systems negative  [ ] Unable to assess ROS because ________    OBJECTIVE:  ICU Vital Signs Last 24 Hrs  T(C): 37.2 (31 Mar 2018 04:00), Max: 37.2 (31 Mar 2018 04:00)  T(F): 98.9 (31 Mar 2018 04:00), Max: 98.9 (31 Mar 2018 04:00)  HR: 80 (31 Mar 2018 06:00) (80 - 100)  BP: 111/58 (31 Mar 2018 06:00) (88/55 - 123/64)  BP(mean): 77 (31 Mar 2018 06:00) (67 - 88)  ABP: --  ABP(mean): --  RR: 15 (31 Mar 2018 06:00) (12 - 26)  SpO2: 97% (31 Mar 2018 06:00) (96% - 100%)        03-30 @ 07:01  -  03-31 @ 07:00  --------------------------------------------------------  IN: 1220 mL / OUT: 1930 mL / NET: -710 mL      CAPILLARY BLOOD GLUCOSE      POCT Blood Glucose.: 146 mg/dL (30 Mar 2018 22:41)      PHYSICAL EXAM:  General:   HEENT:   Lymph Nodes:  Neck:   Respiratory:   Cardiovascular:   Abdomen:   Extremities:   Skin:   Neurological:  Psychiatry:    LINES:    HOSPITAL MEDICATIONS:  enoxaparin Injectable 70 milliGRAM(s) SubCutaneous every 12 hours      furosemide    Tablet 40 milliGRAM(s) Oral two times a day  spironolactone 100 milliGRAM(s) Oral two times a day    insulin lispro (HumaLOG) corrective regimen sliding scale   SubCutaneous Before meals and at bedtime      ondansetron Injectable 4 milliGRAM(s) IV Push every 8 hours PRN    lactulose Syrup 10 Gram(s) Oral three times a day  pantoprazole  Injectable 40 milliGRAM(s) IV Push daily  senna 2 Tablet(s) Oral at bedtime                  LABS:                        11.9   8.3   )-----------( 140      ( 31 Mar 2018 06:06 )             36.1     Hgb Trend: 11.9<--, 11.8<--, 12.6<--, 12.8<--, 12.2<--  03-31    130<L>  |  99  |  39<H>  ----------------------------<  143<H>  4.9   |  20<L>  |  1.12    Ca    8.3<L>      31 Mar 2018 06:06  Phos  3.8     03-31  Mg     1.8     03-31    TPro  5.0<L>  /  Alb  1.8<L>  /  TBili  0.6  /  DBili  0.2  /  AST  204<H>  /  ALT  191<H>  /  AlkPhos  255<H>  03-31    Creatinine Trend: 1.12<--, 1.09<--, 1.10<--, 1.17<--, 1.11<--, 1.23<--  PT/INR - ( 30 Mar 2018 12:35 )   PT: 10.6 sec;   INR: 0.97 ratio         PTT - ( 30 Mar 2018 22:59 )  PTT:30.2 sec          MICROBIOLOGY:     RADIOLOGY:  [ ] Reviewed and interpreted by me    EKG: CHIEF COMPLAINT:    Interval Events:    HPI: This is a 63yo M w a PMHX of alcoholic cirrhosis, with recurrent hepatic hydrothorax s/p Right Pigtail to pleura vac at OSH, hx of PE @ distal LLL pulmonary artery w extension to the segmental branch's, and DAQUAN dx on 2/8 remains on therapeutic Lovenox doses, and has a HX of DMII.  Pt is s/p TIPPS procedure yesterday, was admitted to MICU for monitoring, remains hemodynamically, and H/H remains stable. The pt has been cleared by IR (Dr. Menjivar) for transfer back to the floor, RIJ introducer d/c by IR, pt is has ambulated bed to chair w/o difficulty, and is now tolerating a diet.      REVIEW OF SYSTEMS:  Constitutional: [ ] negative [ ] fevers [ ] chills [ ] weight loss [ ] weight gain  HEENT: [ ] negative [ ] dry eyes [ ] eye irritation [ ] postnasal drip [ ] nasal congestion  CV: [ ] negative  [ ] chest pain [ ] orthopnea [ ] palpitations [ ] murmur  Resp: [ ] negative [ ] cough [ ] shortness of breath [ ] dyspnea [ ] wheezing [ ] sputum [ ] hemoptysis  GI: [ ] negative [ ] nausea [ ] vomiting [ ] diarrhea [ ] constipation [ ] abd pain [ ] dysphagia   : [ ] negative [ ] dysuria [ ] nocturia [ ] hematuria [ ] increased urinary frequency  Musculoskeletal: [ ] negative [ ] back pain [ ] myalgias [ ] arthralgias [ ] fracture  Skin: [ ] negative [ ] rash [ ] itch  Neurological: [ ] negative [ ] headache [ ] dizziness [ ] syncope [ ] weakness [ ] numbness  Psychiatric: [ ] negative [ ] anxiety [ ] depression  Endocrine: [ ] negative [ ] diabetes [ ] thyroid problem  Hematologic/Lymphatic: [ ] negative [ ] anemia [ ] bleeding problem  Allergic/Immunologic: [ ] negative [ ] itchy eyes [ ] nasal discharge [ ] hives [ ] angioedema  [ ] All other systems negative  [ ] Unable to assess ROS because ________    OBJECTIVE:  ICU Vital Signs Last 24 Hrs  T(C): 37.2 (31 Mar 2018 04:00), Max: 37.2 (31 Mar 2018 04:00)  T(F): 98.9 (31 Mar 2018 04:00), Max: 98.9 (31 Mar 2018 04:00)  HR: 80 (31 Mar 2018 06:00) (80 - 100)  BP: 111/58 (31 Mar 2018 06:00) (88/55 - 123/64)  BP(mean): 77 (31 Mar 2018 06:00) (67 - 88)  ABP: --  ABP(mean): --  RR: 15 (31 Mar 2018 06:00) (12 - 26)  SpO2: 97% (31 Mar 2018 06:00) (96% - 100%)        03-30 @ 07:01  -  03-31 @ 07:00  --------------------------------------------------------  IN: 1220 mL / OUT: 1930 mL / NET: -710 mL      CAPILLARY BLOOD GLUCOSE      POCT Blood Glucose.: 146 mg/dL (30 Mar 2018 22:41)      PHYSICAL EXAM:  General:   HEENT:   Lymph Nodes:  Neck:   Respiratory:   Cardiovascular:   Abdomen:   Extremities:   Skin:   Neurological:  Psychiatry:    LINES:    HOSPITAL MEDICATIONS:  enoxaparin Injectable 70 milliGRAM(s) SubCutaneous every 12 hours      furosemide    Tablet 40 milliGRAM(s) Oral two times a day  spironolactone 100 milliGRAM(s) Oral two times a day    insulin lispro (HumaLOG) corrective regimen sliding scale   SubCutaneous Before meals and at bedtime      ondansetron Injectable 4 milliGRAM(s) IV Push every 8 hours PRN    lactulose Syrup 10 Gram(s) Oral three times a day  pantoprazole  Injectable 40 milliGRAM(s) IV Push daily  senna 2 Tablet(s) Oral at bedtime                  LABS:                        11.9   8.3   )-----------( 140      ( 31 Mar 2018 06:06 )             36.1     Hgb Trend: 11.9<--, 11.8<--, 12.6<--, 12.8<--, 12.2<--  03-31    130<L>  |  99  |  39<H>  ----------------------------<  143<H>  4.9   |  20<L>  |  1.12    Ca    8.3<L>      31 Mar 2018 06:06  Phos  3.8     03-31  Mg     1.8     03-31    TPro  5.0<L>  /  Alb  1.8<L>  /  TBili  0.6  /  DBili  0.2  /  AST  204<H>  /  ALT  191<H>  /  AlkPhos  255<H>  03-31    Creatinine Trend: 1.12<--, 1.09<--, 1.10<--, 1.17<--, 1.11<--, 1.23<--  PT/INR - ( 30 Mar 2018 12:35 )   PT: 10.6 sec;   INR: 0.97 ratio         PTT - ( 30 Mar 2018 22:59 )  PTT:30.2 sec          MICROBIOLOGY:     RADIOLOGY:  [ ] Reviewed and interpreted by me    EKG: CHIEF COMPLAINT:  S/P TIPPS procedure     Interval Events:    HPI: This is a 63yo M w a PMHX of alcoholic cirrhosis, with recurrent hepatic hydrothorax s/p Right Pigtail to pleura vac at OSH, hx of PE @ distal LLL pulmonary artery w extension to the segmental branch's, and DAQUAN dx on 2/8 remains on therapeutic Lovenox doses, and has a HX of DMII.  Pt is s/p TIPPS procedure yesterday, was admitted to MICU for monitoring, remains hemodynamically, and H/H remains stable. The pt has been cleared by IR (Dr. Menjivar) for transfer back to the floor, RIJ introducer d/c by IR, pt is has ambulated bed to chair w/o difficulty, and is now tolerating a diet.      REVIEW OF SYSTEMS:  Constitutional: [ ] negative [ ] fevers [ ] chills [ ] weight loss [ ] weight gain  HEENT: [ ] negative [ ] dry eyes [ ] eye irritation [ ] postnasal drip [ ] nasal congestion  CV: [ ] negative  [ ] chest pain [ ] orthopnea [ ] palpitations [ ] murmur  Resp: [ ] negative [ ] cough [ ] shortness of breath [ ] dyspnea [ ] wheezing [ ] sputum [ ] hemoptysis  GI: [ ] negative [ ] nausea [ ] vomiting [ ] diarrhea [ ] constipation [ ] abd pain [ ] dysphagia   : [ ] negative [ ] dysuria [ ] nocturia [ ] hematuria [ ] increased urinary frequency  Musculoskeletal: [ ] negative [ ] back pain [ ] myalgias [ ] arthralgias [ ] fracture  Skin: [ ] negative [ ] rash [ ] itch  Neurological: [ ] negative [ ] headache [ ] dizziness [ ] syncope [ ] weakness [ ] numbness  Psychiatric: [ ] negative [ ] anxiety [ ] depression  Endocrine: [ ] negative [ ] diabetes [ ] thyroid problem  Hematologic/Lymphatic: [ ] negative [ ] anemia [ ] bleeding problem  Allergic/Immunologic: [ ] negative [ ] itchy eyes [ ] nasal discharge [ ] hives [ ] angioedema  [ ] All other systems negative  [ ] Unable to assess ROS because ________    OBJECTIVE:  ICU Vital Signs Last 24 Hrs  T(C): 37.2 (31 Mar 2018 04:00), Max: 37.2 (31 Mar 2018 04:00)  T(F): 98.9 (31 Mar 2018 04:00), Max: 98.9 (31 Mar 2018 04:00)  HR: 80 (31 Mar 2018 06:00) (80 - 100)  BP: 111/58 (31 Mar 2018 06:00) (88/55 - 123/64)  BP(mean): 77 (31 Mar 2018 06:00) (67 - 88)  ABP: --  ABP(mean): --  RR: 15 (31 Mar 2018 06:00) (12 - 26)  SpO2: 97% (31 Mar 2018 06:00) (96% - 100%)        03-30 @ 07:01  -  03-31 @ 07:00  --------------------------------------------------------  IN: 1220 mL / OUT: 1930 mL / NET: -710 mL      CAPILLARY BLOOD GLUCOSE      POCT Blood Glucose.: 146 mg/dL (30 Mar 2018 22:41)      PHYSICAL EXAM:  General:   HEENT:   Lymph Nodes:  Neck:   Respiratory:   Cardiovascular:   Abdomen:   Extremities:   Skin:   Neurological:  Psychiatry:    LINES:    HOSPITAL MEDICATIONS:  enoxaparin Injectable 70 milliGRAM(s) SubCutaneous every 12 hours      furosemide    Tablet 40 milliGRAM(s) Oral two times a day  spironolactone 100 milliGRAM(s) Oral two times a day    insulin lispro (HumaLOG) corrective regimen sliding scale   SubCutaneous Before meals and at bedtime      ondansetron Injectable 4 milliGRAM(s) IV Push every 8 hours PRN    lactulose Syrup 10 Gram(s) Oral three times a day  pantoprazole  Injectable 40 milliGRAM(s) IV Push daily  senna 2 Tablet(s) Oral at bedtime                  LABS:                        11.9   8.3   )-----------( 140      ( 31 Mar 2018 06:06 )             36.1     Hgb Trend: 11.9<--, 11.8<--, 12.6<--, 12.8<--, 12.2<--  03-31    130<L>  |  99  |  39<H>  ----------------------------<  143<H>  4.9   |  20<L>  |  1.12    Ca    8.3<L>      31 Mar 2018 06:06  Phos  3.8     03-31  Mg     1.8     03-31    TPro  5.0<L>  /  Alb  1.8<L>  /  TBili  0.6  /  DBili  0.2  /  AST  204<H>  /  ALT  191<H>  /  AlkPhos  255<H>  03-31    Creatinine Trend: 1.12<--, 1.09<--, 1.10<--, 1.17<--, 1.11<--, 1.23<--  PT/INR - ( 30 Mar 2018 12:35 )   PT: 10.6 sec;   INR: 0.97 ratio         PTT - ( 30 Mar 2018 22:59 )  PTT:30.2 sec          MICROBIOLOGY:     RADIOLOGY:  [ ] Reviewed and interpreted by me    EKG: CHIEF COMPLAINT:  S/P TIPPS procedure for alcoholic cirrhosis    Interval Events: Pt had c/o of epigastric pain- last night, was seen by IR, H/H stable- Abd US has been ordered- This AM on assessment the pt states that his abdomen feels better since he has now had 2 BM's.    HPI: This is a 63yo M w a PMHX of alcoholic cirrhosis, with recurrent hepatic hydrothorax s/p Right Pigtail to pleura vac at OSH, hx of PE @ distal LLL pulmonary artery w extension to the segmental branch's, and DAQUAN dx on 2/8 remains on therapeutic Lovenox doses- held for TIPPS Procedure, and has a HX of DMII.  Today is day #2  s/p TIPPS procedure, was admitted to MICU for monitoring, remains hemodynamically, and H/H remains stable. The pt has been cleared by IR (Dr. Menjivar) for transfer back to the floor, RIJ introducer d/c by IR, pt is has ambulated bed to chair w/o difficulty, and is now tolerating a diet.      REVIEW OF SYSTEM  [x ] All other systems negative      OBJECTIVE:  ICU Vital Signs Last 24 Hrs  T(C): 37.2 (31 Mar 2018 04:00), Max: 37.2 (31 Mar 2018 04:00)  T(F): 98.9 (31 Mar 2018 04:00), Max: 98.9 (31 Mar 2018 04:00)  HR: 80 (31 Mar 2018 06:00) (80 - 100)  BP: 111/58 (31 Mar 2018 06:00) (88/55 - 123/64)  BP(mean): 77 (31 Mar 2018 06:00) (67 - 88)  ABP: --  ABP(mean): --  RR: 15 (31 Mar 2018 06:00) (12 - 26)  SpO2: 97% (31 Mar 2018 06:00) (96% - 100%)        03-30 @ 07:01  -  03-31 @ 07:00  --------------------------------------------------------  IN: 1220 mL / OUT: 1930 mL / NET: -710 mL      CAPILLARY BLOOD GLUCOSE  POCT Blood Glucose.: 146 mg/dL (30 Mar 2018 22:41)      PHYSICAL EXAM:  General: Comfortably laying in bed this am,  HEENT: Normocephalic / atraumatic   Lymph Nodes: non palp  Neck: supple  Respiratory: Bilaterally equal BS / chest excursion, BCTA, R thorax pigtail cath / CT- to water seal c/w draining moderate to large amounts of clear straw drainage  Cardiovascular: S1S2 RRR, all pulses palp- 2+/3  Abdomen: non distended, +BS x 4, tender on deep palp  Extremities: BLE 2+ edema non pitting  Skin: warm, dry, intact  Neurological: A&O x 3, no neuro deficits   Psychiatry:    LINES: [facility] MEDICATIONS:  enoxaparin Injectable 70 milliGRAM(s) SubCutaneous every 12 hours      furosemide    Tablet 40 milliGRAM(s) Oral two times a day  spironolactone 100 milliGRAM(s) Oral two times a day    insulin lispro (HumaLOG) corrective regimen sliding scale   SubCutaneous Before meals and at bedtime      ondansetron Injectable 4 milliGRAM(s) IV Push every 8 hours PRN    lactulose Syrup 10 Gram(s) Oral three times a day  pantoprazole  Injectable 40 milliGRAM(s) IV Push daily  senna 2 Tablet(s) Oral at bedtime        LABS:                        11.9   8.3   )-----------( 140      ( 31 Mar 2018 06:06 )             36.1     Hgb Trend: 11.9<--, 11.8<--, 12.6<--, 12.8<--, 12.2<--  03-31    130<L>  |  99  |  39<H>  ----------------------------<  143<H>  4.9   |  20<L>  |  1.12    Ca    8.3<L>      31 Mar 2018 06:06  Phos  3.8     03-31  Mg     1.8     03-31    TPro  5.0<L>  /  Alb  1.8<L>  /  TBili  0.6  /  DBili  0.2  /  AST  204<H>  /  ALT  191<H>  /  AlkPhos  255<H>  03-31    Creatinine Trend: 1.12<--, 1.09<--, 1.10<--, 1.17<--, 1.11<--, 1.23<--  PT/INR - ( 30 Mar 2018 12:35 )   PT: 10.6 sec;   INR: 0.97 ratio         PTT - ( 30 Mar 2018 22:59 )  PTT:30.2 sec      MICROBIOLOGY:     RADIOLOGY:  [ ] Reviewed and interpreted by me    EKG: CHIEF COMPLAINT:  S/P TIPPS procedure for alcoholic cirrhosis    Interval Events: Pt had c/o of epigastric pain- last night, was seen by IR, H/H stable- Abd US has been ordered- This AM on assessment the pt states that his abdomen feels better since he has now had 2 BM's.    HPI: This is a 61yo M w a PMHX of alcoholic cirrhosis, with recurrent hepatic hydrothorax s/p Right Pigtail to pleura vac at OSH, hx of PE @ distal LLL pulmonary artery w extension to the segmental branch's, and DAQUAN dx on 2/8 remains on therapeutic Lovenox doses- held for TIPPS Procedure, and has a HX of DMII.  Today is day #2  s/p TIPPS procedure, was admitted to MICU for monitoring, remains hemodynamically, and H/H remains stable. The pt has been cleared by IR (Dr. Menjivar) for transfer back to the floor, RIJ introducer d/c by IR, pt is has ambulated bed to chair w/o difficulty, and is now tolerating a diet.      REVIEW OF SYSTEM  [x ] All other systems negative      OBJECTIVE:  ICU Vital Signs Last 24 Hrs  T(C): 37.2 (31 Mar 2018 04:00), Max: 37.2 (31 Mar 2018 04:00)  T(F): 98.9 (31 Mar 2018 04:00), Max: 98.9 (31 Mar 2018 04:00)  HR: 80 (31 Mar 2018 06:00) (80 - 100)  BP: 111/58 (31 Mar 2018 06:00) (88/55 - 123/64)  BP(mean): 77 (31 Mar 2018 06:00) (67 - 88)  ABP: --  ABP(mean): --  RR: 15 (31 Mar 2018 06:00) (12 - 26)  SpO2: 97% (31 Mar 2018 06:00) (96% - 100%)        03-30 @ 07:01  -  03-31 @ 07:00  --------------------------------------------------------  IN: 1220 mL / OUT: 1930 mL / NET: -710 mL      CAPILLARY BLOOD GLUCOSE  POCT Blood Glucose.: 146 mg/dL (30 Mar 2018 22:41)      PHYSICAL EXAM:  General: Comfortably laying in bed this am,  HEENT: Normocephalic / atraumatic   Lymph Nodes: non palp  Neck: supple  Respiratory: Bilaterally equal BS / chest excursion, BCTA, R thorax pigtail cath / CT- to water seal c/w draining moderate to large amounts of clear straw drainage  Cardiovascular: S1S2 RRR, all pulses palp- 2+/3  Abdomen: non distended, +BS x 4, tender on deep palp  Extremities: BLE 2+ edema non pitting  Skin: warm, dry, intact  Neurological: A&O x 3, no neuro deficits   Psychiatry:    LINES: [facility] MEDICATIONS:  enoxaparin Injectable 70 milliGRAM(s) SubCutaneous every 12 hours      furosemide    Tablet 40 milliGRAM(s) Oral two times a day  spironolactone 100 milliGRAM(s) Oral two times a day    insulin lispro (HumaLOG) corrective regimen sliding scale   SubCutaneous Before meals and at bedtime      ondansetron Injectable 4 milliGRAM(s) IV Push every 8 hours PRN    lactulose Syrup 10 Gram(s) Oral three times a day  pantoprazole  Injectable 40 milliGRAM(s) IV Push daily  senna 2 Tablet(s) Oral at bedtime        LABS:                        11.9   8.3   )-----------( 140      ( 31 Mar 2018 06:06 )             36.1     Hgb Trend: 11.9<--, 11.8<--, 12.6<--, 12.8<--, 12.2<--  03-31    130<L>  |  99  |  39<H>  ----------------------------<  143<H>  4.9   |  20<L>  |  1.12    Ca    8.3<L>      31 Mar 2018 06:06  Phos  3.8     03-31  Mg     1.8     03-31    TPro  5.0<L>  /  Alb  1.8<L>  /  TBili  0.6  /  DBili  0.2  /  AST  204<H>  /  ALT  191<H>  /  AlkPhos  255<H>  03-31    Creatinine Trend: 1.12<--, 1.09<--, 1.10<--, 1.17<--, 1.11<--, 1.23<--  PT/INR - ( 30 Mar 2018 12:35 )   PT: 10.6 sec;   INR: 0.97 ratio         PTT - ( 30 Mar 2018 22:59 )  PTT:30.2 sec

## 2018-03-31 NOTE — PROGRESS NOTE ADULT - PROBLEM SELECTOR PLAN 4
- Initial 2d echo shows large loculated pericardial effusion adjacent to the right atrium causing compression of the right atrium and right ventricle  - Repeat 2d echo from 3/27 shows echo-free space adjacent to the right heart appears smaller and the RA is no longer compressed. There is evidence of  compression of the RV apex during early diastole on apical images.  - CT with no acute findings aside from trace pericardial effusion

## 2018-03-31 NOTE — PROGRESS NOTE ADULT - SUBJECTIVE AND OBJECTIVE BOX
Patient is a 62y old  Male who presents with a chief complaint of Transfer from [facility] for TIPS, Recurrent Pleural effusion (22 Mar 2018 14:24)    Transfer Accept Note     Hospital Course: 62 year old Maler with history of DM, PE (on Lovenox) and alcoholic cirrhosis w/ recurrent R sided hydrothorax who initially presented to [facility] with dyspnea and orthopnea, was found to have a large R pleural effusion s/p chest tube placement and transferred for TIPS, now s/p procedure on 3/29. Pt tolerated the procedure without any complications and was transferred to the MICU for closer neuro monitoring. While in the MICU, patient has been hemodynamically stable and has been restarted on therapeutic Lovenox. Pt has reported epigastric pain, but his hgb has remained stable. An abdominal US is pending    SUBJECTIVE / OVERNIGHT EVENTS: Patient had no acute events overnight. Patient seen and examined at bedside.    ROS: [ - ] Fever [ - ] Chills [ - ] Nausea/Vomiting [ - ] Chest Pain [ - ] Shortness of breath     MEDICATIONS  (STANDING):  enoxaparin Injectable 70 milliGRAM(s) SubCutaneous every 12 hours  furosemide    Tablet 40 milliGRAM(s) Oral two times a day  insulin lispro (HumaLOG) corrective regimen sliding scale   SubCutaneous Before meals and at bedtime  lactulose Syrup 10 Gram(s) Oral three times a day  pantoprazole  Injectable 40 milliGRAM(s) IV Push daily  senna 2 Tablet(s) Oral at bedtime  spironolactone 100 milliGRAM(s) Oral two times a day    MEDICATIONS  (PRN):  ondansetron Injectable 4 milliGRAM(s) IV Push every 8 hours PRN Nausea and/or Vomiting      Vital Signs Last 24 Hrs  T(C): 37.1 (31 Mar 2018 11:06), Max: 37.2 (31 Mar 2018 04:00)  T(F): 98.7 (31 Mar 2018 11:06), Max: 98.9 (31 Mar 2018 04:00)  HR: 92 (31 Mar 2018 11:06) (80 - 100)  BP: 107/63 (31 Mar 2018 11:06) (88/54 - 123/64)  BP(mean): 80 (31 Mar 2018 09:00) (67 - 88)  RR: 17 (31 Mar 2018 11:06) (12 - 20)  SpO2: 98% (31 Mar 2018 11:06) (96% - 100%)  CAPILLARY BLOOD GLUCOSE      POCT Blood Glucose.: 134 mg/dL (31 Mar 2018 09:39)  POCT Blood Glucose.: 146 mg/dL (30 Mar 2018 22:41)  POCT Blood Glucose.: 122 mg/dL (30 Mar 2018 18:17)  POCT Blood Glucose.: 102 mg/dL (30 Mar 2018 12:26)    I&O's Summary    30 Mar 2018 07:01  -  31 Mar 2018 07:00  --------------------------------------------------------  IN: 1220 mL / OUT: 1930 mL / NET: -710 mL        PHYSICAL EXAM  GENERAL: NAD, lying comfortably in bed   HEAD:  Atraumatic, Normocephalic  EYES: EOMI, PERRLA, conjunctiva and sclera clear  NECK: Supple, No JVD  CHEST/LUNG: Clear to auscultation bilaterally; R thorax pigtail catheter connected to water seal   draining clear straw drainage, 1800 cc over last 24 hrs  HEART: Regular rate and rhythm; No murmurs, rubs, or gallops  ABDOMEN: Soft, Nontender, Nondistended; Bowel sounds present  EXTREMITIES:  2+ Peripheral Pulses, 2+ non-pitting edema  NEURO: AAOx3, non-focal  SKIN: No rashes or lesions      LABS:                        11.9   8.3   )-----------( 140      ( 31 Mar 2018 06:06 )             36.1     03-31    130<L>  |  99  |  39<H>  ----------------------------<  143<H>  4.9   |  20<L>  |  1.12    Ca    8.3<L>      31 Mar 2018 06:06  Phos  3.8     03-31  Mg     1.8     03-31    TPro  5.0<L>  /  Alb  1.8<L>  /  TBili  0.6  /  DBili  0.2  /  AST  204<H>  /  ALT  191<H>  /  AlkPhos  255<H>  03-31    PT/INR - ( 30 Mar 2018 12:35 )   PT: 10.6 sec;   INR: 0.97 ratio         PTT - ( 30 Mar 2018 22:59 )  PTT:30.2 sec              Consultant(s) Notes Reviewed:  GI, Pulmonology Patient is a 62y old  Male who presents with a chief complaint of Transfer from [facility] for TIPS, Recurrent Pleural effusion (22 Mar 2018 14:24)    Transfer Accept Note     Hospital Course: 62 year old Maler with history of DM, PE (on Lovenox) and alcoholic cirrhosis w/ recurrent R sided hydrothorax who initially presented to [facility] with dyspnea and orthopnea, was found to have a large R pleural effusion s/p chest tube placement and transferred for TIPS, now s/p procedure on 3/29. Pt tolerated the procedure without any complications and was transferred to the MICU for closer neuro monitoring. While in the MICU, patient has been hemodynamically stable and has been restarted on therapeutic Lovenox. Pt has reported epigastric pain, but his hgb has remained stable. An abdominal US is pending    SUBJECTIVE / OVERNIGHT EVENTS: Patient had no acute events overnight. Patient seen and examined at bedside. Patient denying abdominal pain or discomfort at the moment. Patient endorse multiple loose bowel movements. Patient denies bleeding, chest pain or difficulty breathing.     ROS: [ - ] Fever [ - ] Chills [ - ] Nausea/Vomiting [ - ] Chest Pain [ - ] Shortness of breath     MEDICATIONS  (STANDING):  enoxaparin Injectable 70 milliGRAM(s) SubCutaneous every 12 hours  furosemide    Tablet 40 milliGRAM(s) Oral two times a day  insulin lispro (HumaLOG) corrective regimen sliding scale   SubCutaneous Before meals and at bedtime  lactulose Syrup 10 Gram(s) Oral three times a day  pantoprazole  Injectable 40 milliGRAM(s) IV Push daily  senna 2 Tablet(s) Oral at bedtime  spironolactone 100 milliGRAM(s) Oral two times a day    MEDICATIONS  (PRN):  ondansetron Injectable 4 milliGRAM(s) IV Push every 8 hours PRN Nausea and/or Vomiting      Vital Signs Last 24 Hrs  T(C): 37.1 (31 Mar 2018 11:06), Max: 37.2 (31 Mar 2018 04:00)  T(F): 98.7 (31 Mar 2018 11:06), Max: 98.9 (31 Mar 2018 04:00)  HR: 92 (31 Mar 2018 11:06) (80 - 100)  BP: 107/63 (31 Mar 2018 11:06) (88/54 - 123/64)  BP(mean): 80 (31 Mar 2018 09:00) (67 - 88)  RR: 17 (31 Mar 2018 11:06) (12 - 20)  SpO2: 98% (31 Mar 2018 11:06) (96% - 100%)  CAPILLARY BLOOD GLUCOSE      POCT Blood Glucose.: 134 mg/dL (31 Mar 2018 09:39)  POCT Blood Glucose.: 146 mg/dL (30 Mar 2018 22:41)  POCT Blood Glucose.: 122 mg/dL (30 Mar 2018 18:17)  POCT Blood Glucose.: 102 mg/dL (30 Mar 2018 12:26)    I&O's Summary    30 Mar 2018 07:01  -  31 Mar 2018 07:00  --------------------------------------------------------  IN: 1220 mL / OUT: 1930 mL / NET: -710 mL        PHYSICAL EXAM  GENERAL: NAD, lying comfortably in bed   HEAD:  Atraumatic, Normocephalic  EYES: EOMI, PERRLA, conjunctiva and sclera clear  NECK: Supple, No JVD  CHEST/LUNG: Clear to auscultation bilaterally; R thorax pigtail catheter connected to water seal   draining clear straw drainage, 1800 cc over last 24 hrs  HEART: Regular rate and rhythm; No murmurs, rubs, or gallops  ABDOMEN: Soft, mildly tender to deep palpation diffusely, no guarding or rebound tenderness, Nondistended; Bowel sounds present  EXTREMITIES:  2+ Peripheral Pulses, 2+ non-pitting edema  NEURO: AAOx3, non-focal  SKIN: No rashes or lesions      LABS:                        11.9   8.3   )-----------( 140      ( 31 Mar 2018 06:06 )             36.1     03-31    130<L>  |  99  |  39<H>  ----------------------------<  143<H>  4.9   |  20<L>  |  1.12    Ca    8.3<L>      31 Mar 2018 06:06  Phos  3.8     03-31  Mg     1.8     03-31    TPro  5.0<L>  /  Alb  1.8<L>  /  TBili  0.6  /  DBili  0.2  /  AST  204<H>  /  ALT  191<H>  /  AlkPhos  255<H>  03-31    PT/INR - ( 30 Mar 2018 12:35 )   PT: 10.6 sec;   INR: 0.97 ratio         PTT - ( 30 Mar 2018 22:59 )  PTT:30.2 sec              Consultant(s) Notes Reviewed:  GI, Pulmonology Patient is a 62y old  Male who presents with a chief complaint of Transfer from [facility] for TIPS, Recurrent Pleural effusion (22 Mar 2018 14:24)    Transfer Accept Note     Hospital Course: 62 year old Male with history of DM, PE (on Lovenox) and alcoholic cirrhosis w/ recurrent R sided hydrothorax who initially presented to [facility] with dyspnea and orthopnea, was found to have a large R pleural effusion s/p chest tube placement and transferred for TIPS, s/p procedure on 3/29. Pt tolerated the procedure without any complications and was transferred to the MICU for closer neuro monitoring. While in the MICU, patient has been hemodynamically stable and has been restarted on therapeutic Lovenox. Pt has reported epigastric pain, but his hgb has remained stable. An abdominal US is pending.     SUBJECTIVE / OVERNIGHT EVENTS: Patient had no acute events overnight. Patient seen and examined at bedside. Patient denying abdominal pain or discomfort at the moment. Patient endorse multiple loose bowel movements. Patient denies bleeding, chest pain or difficulty breathing.     ROS: [ - ] Fever [ - ] Chills [ - ] Nausea/Vomiting [ - ] Chest Pain [ - ] Shortness of breath     MEDICATIONS  (STANDING):  enoxaparin Injectable 70 milliGRAM(s) SubCutaneous every 12 hours  furosemide    Tablet 40 milliGRAM(s) Oral two times a day  insulin lispro (HumaLOG) corrective regimen sliding scale   SubCutaneous Before meals and at bedtime  lactulose Syrup 10 Gram(s) Oral three times a day  pantoprazole  Injectable 40 milliGRAM(s) IV Push daily  senna 2 Tablet(s) Oral at bedtime  spironolactone 100 milliGRAM(s) Oral two times a day    MEDICATIONS  (PRN):  ondansetron Injectable 4 milliGRAM(s) IV Push every 8 hours PRN Nausea and/or Vomiting      Vital Signs Last 24 Hrs  T(C): 37.1 (31 Mar 2018 11:06), Max: 37.2 (31 Mar 2018 04:00)  T(F): 98.7 (31 Mar 2018 11:06), Max: 98.9 (31 Mar 2018 04:00)  HR: 92 (31 Mar 2018 11:06) (80 - 100)  BP: 107/63 (31 Mar 2018 11:06) (88/54 - 123/64)  BP(mean): 80 (31 Mar 2018 09:00) (67 - 88)  RR: 17 (31 Mar 2018 11:06) (12 - 20)  SpO2: 98% (31 Mar 2018 11:06) (96% - 100%)  CAPILLARY BLOOD GLUCOSE      POCT Blood Glucose.: 134 mg/dL (31 Mar 2018 09:39)  POCT Blood Glucose.: 146 mg/dL (30 Mar 2018 22:41)  POCT Blood Glucose.: 122 mg/dL (30 Mar 2018 18:17)  POCT Blood Glucose.: 102 mg/dL (30 Mar 2018 12:26)    I&O's Summary    30 Mar 2018 07:01  -  31 Mar 2018 07:00  --------------------------------------------------------  IN: 1220 mL / OUT: 1930 mL / NET: -710 mL        PHYSICAL EXAM  GENERAL: NAD, lying comfortably in bed   HEAD:  Atraumatic, Normocephalic  EYES: EOMI, PERRLA, conjunctiva and sclera clear  NECK: Supple, No JVD  CHEST/LUNG: Clear to auscultation bilaterally; R thorax pigtail catheter connected to water seal   draining clear straw drainage, 1800 cc over last 24 hrs  HEART: Regular rate and rhythm; No murmurs, rubs, or gallops  ABDOMEN: Soft, mildly tender to deep palpation diffusely, no guarding or rebound tenderness, Nondistended; Bowel sounds present  EXTREMITIES:  2+ Peripheral Pulses, 2+ non-pitting edema  NEURO: AAOx3, non-focal  SKIN: No rashes or lesions      LABS:                        11.9   8.3   )-----------( 140      ( 31 Mar 2018 06:06 )             36.1     03-31    130<L>  |  99  |  39<H>  ----------------------------<  143<H>  4.9   |  20<L>  |  1.12    Ca    8.3<L>      31 Mar 2018 06:06  Phos  3.8     03-31  Mg     1.8     03-31    TPro  5.0<L>  /  Alb  1.8<L>  /  TBili  0.6  /  DBili  0.2  /  AST  204<H>  /  ALT  191<H>  /  AlkPhos  255<H>  03-31    PT/INR - ( 30 Mar 2018 12:35 )   PT: 10.6 sec;   INR: 0.97 ratio         PTT - ( 30 Mar 2018 22:59 )  PTT:30.2 sec              Consultant(s) Notes Reviewed:  GI, Pulmonology Patient is a 62y old  Male who presents with a chief complaint of Transfer from [facility] for TIPS, Recurrent Pleural effusion (22 Mar 2018 14:24)    Transfer Accept Note     Hospital Course: 62 year old Male with history of DM, PE (on Lovenox), and alcoholic cirrhosis w/ recurrent R sided hydrothorax who initially presented to [facility] with dyspnea and orthopnea, was found to have a large R pleural effusion s/p chest tube placement and transferred for TIPS, s/p procedure on 3/29. Pt tolerated the procedure without any complications and was transferred to the MICU for closer neuro monitoring. While in the MICU, patient has been hemodynamically stable and has been restarted on therapeutic Lovenox. Pt has reported epigastric pain, but his hgb has remained stable. An abdominal US is pending.     SUBJECTIVE / OVERNIGHT EVENTS: Patient had no acute events overnight. Patient seen and examined at bedside. Patient denying abdominal pain or discomfort at the moment. Patient endorse multiple loose bowel movements. Patient denies bleeding, chest pain or difficulty breathing.     ROS: [ - ] Fever [ - ] Chills [ - ] Nausea/Vomiting [ - ] Chest Pain [ - ] Shortness of breath     MEDICATIONS  (STANDING):  enoxaparin Injectable 70 milliGRAM(s) SubCutaneous every 12 hours  furosemide    Tablet 40 milliGRAM(s) Oral two times a day  insulin lispro (HumaLOG) corrective regimen sliding scale   SubCutaneous Before meals and at bedtime  lactulose Syrup 10 Gram(s) Oral three times a day  pantoprazole  Injectable 40 milliGRAM(s) IV Push daily  senna 2 Tablet(s) Oral at bedtime  spironolactone 100 milliGRAM(s) Oral two times a day    MEDICATIONS  (PRN):  ondansetron Injectable 4 milliGRAM(s) IV Push every 8 hours PRN Nausea and/or Vomiting      Vital Signs Last 24 Hrs  T(C): 37.1 (31 Mar 2018 11:06), Max: 37.2 (31 Mar 2018 04:00)  T(F): 98.7 (31 Mar 2018 11:06), Max: 98.9 (31 Mar 2018 04:00)  HR: 92 (31 Mar 2018 11:06) (80 - 100)  BP: 107/63 (31 Mar 2018 11:06) (88/54 - 123/64)  BP(mean): 80 (31 Mar 2018 09:00) (67 - 88)  RR: 17 (31 Mar 2018 11:06) (12 - 20)  SpO2: 98% (31 Mar 2018 11:06) (96% - 100%)  CAPILLARY BLOOD GLUCOSE      POCT Blood Glucose.: 134 mg/dL (31 Mar 2018 09:39)  POCT Blood Glucose.: 146 mg/dL (30 Mar 2018 22:41)  POCT Blood Glucose.: 122 mg/dL (30 Mar 2018 18:17)  POCT Blood Glucose.: 102 mg/dL (30 Mar 2018 12:26)    I&O's Summary    30 Mar 2018 07:01  -  31 Mar 2018 07:00  --------------------------------------------------------  IN: 1220 mL / OUT: 1930 mL / NET: -710 mL        PHYSICAL EXAM  GENERAL: NAD, lying comfortably in bed   HEAD:  Atraumatic, Normocephalic  EYES: EOMI, PERRLA, conjunctiva and sclera clear  NECK: Supple, No JVD  CHEST/LUNG: Clear to auscultation bilaterally; R thorax pigtail catheter connected to water seal   draining clear straw drainage, 1800 cc over last 24 hrs  HEART: Regular rate and rhythm; No murmurs, rubs, or gallops  ABDOMEN: Soft, mildly tender to deep palpation diffusely, no guarding or rebound tenderness, Nondistended; Bowel sounds present  EXTREMITIES:  2+ Peripheral Pulses, 2+ non-pitting edema  NEURO: AAOx3, non-focal  SKIN: No rashes or lesions      LABS:                        11.9   8.3   )-----------( 140      ( 31 Mar 2018 06:06 )             36.1     03-31    130<L>  |  99  |  39<H>  ----------------------------<  143<H>  4.9   |  20<L>  |  1.12    Ca    8.3<L>      31 Mar 2018 06:06  Phos  3.8     03-31  Mg     1.8     03-31    TPro  5.0<L>  /  Alb  1.8<L>  /  TBili  0.6  /  DBili  0.2  /  AST  204<H>  /  ALT  191<H>  /  AlkPhos  255<H>  03-31    PT/INR - ( 30 Mar 2018 12:35 )   PT: 10.6 sec;   INR: 0.97 ratio         PTT - ( 30 Mar 2018 22:59 )  PTT:30.2 sec              Consultant(s) Notes Reviewed:  GI, Pulmonology

## 2018-03-31 NOTE — PROGRESS NOTE ADULT - PROBLEM SELECTOR PLAN 1
Patient with recurrent pleural effusions in the setting of hepatic hydrothorax despite high dose diuretics and chest tube  -Maintain chest tube to pleuravac and monitor drainage (approximately 1800cc in last 24hrs). Pulmonary team following, will follow recommendations   - c/w lasix 40 mg bid and spironolactone 100 mg BID  - s/p TIPS 3/29 with no complications, complaining of epigastric pain today, Hgb stable, will follow up abdominal ultrasound Patient with recurrent pleural effusions in the setting of hepatic hydrothorax despite high dose diuretics   - Chest tube placed at outside hospital on 2/18/2018 by CT surgery service after presenting with SOB and found to have large right sided effusion   - Maintain chest tube to pleuravac and monitor drainage (approximately 1800cc in last 24hrs). Pulmonary team following, will continue to follow recommendations   - c/w lasix 40 mg bid and spironolactone 100 mg BID Patient with recurrent pleural effusions in the setting of hepatic hydrothorax despite high dose diuretics   - Chest tube placed at outside hospital on 2/18/2018 by CT surgery service after presenting with SOB and found to have large right sided effusion   - Maintain pig tail catheter to pleuravac and monitor drainage (approximately 1800cc in last 24hrs). Pulmonary team following, will continue to follow recommendations   - c/w lasix 40 mg bid and spironolactone 100 mg BID

## 2018-03-31 NOTE — PROGRESS NOTE ADULT - PROBLEM SELECTOR PLAN 5
A1c is 5.6% 2/2018. On metformin at home.  - ELVI qAC tid and qhs  - monitor FSG  - consistent carb, DASH/TLC diet A1c is 5.6% 2/2018. On metformin at home, should avoid restarting Metformin in setting of cirrhosis.   - ELVI qAC tid and qhs  - monitor FSG  - consistent carb, DASH/TLC diet

## 2018-03-31 NOTE — PROGRESS NOTE ADULT - PROBLEM SELECTOR PLAN 3
Patient w/ known PE on CTA 2/8/18. Given hepatic impairment, switched from eliquis to lovenox.  - c/w therapeutic Lovenox 70 mg q 12 hrs Patient w/ known PE on CTA 2/8/18. Given hepatic impairment, patient was switched from eliquis to lovenox.  - c/w therapeutic Lovenox 70 mg q 12 hrs Patient w/ known PE on CTA 2/8/18. Given hepatic impairment, patient was switched from eliquis to lovenox, restarted on Lovenox post TIPS procedure  - c/w therapeutic Lovenox 70 mg q 12 hrs

## 2018-04-01 DIAGNOSIS — E87.5 HYPERKALEMIA: ICD-10-CM

## 2018-04-01 DIAGNOSIS — N17.9 ACUTE KIDNEY FAILURE, UNSPECIFIED: ICD-10-CM

## 2018-04-01 LAB
ALBUMIN SERPL ELPH-MCNC: 1.7 G/DL — LOW (ref 3.3–5)
ALP SERPL-CCNC: 323 U/L — HIGH (ref 40–120)
ALT FLD-CCNC: 209 U/L RC — HIGH (ref 10–45)
ANION GAP SERPL CALC-SCNC: 11 MMOL/L — SIGNIFICANT CHANGE UP (ref 5–17)
ANION GAP SERPL CALC-SCNC: 8 MMOL/L — SIGNIFICANT CHANGE UP (ref 5–17)
APTT BLD: 38 SEC — HIGH (ref 27.5–37.4)
AST SERPL-CCNC: 188 U/L — HIGH (ref 10–40)
BASOPHILS # BLD AUTO: 0.02 K/UL — SIGNIFICANT CHANGE UP (ref 0–0.2)
BASOPHILS NFR BLD AUTO: 0.3 % — SIGNIFICANT CHANGE UP (ref 0–2)
BILIRUB SERPL-MCNC: 0.5 MG/DL — SIGNIFICANT CHANGE UP (ref 0.2–1.2)
BUN SERPL-MCNC: 51 MG/DL — HIGH (ref 7–23)
BUN SERPL-MCNC: 53 MG/DL — HIGH (ref 7–23)
CALCIUM SERPL-MCNC: 8 MG/DL — LOW (ref 8.4–10.5)
CALCIUM SERPL-MCNC: 8.5 MG/DL — SIGNIFICANT CHANGE UP (ref 8.4–10.5)
CHLORIDE SERPL-SCNC: 98 MMOL/L — SIGNIFICANT CHANGE UP (ref 96–108)
CHLORIDE SERPL-SCNC: 99 MMOL/L — SIGNIFICANT CHANGE UP (ref 96–108)
CO2 SERPL-SCNC: 22 MMOL/L — SIGNIFICANT CHANGE UP (ref 22–31)
CO2 SERPL-SCNC: 22 MMOL/L — SIGNIFICANT CHANGE UP (ref 22–31)
CREAT ?TM UR-MCNC: 192 MG/DL — SIGNIFICANT CHANGE UP
CREAT SERPL-MCNC: 1.9 MG/DL — HIGH (ref 0.5–1.3)
CREAT SERPL-MCNC: 1.96 MG/DL — HIGH (ref 0.5–1.3)
EOSINOPHIL # BLD AUTO: 0.21 K/UL — SIGNIFICANT CHANGE UP (ref 0–0.5)
EOSINOPHIL NFR BLD AUTO: 2.7 % — SIGNIFICANT CHANGE UP (ref 0–6)
GLUCOSE SERPL-MCNC: 119 MG/DL — HIGH (ref 70–99)
GLUCOSE SERPL-MCNC: 166 MG/DL — HIGH (ref 70–99)
HCT VFR BLD CALC: 28.8 % — LOW (ref 39–50)
HGB BLD-MCNC: 10.4 G/DL — LOW (ref 13–17)
IMM GRANULOCYTES NFR BLD AUTO: 0.3 % — SIGNIFICANT CHANGE UP (ref 0–1.5)
INR BLD: 1.16 RATIO — SIGNIFICANT CHANGE UP (ref 0.88–1.16)
LYMPHOCYTES # BLD AUTO: 1.34 K/UL — SIGNIFICANT CHANGE UP (ref 1–3.3)
LYMPHOCYTES # BLD AUTO: 17.3 % — SIGNIFICANT CHANGE UP (ref 13–44)
MAGNESIUM SERPL-MCNC: 1.8 MG/DL — SIGNIFICANT CHANGE UP (ref 1.6–2.6)
MCHC RBC-ENTMCNC: 33.3 PG — SIGNIFICANT CHANGE UP (ref 27–34)
MCHC RBC-ENTMCNC: 36.1 GM/DL — HIGH (ref 32–36)
MCV RBC AUTO: 92.3 FL — SIGNIFICANT CHANGE UP (ref 80–100)
MONOCYTES # BLD AUTO: 0.91 K/UL — HIGH (ref 0–0.9)
MONOCYTES NFR BLD AUTO: 11.8 % — SIGNIFICANT CHANGE UP (ref 2–14)
NEUTROPHILS # BLD AUTO: 5.23 K/UL — SIGNIFICANT CHANGE UP (ref 1.8–7.4)
NEUTROPHILS NFR BLD AUTO: 67.6 % — SIGNIFICANT CHANGE UP (ref 43–77)
PHOSPHATE SERPL-MCNC: 2.6 MG/DL — SIGNIFICANT CHANGE UP (ref 2.5–4.5)
PLATELET # BLD AUTO: 118 K/UL — LOW (ref 150–400)
POTASSIUM SERPL-MCNC: 4.6 MMOL/L — SIGNIFICANT CHANGE UP (ref 3.5–5.3)
POTASSIUM SERPL-MCNC: 5.6 MMOL/L — HIGH (ref 3.5–5.3)
POTASSIUM SERPL-SCNC: 4.6 MMOL/L — SIGNIFICANT CHANGE UP (ref 3.5–5.3)
POTASSIUM SERPL-SCNC: 5.6 MMOL/L — HIGH (ref 3.5–5.3)
PROT SERPL-MCNC: 4.6 G/DL — LOW (ref 6–8.3)
PROTHROM AB SERPL-ACNC: 13.1 SEC — SIGNIFICANT CHANGE UP (ref 10–13.1)
RBC # BLD: 3.12 M/UL — LOW (ref 4.2–5.8)
RBC # FLD: 14.4 % — SIGNIFICANT CHANGE UP (ref 10.3–14.5)
SODIUM SERPL-SCNC: 129 MMOL/L — LOW (ref 135–145)
SODIUM SERPL-SCNC: 131 MMOL/L — LOW (ref 135–145)
UUN UR-MCNC: 455 MG/DL — SIGNIFICANT CHANGE UP
WBC # BLD: 7.73 K/UL — SIGNIFICANT CHANGE UP (ref 3.8–10.5)
WBC # FLD AUTO: 7.73 K/UL — SIGNIFICANT CHANGE UP (ref 3.8–10.5)

## 2018-04-01 PROCEDURE — 99233 SBSQ HOSP IP/OBS HIGH 50: CPT | Mod: GC

## 2018-04-01 PROCEDURE — 93010 ELECTROCARDIOGRAM REPORT: CPT

## 2018-04-01 RX ORDER — DEXTROSE 50 % IN WATER 50 %
25 SYRINGE (ML) INTRAVENOUS ONCE
Qty: 0 | Refills: 0 | Status: DISCONTINUED | OUTPATIENT
Start: 2018-04-01 | End: 2018-04-01

## 2018-04-01 RX ORDER — INSULIN HUMAN 100 [IU]/ML
10 INJECTION, SOLUTION SUBCUTANEOUS ONCE
Qty: 0 | Refills: 0 | Status: COMPLETED | OUTPATIENT
Start: 2018-04-01 | End: 2018-04-01

## 2018-04-01 RX ORDER — HEPARIN SODIUM 5000 [USP'U]/ML
INJECTION INTRAVENOUS; SUBCUTANEOUS
Qty: 25000 | Refills: 0 | Status: DISCONTINUED | OUTPATIENT
Start: 2018-04-01 | End: 2018-04-01

## 2018-04-01 RX ORDER — CALCIUM GLUCONATE 100 MG/ML
1 VIAL (ML) INTRAVENOUS ONCE
Qty: 0 | Refills: 0 | Status: COMPLETED | OUTPATIENT
Start: 2018-04-01 | End: 2018-04-01

## 2018-04-01 RX ORDER — ALBUMIN HUMAN 25 %
100 VIAL (ML) INTRAVENOUS EVERY 12 HOURS
Qty: 0 | Refills: 0 | Status: DISCONTINUED | OUTPATIENT
Start: 2018-04-01 | End: 2018-04-01

## 2018-04-01 RX ORDER — HEPARIN SODIUM 5000 [USP'U]/ML
INJECTION INTRAVENOUS; SUBCUTANEOUS
Qty: 25000 | Refills: 0 | Status: DISCONTINUED | OUTPATIENT
Start: 2018-04-01 | End: 2018-04-03

## 2018-04-01 RX ORDER — HEPARIN SODIUM 5000 [USP'U]/ML
5500 INJECTION INTRAVENOUS; SUBCUTANEOUS EVERY 6 HOURS
Qty: 0 | Refills: 0 | Status: DISCONTINUED | OUTPATIENT
Start: 2018-04-01 | End: 2018-04-03

## 2018-04-01 RX ORDER — IBUPROFEN 200 MG
400 TABLET ORAL ONCE
Qty: 0 | Refills: 0 | Status: COMPLETED | OUTPATIENT
Start: 2018-04-01 | End: 2018-04-01

## 2018-04-01 RX ORDER — ALBUTEROL 90 UG/1
2.5 AEROSOL, METERED ORAL ONCE
Qty: 0 | Refills: 0 | Status: COMPLETED | OUTPATIENT
Start: 2018-04-01 | End: 2018-04-01

## 2018-04-01 RX ORDER — HEPARIN SODIUM 5000 [USP'U]/ML
5500 INJECTION INTRAVENOUS; SUBCUTANEOUS ONCE
Qty: 0 | Refills: 0 | Status: DISCONTINUED | OUTPATIENT
Start: 2018-04-01 | End: 2018-04-01

## 2018-04-01 RX ORDER — HEPARIN SODIUM 5000 [USP'U]/ML
2500 INJECTION INTRAVENOUS; SUBCUTANEOUS EVERY 6 HOURS
Qty: 0 | Refills: 0 | Status: DISCONTINUED | OUTPATIENT
Start: 2018-04-01 | End: 2018-04-01

## 2018-04-01 RX ORDER — OXYCODONE HYDROCHLORIDE 5 MG/1
5 TABLET ORAL ONCE
Qty: 0 | Refills: 0 | Status: DISCONTINUED | OUTPATIENT
Start: 2018-04-01 | End: 2018-04-01

## 2018-04-01 RX ORDER — HEPARIN SODIUM 5000 [USP'U]/ML
2500 INJECTION INTRAVENOUS; SUBCUTANEOUS EVERY 6 HOURS
Qty: 0 | Refills: 0 | Status: DISCONTINUED | OUTPATIENT
Start: 2018-04-01 | End: 2018-04-03

## 2018-04-01 RX ORDER — SODIUM POLYSTYRENE SULFONATE 4.1 MEQ/G
15 POWDER, FOR SUSPENSION ORAL ONCE
Qty: 0 | Refills: 0 | Status: COMPLETED | OUTPATIENT
Start: 2018-04-01 | End: 2018-04-01

## 2018-04-01 RX ORDER — DEXTROSE 50 % IN WATER 50 %
50 SYRINGE (ML) INTRAVENOUS ONCE
Qty: 0 | Refills: 0 | Status: COMPLETED | OUTPATIENT
Start: 2018-04-01 | End: 2018-04-01

## 2018-04-01 RX ORDER — ALBUMIN HUMAN 25 %
100 VIAL (ML) INTRAVENOUS EVERY 8 HOURS
Qty: 0 | Refills: 0 | Status: DISCONTINUED | OUTPATIENT
Start: 2018-04-01 | End: 2018-04-03

## 2018-04-01 RX ORDER — HEPARIN SODIUM 5000 [USP'U]/ML
5500 INJECTION INTRAVENOUS; SUBCUTANEOUS EVERY 6 HOURS
Qty: 0 | Refills: 0 | Status: DISCONTINUED | OUTPATIENT
Start: 2018-04-01 | End: 2018-04-01

## 2018-04-01 RX ORDER — ACETAMINOPHEN 500 MG
650 TABLET ORAL EVERY 6 HOURS
Qty: 0 | Refills: 0 | Status: DISCONTINUED | OUTPATIENT
Start: 2018-04-01 | End: 2018-04-03

## 2018-04-01 RX ADMIN — SODIUM POLYSTYRENE SULFONATE 15 GRAM(S): 4.1 POWDER, FOR SUSPENSION ORAL at 09:29

## 2018-04-01 RX ADMIN — Medication 50 MILLILITER(S): at 22:39

## 2018-04-01 RX ADMIN — Medication 400 MILLIGRAM(S): at 08:29

## 2018-04-01 RX ADMIN — LACTULOSE 10 GRAM(S): 10 SOLUTION ORAL at 13:12

## 2018-04-01 RX ADMIN — OXYCODONE HYDROCHLORIDE 5 MILLIGRAM(S): 5 TABLET ORAL at 13:04

## 2018-04-01 RX ADMIN — Medication 40 MILLIGRAM(S): at 08:29

## 2018-04-01 RX ADMIN — Medication 1: at 18:02

## 2018-04-01 RX ADMIN — ENOXAPARIN SODIUM 70 MILLIGRAM(S): 100 INJECTION SUBCUTANEOUS at 07:00

## 2018-04-01 RX ADMIN — HEPARIN SODIUM 1200 UNIT(S)/HR: 5000 INJECTION INTRAVENOUS; SUBCUTANEOUS at 23:42

## 2018-04-01 RX ADMIN — INSULIN HUMAN 10 UNIT(S): 100 INJECTION, SOLUTION SUBCUTANEOUS at 18:32

## 2018-04-01 RX ADMIN — OXYCODONE HYDROCHLORIDE 5 MILLIGRAM(S): 5 TABLET ORAL at 12:04

## 2018-04-01 RX ADMIN — Medication 400 MILLIGRAM(S): at 23:48

## 2018-04-01 RX ADMIN — ONDANSETRON 4 MILLIGRAM(S): 8 TABLET, FILM COATED ORAL at 14:05

## 2018-04-01 RX ADMIN — Medication 200 GRAM(S): at 12:05

## 2018-04-01 RX ADMIN — PANTOPRAZOLE SODIUM 40 MILLIGRAM(S): 20 TABLET, DELAYED RELEASE ORAL at 12:04

## 2018-04-01 RX ADMIN — ALBUTEROL 2.5 MILLIGRAM(S): 90 AEROSOL, METERED ORAL at 12:05

## 2018-04-01 RX ADMIN — Medication 1: at 13:10

## 2018-04-01 RX ADMIN — SPIRONOLACTONE 100 MILLIGRAM(S): 25 TABLET, FILM COATED ORAL at 08:30

## 2018-04-01 RX ADMIN — Medication 50 MILLILITER(S): at 18:31

## 2018-04-01 RX ADMIN — Medication 1: at 21:50

## 2018-04-01 RX ADMIN — Medication 400 MILLIGRAM(S): at 09:29

## 2018-04-01 NOTE — CONSULT NOTE ADULT - SUBJECTIVE AND OBJECTIVE BOX
[facility] DIVISION OF KIDNEY DISEASES AND HYPERTENSION -- INITIAL CONSULT NOTE  --------------------------------------------------------------------------------  HPI:  Patient is a 61 y/o man with history of alcoholic cirrhosis, DM2, recurrent right hydrothorax who presented to an outside hospital with shortness of breath due to right hydrothorax.  Patient presented to outside hospital with shortness of breath, found with pleural effusion, had chest tube placed with improvement in dyspnea and improvement in effusion.  Patient denies any previous hospitalizations but per medical records has required multiple admission for recurrent hydrothorax.  Patient underwent TIPs procedure 2 days prior and had a CT chest on 3/27 with IV contrast.  On presentation to [facility], patient was found to be grossly overloaded and has been effectively diuresed with lasix on spironolactone.  Today, patient found with acute renal failure with hyperkalemia.      Patient denies any history of renal failure.  Has DM2 reportedly controlled with metformin, denies any retinopathy from DM2.  24 hour urine protein collected in Feb with <24 mg of protein collected.    PAST HISTORY  --------------------------------------------------------------------------------  PAST MEDICAL & SURGICAL HISTORY:  Pulmonary embolism  Cirrhosis  DM (diabetes mellitus)  S/P thoracentesis    FAMILY HISTORY:  Denies any first degree family history of renal failure    PAST SOCIAL HISTORY:  denies any recent etoh consumption, denies smoking, denies illicit substance abuse    ALLERGIES & MEDICATIONS  --------------------------------------------------------------------------------  Allergies    No Known Allergies    Intolerances      Standing Inpatient Medications  dextrose 50% Injectable 50 milliLiter(s) IV Push once  insulin lispro (HumaLOG) corrective regimen sliding scale   SubCutaneous Before meals and at bedtime  insulin regular  human recombinant. 10 Unit(s) IV Push once  lactulose Syrup 10 Gram(s) Oral three times a day  pantoprazole  Injectable 40 milliGRAM(s) IV Push daily  senna 2 Tablet(s) Oral at bedtime    PRN Inpatient Medications  ondansetron Injectable 4 milliGRAM(s) IV Push every 8 hours PRN      REVIEW OF SYSTEMS  --------------------------------------------------------------------------------  Gen: No fevers/chills, +weakness, +fatigue  Skin: No rashes  Head/Eyes/Ears/Mouth: No headache, no sore throat  Respiratory: No dyspnea, cough  CV: No chest pain, no orthopnea  GI: +abdominal pain, diarrhea, +distention  : No increased frequency, dysuria  MSK: No joint pain/swelling; + severe improving edema on diuretics  Neuro: No dizziness/lightheadedness  Heme: No easy bruising or bleeding  Endo: No heat/cold intolerance  Psych: No significant nervousness, depression    All other systems were reviewed and are negative, except as noted.    VITALS/PHYSICAL EXAM  --------------------------------------------------------------------------------  T(C): 36.3 (04-01-18 @ 06:48), Max: 36.8 (03-31-18 @ 13:59)  HR: 83 (04-01-18 @ 08:24) (79 - 96)  BP: 92/54 (04-01-18 @ 08:24) (85/53 - 99/66)  RR: 18 (04-01-18 @ 06:48) (18 - 18)  SpO2: 94% (04-01-18 @ 06:48) (94% - 99%)  Wt(kg): --        03-31-18 @ 07:01  -  04-01-18 @ 07:00  --------------------------------------------------------  IN: 720 mL / OUT: 1250 mL / NET: -530 mL      Physical Exam:  	Gen: NAD  	HEENT: MMM  	Pulm: No rales or wheeze on exam  	CV: RRR, S1S2; no rub  	Back: No spinal or CVA tenderness; no sacral edema  	Abd: +BS, soft, nontender, + distended  	: No suprapubic tenderness  	UE: Warm, FROM, intact strength; no edema  	LE: Warm, 1+ pitting edema R > L  	Neuro: No focal deficits  	Psych: reserved, terse  	Skin: Warm, without rashes    LABS/STUDIES  --------------------------------------------------------------------------------              10.4   7.73  >-----------<  118      [04-01-18 @ 09:29]              28.8     129  |  99  |  51  ----------------------------<  119      [04-01-18 @ 08:09]  5.6   |  22  |  1.96        Ca     8.0     [04-01-18 @ 08:09]      Mg     1.8     [04-01-18 @ 08:09]      Phos  2.6     [04-01-18 @ 08:09]    TPro  4.6  /  Alb  1.7  /  TBili  0.5  /  DBili  x   /  AST  188  /  ALT  209  /  AlkPhos  323  [04-01-18 @ 08:09]    PT/INR: PT 13.1 , INR 1.16       [04-01-18 @ 09:34]  PTT: 38.0       [04-01-18 @ 09:34]      Creatinine Trend:  SCr 1.96 [04-01 @ 08:09]  SCr 1.12 [03-31 @ 06:06]  SCr 1.09 [03-30 @ 22:59]  SCr 1.10 [03-30 @ 12:35]  SCr 1.17 [03-30 @ 00:20]

## 2018-04-01 NOTE — CONSULT NOTE ADULT - PROBLEM SELECTOR RECOMMENDATION 9
Suspect pre-renal injury versus less likely hepatorenal syndrome.  Would hold diuretics, especially aldactone in the setting of hyperkalemia.  Start IV albumin 25% in 100mL x 4 doses daily for 48 hours.  Check urinalysis, urine electrolytes.  If Creatinine does not improve then would initiate HRS cocktail.  Informed patient that he needs a castellon catheter for close urine output monitoring.  Obstruction is possible but less likely as would require bilateral obstruction for this degree of BRYNN.  Pending urinalysis to assess for intrinsic injury. Suspect pre-renal injury versus less likely hepatorenal syndrome.  Would hold diuretics, especially aldactone in the setting of hyperkalemia.  Start IV albumin 25% in 100mL x 4 doses daily for 48 hours.  Check urinalysis, urine electrolytes.  If Creatinine does not improve then would initiate HRS cocktail.    Obstruction is possible but less likely as would require bilateral obstruction for this degree of BRYNN.  Check renal sonogram, bedside bladder scan.  If patient has residual would place castellon catheter.  Pending urinalysis to assess for intrinsic injury.

## 2018-04-01 NOTE — PROGRESS NOTE ADULT - PROBLEM SELECTOR PLAN 5
s/p TIPS 3/29   - Patient had been complaining of epigastric pain, Vitals and Hgb stable, no longer complaining of abdominal pain, will follow up abdominal ultrasound  - lactulose 10 g tid; titrate to 2-3 BM's/day  - c/w spironolactone 100 mg BID, holding lasix today in setting of BRYNN   - Appreciate hepatology recs, will need outpatient follow up with hepatology for titration of diuretics (will need to be titrated down now that he is s/p TIPS), EGD (varices screening) s/p TIPS 3/29, MELD today 22, yesterday 9 (BRYNN today)   - Patient had been complaining of epigastric pain, Vitals and Hgb stable, no longer complaining of abdominal pain, will follow up abdominal ultrasound  - lactulose 10 g tid; titrate to 2-3 BM's/day  - c/w spironolactone 100 mg BID, holding lasix today in setting of BRYNN   - Appreciate hepatology recs, will need outpatient follow up with hepatology for titration of diuretics (will need to be titrated down now that he is s/p TIPS), EGD (varices screening) s/p TIPS 3/29, MELD today 22, yesterday 9 (BRYNN today)   - Patient had been complaining of epigastric pain, Vitals and Hgb stable, no longer complaining of abdominal pain, will follow up abdominal ultrasound  - lactulose 10 g tid; titrate to 2-3 BM's/day  - holding spironolactone 100 mg BID, holding lasix today in setting of BRYNN   - Appreciate hepatology recs, will need outpatient follow up with hepatology for titration of diuretics (will need to be titrated down now that he is s/p TIPS), EGD (varices screening)

## 2018-04-01 NOTE — PROGRESS NOTE ADULT - PROBLEM SELECTOR PLAN 6
Patient w/ known PE on CTA 2/8/18. Given hepatic impairment, patient was switched from eliquis to lovenox, restarted on Lovenox post TIPS procedure  - in setting of BRYNN, will need to switch from Lovenox to Heparin infusion, will start heparin infusion 10 hours later

## 2018-04-01 NOTE — PROGRESS NOTE ADULT - PROBLEM SELECTOR PLAN 2
-Patient with BRYNN today, Creatinine 1.96  -may be in setting of lasix or recent TIPS procedure  -Spoke with hepatology fellow who recommended holding lasix for today and monitoring creatinine level  -will monitor BMP later today -Patient with BRYNN today, Creatinine 1.96  -may be in setting of lasix or recent TIPS procedure  - patient urinating without difficulties, non-obstructive   -Spoke with hepatology fellow who recommended holding lasix for today and monitoring creatinine level  - will follow up urine electrolytes   -will monitor BMP later today - Patient with BRYNN today, Creatinine 1.96  - may be in setting of lasix, ATN due to recent contrast, or recent TIPS procedure  - patient urinating without difficulties, non-obstructive   - Spoke with hepatology fellow who recommended holding lasix for today and monitoring creatinine level  - will follow up urine electrolytes   - will monitor BMP later today  - Renal consulted, will follow up recommendations

## 2018-04-01 NOTE — PROGRESS NOTE ADULT - PROBLEM SELECTOR PLAN 1
Potassium elevated to 5.6 this morning  -EKG without stigmata of hyperkalemia, no peaked T waves, no CO or QRS prolongation  - will give Kayexalate, Insulin/d50, albuterol and calcium gluconate   - will recheck BMP in the evening and if still hyperkalemic, will treat accordingly and place on telemetry Potassium elevated to 5.6 this morning  -EKG without stigmata of hyperkalemia, no peaked T waves, no KS or QRS prolongation  - will give Kayexalate, Insulin/d50, albuterol and calcium gluconate   -hold aldactone  - will recheck BMP in the evening and if still hyperkalemic, will treat accordingly and place on telemetry

## 2018-04-01 NOTE — CONSULT NOTE ADULT - PROBLEM SELECTOR RECOMMENDATION 2
Likely due to high dose spironolactone.  Hold aldactone.  Potassium restricted diet.  Monitor urine output.

## 2018-04-01 NOTE — CONSULT NOTE ADULT - ASSESSMENT
Patient is a 61 y/o man with cirrhosis presenting with gross fluid overload, right hydrothorax who is s/p TIPS and developed BRYNN in setting of IV diuresis.

## 2018-04-01 NOTE — PROGRESS NOTE ADULT - PROBLEM SELECTOR PLAN 4
Patient with recurrent pleural effusions in the setting of hepatic hydrothorax despite high dose diuretics   - Chest tube placed at outside hospital on 2/18/2018 by CT surgery service after presenting with SOB and found to have large right sided effusion   - Maintain pig tail catheter to pleuravac and monitor drainage (approximately 900 cc in last 24hrs). Pulmonary team following, will continue to follow recommendations   - c/w spironolactone 100 mg BID, holding lasix in setting of BRYNN Patient with recurrent pleural effusions in the setting of hepatic hydrothorax despite high dose diuretics   - Chest tube placed at outside hospital on 2/18/2018 by CT surgery service after presenting with SOB and found to have large right sided effusion   - Maintain pig tail catheter to pleuravac and monitor drainage (approximately 900 cc in last 24hrs). Pulmonary team following, will continue to follow recommendations   - holding lasix, aldactone in setting of BRYNN/hyperkalemia

## 2018-04-01 NOTE — PROGRESS NOTE ADULT - SUBJECTIVE AND OBJECTIVE BOX
Patient is a 62y old  Male who presents with a chief complaint of Transfer from [facility] for TIPS, Recurrent Pleural effusion (22 Mar 2018 14:24)        SUBJECTIVE / OVERNIGHT EVENTS: Patient had no acute events overnight. Patient seen and examined at bedside this morning. Patient denying abdominal pain this morning. Patient has been having normal bowel movements and has been urinating without difficulties. Patient endorses headache this morning. Drainage from pigtail catheter: 900 cc over last 24 hours     ROS: [ - ] Fever [ - ] Chills [ - ] Nausea/Vomiting [ - ] Chest Pain [ - ] Shortness of breath     MEDICATIONS  (STANDING):  ALBUTerol    0.083%. 2.5 milliGRAM(s) Nebulizer once  calcium gluconate IVPB 1 Gram(s) IV Intermittent once  dextrose 50% Injectable 50 milliLiter(s) IV Push once  insulin lispro (HumaLOG) corrective regimen sliding scale   SubCutaneous Before meals and at bedtime  insulin regular  human recombinant. 10 Unit(s) IV Push once  lactulose Syrup 10 Gram(s) Oral three times a day  pantoprazole  Injectable 40 milliGRAM(s) IV Push daily  senna 2 Tablet(s) Oral at bedtime  spironolactone 100 milliGRAM(s) Oral two times a day    MEDICATIONS  (PRN):  ondansetron Injectable 4 milliGRAM(s) IV Push every 8 hours PRN Nausea and/or Vomiting      Vital Signs Last 24 Hrs  T(C): 36.3 (01 Apr 2018 06:48), Max: 37.1 (31 Mar 2018 11:06)  T(F): 97.4 (01 Apr 2018 06:48), Max: 98.7 (31 Mar 2018 11:06)  HR: 83 (01 Apr 2018 08:24) (79 - 96)  BP: 92/54 (01 Apr 2018 08:24) (85/53 - 107/63)  BP(mean): --  RR: 18 (01 Apr 2018 06:48) (17 - 18)  SpO2: 94% (01 Apr 2018 06:48) (94% - 99%)  CAPILLARY BLOOD GLUCOSE      POCT Blood Glucose.: 114 mg/dL (01 Apr 2018 08:15)  POCT Blood Glucose.: 160 mg/dL (31 Mar 2018 21:48)  POCT Blood Glucose.: 154 mg/dL (31 Mar 2018 17:23)  POCT Blood Glucose.: 267 mg/dL (31 Mar 2018 12:54)    I&O's Summary    31 Mar 2018 07:01  -  01 Apr 2018 07:00  --------------------------------------------------------  IN: 720 mL / OUT: 1250 mL / NET: -530 mL        PHYSICAL EXAM  GENERAL: NAD, lying comfortably in bed   HEAD:  Atraumatic, Normocephalic  EYES: EOMI, PERRLA, conjunctiva and sclera clear  NECK: Supple, No JVD  CHEST/LUNG: Clear to auscultation bilaterally; R thorax pigtail catheter connected to water seal   draining clear straw drainage, 900 cc over last 24 hrs  HEART: Regular rate and rhythm; No murmurs, rubs, or gallops  ABDOMEN: Soft, no TTP, no guarding or rebound tenderness, Nondistended; Bowel sounds present  EXTREMITIES:  2+ Peripheral Pulses, 2+ non-pitting edema  NEURO: AAOx3, mentating well, non-focal  SKIN: No rashes or lesions        LABS:                        11.9   8.3   )-----------( 140      ( 31 Mar 2018 06:06 )             36.1     04-01    129<L>  |  99  |  51<H>  ----------------------------<  119<H>  5.6<H>   |  22  |  1.96<H>    Ca    8.0<L>      01 Apr 2018 08:09  Phos  2.6     04-01  Mg     1.8     04-01    TPro  4.6<L>  /  Alb  1.7<L>  /  TBili  0.5  /  DBili  x   /  AST  188<H>  /  ALT  209<H>  /  AlkPhos  323<H>  04-01    PT/INR - ( 30 Mar 2018 12:35 )   PT: 10.6 sec;   INR: 0.97 ratio         PTT - ( 30 Mar 2018 22:59 )  PTT:30.2 sec              Consultant(s) Notes Reviewed:  Hepatology, Pulmonology

## 2018-04-01 NOTE — CONSULT NOTE ADULT - ATTENDING COMMENTS
BRYNN : likely pre renal in the setting of high dose diuretics /Large CT drainage and Hypotension. Doubt this to HRS given no precipitating factors. TIPS  procedure is usually associated with improvement in renal hemodynamics as well as GFR.     -would stop all diuretics( lasix and aldactone)  -Would resuscitate with albumin  -Strict I/O. Please monitor urine output   -Check U/A, Urine lytes  -Low K diet BRYNN : likely pre renal in the setting of high dose diuretics /Large CT drainage and Hypotension. Doubt this to HRS given no precipitating factors. TIPS  procedure is usually associated with improvement in renal hemodynamics as well as GFR.     -would stop all diuretics( lasix and aldactone)  -Would resuscitate with albumin ( 1 gm/kg x 2 days)   -Strict I/O. Please monitor urine output   -Check U/A, Urine lytes  -Low K diet

## 2018-04-01 NOTE — PROGRESS NOTE ADULT - PROBLEM SELECTOR PLAN 3
Hypervolemic hyponatremia in setting of cirrhosis. Na stable.   - continue with Spironolactone, holding lasix in setting of BRYNN    - will fluid restrict 1500 ml    - will f/u urine electrolyte    - continue to monitor Na Hypervolemic hyponatremia in setting of cirrhosis. Na stable.   - holding lasix/aldactone in setting of BRYNN    - will fluid restrict 1500 ml    - will f/u urine electrolyte    - continue to monitor Na

## 2018-04-01 NOTE — PROGRESS NOTE ADULT - ASSESSMENT
63 y/o M with a PMH significant for alcoholic cirrhosis, PE on eliquis, DM2, and recurrent pleural effusions in the setting of hepatic hydrothorax. Patient presented to [facility] on 3/18/18 with worsening dyspnea and orthopnea x3 days, found to have a large right pleural effusion s/p chest tube placement at OSH, and TIPS here on 3/29 with no complications, transferred to MICU for monitoring, stable for transfer back to medicine floors with pigtail catheter in place. Now with hyponatremia, BRYNN and hyperkalemia

## 2018-04-02 ENCOUNTER — TRANSCRIPTION ENCOUNTER (OUTPATIENT)
Age: 63
End: 2018-04-02

## 2018-04-02 LAB
ALBUMIN SERPL ELPH-MCNC: 2 G/DL — LOW (ref 3.3–5)
ALP SERPL-CCNC: 337 U/L — HIGH (ref 40–120)
ALT FLD-CCNC: 187 U/L RC — HIGH (ref 10–45)
ANION GAP SERPL CALC-SCNC: 11 MMOL/L — SIGNIFICANT CHANGE UP (ref 5–17)
APPEARANCE UR: CLEAR — SIGNIFICANT CHANGE UP
APTT BLD: 100.5 SEC — HIGH (ref 27.5–37.4)
APTT BLD: 80.3 SEC — HIGH (ref 27.5–37.4)
APTT BLD: 82.9 SEC — HIGH (ref 27.5–37.4)
APTT BLD: 90.8 SEC — HIGH (ref 27.5–37.4)
AST SERPL-CCNC: 146 U/L — HIGH (ref 10–40)
BACTERIA # UR AUTO: NEGATIVE — SIGNIFICANT CHANGE UP
BASOPHILS # BLD AUTO: 0.03 K/UL — SIGNIFICANT CHANGE UP (ref 0–0.2)
BASOPHILS NFR BLD AUTO: 0.5 % — SIGNIFICANT CHANGE UP (ref 0–2)
BILIRUB SERPL-MCNC: 0.6 MG/DL — SIGNIFICANT CHANGE UP (ref 0.2–1.2)
BILIRUB UR-MCNC: NEGATIVE — SIGNIFICANT CHANGE UP
BUN SERPL-MCNC: 54 MG/DL — HIGH (ref 7–23)
CALCIUM SERPL-MCNC: 8.4 MG/DL — SIGNIFICANT CHANGE UP (ref 8.4–10.5)
CHLORIDE SERPL-SCNC: 99 MMOL/L — SIGNIFICANT CHANGE UP (ref 96–108)
CHLORIDE UR-SCNC: <35 MMOL/L — SIGNIFICANT CHANGE UP
CO2 SERPL-SCNC: 22 MMOL/L — SIGNIFICANT CHANGE UP (ref 22–31)
COLOR SPEC: ABNORMAL
CREAT SERPL-MCNC: 1.81 MG/DL — HIGH (ref 0.5–1.3)
DIFF PNL FLD: NEGATIVE — SIGNIFICANT CHANGE UP
EOSINOPHIL # BLD AUTO: 0.18 K/UL — SIGNIFICANT CHANGE UP (ref 0–0.5)
EOSINOPHIL NFR BLD AUTO: 3.1 % — SIGNIFICANT CHANGE UP (ref 0–6)
EPI CELLS # UR: 1 /HPF — SIGNIFICANT CHANGE UP (ref 0–5)
GLUCOSE SERPL-MCNC: 116 MG/DL — HIGH (ref 70–99)
GLUCOSE UR QL: NEGATIVE MG/DL — SIGNIFICANT CHANGE UP
HCT VFR BLD CALC: 28.2 % — LOW (ref 39–50)
HCT VFR BLD CALC: 28.9 % — LOW (ref 39–50)
HGB BLD-MCNC: 10 G/DL — LOW (ref 13–17)
HGB BLD-MCNC: 10.3 G/DL — LOW (ref 13–17)
HYALINE CASTS # UR AUTO: 8 /LPF — HIGH (ref 0–7)
IMM GRANULOCYTES NFR BLD AUTO: 0.3 % — SIGNIFICANT CHANGE UP (ref 0–1.5)
INR BLD: 1.09 RATIO — SIGNIFICANT CHANGE UP (ref 0.88–1.16)
KETONES UR-MCNC: NEGATIVE — SIGNIFICANT CHANGE UP
LEUKOCYTE ESTERASE UR-ACNC: NEGATIVE — SIGNIFICANT CHANGE UP
LYMPHOCYTES # BLD AUTO: 1.45 K/UL — SIGNIFICANT CHANGE UP (ref 1–3.3)
LYMPHOCYTES # BLD AUTO: 24.6 % — SIGNIFICANT CHANGE UP (ref 13–44)
MAGNESIUM SERPL-MCNC: 2 MG/DL — SIGNIFICANT CHANGE UP (ref 1.6–2.6)
MCHC RBC-ENTMCNC: 33.8 PG — SIGNIFICANT CHANGE UP (ref 27–34)
MCHC RBC-ENTMCNC: 35.1 PG — HIGH (ref 27–34)
MCHC RBC-ENTMCNC: 35.5 GM/DL — SIGNIFICANT CHANGE UP (ref 32–36)
MCHC RBC-ENTMCNC: 35.7 GM/DL — SIGNIFICANT CHANGE UP (ref 32–36)
MCV RBC AUTO: 95.3 FL — SIGNIFICANT CHANGE UP (ref 80–100)
MCV RBC AUTO: 98.4 FL — SIGNIFICANT CHANGE UP (ref 80–100)
MONOCYTES # BLD AUTO: 0.67 K/UL — SIGNIFICANT CHANGE UP (ref 0–0.9)
MONOCYTES NFR BLD AUTO: 11.4 % — SIGNIFICANT CHANGE UP (ref 2–14)
NEUTROPHILS # BLD AUTO: 3.55 K/UL — SIGNIFICANT CHANGE UP (ref 1.8–7.4)
NEUTROPHILS NFR BLD AUTO: 60.1 % — SIGNIFICANT CHANGE UP (ref 43–77)
NITRITE UR-MCNC: NEGATIVE — SIGNIFICANT CHANGE UP
PH UR: 5 — SIGNIFICANT CHANGE UP (ref 5–8)
PHOSPHATE SERPL-MCNC: 3.4 MG/DL — SIGNIFICANT CHANGE UP (ref 2.5–4.5)
PLATELET # BLD AUTO: 139 K/UL — LOW (ref 150–400)
PLATELET # BLD AUTO: 152 K/UL — SIGNIFICANT CHANGE UP (ref 150–400)
POTASSIUM SERPL-MCNC: 4.7 MMOL/L — SIGNIFICANT CHANGE UP (ref 3.5–5.3)
POTASSIUM SERPL-SCNC: 4.7 MMOL/L — SIGNIFICANT CHANGE UP (ref 3.5–5.3)
POTASSIUM UR-SCNC: 35 MMOL/L — SIGNIFICANT CHANGE UP
PROT SERPL-MCNC: 4.8 G/DL — LOW (ref 6–8.3)
PROT UR-MCNC: 300 MG/DL
PROTHROM AB SERPL-ACNC: 12.3 SEC — SIGNIFICANT CHANGE UP (ref 10–13.1)
RBC # BLD: 2.94 M/UL — LOW (ref 4.2–5.8)
RBC # BLD: 2.96 M/UL — LOW (ref 4.2–5.8)
RBC # FLD: 13.2 % — SIGNIFICANT CHANGE UP (ref 10.3–14.5)
RBC # FLD: 14.6 % — HIGH (ref 10.3–14.5)
RBC CASTS # UR COMP ASSIST: 4 /HPF — SIGNIFICANT CHANGE UP (ref 0–4)
SODIUM SERPL-SCNC: 132 MMOL/L — LOW (ref 135–145)
SODIUM UR-SCNC: <20 MMOL/L — SIGNIFICANT CHANGE UP
SP GR SPEC: 1.02 — SIGNIFICANT CHANGE UP (ref 1.01–1.02)
UROBILINOGEN FLD QL: NEGATIVE MG/DL — SIGNIFICANT CHANGE UP
WBC # BLD: 5.9 K/UL — SIGNIFICANT CHANGE UP (ref 3.8–10.5)
WBC # BLD: 6 K/UL — SIGNIFICANT CHANGE UP (ref 3.8–10.5)
WBC # FLD AUTO: 5.9 K/UL — SIGNIFICANT CHANGE UP (ref 3.8–10.5)
WBC # FLD AUTO: 6 K/UL — SIGNIFICANT CHANGE UP (ref 3.8–10.5)
WBC UR QL: 4 /HPF — SIGNIFICANT CHANGE UP (ref 0–5)

## 2018-04-02 PROCEDURE — 76700 US EXAM ABDOM COMPLETE: CPT | Mod: 26,59

## 2018-04-02 PROCEDURE — 93975 VASCULAR STUDY: CPT | Mod: 26

## 2018-04-02 PROCEDURE — 99232 SBSQ HOSP IP/OBS MODERATE 35: CPT | Mod: GC

## 2018-04-02 PROCEDURE — 99233 SBSQ HOSP IP/OBS HIGH 50: CPT | Mod: GC

## 2018-04-02 PROCEDURE — 99231 SBSQ HOSP IP/OBS SF/LOW 25: CPT

## 2018-04-02 RX ORDER — OXYCODONE HYDROCHLORIDE 5 MG/1
5 TABLET ORAL ONCE
Qty: 0 | Refills: 0 | Status: DISCONTINUED | OUTPATIENT
Start: 2018-04-02 | End: 2018-04-02

## 2018-04-02 RX ADMIN — HEPARIN SODIUM 1100 UNIT(S)/HR: 5000 INJECTION INTRAVENOUS; SUBCUTANEOUS at 13:27

## 2018-04-02 RX ADMIN — LACTULOSE 10 GRAM(S): 10 SOLUTION ORAL at 15:30

## 2018-04-02 RX ADMIN — Medication 50 MILLILITER(S): at 15:29

## 2018-04-02 RX ADMIN — PANTOPRAZOLE SODIUM 40 MILLIGRAM(S): 20 TABLET, DELAYED RELEASE ORAL at 12:19

## 2018-04-02 RX ADMIN — Medication 1: at 22:06

## 2018-04-02 RX ADMIN — HEPARIN SODIUM 1200 UNIT(S)/HR: 5000 INJECTION INTRAVENOUS; SUBCUTANEOUS at 07:26

## 2018-04-02 RX ADMIN — Medication 400 MILLIGRAM(S): at 00:18

## 2018-04-02 RX ADMIN — ONDANSETRON 4 MILLIGRAM(S): 8 TABLET, FILM COATED ORAL at 15:30

## 2018-04-02 RX ADMIN — OXYCODONE HYDROCHLORIDE 5 MILLIGRAM(S): 5 TABLET ORAL at 22:44

## 2018-04-02 RX ADMIN — HEPARIN SODIUM 1100 UNIT(S)/HR: 5000 INJECTION INTRAVENOUS; SUBCUTANEOUS at 20:41

## 2018-04-02 RX ADMIN — Medication 1: at 12:33

## 2018-04-02 RX ADMIN — LACTULOSE 10 GRAM(S): 10 SOLUTION ORAL at 06:36

## 2018-04-02 RX ADMIN — Medication 50 MILLILITER(S): at 06:30

## 2018-04-02 RX ADMIN — OXYCODONE HYDROCHLORIDE 5 MILLIGRAM(S): 5 TABLET ORAL at 23:30

## 2018-04-02 RX ADMIN — Medication 50 MILLILITER(S): at 21:02

## 2018-04-02 NOTE — PROGRESS NOTE ADULT - SUBJECTIVE AND OBJECTIVE BOX
Patient is a 62y old  Male who presents with a chief complaint of Transfer from [facility] for TIPS, Recurrent Pleural effusion (22 Mar 2018 14:24)        SUBJECTIVE / OVERNIGHT EVENTS: Patient with hyperkalemia and BRYNN yesterday. Nephrology was consulted, diuretics were held and patient was started on albumin 25% for doses. Patient's potassium in the evening improved to 4.6 and creatinine improved slightly as well. Patient had no acute events overnight. Patient seen and examined at bedside this morning. Patient denying chest pain, shortness of breath or abdominal pain this morning. Pigtail catheter drained 350 cc overnight.     ROS: [ - ] Fever [ - ] Chills [ - ] Nausea/Vomiting    MEDICATIONS  (STANDING):  albumin human 25% IVPB 100 milliLiter(s) IV Intermittent every 8 hours  heparin  Infusion.  Unit(s)/Hr (12 mL/Hr) IV Continuous <Continuous>  insulin lispro (HumaLOG) corrective regimen sliding scale   SubCutaneous Before meals and at bedtime  lactulose Syrup 10 Gram(s) Oral three times a day  pantoprazole  Injectable 40 milliGRAM(s) IV Push daily  senna 2 Tablet(s) Oral at bedtime    MEDICATIONS  (PRN):  acetaminophen   Tablet. 650 milliGRAM(s) Oral every 6 hours PRN mild and moderate pain  heparin  Injectable 5500 Unit(s) IV Push every 6 hours PRN For aPTT less than 40  heparin  Injectable 2500 Unit(s) IV Push every 6 hours PRN For aPTT between 40 - 57  ondansetron Injectable 4 milliGRAM(s) IV Push every 8 hours PRN Nausea and/or Vomiting      Vital Signs Last 24 Hrs  T(C): 36.2 (02 Apr 2018 07:00), Max: 37.1 (01 Apr 2018 14:30)  T(F): 97.2 (02 Apr 2018 07:00), Max: 98.7 (01 Apr 2018 14:30)  HR: 82 (02 Apr 2018 07:00) (64 - 84)  BP: 103/63 (02 Apr 2018 07:00) (92/54 - 104/64)  BP(mean): --  RR: 18 (02 Apr 2018 07:00) (18 - 18)  SpO2: 99% (02 Apr 2018 07:00) (96% - 99%)  CAPILLARY BLOOD GLUCOSE      POCT Blood Glucose.: 174 mg/dL (01 Apr 2018 21:38)  POCT Blood Glucose.: 180 mg/dL (01 Apr 2018 17:31)  POCT Blood Glucose.: 188 mg/dL (01 Apr 2018 12:41)  POCT Blood Glucose.: 114 mg/dL (01 Apr 2018 08:15)    I&O's Summary    01 Apr 2018 07:01  -  02 Apr 2018 07:00  --------------------------------------------------------  IN: 360 mL / OUT: 350 mL / NET: 10 mL        PHYSICAL EXAM  GENERAL: NAD, lying comfortably in bed   HEAD:  Atraumatic, Normocephalic  EYES: EOMI, PERRLA, conjunctiva and sclera clear  NECK: Supple, No JVD  CHEST/LUNG: Clear to auscultation bilaterally; R thorax pigtail catheter connected to water seal   draining clear straw drainage, 350 cc over last 24 hrs  HEART: Regular rate and rhythm; No murmurs, rubs, or gallops  ABDOMEN: Soft, no TTP, no guarding or rebound tenderness, Nondistended; Bowel sounds present  EXTREMITIES:  2+ Peripheral Pulses, 2+ non-pitting edema  NEURO: AAOx3, mentating well, non-focal  SKIN: No rashes or lesions      LABS:                        10.3   6.0   )-----------( 139      ( 02 Apr 2018 06:49 )             28.9     04-02    132<L>  |  99  |  54<H>  ----------------------------<  116<H>  4.7   |  22  |  1.81<H>    Ca    8.4      02 Apr 2018 06:49  Phos  3.4     04-02  Mg     2.0     04-02    TPro  4.8<L>  /  Alb  2.0<L>  /  TBili  0.6  /  DBili  x   /  AST  146<H>  /  ALT  187<H>  /  AlkPhos  337<H>  04-02    PT/INR - ( 01 Apr 2018 09:34 )   PT: 13.1 sec;   INR: 1.16 ratio         PTT - ( 02 Apr 2018 06:49 )  PTT:80.3 sec          Consultant(s) Notes Reviewed:  Hepatology, Nephrology

## 2018-04-02 NOTE — DISCHARGE NOTE ADULT - MEDICATION SUMMARY - MEDICATIONS TO CHANGE
I will SWITCH the dose or number of times a day I take the medications listed below when I get home from the hospital:    apixaban 5 mg oral tablet  -- 1 tab(s) by mouth 2 times a day   -- Check with your doctor before becoming pregnant.  It is very important that you take or use this exactly as directed.  Do not skip doses or discontinue unless directed by your doctor.  Obtain medical advice before taking any non-prescription drugs as some may affect the action of this medication.

## 2018-04-02 NOTE — PROGRESS NOTE ADULT - PROBLEM SELECTOR PLAN 5
s/p TIPS 3/29  - Patient had been complaining of epigastric pain, Vitals and Hgb stable, no longer complaining of abdominal pain, will follow up abdominal ultrasound  - lactulose 10 g tid; titrate to 2-3 BM's/day  - holding spironolactone and lasix in setting of BRYNN   - Appreciate hepatology recs, will need outpatient follow up with hepatology for titration of diuretics (will need to be titrated down now that he is s/p TIPS), EGD (varices screening)

## 2018-04-02 NOTE — PROGRESS NOTE ADULT - PROBLEM SELECTOR PLAN 2
Resolved   - Potassium elevated to 5.6 on 4/01, EKG without stigmata of hyperkalemia, no peaked T waves, no TX or QRS prolongation, patient given Kayexalate, Insulin/d50, albuterol and calcium gluconate   - holding diuretics in setting of BRYNN

## 2018-04-02 NOTE — DISCHARGE NOTE ADULT - CARE PROVIDERS DIRECT ADDRESSES
[email or web address]

## 2018-04-02 NOTE — PROGRESS NOTE ADULT - PROBLEM SELECTOR PLAN 3
Improving   - Hypervolemic hyponatremia in setting of cirrhosis  - holding lasix/aldactone   - fluid restriction 1500 ml    - will f/u urine electrolytes    - continue to monitor Na

## 2018-04-02 NOTE — PROGRESS NOTE ADULT - PROBLEM SELECTOR PLAN 6
Patient w/ known PE on CTA 2/8/18. Given hepatic impairment, patient was switched from eliquis to lovenox, restarted on Lovenox post TIPS procedure  - in setting of BRYNN, switched to Heparin infusion Patient w/ known PE on CTA 2/8/18. Given hepatic impairment, patient was switched from eliquis to lovenox  - in setting of BRYNN, switched to Heparin infusion

## 2018-04-02 NOTE — PROGRESS NOTE ADULT - ASSESSMENT
62 year old male with a PMHx of alcoholic liver cirrhosis s/p TIPS procedure on 3/30, DM2 , right hydrothorax s/p chest tube now with BRYNN and fluid overload.

## 2018-04-02 NOTE — DISCHARGE NOTE ADULT - PATIENT PORTAL LINK FT
You can access the [facility], offered by [facility], by registering with the following website: [email or web address]

## 2018-04-02 NOTE — DISCHARGE NOTE ADULT - CARE PLAN
Principal Discharge DX:	Alcoholic cirrhosis of liver without ascites  Secondary Diagnosis:	Pleural effusion associated with hepatic disorder  Secondary Diagnosis:	BRYNN (acute kidney injury) Principal Discharge DX:	Alcoholic cirrhosis of liver without ascites  Secondary Diagnosis:	Pleural effusion associated with hepatic disorder  Secondary Diagnosis:	BRYNN (acute kidney injury)  Secondary Diagnosis:	Pulmonary embolism Principal Discharge DX:	Alcoholic cirrhosis of liver without ascites  Goal:	Please follow up with your primary care doctor within 1 week of discharge  Assessment and plan of treatment:	You were admitted with ascites and pleural effusion related to liver failure. You had a chest tube placed which was removed 4/3 and there is concern that the fluid will accumulate again. You had TIPS procedure this admission to address cirrhosis and ultrasound demonstrated the procedure was successful. You are leaving against medical advice however please follow instructions below:  -please return to ER if you experience SOB  -please take lactulose 3 times daily for a goal of 2 soft bowel movements daily. This will prevent confusion and elevated ammonia related to liver failure  -please resume lasix (furosemide) 40 mg daily   -DO NOT resume aldactone 50 mg BID (spironolactone).   -you must follow up with a primary care doctor within 1 week of discharge to have your labs checked.  -a name for PMD and hepatologist have been included in your discharge paperwork.  Secondary Diagnosis:	Pleural effusion associated with hepatic disorder  Assessment and plan of treatment:	-Your chest tube was removed 4/3; the fluid will likely reaccumulate. Please return to hospital should your shortness of breath occur again.  Secondary Diagnosis:	BRYNN (acute kidney injury)  Assessment and plan of treatment:	After TIPS was performed you developed an acute kidney injury and high potassium likely related to diuretic use and low blood pressures. As you are leaving against medical advice; a plan was made with assistance of nephrology for the safest possible discharge. Please follow directions below:  -please resume lasix (furosemide) 40 mg daily   -DO NOT resume aldactone 50 mg BID (spironolactone).   -you must follow up with a primary care doctor within 1 week of discharge to have your labs checked.  Secondary Diagnosis:	Pulmonary embolism  Assessment and plan of treatment:	On admission your eliquis was changed to IV heparin. Your creatinine clearance and liver function have improved this hospitalization. You may continue with eliquis however please follow the dose change below:  -take 10 mg twice daily for 7 days (14 doses total)  -after 7 days; continue with home dose of 5 mg twice daily and follow up with your primary care doctor.

## 2018-04-02 NOTE — DISCHARGE NOTE ADULT - MEDICATION SUMMARY - MEDICATIONS TO STOP TAKING
I will STOP taking the medications listed below when I get home from the hospital:    metFORMIN 850 mg oral tablet  -- 1 tab(s) by mouth 2 times a day    spironolactone 25 mg oral tablet  -- 2 tab(s) by mouth 2 times a day

## 2018-04-02 NOTE — DISCHARGE NOTE ADULT - HOSPITAL COURSE
62 year old Male with history of DM, PE (on Lovenox), and alcoholic cirrhosis w/ recurrent R sided hydrothorax who initially presented to [facility] with dyspnea and orthopnea, was found to have a large R pleural effusion s/p chest tube placement and transferred for TIPS, s/p procedure on 3/29. Patient tolerated the procedure without any complications and was transferred to the MICU post procedure for closer neuro monitoring. While in the MICU, patient had been hemodynamically stable and was restarted on therapeutic Lovenox. Patient reported epigastric pain, but his hgb had remained stable and an abdominal US was normal for TIPS procedure.     Patient developed BRYNN during hospital stay in the setting of IV diureses. Nephrology was consulted, and patient's diuretics were held and patient was started on albumin IVF's. Patient's BRYNN improved with albumin. Patient also with hyperkalemia, that resolved with treatment.     Patient's chest tube was removed on _______________ without any complications. CXR afterwards revealed _________________.     Patient was switched to Heparin infusion from Lovenox due to BRYNN, and will be discharged on ____________ for PE management.     Patient was discharged on ______________ as per Hepatology for cirrhosis, and he will have to follow up with the hepatology team as an outpatient for titration of diuretics (will need to be titrated down now that he is s/p TIPS), EGD (varices screening). 62 year old Male with history of DM, PE (on Eliquis), and alcoholic cirrhosis w/ recurrent R sided hydrothorax who initially presented to [facility] with dyspnea and orthopnea, was found to have a large R pleural effusion s/p chest tube placement and transferred for TIPS, s/p procedure on 3/29. Patient tolerated the procedure without any complications and was transferred to the MICU post procedure for closer neuro monitoring. While in the MICU, patient had been hemodynamically stable and was restarted on therapeutic Lovenox. Patient reported epigastric pain, but his hgb had remained stable and an abdominal US was demonstrated successful TIPS procedure.     Patient developed BRYNN during hospital stay in the setting of IV diureses. Nephrology was consulted, and patient's diuretics were held and patient was started on albumin IV. Patient's BRYNN improved with albumin. Patient also with hyperkalemia, that resolved with treatment.     Patient's chest tube was removed on 4/3 without any complications.     Patient was switched to Heparin infusion from Lovenox due to BRYNN, and will be discharged on Eliquis for given improvement in renal function and hepatic function after TIPS. He is leaving AMA and has been instructed that in restarting Eliquis he must take 10 mg BID for 7 days followed by 5 mg BID for 7 days. He verbalized understanding with teach back/repetition. This is also documented in the patients discharge paperwork.     Patient was discharged on 4/3 AMA. He would like to leave AMA as he is unsatisfied with being in the hospital; he feels it is a skilled nursing cell. He is not unhappy with any of the care provided. On clarification if there was anything the team could do to make his stay more comfortable or if his needs were not addressed; he states that there was nothing he feels the team could do differently.     I discussed with the patient multiple reasons for our recommending he stay including: pulm: given chest tube discontinued today 4/3 and expect reaccumulation. He was instructed to return if SOB. Renal: patient with BRYNN and hyperkalemia which resolved. He was receiving IV albumin. Discussed with nephrology how to safely discharge and recommended hold aldactone and c/w lasix with recommended follow up within 1 week for lab check. Again; discussed with patient with teach back/repetition. This was also documented in his paperwork. Finally emphasized importance of hepatology follow up. Patient stated he would not want to see any doctors in this area however agreed to call hepatologist number listed in paperwork if he is unable to find a provider in [place]. He understands he will have to follow up with the hepatology team as an outpatient for titration of diuretics, EGD (varices screening).

## 2018-04-02 NOTE — PROGRESS NOTE ADULT - SUBJECTIVE AND OBJECTIVE BOX
Interval Events:  No acute events o/n    REVIEW OF SYSTEMS:  Constitutional:   Eyes:  ENT:  CV:  Resp:  GI:  :  MSK:  Integumentary:  Neurological:  Psychiatric:  Endocrine:  Hematologic/Lymphatic:  Allergic/Immunologic:  [x] All other systems negative  [ ] Unable to assess ROS because ________    OBJECTIVE:  ICU Vital Signs Last 24 Hrs  T(C): 36.5 (02 Apr 2018 12:46), Max: 36.9 (01 Apr 2018 19:22)  T(F): 97.7 (02 Apr 2018 12:46), Max: 98.4 (01 Apr 2018 19:22)  HR: 67 (02 Apr 2018 12:46) (64 - 84)  BP: 103/66 (02 Apr 2018 12:46) (94/58 - 104/64)  BP(mean): --  ABP: --  ABP(mean): --  RR: 18 (02 Apr 2018 12:46) (17 - 18)  SpO2: 92% (02 Apr 2018 12:46) (92% - 100%)        04-01 @ 07:01 - 04-02 @ 07:00  --------------------------------------------------------  IN: 651 mL / OUT: 350 mL / NET: 301 mL    04-02 @ 07:01  - 04-02 @ 14:53  --------------------------------------------------------  IN: 432.7 mL / OUT: 0 mL / NET: 432.7 mL      CAPILLARY BLOOD GLUCOSE      POCT Blood Glucose.: 193 mg/dL (02 Apr 2018 12:27)      PHYSICAL EXAM:  General: AAOx3  HEENT: EOMI, PERRL  Lymph Nodes: No LAD  Neck: Supple  Respiratory: right sided chest tube  Cardiovascular: RRR   Abdomen: soft, NT/ND  Extremities: no c/c/e    HOSPITAL MEDICATIONS:  MEDICATIONS  (STANDING):  albumin human 25% IVPB 100 milliLiter(s) IV Intermittent every 8 hours  heparin  Infusion.  Unit(s)/Hr (12 mL/Hr) IV Continuous <Continuous>  insulin lispro (HumaLOG) corrective regimen sliding scale   SubCutaneous Before meals and at bedtime  lactulose Syrup 10 Gram(s) Oral three times a day  pantoprazole  Injectable 40 milliGRAM(s) IV Push daily  senna 2 Tablet(s) Oral at bedtime    MEDICATIONS  (PRN):  acetaminophen   Tablet. 650 milliGRAM(s) Oral every 6 hours PRN mild and moderate pain  heparin  Injectable 5500 Unit(s) IV Push every 6 hours PRN For aPTT less than 40  heparin  Injectable 2500 Unit(s) IV Push every 6 hours PRN For aPTT between 40 - 57  ondansetron Injectable 4 milliGRAM(s) IV Push every 8 hours PRN Nausea and/or Vomiting      LABS:                        10.0   5.90  )-----------( 152      ( 02 Apr 2018 07:54 )             28.2     04-02    132<L>  |  99  |  54<H>  ----------------------------<  116<H>  4.7   |  22  |  1.81<H>    Ca    8.4      02 Apr 2018 06:49  Phos  3.4     04-02  Mg     2.0     04-02    TPro  4.8<L>  /  Alb  2.0<L>  /  TBili  0.6  /  DBili  x   /  AST  146<H>  /  ALT  187<H>  /  AlkPhos  337<H>  04-02    PT/INR - ( 02 Apr 2018 07:43 )   PT: 12.3 sec;   INR: 1.09 ratio         PTT - ( 02 Apr 2018 13:05 )  PTT:100.5 sec          RADIOLOGY:  [x] Reviewed and interpreted by me

## 2018-04-02 NOTE — DISCHARGE NOTE ADULT - CARE PROVIDER_API CALL
Sebastián Medrano), Internal Medicine  [address] [place] [address]  Phone: [phone]  Fax: [phone]    Garrick Gomez), Internal Medicine  [address]  [place] [address]  Phone: [phone]  Fax: [phone]    Tabatha Lockwood), Internal Medicine; Nephrology  [address] [facility] [address]  [address] [place] [address]  Phone: [phone]  Fax: [phone]    Anthony Pruitt), Gastroenterology; Internal Medicine  [address] [place] [address]  Phone: [phone]  Fax: [phone]

## 2018-04-02 NOTE — PROGRESS NOTE ADULT - PROBLEM SELECTOR PLAN 1
Pt. with BRYNN likely multifactorial in the setting of fluid overload, diuretics, low BP and IV contrast. Scr decreased to 1.8 today. Advise to continue to hold diuretics. Advise to continue with IVF albumin.  Check U/A and renal sonogram. Monitor BMP, urine output, I/Os. Avoid nephrotoxins, RCA and NSAIDs

## 2018-04-02 NOTE — PROGRESS NOTE ADULT - SUBJECTIVE AND OBJECTIVE BOX
[facility] DIVISION OF KIDNEY DISEASES AND HYPERTENSION -- FOLLOW UP NOTE  --------------------------------------------------------------------------------  HPI: This is a 62 year old male with a PMHx of alcoholic liver cirrhosis s/p TIPS procedure on 3/30, DM2 , right hydrothorax s/p chest tube transferred from OSH. Pt. had CT with IV contrast on 3/27. Pt. with no known history of kidney disease in the past. Labs on admission (4/1) show pt. with elevated Scr of 1.9 which is 1.8 today. Currently pt. c/o mild NGUYEN. Pt. denies CP, N/V.        PAST HISTORY  --------------------------------------------------------------------------------  No significant changes to PMH, PSH, FHx, SHx, unless otherwise noted    ALLERGIES & MEDICATIONS  --------------------------------------------------------------------------------  Allergies    No Known Allergies    Intolerances      Standing Inpatient Medications  albumin human 25% IVPB 100 milliLiter(s) IV Intermittent every 8 hours  heparin  Infusion.  Unit(s)/Hr IV Continuous <Continuous>  insulin lispro (HumaLOG) corrective regimen sliding scale   SubCutaneous Before meals and at bedtime  lactulose Syrup 10 Gram(s) Oral three times a day  pantoprazole  Injectable 40 milliGRAM(s) IV Push daily  senna 2 Tablet(s) Oral at bedtime      REVIEW OF SYSTEMS  --------------------------------------------------------------------------------  Gen: No  fevers/chills  Head/Eyes/Ears/Mouth: No headache;   Respiratory: No dyspnea  CV: No chest pain  GI: no abdominal pain; +distention   : No dysuria  MSK: + edema  Neuro: No dizziness/lightheadedness, weakness, seizures, numbness, tingling  Heme: No easy bruising or bleeding        All other systems were reviewed and are negative, except as noted.    VITALS/PHYSICAL EXAM  --------------------------------------------------------------------------------  T(C): 36.2 (04-02-18 @ 07:00), Max: 37.1 (04-01-18 @ 14:30)  HR: 82 (04-02-18 @ 07:00) (64 - 84)  BP: 103/63 (04-02-18 @ 07:00) (92/54 - 104/64)  RR: 18 (04-02-18 @ 07:00) (18 - 18)  SpO2: 99% (04-02-18 @ 07:00) (96% - 99%)  Wt(kg): --        04-01-18 @ 07:01  -  04-02-18 @ 07:00  --------------------------------------------------------  IN: 360 mL / OUT: 0 mL / NET: 360 mL      Physical Exam:  	Gen: NAD  	HEENT: MMM  	Pulm: CTA b/l   	CV: RRR  	Abd: NT +distended   	: No suprapubic tenderness  	LE: Warm, b/l LE edema              Neuro: No focal deficits  	Skin: Warm, without rashes    LABS/STUDIES  --------------------------------------------------------------------------------              10.3   6.0   >-----------<  139      [04-02-18 @ 06:49]              28.9     132  |  99  |  54  ----------------------------<  116      [04-02-18 @ 06:49]  4.7   |  22  |  1.81        Ca     8.4     [04-02-18 @ 06:49]      Mg     2.0     [04-02-18 @ 06:49]      Phos  3.4     [04-02-18 @ 06:49]    TPro  4.8  /  Alb  2.0  /  TBili  0.6  /  DBili  x   /  AST  146  /  ALT  187  /  AlkPhos  337  [04-02-18 @ 06:49]    PT/INR: PT 13.1 , INR 1.16       [04-01-18 @ 09:34]  PTT: 80.3       [04-02-18 @ 06:49]      Creatinine Trend:  SCr 1.81 [04-02 @ 06:49]  SCr 1.90 [04-01 @ 23:36]  SCr 1.96 [04-01 @ 08:09]  SCr 1.12 [03-31 @ 06:06]  SCr 1.09 [03-30 @ 22:59]      Urine Creatinine 192      [04-01-18 @ 19:22]  Urine Urea Nitrogen 455      [04-01-18 @ 21:28]

## 2018-04-02 NOTE — DISCHARGE NOTE ADULT - MEDICATION SUMMARY - MEDICATIONS TO TAKE
I will START or STAY ON the medications listed below when I get home from the hospital:    oxyCODONE 5 mg oral tablet  -- 1 tab(s) by mouth every 4 hours, As needed, Pain  -- Indication: For Pain    Eliquis 5 mg oral tablet  -- 2 tab(s) by mouth 2 times a day   -- Check with your doctor before becoming pregnant.  It is very important that you take or use this exactly as directed.  Do not skip doses or discontinue unless directed by your doctor.  Obtain medical advice before taking any non-prescription drugs as some may affect the action of this medication.    -- Indication: For DVT; take 10 mg twice daily for 7 days followed by 5 mg twice daily and follow up with your PMD    furosemide 40 mg oral tablet  -- 1 tab(s) by mouth once a day  -- Indication: For Cirrhosis    lactulose 10 g/15 mL oral syrup  -- 15 milliliter(s) by mouth 3 times a day titrate to two bowel movements daily  -- Indication: For Cirrhosis    senna oral tablet  -- 2 tab(s) by mouth once a day (at bedtime) use as needed for consitpation  -- Indication: For Constipation    pantoprazole 40 mg oral delayed release tablet  -- 1 tab(s) by mouth once a day (before a meal)  -- Indication: For Cirrhosis    Multiple Vitamins oral tablet  -- 1 tab(s) by mouth once a day  -- Indication: For Supplement I will START or STAY ON the medications listed below when I get home from the hospital:    oxyCODONE 5 mg oral tablet  -- 1 tab(s) by mouth every 4 hours, As needed, Pain  -- Indication: For Pain    Eliquis 5 mg oral tablet  -- 2 tab(s) by mouth 2 times a day   -- Check with your doctor before becoming pregnant.  It is very important that you take or use this exactly as directed.  Do not skip doses or discontinue unless directed by your doctor.  Obtain medical advice before taking any non-prescription drugs as some may affect the action of this medication.    -- Indication: For Pulmonary embolism    furosemide 40 mg oral tablet  -- 1 tab(s) by mouth once a day  -- Indication: For Cirrhosis    lactulose 10 g/15 mL oral syrup  -- 15 milliliter(s) by mouth 3 times a day titrate to two bowel movements daily  -- Indication: For Cirrhosis    senna oral tablet  -- 2 tab(s) by mouth once a day (at bedtime) use as needed for consitpation  -- Indication: For Constipation    pantoprazole 40 mg oral delayed release tablet  -- 1 tab(s) by mouth once a day (before a meal)  -- Indication: For Cirrhosis    Multiple Vitamins oral tablet  -- 1 tab(s) by mouth once a day  -- Indication: For Supplement

## 2018-04-02 NOTE — PROGRESS NOTE ADULT - PROBLEM SELECTOR PLAN 4
Patient with recurrent pleural effusions in the setting of hepatic hydrothorax despite high dose diuretics   - Chest tube placed at outside hospital on 2/18/2018 by CT surgery service after presenting with SOB and found to have large right sided effusion   - Maintain pig tail catheter to pleuravac and monitor drainage (approximately 350 cc in last 24hrs). Pulmonary team following, will continue to follow recommendations   - holding lasix and aldactone in setting of BRYNN/hyperkalemia

## 2018-04-02 NOTE — PROGRESS NOTE ADULT - SUBJECTIVE AND OBJECTIVE BOX
Interventional Radiology Follow- Up Note      This is a 62y Male with PMH of ETOH cirrhosis, DM, PE (on levenox) and recurrent pleural effusions who was transferred for TIPS. He is currently s/p TIPS on 3/29 in Interventional Radiology with Dr. Menjivar.    Patient seen and examined @ bedside. No complaints offered. Reports pain has improved since I evaluated him on friday.     PAST MEDICAL & SURGICAL HISTORY:  Pulmonary embolism  Cirrhosis  DM (diabetes mellitus)  S/P thoracentesis      Allergies: No Known Allergies    LABS:                        10.0   5.90  )-----------( 152      ( 02 Apr 2018 07:54 )             28.2     04-02    132<L>  |  99  |  54<H>  ----------------------------<  116<H>  4.7   |  22  |  1.81<H>    Ca    8.4      02 Apr 2018 06:49  Phos  3.4     04-02  Mg     2.0     04-02    TPro  4.8<L>  /  Alb  2.0<L>  /  TBili  0.6  /  DBili  x   /  AST  146<H>  /  ALT  187<H>  /  AlkPhos  337<H>  04-02    PT/INR - ( 02 Apr 2018 07:43 )   PT: 12.3 sec;   INR: 1.09 ratio         PTT - ( 02 Apr 2018 07:43 )  PTT:82.9 sec      Vitals: T(F): 97.7 (04-02-18 @ 12:46), Max: 98.7 (04-01-18 @ 14:30)  HR: 67 (04-02-18 @ 12:46) (64 - 84)  BP: 103/66 (04-02-18 @ 12:46) (94/58 - 104/64)  RR: 18 (04-02-18 @ 12:46) (17 - 18)  SpO2: 92% (04-02-18 @ 12:46) (92% - 100%)      PHYSICAL EXAM:  Gen: NAD, A&Ox3  Abd: ND, soft, NTTP      A/P: 62y Male s/p TIPS procedure on 3/29 with Dr. Menjivar.  -Abdominal pain improved.  -H/H stable  -Pt to have abdominal US, f/u  -trend vitals, labs  -Continue global care per primary team  -will discuss with IR attending     Please call IR at extension [phone] with any questions, concerns, or issues regarding above.

## 2018-04-02 NOTE — PROGRESS NOTE ADULT - ASSESSMENT
59 year old man with alcoholic cirrhosis [portal HTN - refractory ascites/hepatic hydrothorax, no variceal bleeding ?unknown previous EGD, no known encephalopathy], pulmonary embolism in 2/2018 [on eliquis], h/o c.diff, DM II, presented to OSH with worsening SOB x 3 d. 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 s/p chest tube placement at OSH.    1) Refractory ascites/hepatic hydrothorax -  -secondary to underlying cirrhosis  -s/p TIPS last week with IR  -c/w diuretics   -low sodium diet (<2g/day)  -cont chest tube drainage, will likely d/c tomorrow    Marcelo Hines DO  Pulmonary and Critical Care Fellow

## 2018-04-03 VITALS
OXYGEN SATURATION: 99 % | TEMPERATURE: 98 F | HEART RATE: 95 BPM | DIASTOLIC BLOOD PRESSURE: 58 MMHG | RESPIRATION RATE: 18 BRPM | SYSTOLIC BLOOD PRESSURE: 107 MMHG

## 2018-04-03 LAB
ALBUMIN SERPL ELPH-MCNC: 2.6 G/DL — LOW (ref 3.3–5)
ALP SERPL-CCNC: 295 U/L — HIGH (ref 40–120)
ALT FLD-CCNC: 125 U/L RC — HIGH (ref 10–45)
ANION GAP SERPL CALC-SCNC: 11 MMOL/L — SIGNIFICANT CHANGE UP (ref 5–17)
APTT BLD: 88.8 SEC — HIGH (ref 27.5–37.4)
APTT BLD: 93 SEC — HIGH (ref 27.5–37.4)
APTT BLD: >200 SEC — CRITICAL HIGH (ref 27.5–37.4)
AST SERPL-CCNC: 87 U/L — HIGH (ref 10–40)
BILIRUB SERPL-MCNC: 0.6 MG/DL — SIGNIFICANT CHANGE UP (ref 0.2–1.2)
BUN SERPL-MCNC: 45 MG/DL — HIGH (ref 7–23)
CALCIUM SERPL-MCNC: 8.7 MG/DL — SIGNIFICANT CHANGE UP (ref 8.4–10.5)
CHLORIDE SERPL-SCNC: 102 MMOL/L — SIGNIFICANT CHANGE UP (ref 96–108)
CO2 SERPL-SCNC: 21 MMOL/L — LOW (ref 22–31)
CREAT SERPL-MCNC: 1.48 MG/DL — HIGH (ref 0.5–1.3)
GLUCOSE SERPL-MCNC: 143 MG/DL — HIGH (ref 70–99)
HCT VFR BLD CALC: 26.1 % — LOW (ref 39–50)
HGB BLD-MCNC: 9.4 G/DL — LOW (ref 13–17)
INR BLD: 1.1 RATIO — SIGNIFICANT CHANGE UP (ref 0.88–1.16)
MCHC RBC-ENTMCNC: 35.6 PG — HIGH (ref 27–34)
MCHC RBC-ENTMCNC: 36 GM/DL — SIGNIFICANT CHANGE UP (ref 32–36)
MCV RBC AUTO: 98.9 FL — SIGNIFICANT CHANGE UP (ref 80–100)
PLATELET # BLD AUTO: 129 K/UL — LOW (ref 150–400)
POTASSIUM SERPL-MCNC: 4.5 MMOL/L — SIGNIFICANT CHANGE UP (ref 3.5–5.3)
POTASSIUM SERPL-SCNC: 4.5 MMOL/L — SIGNIFICANT CHANGE UP (ref 3.5–5.3)
PROT SERPL-MCNC: 5 G/DL — LOW (ref 6–8.3)
PROTHROM AB SERPL-ACNC: 12 SEC — SIGNIFICANT CHANGE UP (ref 9.8–12.7)
RBC # BLD: 2.64 M/UL — LOW (ref 4.2–5.8)
RBC # FLD: 13.3 % — SIGNIFICANT CHANGE UP (ref 10.3–14.5)
SODIUM SERPL-SCNC: 134 MMOL/L — LOW (ref 135–145)
WBC # BLD: 4.8 K/UL — SIGNIFICANT CHANGE UP (ref 3.8–10.5)
WBC # FLD AUTO: 4.8 K/UL — SIGNIFICANT CHANGE UP (ref 3.8–10.5)

## 2018-04-03 PROCEDURE — C1725: CPT

## 2018-04-03 PROCEDURE — 84100 ASSAY OF PHOSPHORUS: CPT

## 2018-04-03 PROCEDURE — C1769: CPT

## 2018-04-03 PROCEDURE — 82140 ASSAY OF AMMONIA: CPT

## 2018-04-03 PROCEDURE — 83735 ASSAY OF MAGNESIUM: CPT

## 2018-04-03 PROCEDURE — 84540 ASSAY OF URINE/UREA-N: CPT

## 2018-04-03 PROCEDURE — 81001 URINALYSIS AUTO W/SCOPE: CPT

## 2018-04-03 PROCEDURE — 99238 HOSP IP/OBS DSCHRG MGMT 30/<: CPT

## 2018-04-03 PROCEDURE — 82962 GLUCOSE BLOOD TEST: CPT

## 2018-04-03 PROCEDURE — 84300 ASSAY OF URINE SODIUM: CPT

## 2018-04-03 PROCEDURE — 99232 SBSQ HOSP IP/OBS MODERATE 35: CPT | Mod: GC

## 2018-04-03 PROCEDURE — 83550 IRON BINDING TEST: CPT

## 2018-04-03 PROCEDURE — 74177 CT ABD & PELVIS W/CONTRAST: CPT

## 2018-04-03 PROCEDURE — 85610 PROTHROMBIN TIME: CPT

## 2018-04-03 PROCEDURE — 99233 SBSQ HOSP IP/OBS HIGH 50: CPT | Mod: GC

## 2018-04-03 PROCEDURE — 80076 HEPATIC FUNCTION PANEL: CPT

## 2018-04-03 PROCEDURE — 80048 BASIC METABOLIC PNL TOTAL CA: CPT

## 2018-04-03 PROCEDURE — 76937 US GUIDE VASCULAR ACCESS: CPT

## 2018-04-03 PROCEDURE — C1874: CPT

## 2018-04-03 PROCEDURE — 80053 COMPREHEN METABOLIC PANEL: CPT

## 2018-04-03 PROCEDURE — P9047: CPT

## 2018-04-03 PROCEDURE — 82103 ALPHA-1-ANTITRYPSIN TOTAL: CPT

## 2018-04-03 PROCEDURE — C1894: CPT

## 2018-04-03 PROCEDURE — 71045 X-RAY EXAM CHEST 1 VIEW: CPT

## 2018-04-03 PROCEDURE — 93306 TTE W/DOPPLER COMPLETE: CPT

## 2018-04-03 PROCEDURE — 87040 BLOOD CULTURE FOR BACTERIA: CPT

## 2018-04-03 PROCEDURE — 94640 AIRWAY INHALATION TREATMENT: CPT

## 2018-04-03 PROCEDURE — 82728 ASSAY OF FERRITIN: CPT

## 2018-04-03 PROCEDURE — 86901 BLOOD TYPING SEROLOGIC RH(D): CPT

## 2018-04-03 PROCEDURE — 82570 ASSAY OF URINE CREATININE: CPT

## 2018-04-03 PROCEDURE — 82746 ASSAY OF FOLIC ACID SERUM: CPT

## 2018-04-03 PROCEDURE — 86850 RBC ANTIBODY SCREEN: CPT

## 2018-04-03 PROCEDURE — 93975 VASCULAR STUDY: CPT

## 2018-04-03 PROCEDURE — 76700 US EXAM ABDOM COMPLETE: CPT

## 2018-04-03 PROCEDURE — 37182 INSERT HEPATIC SHUNT (TIPS): CPT

## 2018-04-03 PROCEDURE — 97161 PT EVAL LOW COMPLEX 20 MIN: CPT

## 2018-04-03 PROCEDURE — 82436 ASSAY OF URINE CHLORIDE: CPT

## 2018-04-03 PROCEDURE — 84133 ASSAY OF URINE POTASSIUM: CPT

## 2018-04-03 PROCEDURE — 86900 BLOOD TYPING SEROLOGIC ABO: CPT

## 2018-04-03 PROCEDURE — 82607 VITAMIN B-12: CPT

## 2018-04-03 PROCEDURE — 71260 CT THORAX DX C+: CPT

## 2018-04-03 PROCEDURE — C1887: CPT

## 2018-04-03 PROCEDURE — 85027 COMPLETE CBC AUTOMATED: CPT

## 2018-04-03 PROCEDURE — 93005 ELECTROCARDIOGRAM TRACING: CPT

## 2018-04-03 PROCEDURE — 85730 THROMBOPLASTIN TIME PARTIAL: CPT

## 2018-04-03 RX ORDER — APIXABAN 2.5 MG/1
10 TABLET, FILM COATED ORAL EVERY 12 HOURS
Qty: 0 | Refills: 0 | Status: DISCONTINUED | OUTPATIENT
Start: 2018-04-03 | End: 2018-04-03

## 2018-04-03 RX ORDER — SENNA PLUS 8.6 MG/1
2 TABLET ORAL
Qty: 60 | Refills: 2 | OUTPATIENT
Start: 2018-04-03 | End: 2018-07-01

## 2018-04-03 RX ORDER — METFORMIN HYDROCHLORIDE 850 MG/1
1 TABLET ORAL
Qty: 0 | Refills: 0 | COMMUNITY

## 2018-04-03 RX ORDER — LACTULOSE 10 G/15ML
15 SOLUTION ORAL
Qty: 1 | Refills: 2 | OUTPATIENT
Start: 2018-04-03 | End: 2018-07-01

## 2018-04-03 RX ORDER — APIXABAN 2.5 MG/1
2 TABLET, FILM COATED ORAL
Qty: 28 | Refills: 0 | OUTPATIENT
Start: 2018-04-03 | End: 2018-04-09

## 2018-04-03 RX ADMIN — ONDANSETRON 4 MILLIGRAM(S): 8 TABLET, FILM COATED ORAL at 04:35

## 2018-04-03 RX ADMIN — LACTULOSE 10 GRAM(S): 10 SOLUTION ORAL at 05:35

## 2018-04-03 RX ADMIN — Medication 50 MILLILITER(S): at 06:21

## 2018-04-03 RX ADMIN — HEPARIN SODIUM 1100 UNIT(S)/HR: 5000 INJECTION INTRAVENOUS; SUBCUTANEOUS at 04:36

## 2018-04-03 RX ADMIN — APIXABAN 10 MILLIGRAM(S): 2.5 TABLET, FILM COATED ORAL at 13:11

## 2018-04-03 NOTE — PROGRESS NOTE ADULT - ASSESSMENT
59 year old man with alcoholic cirrhosis [portal HTN - refractory ascites/hepatic hydrothorax, no variceal bleeding ?unknown previous EGD, no known encephalopathy], pulmonary embolism in 2/2018 [on eliquis], h/o c.diff, DM II, presented to OSH with worsening SOB x 3 d. 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 s/p chest tube placement at OSH.    1) Refractory ascites/hepatic hydrothorax -  -secondary to underlying cirrhosis  -s/p TIPS last week with IR  -c/w diuretics   -low sodium diet (<2g/day)  -cont chest tube drainage, will likely d/c today    Marcelo Hines DO  Pulmonary and Critical Care Fellow

## 2018-04-03 NOTE — PROGRESS NOTE ADULT - PROBLEM SELECTOR PLAN 4
Patient w/ known PE on CTA 2/8/18. Given hepatic impairment, patient was switched from eliquis to lovenox then to heparin gtts in setting of BRYNN which continues to improve; monitor PTT Chronic. Patient w/ known PE on CTA 2/8/18. Given hepatic impairment, patient was switched from eliquis to lovenox then to heparin gtts in setting of BRYNN which continues to improve; monitor PTT

## 2018-04-03 NOTE — PROGRESS NOTE ADULT - PROBLEM SELECTOR PLAN 3
s/p TIPS 3/29  - Patient had been complaining of epigastric pain, Vitals and Hgb stable, no longer complaining of abdominal pain, abd US demonstrates TIPS   - lactulose 10 g tid; titrate to 2-3 BM's/day  - holding spironolactone and lasix in setting of BRYNN and hyperkalemia in the past  - Appreciate hepatology recs, will need outpatient follow up with hepatology for titration of diuretics (will need to be titrated down now that he is s/p TIPS), EGD (varices screening)

## 2018-04-03 NOTE — PROGRESS NOTE ADULT - NSHPATTENDINGPLANDISCUSS_GEN_ALL_CORE
Dr Menjivar (IR)
Dr Menjivar (IR)
team
team
patient, consult team
patient, team
team
hospitalist
med team
med team
Dr Hernandez

## 2018-04-03 NOTE — PROGRESS NOTE ADULT - PROBLEM SELECTOR PLAN 5
Resolved   - Potassium elevated to 5.6 on 4/01, EKG without stigmata of hyperkalemia, no peaked T waves, no NM or QRS prolongation, patient given Kayexalate, Insulin/d50, albuterol and calcium gluconate   - holding diuretics in setting of BRYNN

## 2018-04-03 NOTE — PROGRESS NOTE ADULT - SUBJECTIVE AND OBJECTIVE BOX
Interval Events:  No acute events o/n    REVIEW OF SYSTEMS:  Constitutional:   Eyes:  ENT:  CV:  Resp:  GI:  :  MSK:  Integumentary:  Neurological:  Psychiatric:  Endocrine:  Hematologic/Lymphatic:  Allergic/Immunologic:  [x] All other systems negative  [ ] Unable to assess ROS because ________    OBJECTIVE:  ICU Vital Signs Last 24 Hrs  T(C): 36.9 (2018 05:35), Max: 37.1 (2018 15:45)  T(F): 98.4 (2018 05:35), Max: 98.8 (2018 15:45)  HR: 81 (2018 05:35) (67 - 86)  BP: 108/68 (2018 06:17) (91/51 - 112/69)  BP(mean): --  ABP: --  ABP(mean): --  RR: 17 (2018 05:35) (17 - 18)  SpO2: 100% (2018 05:35) (92% - 100%)        04-02 @ 07:01  -  04-03 @ 07:00  --------------------------------------------------------  IN: 1319.1 mL / OUT: 710 mL / NET: 609.1 mL      CAPILLARY BLOOD GLUCOSE      POCT Blood Glucose.: 166 mg/dL (2018 21:39)      PHYSICAL EXAM:  General: AAOx3  HEENT: EOMI, PERRL  Lymph Nodes: No LAD  Neck: Supple  Respiratory: right sided chest pain  Cardiovascular: RRR   Abdomen: soft, NT/ND  Extremities: no c/c/e    HOSPITAL MEDICATIONS:  MEDICATIONS  (STANDING):  albumin human 25% IVPB 100 milliLiter(s) IV Intermittent every 8 hours  heparin  Infusion.  Unit(s)/Hr (12 mL/Hr) IV Continuous <Continuous>  insulin lispro (HumaLOG) corrective regimen sliding scale   SubCutaneous Before meals and at bedtime  lactulose Syrup 10 Gram(s) Oral three times a day  pantoprazole  Injectable 40 milliGRAM(s) IV Push daily  senna 2 Tablet(s) Oral at bedtime    MEDICATIONS  (PRN):  acetaminophen   Tablet. 650 milliGRAM(s) Oral every 6 hours PRN mild and moderate pain  heparin  Injectable 5500 Unit(s) IV Push every 6 hours PRN For aPTT less than 40  heparin  Injectable 2500 Unit(s) IV Push every 6 hours PRN For aPTT between 40 - 57  ondansetron Injectable 4 milliGRAM(s) IV Push every 8 hours PRN Nausea and/or Vomiting      LABS:                        9.4    4.8   )-----------( 129      ( 2018 04:09 )             26.1     04-03    134<L>  |  102  |  45<H>  ----------------------------<  143<H>  4.5   |  21<L>  |  1.48<H>    Ca    8.7      2018 04:09  Phos  3.4     04-02  Mg     2.0     04-02    TPro  5.0<L>  /  Alb  2.6<L>  /  TBili  0.6  /  DBili  x   /  AST  87<H>  /  ALT  125<H>  /  AlkPhos  295<H>  04-03    PT/INR - ( 2018 04:09 )   PT: 12.0 sec;   INR: 1.10 ratio         PTT - ( 2018 04:09 )  PTT:88.8 sec  Urinalysis Basic - ( 2018 21:46 )    Color: Gwen / Appearance: Clear / S.020 / pH: x  Gluc: x / Ketone: Negative  / Bili: Negative / Urobili: Negative mg/dL   Blood: x / Protein: 300 mg/dL / Nitrite: Negative   Leuk Esterase: Negative / RBC: 4 /HPF / WBC 4 /HPF   Sq Epi: x / Non Sq Epi: 1 /HPF / Bacteria: Negative            RADIOLOGY:  [x] Reviewed and interpreted by me

## 2018-04-03 NOTE — PROGRESS NOTE ADULT - SUBJECTIVE AND OBJECTIVE BOX
[facility] DIVISION OF KIDNEY DISEASES AND HYPERTENSION -- FOLLOW UP NOTE  --------------------------------------------------------------------------------  HPI: This is a 62 year old male with a PMHx of alcoholic liver cirrhosis s/p TIPS procedure on 3/30, DM2 , right hydrothorax s/p chest tube transferred from OSH. Pt. had CT with IV contrast on 3/27. Pt. with no known history of kidney disease in the past. Labs on admission (4/1) show pt. with elevated Scr of 1.9 which decreased to 1.48 today. Currently pt. states SOB has improved and denies CP, N/V.        PAST HISTORY  --------------------------------------------------------------------------------  No significant changes to PMH, PSH, FHx, SHx, unless otherwise noted    ALLERGIES & MEDICATIONS  --------------------------------------------------------------------------------  Allergies    No Known Allergies    Intolerances      Standing Inpatient Medications  albumin human 25% IVPB 100 milliLiter(s) IV Intermittent every 8 hours  heparin  Infusion.  Unit(s)/Hr IV Continuous <Continuous>  insulin lispro (HumaLOG) corrective regimen sliding scale   SubCutaneous Before meals and at bedtime  lactulose Syrup 10 Gram(s) Oral three times a day  pantoprazole  Injectable 40 milliGRAM(s) IV Push daily  senna 2 Tablet(s) Oral at bedtime    PRN Inpatient Medications  acetaminophen   Tablet. 650 milliGRAM(s) Oral every 6 hours PRN  heparin  Injectable 5500 Unit(s) IV Push every 6 hours PRN  heparin  Injectable 2500 Unit(s) IV Push every 6 hours PRN  ondansetron Injectable 4 milliGRAM(s) IV Push every 8 hours PRN      REVIEW OF SYSTEMS  --------------------------------------------------------------------------------  Gen: No  fevers/chills  Head/Eyes/Ears/Mouth: No headache;   Respiratory: No dyspnea  CV: No chest pain  GI: no abdominal pain; +distention   : No dysuria  MSK: + edema  Neuro: No dizziness/lightheadedness, weakness, seizures, numbness, tingling  Heme: No easy bruising or bleeding        All other systems were reviewed and are negative, except as noted.      VITALS/PHYSICAL EXAM  --------------------------------------------------------------------------------  T(C): 36.9 (04-03-18 @ 05:35), Max: 37.1 (04-02-18 @ 15:45)  HR: 81 (04-03-18 @ 05:35) (67 - 86)  BP: 108/68 (04-03-18 @ 06:17) (91/51 - 112/69)  RR: 17 (04-03-18 @ 05:35) (17 - 18)  SpO2: 100% (04-03-18 @ 05:35) (92% - 100%)  Wt(kg): --        04-01-18 @ 07:01  -  04-02-18 @ 07:00  --------------------------------------------------------  IN: 651 mL / OUT: 350 mL / NET: 301 mL    04-02-18 @ 07:01  -  04-03-18 @ 06:55  --------------------------------------------------------  IN: 1319.1 mL / OUT: 460 mL / NET: 859.1 mL      Physical Exam:  	Gen: NAD  	HEENT: MMM  	Pulm: CTA b/l   	CV: RRR  	Abd: NT +distended   	: No suprapubic tenderness  	LE: Warm, b/l LE edema              Neuro: No focal deficits  	Skin: Warm, without rashes      LABS/STUDIES  --------------------------------------------------------------------------------              9.4    4.8   >-----------<  129      [04-03-18 @ 04:09]              26.1     134  |  102  |  45  ----------------------------<  143      [04-03-18 @ 04:09]  4.5   |  21  |  1.48        Ca     8.7     [04-03-18 @ 04:09]      Mg     2.0     [04-02-18 @ 06:49]      Phos  3.4     [04-02-18 @ 06:49]    TPro  5.0  /  Alb  2.6  /  TBili  0.6  /  DBili  x   /  AST  87  /  ALT  125  /  AlkPhos  295  [04-03-18 @ 04:09]    PT/INR: PT 12.0 , INR 1.10       [04-03-18 @ 04:09]  PTT: 88.8       [04-03-18 @ 04:09]      Creatinine Trend:  SCr 1.48 [04-03 @ 04:09]  SCr 1.81 [04-02 @ 06:49]  SCr 1.90 [04-01 @ 23:36]  SCr 1.96 [04-01 @ 08:09]  SCr 1.12 [03-31 @ 06:06]    Urinalysis - [04-02-18 @ 21:46]      Color Gwen / Appearance Clear / SG 1.020 / pH 5.0      Gluc Negative / Ketone Negative  / Bili Negative / Urobili Negative       Blood Negative / Protein 300 / Leuk Est Negative / Nitrite Negative      RBC 4 / WBC 4 / Hyaline 8 / Gran  / Sq Epi  / Non Sq Epi 1 / Bacteria Negative

## 2018-04-03 NOTE — PROGRESS NOTE ADULT - PROBLEM SELECTOR PLAN 1
- BRYNN continues to improve with albumin q 8 hours; will continue for goal serum albumin of 3  - Nephrology following, BRYNN likely multifactorial in the setting of diuretic use and hypotension, IV contrast  - Nephrology recommending continuing to hold diuretics and continuing with IVF albumin  - Patient urinating without difficulties   - will f/u UA, urine electrolytes and renal sonogram   - continue to monitor BMP, urine output, I/Os.   - continue to avoid nephrotoxins - BRYNN continues to improve with albumin q 8 hours; will continue for goal serum albumin of 3  - Nephrology following, BRYNN likely multifactorial in the setting of diuretic use and hypotension, IV contrast resulting in ATN  - Nephrology recommending continuing to hold diuretics and continuing with IVF albumin  - Patient urinating without difficulties   - will f/u UA, urine electrolytes and renal sonogram   - continue to monitor BMP, urine output, I/Os.   - continue to avoid nephrotoxins

## 2018-04-03 NOTE — PROGRESS NOTE ADULT - PROBLEM SELECTOR PLAN 1
Pt. with BRYNN likely multifactorial in the setting of fluid overload, diuretics, low BP and IV contrast. Scr decreased to 1.48 today. Advise to continue to hold diuretics. Pt. currently on IVFs albumin.  Check spot TP/CR. Monitor BMP, urine output, I/Os. Avoid nephrotoxins, RCA and NSAIDs

## 2018-04-03 NOTE — PROGRESS NOTE ADULT - PROBLEM SELECTOR PROBLEM 3
Other pulmonary embolism without acute cor pulmonale, unspecified chronicity
Alcoholic cirrhosis of liver without ascites
Hyponatremia
Other pulmonary embolism without acute cor pulmonale, unspecified chronicity
Hyponatremia

## 2018-04-03 NOTE — PROGRESS NOTE ADULT - PROBLEM SELECTOR PLAN 8
A1c is 5.6% 2/2018. On metformin at home, should avoid restarting Metformin in setting of cirrhosis.   - ELVI qAC tid and qhs  - monitor FSG  - consistent carb, DASH/TLC diet
- A1c is 5.6% 2/2018; would not discharge on oral hypoglycemics.   - ELVI qAC tid and qhs  - monitor FSG  - consistent carb, DASH/TLC diet
A1c is 5.6% 2/2018. On metformin at home, should avoid restarting Metformin in setting of cirrhosis.   - ELVI qAC tid and qhs  - monitor FSG  - consistent carb, DASH/TLC diet

## 2018-04-03 NOTE — PROGRESS NOTE ADULT - PROBLEM SELECTOR PROBLEM 1
BRYNN (acute kidney injury)
Hyperkalemia
Pleural effusion associated with hepatic disorder
BRYNN (acute kidney injury)
Pleural effusion associated with hepatic disorder

## 2018-04-03 NOTE — PROGRESS NOTE ADULT - PROBLEM SELECTOR PLAN 2
Patient with recurrent pleural effusions in the setting of hepatic hydrothorax despite high dose diuretics   - Chest tube placed at outside hospital on 2/18/2018 by CT surgery service after presenting with SOB and found to have large right sided effusion   - Maintain pigtail catheter to pleuravac and monitor drainage  - Pulm documents likely to discontinue pigtail today   - holding lasix and aldactone in setting of BRYNN/hyperkalemia

## 2018-04-03 NOTE — PROGRESS NOTE ADULT - PROBLEM SELECTOR PLAN 7
- Initial 2d echo shows large loculated pericardial effusion adjacent to the right atrium causing compression of the right atrium and right ventricle  - Repeat 2d echo from 3/27 shows echo-free space adjacent to the right heart appears smaller and the RA is no longer compressed. There is evidence of  compression of the RV apex during early diastole on apical images.  - CT with no acute findings aside from trace pericardial effusion
DVT ppx: on therapeutic lovenox     Jennifer Hernandez M.D.   PGY-1 | Internal Medicine   [phone] | [phone]  After 7 pm covering pager [phone]

## 2018-04-03 NOTE — PROGRESS NOTE ADULT - PROBLEM SELECTOR PROBLEM 2
Alcoholic cirrhosis of liver without ascites
BRYNN (acute kidney injury)
Pleural effusion associated with hepatic disorder
Alcoholic cirrhosis of liver without ascites
Alcoholic cirrhosis of liver without ascites
Hyperkalemia

## 2018-04-03 NOTE — PROGRESS NOTE ADULT - ASSESSMENT
62M with alcoholic cirrhosis c/b recurrent hepatic hydrothorax, PE on Eliquis outpatient, T2DM who presented to [facility] on 3/18/18 with worsening dyspnea and orthopnea x3 days, found to have a large right pleural effusion s/p chest tube placement at OSH, and TIPS here on 3/29 with no complications, transferred to MICU for monitoring, found to be stable and transferredback to medicine floors with pigtail catheter in place; course complicated by BRYNN in setting of diuretic use resolving with albumin

## 2018-04-03 NOTE — PROGRESS NOTE ADULT - SUBJECTIVE AND OBJECTIVE BOX
Francy Taylor MD   PGY 2  Pager [phone]  Page [phone] or [phone] after 7 pm       Patient is a 62y old  Male who presents with a chief complaint of Transfer from [facility] for TIPS, Recurrent Pleural effusion (2018 17:16)      INTERVAL HPI/OVERNIGHT EVENTS: Overnight patient received oxycodone for pain at chest tube site which has since resolved. Denies fever, chills, CP, SOB, abd pain. Patient feels well this AM.    REVIEW OF SYSTEMS:  CONSTITUTIONAL: No fever, chills  ENMT:  No difficulty hearing, no change in vision  NECK: No pain or stiffness  RESPIRATORY: No cough, SOB  CARDIOVASCULAR: No chest pain, palpitations  GASTROINTESTINAL: No abdominal pain. No nausea, vomiting, or diarrhea  GENITOURINARY: No dysuria  NEUROLOGICAL: No HA  SKIN: No itching, burning, rashes, or lesions   LYMPH NODES: No enlarged glands  ENDOCRINE: No heat or cold intolerance; No hair loss  MUSCULOSKELETAL: No joint pain or swelling; No muscle, back, or extremity pain  PSYCHIATRIC: No depression, anxiety  HEME/LYMPH: No easy bruising, or bleeding gums    T(C): 36.9 (18 @ 05:35), Max: 37.1 (18 @ 15:45)  HR: 81 (18 @ 05:35) (67 - 86)  BP: 108/68 (18 @ 06:17) (91/51 - 112/69)  RR: 17 (18 @ 05:35) (17 - 18)  SpO2: 100% (18 @ 05:35) (92% - 100%)  Wt(kg): --Vital Signs Last 24 Hrs  T(C): 36.9 (2018 05:35), Max: 37.1 (2018 15:45)  T(F): 98.4 (2018 05:35), Max: 98.8 (2018 15:45)  HR: 81 (2018 05:35) (67 - 86)  BP: 108/68 (2018 06:17) (91/51 - 112/69)  BP(mean): --  RR: 17 (2018 05:35) (17 - 18)  SpO2: 100% (2018 05:35) (92% - 100%)    PHYSICAL EXAM:  GENERAL: NAD, lying comfortably in bed   EYES: clear conjunctiva  NECK: Supple  CHEST/LUNG: Clear to auscultation bilaterally; R thorax pigtail catheter connected to water seal   draining clear straw drainage  HEART: Regular rate and rhythm; No murmurs, rubs, or gallops  ABDOMEN: Soft, no TTP, no guarding or rebound tenderness, Nondistended; Bowel sounds present  EXTREMITIES:  2+ Peripheral Pulses, 2+ non-pitting edema in bilateral LE   NEURO: AAOx3, mentating well, non-focal  SKIN: No rashes or lesions    Consultant(s) Notes Reviewed:  [x ] YES  [ ] NO  Care Discussed with Consultants/Other Providers [ x] YES  [ ] NO    LABS:                        9.4    4.8   )-----------( 129      ( 2018 04:09 )             26.1     04-03    134<L>  |  102  |  45<H>  ----------------------------<  143<H>  4.5   |  21<L>  |  1.48<H>    Ca    8.7      2018 04:09  Phos  3.4     04-02  Mg     2.0     04-02    TPro  5.0<L>  /  Alb  2.6<L>  /  TBili  0.6  /  DBili  x   /  AST  87<H>  /  ALT  125<H>  /  AlkPhos  295<H>  04-03    PT/INR - ( 2018 04:09 )   PT: 12.0 sec;   INR: 1.10 ratio         PTT - ( 2018 04:09 )  PTT:88.8 sec  Urinalysis Basic - ( 2018 21:46 )    Color: Gwen / Appearance: Clear / S.020 / pH: x  Gluc: x / Ketone: Negative  / Bili: Negative / Urobili: Negative mg/dL   Blood: x / Protein: 300 mg/dL / Nitrite: Negative   Leuk Esterase: Negative / RBC: 4 /HPF / WBC 4 /HPF   Sq Epi: x / Non Sq Epi: 1 /HPF / Bacteria: Negative      CAPILLARY BLOOD GLUCOSE      POCT Blood Glucose.: 166 mg/dL (2018 21:39)  POCT Blood Glucose.: 109 mg/dL (2018 17:39)  POCT Blood Glucose.: 193 mg/dL (2018 12:27)  POCT Blood Glucose.: 107 mg/dL (2018 08:33)        Urinalysis Basic - ( 2018 21:46 )    Color: Gwen / Appearance: Clear / S.020 / pH: x  Gluc: x / Ketone: Negative  / Bili: Negative / Urobili: Negative mg/dL   Blood: x / Protein: 300 mg/dL / Nitrite: Negative   Leuk Esterase: Negative / RBC: 4 /HPF / WBC 4 /HPF   Sq Epi: x / Non Sq Epi: 1 /HPF / Bacteria: Negative        RADIOLOGY & ADDITIONAL TESTS:    Imaging Personally Reviewed:  [ ] YES  [ ] NO

## 2018-04-03 NOTE — PROGRESS NOTE ADULT - PROBLEM SELECTOR PLAN 9
DVT ppx: currently on heparin infusion      Jennifer Hernandez M.D.   PGY-1 | Internal Medicine   [phone] | [phone]  After 7 pm covering pager [phone]
DVT ppx: currently on heparin infusion
DVT ppx: currently on therapeutic Lovenox, will switch to heparin infusion tomas Hernandez M.D.   PGY-1 | Internal Medicine   [phone] | [phone]  After 7 pm covering pager [phone]

## 2018-04-03 NOTE — PROGRESS NOTE ADULT - ATTENDING COMMENTS
Pt is a 61 yo HM with h/o DM, PE on AC, alcoholic cirrhosis, ascites and recurrent hepatic hydrothorax who initially presented to OSH on 3/18 2 to worsening dyspnea and orthopnea.  Pt was found to have a large R pleural effusion; eventually Rx'd with R CT placement. Pt transferred to [facility] and s/p TIPS 3/29-> pt transferred to ICU post procedure. Admitting dx: Refractory ascites/hepatic hydrothorax, s/p TIPS    Resp: Follow CT drainage  Heme: Cont AC with Lovenox  FEN: Clear liquid diet (advance po diet as per GI)  Endo: Follow FS and cover with Lispro  GI: As per GI f/u  - continue current diuretic regimen - Lasix 40mg BID, Aldactone 200mg daily   - continue lactulose; titrate to 2-3 BM/day  - daily CMP and INR  - low sodium diet (<2g/day)   - outpatient followup with hepatology for titration of diuretics (will need to be titrated down now that he is s/p TIPS), EGD (varices screening) - patient prefers MDs near his home in [place]   Social: May transfer back to [facility]
Right hepatic hydrothorax refractory to serial drainage, now with chest tube placed at [facility] on 3/18 with drainage of >10 liters since placement. Now chest tube clamped to avoid volume depletion and electrolyte abnormalities. Still with free flowing small to moderate right pleural effusion on ultrasound. Continue diuresis with furosemide and spironolactone. Has h/o PE with decreased RV function in 2/2018, but RV appears to have normal LV systolic function currently. Repeat TTE. There is no evidence of portopulmonary hypertension. Agree with TIPS for refractory hepatic hydrothorax. Discussed with patient and team.
Mr. Gilbert drained approximately 1 Liter overnight.  He is considering TIPS.  Agree with current management.
Mr. Gilbert has agreed to TIPS and is awaiting scheduling of procedure.  Would continue chest tube drainage until after procedure is completed.  He drained an additional liter overnight.  STill with lower extremity edema.
Patient awaiting TIPS for control of hydrothorax.  will have wedged hepatic venous pressure measured before TIPS placement.  Monitor fluid balance and renal/liver function post TIPS.  Patient to require long term anticoagulation for PE.  Currently with little ascites but has peripheral edema R>L.
Patient now s/p TIPS and will assess ability to control hydrothorax with reduced portal pressure.  Continue diuretics as pleural fluid is assessed.  Diet low in sodium.
Patient with continued drainage, no dyspnea, will remove chest tube.
Patient with continuing significant hydrothorax in spite of high dose diuretics.  No encephalpathy.  Patient now desires a TIPS.  agree with proceeding with TIPS procedure to control hydrothorax.  Advise measurement of wedged hepatic vein pressure just prior to TIPS placement.
Patient with hepatic hydrothorax and continued drainage from chest tube.
Patient with large pleural effusion drained with tube.  Little ascites now on diuretics.  Will plan for wedged hepatic venous pressure measurement and placement of TIPS if elevated.  Patient aware and accepting of plan.
Mr. Gilbert has a history of alcoholic cirrhosis with persistent right pleural effusion since november of 2017.  He has had the fluid tapped several times and most recently had chest tube placed.  Fluid is consistent with a transudate.  He is draining close to 1 liter per day.  He has peripheral edema.  Agree with diuretics.  If underlying process is not controlled- portal hypertension secondary to cirrhosis- the pleural fluid will continue to accumulate.  Mangement of hepatic hydrothorax includes optimization of diuretic regimen, sodium restriction and drainage.  Typically we perform thoracentesis- however this patient now has a chest tube.  I would leave the chest tube on drainage to gravity ( no suction).  There is a theoretical concern for re-expansion pulmonary edema if he has massive output from the chest tube.  Pleuradesis typically does not work.  Thoracoscopic repair of diaphragmatic rents could prevent reaccumulation however fluid will likely accumulate in the abdomen.  TIPS would be definitive treatment.  discussed with Mr. Gilbert who understands.  Will follow.
No new complaints.  1.  ARF--slow improvement with non oliguric, no HD requirement.    2.  Volume overload.  S/P TIPS.  Trend for potential diuretic requirement
Patient with alcoholic cirrhosis , now not drinking.  Has chest tube in place and currently has had increase in diuretic doses with less edema.  Patient concerned about risk of TIPS and has been told his hydrothorax may be multifactorial due in part to lung disease possibly related to pulmonary embolism.  Suggest patient will need hepatic venous wedge pressure measurement to assess portal hypertension to determine extent to which TIPS is needed.  Agree with maximum diuretic use to attempt to control fluid overload which may allow removal of chest tube.  Chronic use of chest tube to drain pleural fluid runs high risk of infection.    Patient will need EGD in future to r/o presence of esophageal varices.
Renal failure, S/P TIPS  1.  Renal failure--volume optimize off diuretics  2.  Volume overload--modest peripheral edema, minimal ascites.  No aggressive diuresis at this time.
Pt is a 63 yo HM with h/o DM, PE on AC, alcoholic cirrhosis, ascites and recurrent hepatic hydrothorax who initially presented to OSH on 3/18 2 to worsening dyspnea and orthopnea.  Pt was found to have a large R pleural effusion; eventually Rx'd with R CT placement. Pt transferred to [facility] and s/p TIPS 3/29-> pt transferred to ICU post procedure. Admitting dx: Refractory ascites/hepatic hydrothorax, s/p TIPS    Resp: Follow CT drainage  Heme: Cont AC with Lovenox  FEN: Advance po diet as tolerated  Endo: Follow FS and cover with Lispro  GI: Abd U/S  F/u as per GI   - continue current diuretic regimen - Lasix 40mg BID, Aldactone 200mg daily   - continue lactulose; titrate to 2-3 BM/day  - daily CMP and INR  - low sodium diet (<2g/day)   - outpatient followup with hepatology for titration of diuretics (will need to be titrated down now that he is s/p TIPS), EGD (varices screening) - patient prefers MDs near his home in [place]   Social: May transfer to [facility] today
as above.  Pt with recurrent hydrothorax in current discussion for a TIPS procedure. Patient is concerned about potential complications of metabolic encephalopathy as a result of the TIPS. For now c/w Diuresis and 1 liter drainage of hydrothorax daily. Pt needs Repeat TTE after hydrothorax is drained to better assess Right Ventricle.
TIPS today with IR  transfer to MICU for post TIPS monitoring
Feels well, wants to leave today, understands risks of leaving including risk of death, and that in doing so it would be against medical advice.   Chest tube pulled by pulm today.  Will restart Eliquis on discharge  Pt to be discharged today AMA
Beeper: [phone]

## 2018-04-03 NOTE — PROGRESS NOTE ADULT - PROVIDER SPECIALTY LIST ADULT
Gastroenterology
Internal Medicine
Intervent Radiology
MICU
MICU
Nephrology
Nephrology
Pulmonology
Internal Medicine

## 2018-04-04 LAB
CULTURE RESULTS: SIGNIFICANT CHANGE UP
CULTURE RESULTS: SIGNIFICANT CHANGE UP
SPECIMEN SOURCE: SIGNIFICANT CHANGE UP
SPECIMEN SOURCE: SIGNIFICANT CHANGE UP

## 2018-04-18 ENCOUNTER — INPATIENT (INPATIENT)
Facility: HOSPITAL | Age: 63
LOS: 6 days | Discharge: HOME | DRG: 433 | End: 2018-04-25
Attending: INTERNAL MEDICINE | Admitting: INTERNAL MEDICINE
Payer: SELF-PAY

## 2018-04-18 VITALS
TEMPERATURE: 98 F | WEIGHT: 167.99 LBS | SYSTOLIC BLOOD PRESSURE: 127 MMHG | HEART RATE: 75 BPM | DIASTOLIC BLOOD PRESSURE: 84 MMHG | OXYGEN SATURATION: 98 % | HEIGHT: 66 IN | RESPIRATION RATE: 22 BRPM

## 2018-04-18 DIAGNOSIS — K70.31 ALCOHOLIC CIRRHOSIS OF LIVER WITH ASCITES: ICD-10-CM

## 2018-04-18 DIAGNOSIS — Z98.890 OTHER SPECIFIED POSTPROCEDURAL STATES: Chronic | ICD-10-CM

## 2018-04-18 DIAGNOSIS — J90 PLEURAL EFFUSION, NOT ELSEWHERE CLASSIFIED: ICD-10-CM

## 2018-04-18 DIAGNOSIS — E11.49 TYPE 2 DIABETES MELLITUS WITH OTHER DIABETIC NEUROLOGICAL COMPLICATION: ICD-10-CM

## 2018-04-18 DIAGNOSIS — I27.82 CHRONIC PULMONARY EMBOLISM: ICD-10-CM

## 2018-04-18 LAB
ALBUMIN SERPL ELPH-MCNC: 1.9 G/DL — LOW (ref 3.3–5.2)
ALP SERPL-CCNC: 533 U/L — HIGH (ref 40–120)
ALT FLD-CCNC: 73 U/L — HIGH
ANION GAP SERPL CALC-SCNC: 8 MMOL/L — SIGNIFICANT CHANGE UP (ref 5–17)
APTT BLD: 36 SEC — SIGNIFICANT CHANGE UP (ref 27.5–37.4)
AST SERPL-CCNC: 84 U/L — HIGH
BASOPHILS # BLD AUTO: 0.1 K/UL — SIGNIFICANT CHANGE UP (ref 0–0.2)
BASOPHILS NFR BLD AUTO: 0.7 % — SIGNIFICANT CHANGE UP (ref 0–2)
BILIRUB SERPL-MCNC: 0.7 MG/DL — SIGNIFICANT CHANGE UP (ref 0.4–2)
BLD GP AB SCN SERPL QL: SIGNIFICANT CHANGE UP
BUN SERPL-MCNC: 43 MG/DL — HIGH (ref 8–20)
CALCIUM SERPL-MCNC: 8.2 MG/DL — LOW (ref 8.6–10.2)
CHLORIDE SERPL-SCNC: 105 MMOL/L — SIGNIFICANT CHANGE UP (ref 98–107)
CO2 SERPL-SCNC: 22 MMOL/L — SIGNIFICANT CHANGE UP (ref 22–29)
CREAT SERPL-MCNC: 0.77 MG/DL — SIGNIFICANT CHANGE UP (ref 0.5–1.3)
EOSINOPHIL # BLD AUTO: 0.4 K/UL — SIGNIFICANT CHANGE UP (ref 0–0.5)
EOSINOPHIL NFR BLD AUTO: 3.3 % — SIGNIFICANT CHANGE UP (ref 0–5)
GLUCOSE SERPL-MCNC: 180 MG/DL — HIGH (ref 70–115)
HCT VFR BLD CALC: 36.7 % — LOW (ref 42–52)
HGB BLD-MCNC: 12.8 G/DL — LOW (ref 14–18)
INR BLD: 1.36 RATIO — HIGH (ref 0.88–1.16)
LIDOCAIN IGE QN: 93 U/L — HIGH (ref 22–51)
LYMPHOCYTES # BLD AUTO: 2.7 K/UL — SIGNIFICANT CHANGE UP (ref 1–4.8)
LYMPHOCYTES # BLD AUTO: 24.4 % — SIGNIFICANT CHANGE UP (ref 20–55)
MAGNESIUM SERPL-MCNC: 2.1 MG/DL — SIGNIFICANT CHANGE UP (ref 1.6–2.6)
MCHC RBC-ENTMCNC: 32.6 PG — HIGH (ref 27–31)
MCHC RBC-ENTMCNC: 34.9 G/DL — SIGNIFICANT CHANGE UP (ref 32–36)
MCV RBC AUTO: 93.4 FL — SIGNIFICANT CHANGE UP (ref 80–94)
MONOCYTES # BLD AUTO: 1 K/UL — HIGH (ref 0–0.8)
MONOCYTES NFR BLD AUTO: 8.9 % — SIGNIFICANT CHANGE UP (ref 3–10)
NEUTROPHILS # BLD AUTO: 6.9 K/UL — SIGNIFICANT CHANGE UP (ref 1.8–8)
NEUTROPHILS NFR BLD AUTO: 62.2 % — SIGNIFICANT CHANGE UP (ref 37–73)
PHOSPHATE SERPL-MCNC: 4 MG/DL — SIGNIFICANT CHANGE UP (ref 2.4–4.7)
PLATELET # BLD AUTO: 216 K/UL — SIGNIFICANT CHANGE UP (ref 150–400)
POTASSIUM SERPL-MCNC: 4.6 MMOL/L — SIGNIFICANT CHANGE UP (ref 3.5–5.3)
POTASSIUM SERPL-SCNC: 4.6 MMOL/L — SIGNIFICANT CHANGE UP (ref 3.5–5.3)
PROCALCITONIN SERPL-MCNC: 0.13 NG/ML — HIGH (ref 0–0.04)
PROT SERPL-MCNC: 5.2 G/DL — LOW (ref 6.6–8.7)
PROTHROM AB SERPL-ACNC: 15 SEC — HIGH (ref 9.8–12.7)
RBC # BLD: 3.93 M/UL — LOW (ref 4.6–6.2)
RBC # FLD: 14.9 % — SIGNIFICANT CHANGE UP (ref 11–15.6)
SODIUM SERPL-SCNC: 135 MMOL/L — SIGNIFICANT CHANGE UP (ref 135–145)
TROPONIN T SERPL-MCNC: <0.01 NG/ML — SIGNIFICANT CHANGE UP (ref 0–0.06)
TYPE + AB SCN PNL BLD: SIGNIFICANT CHANGE UP
WBC # BLD: 11.1 K/UL — HIGH (ref 4.8–10.8)
WBC # FLD AUTO: 11.1 K/UL — HIGH (ref 4.8–10.8)

## 2018-04-18 PROCEDURE — 99285 EMERGENCY DEPT VISIT HI MDM: CPT | Mod: 25

## 2018-04-18 PROCEDURE — 99254 IP/OBS CNSLTJ NEW/EST MOD 60: CPT

## 2018-04-18 PROCEDURE — 71045 X-RAY EXAM CHEST 1 VIEW: CPT | Mod: 26

## 2018-04-18 PROCEDURE — 71045 X-RAY EXAM CHEST 1 VIEW: CPT | Mod: 26,77

## 2018-04-18 PROCEDURE — 99222 1ST HOSP IP/OBS MODERATE 55: CPT

## 2018-04-18 PROCEDURE — 99233 SBSQ HOSP IP/OBS HIGH 50: CPT

## 2018-04-18 PROCEDURE — 93010 ELECTROCARDIOGRAM REPORT: CPT

## 2018-04-18 PROCEDURE — 93970 EXTREMITY STUDY: CPT | Mod: 26

## 2018-04-18 RX ORDER — PANTOPRAZOLE SODIUM 20 MG/1
40 TABLET, DELAYED RELEASE ORAL
Qty: 0 | Refills: 0 | Status: DISCONTINUED | OUTPATIENT
Start: 2018-04-18 | End: 2018-04-25

## 2018-04-18 RX ORDER — OXYCODONE HYDROCHLORIDE 5 MG/1
5 TABLET ORAL EVERY 4 HOURS
Qty: 0 | Refills: 0 | Status: DISCONTINUED | OUTPATIENT
Start: 2018-04-18 | End: 2018-04-24

## 2018-04-18 RX ORDER — CEFAZOLIN SODIUM 1 G
2000 VIAL (EA) INJECTION ONCE
Qty: 0 | Refills: 0 | Status: DISCONTINUED | OUTPATIENT
Start: 2018-04-19 | End: 2018-04-22

## 2018-04-18 RX ORDER — FUROSEMIDE 40 MG
40 TABLET ORAL DAILY
Qty: 0 | Refills: 0 | Status: DISCONTINUED | OUTPATIENT
Start: 2018-04-18 | End: 2018-04-25

## 2018-04-18 RX ORDER — LACTULOSE 10 G/15ML
10 SOLUTION ORAL THREE TIMES A DAY
Qty: 0 | Refills: 0 | Status: DISCONTINUED | OUTPATIENT
Start: 2018-04-18 | End: 2018-04-19

## 2018-04-18 RX ORDER — SENNA PLUS 8.6 MG/1
2 TABLET ORAL AT BEDTIME
Qty: 0 | Refills: 0 | Status: DISCONTINUED | OUTPATIENT
Start: 2018-04-18 | End: 2018-04-25

## 2018-04-18 RX ADMIN — OXYCODONE HYDROCHLORIDE 5 MILLIGRAM(S): 5 TABLET ORAL at 16:30

## 2018-04-18 RX ADMIN — LACTULOSE 10 GRAM(S): 10 SOLUTION ORAL at 18:56

## 2018-04-18 RX ADMIN — OXYCODONE HYDROCHLORIDE 5 MILLIGRAM(S): 5 TABLET ORAL at 15:22

## 2018-04-18 RX ADMIN — Medication 1 TABLET(S): at 18:56

## 2018-04-18 NOTE — ED ADULT NURSE REASSESSMENT NOTE - NS ED NURSE REASSESS COMMENT FT1
Bedside report received from offgoing rn, chart as noted, pt a&ox3 resting comfortably on stretcher, on cm, vss per fs, iv bolus 500cc completed @ this time, pt with pigtail noted to right posterior chest, dressing clean and dry, breath sounds equal and leonid, tube clamped per md orders, pt reports soreness to site denies cp denies sob, verbalized improvement in condition, pending bed assignment @ this time
CT Surg PAs @ bedside to evaluate and discuss with pt. Pt does not need Chest tube at this time due to taking Eliquis last night. Pt will wait for dayshift staff for placement unless there's an acute change in pts sats. Will continue to monitor.
Pt output of 4400 total. Luisito ALCARAZ contacted, states to clamp Plural vac.   Dr. Pack to bedside to evaluate pt.   Pt AOx4, VSS, c/o chest discomfort and abd pain. will continue to monitor
as per MD Avila, cardio thoraic is to evaluate pt around 0600. Pt remains labored breathing, shortness of breath. pt is Normal Sinus Rhythm on cardiac monitor.
pt c/o shortness of breath . pt still has labored breathing with accessory muscle use. MD Avalos advised. pt is still waiting for CT to come and preform procedure. pt educated on plan of care and the wait time for Ct to come and start procedure. pt able to successfully teach back education. vital signs remain stable.
pt had pigtailed chest tubed placed in the right chest area to suction. pt Pleur-evac filled and replaced RAHEEM Jorge notified pt to have cxr.
pt resting comfortably at this time Dr Pack made aware of decreased b/p 500cc NS bolus up and infusing pt denies dizziness no sob right sided chest tube in placed and clamped dsd intact and occulsive to chest wall. will continue to monitor.
pt received awake and alert resp even and unlabored at rest NGUYEN present pt has very diminished BS on the right side with swelling present to the back area where he has old scars from previous chest tubes. pt denies pain o2 in use. pt spoke with DR House about plan of care. pt sitting up in cart on cardiac monitor

## 2018-04-18 NOTE — H&P ADULT - PROBLEM SELECTOR PLAN 1
CT surgery to drain when stable CT surgery to drain when stable but patient received a dose of eliquis on 4/17.

## 2018-04-18 NOTE — CONSULT NOTE ADULT - SUBJECTIVE AND OBJECTIVE BOX
HPI:  62 year old male alcoholic cirrhosis, recurrent hepatic hydrothorax, PE 2/8/2018 on eliquis, TIPS 3/29/2018, DM, status post chest tube at [place] on 4/3/2018. Patient was at home in usual poor state of health when he developed worsening shortness of breath. He came to [facility] ER where cxr revealed a large right sided pleural effusion.  Cardio-thoracic surgery has been following and they plan on eventually draining the chest but would like to monitor the patient first off eliquis for fear of bleeding. (18 Apr 2018 10:17)      PAST MEDICAL & SURGICAL HISTORY:  Pulmonary embolism  Cirrhosis  DM (diabetes mellitus)  S/P thoracentesis      ROS:  No Heartburn, regurgitation, dysphagia, odynophagia.  No dyspepsia  No abdominal pain.    No Nausea, vomiting.  No Bleeding.  No hematemesis.   No diarrhea.    No hematochesia.  No weight loss, anorexia.  No edema.      MEDICATIONS  (STANDING):  furosemide    Tablet 40 milliGRAM(s) Oral daily  lactulose Oral Liquid - Peds 10 Gram(s) Oral three times a day  multivitamin 1 Tablet(s) Oral daily  pantoprazole    Tablet 40 milliGRAM(s) Oral before breakfast  senna 2 Tablet(s) Oral at bedtime    MEDICATIONS  (PRN):  oxyCODONE    IR 5 milliGRAM(s) Oral every 4 hours PRN Mild Pain (1 - 3)      Allergies    No Known Allergies    Intolerances        SOCIAL HISTORY:    ENDOSCOPIC/GI HISTORY:    FAMILY HISTORY:  No pertinent family history in first degree relatives      Vital Signs Last 24 Hrs  T(C): 36.5 (18 Apr 2018 11:01), Max: 36.6 (18 Apr 2018 05:23)  T(F): 97.7 (18 Apr 2018 11:01), Max: 97.8 (18 Apr 2018 05:23)  HR: 106 (18 Apr 2018 11:01) (75 - 106)  BP: 124/85 (18 Apr 2018 11:01) (113/76 - 127/84)  BP(mean): --  RR: 20 (18 Apr 2018 11:01) (20 - 24)  SpO2: 98% (18 Apr 2018 11:01) (95% - 100%)    PHYSICAL EXAM:    GENERAL: NAD, well-groomed, well-developed  HEAD:  Atraumatic, Normocephalic  EYES: EOMI, PERRLA, conjunctiva and sclera clear  ENMT: No tonsillar erythema, exudates, or enlargement; Moist mucous membranes, Good dentition, No lesions  NECK: Supple, No JVD, Normal thyroid  CHEST/LUNG: Clear to percussion left side, Decreased breath sounds right side; No rales, rhonchi, wheezing, or rubs  HEART: Regular rate and rhythm; No murmurs, rubs, or gallops  ABDOMEN: Soft, Nontender, Nondistended; Bowel sounds present  EXTREMITIES:  2+ Peripheral Pulses, No clubbing, cyanosis,  2+edema  LYMPH: No lymphadenopathy noted  SKIN: No rashes or lesions      LABS:                        12.8   11.1  )-----------( 216      ( 18 Apr 2018 03:22 )             36.7     04-18    135  |  105  |  43.0<H>  ----------------------------<  180<H>  4.6   |  22.0  |  0.77    Ca    8.2<L>      18 Apr 2018 03:22  Phos  4.0     04-18  Mg     2.1     04-18    TPro  5.2<L>  /  Alb  1.9<L>  /  TBili  0.7  /  DBili  x   /  AST  84<H>  /  ALT  73<H>  /  AlkPhos  533<H>  04-18    PT/INR - ( 18 Apr 2018 03:22 )   PT: 15.0 sec;   INR: 1.36 ratio         PTT - ( 18 Apr 2018 03:22 )  PTT:36.0 sec       LIVER FUNCTIONS - ( 18 Apr 2018 03:22 )  Alb: 1.9 g/dL / Pro: 5.2 g/dL / ALK PHOS: 533 U/L / ALT: 73 U/L / AST: 84 U/L / GGT: x               RADIOLOGY & ADDITIONAL STUDIES: HPI:  62 year old male alcoholic cirrhosis, recurrent hepatic hydrothorax, PE 2/8/2018 on eliquis, TIPS 3/29/2018, DM, status post chest tube at [place] on 4/3/2018. Patient was at home in usual poor state of health when he developed worsening shortness of breath. He came to [facility] ER where cxr revealed a large right sided pleural effusion.  Cardio-thoracic surgery has been following and they plan on eventually draining the chest but would like to monitor the patient first off eliquis for fear of bleeding.      PAST MEDICAL & SURGICAL HISTORY:  Pulmonary embolism  Cirrhosis  DM (diabetes mellitus)  S/P thoracentesis  s/p TIPS recently    ROS:  No Heartburn, regurgitation, dysphagia, odynophagia.  No dyspepsia  No abdominal pain.    No Nausea, vomiting.  No Bleeding.  No hematemesis.   No diarrhea.    No hematochesia.  No weight loss, anorexia.  No edema.      MEDICATIONS  (STANDING):  furosemide    Tablet 40 milliGRAM(s) Oral daily  lactulose Oral Liquid - Peds 10 Gram(s) Oral three times a day  multivitamin 1 Tablet(s) Oral daily  pantoprazole    Tablet 40 milliGRAM(s) Oral before breakfast  senna 2 Tablet(s) Oral at bedtime    MEDICATIONS  (PRN):  oxyCODONE    IR 5 milliGRAM(s) Oral every 4 hours PRN Mild Pain (1 - 3)      Allergies    No Known Allergies    Intolerances        SOCIAL HISTORY:History of alcohol abuse in the past. No drugs or any tobacco use    ENDOSCOPIC/GI HISTORY:Colonoscopy last year    FAMILY HISTORY:  No pertinent family history in first degree relatives      Vital Signs Last 24 Hrs  T(C): 36.5 (18 Apr 2018 11:01), Max: 36.6 (18 Apr 2018 05:23)  T(F): 97.7 (18 Apr 2018 11:01), Max: 97.8 (18 Apr 2018 05:23)  HR: 106 (18 Apr 2018 11:01) (75 - 106)  BP: 124/85 (18 Apr 2018 11:01) (113/76 - 127/84)  BP(mean): --  RR: 20 (18 Apr 2018 11:01) (20 - 24)  SpO2: 98% (18 Apr 2018 11:01) (95% - 100%)    PHYSICAL EXAM:    GENERAL: NAD, well-groomed, well-developed  HEAD:  Atraumatic, Normocephalic  EYES: EOMI, PERRLA, conjunctiva and sclera clear  ENMT: No tonsillar erythema, exudates, or enlargement; Moist mucous membranes, Good dentition, No lesions  NECK: Supple, No JVD, Normal thyroid  CHEST/LUNG: Clear to percussion left side, Decreased breath sounds right side; No rales, rhonchi, wheezing, or rubs  HEART: Regular rate and rhythm; No murmurs, rubs, or gallops  ABDOMEN: Soft, Nontender, distended; Bowel sounds present  EXTREMITIES:  2+ Peripheral Pulses, No clubbing, cyanosis,  2+edema  LYMPH: No lymphadenopathy noted  SKIN: No rashes or lesions      LABS:                        12.8   11.1  )-----------( 216      ( 18 Apr 2018 03:22 )             36.7     04-18    135  |  105  |  43.0<H>  ----------------------------<  180<H>  4.6   |  22.0  |  0.77    Ca    8.2<L>      18 Apr 2018 03:22  Phos  4.0     04-18  Mg     2.1     04-18    TPro  5.2<L>  /  Alb  1.9<L>  /  TBili  0.7  /  DBili  x   /  AST  84<H>  /  ALT  73<H>  /  AlkPhos  533<H>  04-18    PT/INR - ( 18 Apr 2018 03:22 )   PT: 15.0 sec;   INR: 1.36 ratio         PTT - ( 18 Apr 2018 03:22 )  PTT:36.0 sec       LIVER FUNCTIONS - ( 18 Apr 2018 03:22 )  Alb: 1.9 g/dL / Pro: 5.2 g/dL / ALK PHOS: 533 U/L / ALT: 73 U/L / AST: 84 U/L / GGT: x               RADIOLOGY & ADDITIONAL STUDIES:  < from: US Duplex Venous Lower Ext Complete, Bilateral (04.18.18 @ 12:22) >  INTERPRETATION:  CLINICAL INFORMATION: Leg edema    COMPARISON: None available.    TECHNIQUE: Duplex sonography of the BILATERAL LOWER extremities with   color and spectral Doppler, with and without compression.      FINDINGS:    There is normal compressibility of the bilateral common femoral, femoral   and popliteal veins. No calf vein thrombosis is detected.    Doppler examination shows normalspontaneous and phasic flow.    IMPRESSION:     No evidence of bilateral lower extremity deep venous thrombosis.      < end of copied text >    < from: Xray Chest 1 View- PORTABLE-Urgent (04.18.18 @ 15:53) >  CLINICAL INFORMATION:   Right chest tube placement. Right effusion   evacuation.    TECHNIQUE:  Portable  AP view of the chest was obtained.    COMPARISON: 4/18/2018 available for review.    FINDINGS:   Right chest tube in place with removal of the large volume ascites .   residual lung right lung post expansion pneumonitis seen . left lung   clear.No pneumothorax. Heart size within normal limits allowing for   magnification effect of AP projection.  Visualized osseous structures are intact.        IMPRESSION:   Right chest tube in place removing large volume right   ascites with minimal right lung base post expansion pneumonitis..          < end of copied text >

## 2018-04-18 NOTE — ED ADULT NURSE NOTE - OBJECTIVE STATEMENT
pt care assumed at 0400. pt c/o shortness of breath that began 1 week ago. pt states he had TIPS procedure about 2 weeks ago and "he fills like the fluid is filled up again." pt has labored breathing with wheezing breath sounds on the left and no breath sounds on the right side. pt abd is distended and firm. pt is Normal Sinus Rhythm on cardiac monitor, 98% on 4L NC. MD Avalos advised of pt condition and called to bed side by RN. pt awaiting reeval from MD.

## 2018-04-18 NOTE — ED ADULT NURSE NOTE - CHIEF COMPLAINT QUOTE
Pt BIBA c/o sob.   Pt reports having TIPS procedure on liver approx 2 weeks ago.  SOB began approx 1 week ago.  Pt also reports onset of rectal bleeding as of today.  Diminished breath sounds noted on right side.   Breathing appears labored.  Obvious use of accessory muscles.

## 2018-04-18 NOTE — ED PROVIDER NOTE - OBJECTIVE STATEMENT
61 y/o M pt with a pmhx of cirrhosis, DM, thoracentesis and PE presents to the ED c/o dyspnea on exertion s/p TIPS procedure for liver cirrhosis. TIPs procedure preformed 2 weeks ago and SOB has exacerbated x1 week. Denies fever, cough, chills, headache, neck pain, recent fall, headache, rash, visual changes, recent travel, chest pain or any other complaints. NKDA.

## 2018-04-18 NOTE — ED PROVIDER NOTE - PROGRESS NOTE DETAILS
Spoke to cardiothoracic PA for evaluation for emergent thoracocentesis. ct surgery to perform thoracentesis then advise medicine admsiion am attending aware of plan of care

## 2018-04-18 NOTE — CONSULT NOTE ADULT - SUBJECTIVE AND OBJECTIVE BOX
PULMONARY CONSULT NOTE      ERIC ARIASKB-[id number]    Patient is a 62y old  Male who presents with a chief complaint of Shortness of breath (18 Apr 2018 10:17)  Patient s/p TIPS due to recurrent pleural effusions secondary to portal hypertension ETOH cirrhosis.  Procedure was done 3 weeks ago.  Now presents to ER with increased dyspnea>evaluation reveals recurrence of large right effusion.  Patient was diagnosed with left PE 2 months ago for which he is on Elliquis.  Denies chest pain, hemoptysis.  Has had chronic leg edema  Distant tobacco history.     INTERVAL HPI/OVERNIGHT EVENTS:    MEDICATIONS  (STANDING):  furosemide    Tablet 40 milliGRAM(s) Oral daily  lactulose Oral Liquid - Peds 10 Gram(s) Oral three times a day  multivitamin 1 Tablet(s) Oral daily  pantoprazole    Tablet 40 milliGRAM(s) Oral before breakfast  senna 2 Tablet(s) Oral at bedtime      MEDICATIONS  (PRN):  oxyCODONE    IR 5 milliGRAM(s) Oral every 4 hours PRN Mild Pain (1 - 3)      Allergies    No Known Allergies    Intolerances        PAST MEDICAL & SURGICAL HISTORY:  Pulmonary embolism  Cirrhosis  DM (diabetes mellitus)  S/P thoracentesis      FAMILY HISTORY:  No pertinent family history in first degree relatives      SOCIAL HISTORY  Smoking History: Distant    REVIEW OF SYSTEMS:    CONSTITUTIONAL:  As per HPI.    HEENT:  Eyes:  No diplopia or blurred vision. ENT:  No earache, sore throat or runny nose.    CARDIOVASCULAR:  No pressure, squeezing, tightness, heaviness or aching about the chest; no palpitations.    RESPIRATORY:  Per HPI    GASTROINTESTINAL:  No nausea, vomiting or diarrhea.    GENITOURINARY:  No dysuria, frequency or urgency.    MUSCULOSKELETAL:  No joint pains    SKIN:  No new lesions.    NEUROLOGIC:  No paresthesias, fasciculations, seizures or weakness.    PSYCHIATRIC:  No disorder of thought or mood.    ENDOCRINE:  No heat or cold intolerance, polyuria or polydipsia.    HEMATOLOGICAL:  No easy bruising or bleeding.     Vital Signs Last 24 Hrs  T(C): 36.5 (18 Apr 2018 11:01), Max: 36.6 (18 Apr 2018 05:23)  T(F): 97.7 (18 Apr 2018 11:01), Max: 97.8 (18 Apr 2018 05:23)  HR: 106 (18 Apr 2018 11:01) (75 - 106)  BP: 124/85 (18 Apr 2018 11:01) (113/76 - 127/84)  BP(mean): --  RR: 20 (18 Apr 2018 11:01) (20 - 24)  SpO2: 98% (18 Apr 2018 11:01) (95% - 100%)    PHYSICAL EXAMINATION:    GENERAL: The patient is a well-developed, well-nourished _____in no apparent distress.     HEENT: Head is normocephalic and atraumatic. Extraocular muscles are intact. Mucous membranes are moist.     NECK: Supple.     LUNGS:Absent right breath sounds; left is clear    HEART: Regular rate and rhythm without murmur.    ABDOMEN: Soft, nontender, and nondistended.  No hepatosplenomegaly is noted.    EXTREMITIES: Without any cyanosis, clubbing, rash, lesions; + edema.    NEUROLOGIC: Grossly intact.    SKIN: No ulceration or induration present.      LABS:                        12.8   11.1  )-----------( 216      ( 18 Apr 2018 03:22 )             36.7     04-18    135  |  105  |  43.0<H>  ----------------------------<  180<H>  4.6   |  22.0  |  0.77    Ca    8.2<L>      18 Apr 2018 03:22  Phos  4.0     04-18  Mg     2.1     04-18    TPro  5.2<L>  /  Alb  1.9<L>  /  TBili  0.7  /  DBili  x   /  AST  84<H>  /  ALT  73<H>  /  AlkPhos  533<H>  04-18    PT/INR - ( 18 Apr 2018 03:22 )   PT: 15.0 sec;   INR: 1.36 ratio         PTT - ( 18 Apr 2018 03:22 )  PTT:36.0 sec      CARDIAC MARKERS ( 18 Apr 2018 03:22 )  x     / <0.01 ng/mL / x     / x     / x                    MICROBIOLOGY:    RADIOLOGY & ADDITIONAL STUDIES:

## 2018-04-18 NOTE — ED ADULT NURSE REASSESSMENT NOTE - DISTAL EXTREMITY COLOR
Bedside and Verbal shift change report given to Justin Perez RN (oncoming nurse) by self (offgoing nurse). Report included the following information Recent Results. pale

## 2018-04-18 NOTE — CONSULT NOTE ADULT - SUBJECTIVE AND OBJECTIVE BOX
62y  Male  No Known Allergies    Patient is a 62y old  Male who presents with a chief complaint of Shortness of breath (18 Apr 2018 10:17)    HPI:  62 year old male alcoholic cirrhosis, recurrent hepatic hydrothorax, PE 2/8/2018 on eliquis, TIPS 3/29/2018, DM, status post chest tube at [place] on 4/3/2018. Patient was at home in usual poor state of health when he developed worsening shortness of breath. He came to [facility] ER where cxr revealed a large right sided pleural effusion.  Cardio-thoracic surgery has been following and they plan on eventually draining the chest but would like to monitor the patient first off eliquis for fear of bleeding. (18 Apr 2018 10:17)    PAST MEDICAL & SURGICAL HISTORY:  Pulmonary embolism  Cirrhosis  DM (diabetes mellitus)  S/P thoracentesis    FAMILY HISTORY:  No pertinent family history in first degree relatives    MEDICATIONS  (STANDING):  furosemide    Tablet 40 milliGRAM(s) Oral daily  lactulose Oral Liquid - Peds 10 Gram(s) Oral three times a day  multivitamin 1 Tablet(s) Oral daily  pantoprazole    Tablet 40 milliGRAM(s) Oral before breakfast  senna 2 Tablet(s) Oral at bedtime    MEDICATIONS  (PRN):  oxyCODONE    IR 5 milliGRAM(s) Oral every 4 hours PRN Mild Pain (1 - 3)      Vital Signs Last 24 Hrs  T(C): 36.5 (18 Apr 2018 11:01), Max: 36.6 (18 Apr 2018 05:23)  T(F): 97.7 (18 Apr 2018 11:01), Max: 97.8 (18 Apr 2018 05:23)  HR: 106 (18 Apr 2018 11:01) (75 - 106)  BP: 124/85 (18 Apr 2018 11:01) (113/76 - 127/84)  BP(mean): --  RR: 20 (18 Apr 2018 11:01) (20 - 24)  SpO2: 98% (18 Apr 2018 11:01) (95% - 100%)  CBC Full  -  ( 18 Apr 2018 03:22 )  WBC Count : 11.1 K/uL  Hemoglobin : 12.8 g/dL  Hematocrit : 36.7 %  Platelet Count - Automated : 216 K/uL  Mean Cell Volume : 93.4 fl  Mean Cell Hemoglobin : 32.6 pg  Mean Cell Hemoglobin Concentration : 34.9 g/dL  Auto Neutrophil # : 6.9 K/uL  Auto Lymphocyte # : 2.7 K/uL  Auto Monocyte # : 1.0 K/uL  Auto Eosinophil # : 0.4 K/uL  Auto Basophil # : 0.1 K/uL  Auto Neutrophil % : 62.2 %  Auto Lymphocyte % : 24.4 %  Auto Monocyte % : 8.9 %  Auto Eosinophil % : 3.3 %  Auto Basophil % : 0.7 %    PT/INR - ( 18 Apr 2018 03:22 )   PT: 15.0 sec;   INR: 1.36 ratio         PTT - ( 18 Apr 2018 03:22 )  PTT:36.0 sec  04-18    135  |  105  |  43.0<H>  ----------------------------<  180<H>  4.6   |  22.0  |  0.77    Ca    8.2<L>      18 Apr 2018 03:22  Phos  4.0     04-18  Mg     2.1     04-18    TPro  5.2<L>  /  Alb  1.9<L>  /  TBili  0.7  /  DBili  x   /  AST  84<H>  /  ALT  73<H>  /  AlkPhos  533<H>  04-18      REVIEW OF SYSTEMS    lethargic , weak , ++ weight  gain.   other neg    Allergic/Immunologic:	    Physicial Exam:     Constitutional: NAD, well-groomed, well-developed  HEENT: PERRLA, EOMI, no drainage or redness  Neck: No bruits; no thyromegaly or nodules,  ++ 2  JVD  Back: Normal spine flexure, No CVA tenderness, No deformity or limitation of movement  Respiratory: Breath Sounds equal & clear to percussion & auscultation, no accessory muscle use  Cardiovascular: Regular rate & rhythm, normal S1, S2; no murmurs, gallops or rubs; no S3, S4  Gastrointestinal: Soft, non-tender, non distended no hepatosplenomegaly, normal bowel sounds  Extremities: No peripheral edema, No cyanosis, clubbing   Vascular  ++ edema : 2+ peripheral pulses throughout  Neurological: GCS:    A&O x 3; no sensory, motor  deficits, normal reflexes  Psychiatric: Normal mood, normal affect  Musculoskeletal: No joint pain, swelling or deformity; no limitation of movement  Skin: No rashes    A/P  62 year old male alcoholic cirrhosis, recurrent hepatic hydrothorax, PE 2/8/2018 on eliquis, TIPS 3/29/2018, DM, status post chest tube at [place] on 4/3/2018. Patient was at home in usual poor state of health when he developed worsening shortness of breath. He came to [facility] ER where cxr revealed a large right sided pleural effusion.  Cardio-thoracic surgery has been following and they plan on eventually draining the chest but would like to monitor the patient first off eliquis for fear of bleeding. (18 Apr 2018 10:17)    hold eliquis   right pigtail -- clamp post 2 liter '  chest xray   NPO p MN for Pleurx cath

## 2018-04-18 NOTE — H&P ADULT - ASSESSMENT
62 year old male large recurrent hepatic hydrothow, alcoholic cirrhosis, PE 2/18, TIPS 3/18. Cardiothoracic surgery plans on draining the effusion  once eliquis washes out.

## 2018-04-18 NOTE — ED ADULT TRIAGE NOTE - CHIEF COMPLAINT QUOTE
Pt BIBA c/o sob.   Pt reports having TIPS procedure on liver approx 2 weeks ago.  SOB began approx 1 week ago.  Pt also reports onset of rectal bleeding as of today.  Diminished breath sounds noted on right side.   Breathing appears labored. Pt BIBA c/o sob.   Pt reports having TIPS procedure on liver approx 2 weeks ago.  SOB began approx 1 week ago.  Pt also reports onset of rectal bleeding as of today.  Diminished breath sounds noted on right side.   Breathing appears labored.  Obvious use of accessory muscles.

## 2018-04-18 NOTE — PROGRESS NOTE ADULT - SUBJECTIVE AND OBJECTIVE BOX
The patient is a 62y old male who presents with a chief complaint of Shortness of breath .  He said that his right side fills up with fluid. He said that it has happened 5 times before at  different hospitals. He is scheduled to have a RIGHT VIDEO ASSISTED THORACOSCOPIC  SURGERY, PLEURX.  18 Apr 2018 10:17)      PAST MEDICAL HISTORY:  Pulmonary embolism  Cirrhosis with portal hypertension  DM (diabetes mellitus)  TTE = normal global left ventricular systolic function 2/9/2018      PAST SURGICAL HISTORY:  Thoracentesis  TIPS Procedure done a few weeks ago.  i.e. A transjugular intrahepatic portosystemic shunt.      MEDICATIONS  (STANDING):  furosemide    Tablet 40 milliGRAM(s) Oral daily  lactulose Oral Liquid - Peds 10 Gram(s) Oral three times a day  multivitamin 1 Tablet(s) Oral daily  pantoprazole    Tablet 40 milliGRAM(s) Oral before breakfast  senna 2 Tablet(s) Oral at bedtime    MEDICATIONS  (PRN):  oxyCODONE    IR 5 milliGRAM(s) Oral every 4 hours PRN Mild Pain (1 - 3)      Allergies:     No Known Drug Allergies      SOCIAL HISTORY:     The patient said that he stopped drinking alcohol 14 months ago.  He also                                 quit smoking 12 years ago after smoking 1ppd x 20 years.  He never used                                 illicit drugs.                      12.8   11.1  )-----------( 216      ( 18 Apr 2018 03:22 )             36.7     PT/INR - ( 18 Apr 2018 03:22 )   PT: 15.0 sec;   INR: 1.36 ratio       PTT - ( 18 Apr 2018 03:22 )  PTT:36.0 sec    04-18    135  |  105  |  43.0<H>  ----------------------------<  180<H>  4.6   |  22.0  |  0.77    Ca    8.2<L>      18 Apr 2018 03:22  Phos  4.0     04-18  Mg     2.1     04-18    TPro  5.2<L>  /  Alb  1.9<L>  /  TBili  0.7  /  DBili  x   /  AST  84<H>  /  ALT  73<H>  /  AlkPhos  533<H>  04-18      Height (cm): 167.64 (04-18 @ 02:56)  Weight (kg): 76.2 (04-18 @ 02:56)  BMI (kg/m2): 27.1 (04-18 @ 02:56)    EKG:    TT ECHO:        ASA # =             Mallampati # =

## 2018-04-18 NOTE — H&P ADULT - HISTORY OF PRESENT ILLNESS
62 year old male alcoholic cirrhosis, recurrent hepatic hydrothorax, PE 2/8/2018 on eliquis, TIPS 3/29/2018, DM, status post chest tube at [place] on 4/3/2018. Patient was at home in usual poor state of health when he developed worsening shortness of breath. He came to [facility] ER where cxr revealed a large right sided pleural effusion.  Cardio-thoracic surgery has been following and they plan on eventually draining the chest but would like to monitor the patient first off eliquis for fear of bleeding.

## 2018-04-19 ENCOUNTER — APPOINTMENT (OUTPATIENT)
Dept: THORACIC SURGERY | Facility: HOSPITAL | Age: 63
End: 2018-04-19

## 2018-04-19 DIAGNOSIS — K74.60 UNSPECIFIED CIRRHOSIS OF LIVER: ICD-10-CM

## 2018-04-19 DIAGNOSIS — R06.09 OTHER FORMS OF DYSPNEA: ICD-10-CM

## 2018-04-19 LAB
ANION GAP SERPL CALC-SCNC: 8 MMOL/L — SIGNIFICANT CHANGE UP (ref 5–17)
BUN SERPL-MCNC: 56 MG/DL — HIGH (ref 8–20)
CALCIUM SERPL-MCNC: 7.9 MG/DL — LOW (ref 8.6–10.2)
CHLORIDE SERPL-SCNC: 106 MMOL/L — SIGNIFICANT CHANGE UP (ref 98–107)
CO2 SERPL-SCNC: 21 MMOL/L — LOW (ref 22–29)
CREAT SERPL-MCNC: 0.9 MG/DL — SIGNIFICANT CHANGE UP (ref 0.5–1.3)
GLUCOSE SERPL-MCNC: 125 MG/DL — HIGH (ref 70–115)
POTASSIUM SERPL-MCNC: 4.9 MMOL/L — SIGNIFICANT CHANGE UP (ref 3.5–5.3)
POTASSIUM SERPL-SCNC: 4.9 MMOL/L — SIGNIFICANT CHANGE UP (ref 3.5–5.3)
SODIUM SERPL-SCNC: 135 MMOL/L — SIGNIFICANT CHANGE UP (ref 135–145)

## 2018-04-19 PROCEDURE — 71045 X-RAY EXAM CHEST 1 VIEW: CPT | Mod: 26

## 2018-04-19 PROCEDURE — 99233 SBSQ HOSP IP/OBS HIGH 50: CPT

## 2018-04-19 PROCEDURE — 93975 VASCULAR STUDY: CPT | Mod: 26

## 2018-04-19 PROCEDURE — 71045 X-RAY EXAM CHEST 1 VIEW: CPT | Mod: 26,77

## 2018-04-19 PROCEDURE — 99232 SBSQ HOSP IP/OBS MODERATE 35: CPT

## 2018-04-19 RX ADMIN — OXYCODONE HYDROCHLORIDE 5 MILLIGRAM(S): 5 TABLET ORAL at 21:23

## 2018-04-19 RX ADMIN — OXYCODONE HYDROCHLORIDE 5 MILLIGRAM(S): 5 TABLET ORAL at 22:00

## 2018-04-19 RX ADMIN — Medication 1 TABLET(S): at 11:36

## 2018-04-19 NOTE — PROGRESS NOTE ADULT - SUBJECTIVE AND OBJECTIVE BOX
ERIC ARIAS     Chief Complaint: Patient is a 62y old  Male who presents with a chief complaint of Shortness of breath (18 Apr 2018 10:17)      PAST MEDICAL & SURGICAL HISTORY:  Pulmonary embolism  Cirrhosis  DM (diabetes mellitus)  S/P thoracentesis      HPI/OVERNIGHT EVENTS: Patient lying in bed. He refuses a pleur-x catheter. CT surg, pulm and gi input appreciated.    MEDICATIONS  (STANDING):  ceFAZolin   IVPB 2000 milliGRAM(s) IV Intermittent once  furosemide    Tablet 40 milliGRAM(s) Oral daily  multivitamin 1 Tablet(s) Oral daily  pantoprazole    Tablet 40 milliGRAM(s) Oral before breakfast  senna 2 Tablet(s) Oral at bedtime      Vital Signs Last 24 Hrs  T(C): 36.8 (19 Apr 2018 00:08), Max: 36.8 (19 Apr 2018 00:08)  T(F): 98.2 (19 Apr 2018 00:08), Max: 98.2 (19 Apr 2018 00:08)  HR: 86 (19 Apr 2018 11:29) (84 - 103)  BP: 101/56 (19 Apr 2018 11:29) (85/50 - 124/67)  BP(mean): --  RR: 20 (19 Apr 2018 08:52) (20 - 20)  SpO2: 100% (19 Apr 2018 08:52) (96% - 100%)    PHYSICAL EXAM:  Constitutional: NAD, well-groomed, well-developed  HEENT: PERRLA, EOMI, Normal Hearing, MMM  Neck: No LAD, No JVD  Back: Normal spine flexure, No CVA tenderness  Respiratory:Chest tube in place Cardiovascular: S1 and S2, RRR, no M/G/R  Gastrointestinal: BS+, soft, NT/ND  Extremities: No peripheral edema  Vascular: 2+ peripheral pulses  Neurological: A/O x 3,   CAPILLARY BLOOD GLUCOSE    LABS:                        12.8   11.1  )-----------( 216      ( 18 Apr 2018 03:22 )             36.7     04-19    135  |  106  |  56.0<H>  ----------------------------<  125<H>  4.9   |  21.0<L>  |  0.90    Ca    7.9<L>      19 Apr 2018 10:39  Phos  4.0     04-18  Mg     2.1     04-18    TPro  5.2<L>  /  Alb  1.9<L>  /  TBili  0.7  /  DBili  x   /  AST  84<H>  /  ALT  73<H>  /  AlkPhos  533<H>  04-18    PT/INR - ( 18 Apr 2018 03:22 )   PT: 15.0 sec;   INR: 1.36 ratio         PTT - ( 18 Apr 2018 03:22 )  PTT:36.0 sec      RADIOLOGY & ADDITIONAL TESTS:

## 2018-04-19 NOTE — PROGRESS NOTE ADULT - SUBJECTIVE AND OBJECTIVE BOX
Subjective: 63 y/o male found sitting up in bed in NAD, right pigtail in place (clamped). Pt states "he is doing okay". Pt denies SOB at this time while resting, cp, n/v/d. Pt in ED.     VITAL SIGNS  Vital Signs Last 24 Hrs  T(C): 36.5 (04-19-18 @ 11:52), Max: 36.8 (04-19-18 @ 00:08)  T(F): 97.7 (04-19-18 @ 11:52), Max: 98.2 (04-19-18 @ 00:08)  HR: 84 (04-19-18 @ 11:52) (84 - 103)  BP: 106/57 (04-19-18 @ 11:52) (85/50 - 124/67)  RR: 19 (04-19-18 @ 11:52) (19 - 20)  SpO2: 98% (04-19-18 @ 11:52) (96% - 100%)  on room air.                MEDICATIONS  ceFAZolin   IVPB 2000 milliGRAM(s) IV Intermittent once  furosemide    Tablet 40 milliGRAM(s) Oral daily  multivitamin 1 Tablet(s) Oral daily  oxyCODONE    IR 5 milliGRAM(s) Oral every 4 hours PRN  pantoprazole    Tablet 40 milliGRAM(s) Oral before breakfast  senna 2 Tablet(s) Oral at bedtime      PHYSICAL EXAM  General: Thin male, well developed, no acute distress  Neurology: alert and oriented x 3, nonfocal, no gross deficits  Respiratory: slightly diminished breath sounds to right lung field.   CV: regular rate and rhythm  Abdomen: soft, nontender, nondistended, positive bowel sounds  Extremities: warm, well perfused. no edema. + DP pulses  Chest tubes: Right sided pigtail clamped.        04-18 @ 07:01  -  04-19 @ 07:00  --------------------------------------------------------  IN: 0 mL / OUT: 4400 mL / NET: -4400 mL        Weights:  Daily     Daily   Admit Wt: Drug Dosing Weight  Height (cm): 167.64 (18 Apr 2018 02:56)  Weight (kg): 76.2 (18 Apr 2018 02:56)  BMI (kg/m2): 27.1 (18 Apr 2018 02:56)  BSA (m2): 1.86 (18 Apr 2018 02:56)    LABS  04-19    135  |  106  |  56.0<H>  ----------------------------<  125<H>  4.9   |  21.0<L>  |  0.90    Ca    7.9<L>      19 Apr 2018 10:39  Phos  4.0     04-18  Mg     2.1     04-18    TPro  5.2<L>  /  Alb  1.9<L>  /  TBili  0.7  /  DBili  x   /  AST  84<H>  /  ALT  73<H>  /  AlkPhos  533<H>  04-18                                 12.8   11.1  )-----------( 216      ( 18 Apr 2018 03:22 )             36.7          PT/INR - ( 18 Apr 2018 03:22 )   PT: 15.0 sec;   INR: 1.36 ratio         PTT - ( 18 Apr 2018 03:22 )  PTT:36.0 sec    CAPILLARY BLOOD GLUCOSE               Today's CXR: < from: Xray Chest 1 View- PORTABLE-Routine (04.19.18 @ 06:35) >  Findings: No acute infiltrates, congestion or pleural effusions  .   Pigtail catheter coiled in the right costophrenic angle. No significant   pleural fluid, pneumothorax or infiltrates. Left diaphragm slightly   elevated with subsegmental atelectasis at the left lung base.    The   pulmonary vasculature and aorta are normal for age. Heart size is   unremarkable.     The thorax is normal for age.    Impression: Pleural fluid has resolved. Subsegmental atelectasis left   lower lobe. No significant change in right chest tube    < end of copied text >      PAST MEDICAL & SURGICAL HISTORY:  Pulmonary embolism  Cirrhosis  DM (diabetes mellitus)  S/P thoracentesis       PLAN  Neuro: Pain management  Pulm: Encourage coughing, deep breathing and use of incentive spirometry. Wean off supplemental oxygen as able. Daily CXR.   Cardio: Continue Aspirin, Lipitor. (BB)  GI: Tolerating diet. Continue stool softeners.  Renal: Keep negative fluid balance. (Diuretics) Trend BUN/Cr. Supplement electrolytes prn.   :   Vasc: Lovenox/SCDs for DVT prophylaxis  Heme: Stable H/H. Trend CBC daily.   Endo:  ID: Off antibiotics. Stable.  Therapy: PT daily as tolerated.  Tubes/Wires:   Disposition: Aim to D/C to home on  Discussed with Cardiothoracic Team at AM rounds.

## 2018-04-19 NOTE — PROGRESS NOTE ADULT - PROBLEM SELECTOR PLAN 1
Chest tube in place, clamped. Drained greater than 4 liters yesterday, now Patient with mild hypotension

## 2018-04-19 NOTE — PROGRESS NOTE ADULT - PROBLEM SELECTOR PLAN 1
Continue to Hold Eliquis  Unclamp right pigtail, allow for 1L max drain then re-clamp   Will hold off on OR for Pleurex for today > re-evaluate tomorrow and will continue to follow per Dr. Saez

## 2018-04-19 NOTE — PROGRESS NOTE ADULT - SUBJECTIVE AND OBJECTIVE BOX
PULMONARY PROGRESS NOTE      [id number]    Patient is a 62y old  Male who presents with a chief complaint of Shortness of breath (18 Apr 2018 10:17)    HPI  62 year old male alcoholic cirrhosis, recurrent hepatic hydrothorax, PE 2/8/2018 on eliquis, TIPS 3/29/2018, DM, status post chest tube at [place] on 4/3/2018. Patient was at home in usual poor state of health when he developed worsening shortness of breath. He came to [facility] ER where cxr revealed a large right sided pleural effusion.  Cardio-thoracic surgery has been following and they plan on eventually draining the chest but would like to monitor the patient first off eliquis for fear of bleeding. (18 Apr 2018 10:17)    INTERVAL HPI/OVERNIGHT EVENTS:    Pigtail placed and effusion completely drained  Dyspnea largely resolved    MEDICATIONS  (STANDING):  ceFAZolin   IVPB 2000 milliGRAM(s) IV Intermittent once  furosemide    Tablet 40 milliGRAM(s) Oral daily  lactulose Oral Liquid - Peds 10 Gram(s) Oral three times a day  multivitamin 1 Tablet(s) Oral daily  pantoprazole    Tablet 40 milliGRAM(s) Oral before breakfast  senna 2 Tablet(s) Oral at bedtime      MEDICATIONS  (PRN):  oxyCODONE    IR 5 milliGRAM(s) Oral every 4 hours PRN Mild Pain (1 - 3)      Allergies    No Known Allergies    Intolerances        PAST MEDICAL & SURGICAL HISTORY:  Pulmonary embolism  Cirrhosis  DM (diabetes mellitus)  S/P thoracentesis      SOCIAL HISTORY  Smoking History:       REVIEW OF SYSTEMS:    CONSTITUTIONAL:  As per HPI.    HEENT:  Eyes:  No diplopia or blurred vision. ENT:  No earache, sore throat or runny nose.    CARDIOVASCULAR:  No pressure, squeezing, tightness, heaviness or aching about the chest; no palpitations.    RESPIRATORY:  Per HPI    GASTROINTESTINAL:  No nausea, vomiting or diarrhea.    GENITOURINARY:  No dysuria, frequency or urgency.    MUSCULOSKELETAL:  No joint pains    SKIN:  No new lesions.    NEUROLOGIC:  No paresthesias, fasciculations, seizures or weakness.    PSYCHIATRIC:  No disorder of thought or mood.    ENDOCRINE:  No heat or cold intolerance, polyuria or polydipsia.    HEMATOLOGICAL:  No easy bruising or bleeding.       Vital Signs Last 24 Hrs  T(C): 36.8 (19 Apr 2018 00:08), Max: 36.8 (19 Apr 2018 00:08)  T(F): 98.2 (19 Apr 2018 00:08), Max: 98.2 (19 Apr 2018 00:08)  HR: 84 (19 Apr 2018 05:29) (84 - 106)  BP: 85/50 (19 Apr 2018 05:29) (85/50 - 124/85)  BP(mean): --  RR: 20 (19 Apr 2018 05:29) (20 - 20)  SpO2: 100% (19 Apr 2018 05:29) (96% - 100%)    PHYSICAL EXAMINATION:    GENERAL: The patient is arouseable and in no apparent distress.     HEENT: Head is normocephalic and atraumatic. Extraocular muscles are intact. Mucous membranes are moist.    NECK: Supple.    LUNGS: Clear to auscultation without wheezing, rales or rhonchi; respirations unlabored    HEART: Regular rate and rhythm without murmur.    ABDOMEN: Soft, nontender, and nondistended.      EXTREMITIES: Without any cyanosis, clubbing, rash, lesions.  2+ edema    NEUROLOGIC: Grossly intact.    LABS:                        12.8   11.1  )-----------( 216      ( 18 Apr 2018 03:22 )             36.7     04-18    135  |  105  |  43.0<H>  ----------------------------<  180<H>  4.6   |  22.0  |  0.77    Ca    8.2<L>      18 Apr 2018 03:22  Phos  4.0     04-18  Mg     2.1     04-18    TPro  5.2<L>  /  Alb  1.9<L>  /  TBili  0.7  /  DBili  x   /  AST  84<H>  /  ALT  73<H>  /  AlkPhos  533<H>  04-18    PT/INR - ( 18 Apr 2018 03:22 )   PT: 15.0 sec;   INR: 1.36 ratio         PTT - ( 18 Apr 2018 03:22 )  PTT:36.0 sec      CARDIAC MARKERS ( 18 Apr 2018 03:22 )  x     / <0.01 ng/mL / x     / x     / x                Procalcitonin, Serum: 0.13 ng/mL (04-18-18 @ 13:30)      MICROBIOLOGY: NA    RADIOLOGY & ADDITIONAL STUDIES:    CXR on 4/18/18  Clear.  Minimal suggestion of post reexpansion pulmonary edema    Leg Duplex on 4/18/18: neg for DVT bilat PULMONARY PROGRESS NOTE      [facility][id number]    Patient is a 62y old  Male who presents with a chief complaint of Shortness of breath (18 Apr 2018 10:17)    HPI  62 year old male alcoholic cirrhosis, recurrent hepatic hydrothorax, PE 2/8/2018 on eliquis, TIPS 3/29/2018, DM, status post chest tube at [place] on 4/3/2018. Patient was at home in usual poor state of health when he developed worsening shortness of breath. He came to [facility] ER where cxr revealed a large right sided pleural effusion.  Cardio-thoracic surgery has been following and they plan on eventually draining the chest but would like to monitor the patient first off eliquis for fear of bleeding. (18 Apr 2018 10:17)    INTERVAL HPI/OVERNIGHT EVENTS:    Pigtail placed and effusion completely drained  Dyspnea largely resolved    MEDICATIONS  (STANDING):  ceFAZolin   IVPB 2000 milliGRAM(s) IV Intermittent once  furosemide    Tablet 40 milliGRAM(s) Oral daily  lactulose Oral Liquid - Peds 10 Gram(s) Oral three times a day  multivitamin 1 Tablet(s) Oral daily  pantoprazole    Tablet 40 milliGRAM(s) Oral before breakfast  senna 2 Tablet(s) Oral at bedtime      MEDICATIONS  (PRN):  oxyCODONE    IR 5 milliGRAM(s) Oral every 4 hours PRN Mild Pain (1 - 3), consult palliative care for poor prognosis.      Allergies    No Known Allergies    Intolerances        PAST MEDICAL & SURGICAL HISTORY:  Pulmonary embolism  Cirrhosis  DM (diabetes mellitus)  S/P thoracentesis      SOCIAL HISTORY  Smoking History:       REVIEW OF SYSTEMS:    CONSTITUTIONAL:  As per HPI.    HEENT:  Eyes:  No diplopia or blurred vision. ENT:  No earache, sore throat or runny nose.    CARDIOVASCULAR:  No pressure, squeezing, tightness, heaviness or aching about the chest; no palpitations.    RESPIRATORY:  Per HPI    GASTROINTESTINAL:  No nausea, vomiting or diarrhea.    GENITOURINARY:  No dysuria, frequency or urgency.    MUSCULOSKELETAL:  No joint pains    SKIN:  No new lesions.    NEUROLOGIC:  No paresthesias, fasciculations, seizures or weakness.    PSYCHIATRIC:  No disorder of thought or mood.    ENDOCRINE:  No heat or cold intolerance, polyuria or polydipsia.    HEMATOLOGICAL:  No easy bruising or bleeding.       Vital Signs Last 24 Hrs  T(C): 36.8 (19 Apr 2018 00:08), Max: 36.8 (19 Apr 2018 00:08)  T(F): 98.2 (19 Apr 2018 00:08), Max: 98.2 (19 Apr 2018 00:08)  HR: 84 (19 Apr 2018 05:29) (84 - 106)  BP: 85/50 (19 Apr 2018 05:29) (85/50 - 124/85)  BP(mean): --  RR: 20 (19 Apr 2018 05:29) (20 - 20)  SpO2: 100% (19 Apr 2018 05:29) (96% - 100%)    PHYSICAL EXAMINATION:    GENERAL: The patient is arouseable and in no apparent distress.     HEENT: Head is normocephalic and atraumatic. Extraocular muscles are intact. Mucous membranes are moist.    NECK: Supple.    LUNGS: Clear to auscultation without wheezing, rales or rhonchi; respirations unlabored    HEART: Regular rate and rhythm without murmur.    ABDOMEN: Soft, nontender, and nondistended.      EXTREMITIES: Without any cyanosis, clubbing, rash, lesions.  2+ edema    NEUROLOGIC: Grossly intact.    LABS:                        12.8   11.1  )-----------( 216      ( 18 Apr 2018 03:22 )             36.7     04-18    135  |  105  |  43.0<H>  ----------------------------<  180<H>  4.6   |  22.0  |  0.77    Ca    8.2<L>      18 Apr 2018 03:22  Phos  4.0     04-18  Mg     2.1     04-18    TPro  5.2<L>  /  Alb  1.9<L>  /  TBili  0.7  /  DBili  x   /  AST  84<H>  /  ALT  73<H>  /  AlkPhos  533<H>  04-18    PT/INR - ( 18 Apr 2018 03:22 )   PT: 15.0 sec;   INR: 1.36 ratio         PTT - ( 18 Apr 2018 03:22 )  PTT:36.0 sec      CARDIAC MARKERS ( 18 Apr 2018 03:22 )  x     / <0.01 ng/mL / x     / x     / x                Procalcitonin, Serum: 0.13 ng/mL (04-18-18 @ 13:30)      MICROBIOLOGY: NA    RADIOLOGY & ADDITIONAL STUDIES:    CXR on 4/18/18  Clear.  Minimal suggestion of post reexpansion pulmonary edema    Leg Duplex on 4/18/18: neg for DVT bilat

## 2018-04-19 NOTE — PROGRESS NOTE ADULT - SUBJECTIVE AND OBJECTIVE BOX
Pt seen and examined. He had righ chest tube inserted for recurrent right sided pleural effusion. 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     REVIEW OF SYSTEMS:  Constitutional: No fever, weight loss or fatigue  Cardiovascular: No chest pain, palpitations, dizziness or leg swelling  Gastrointestinal: No abdominal or epigastric pain. No nausea, vomiting or hematemesis; No diarrhea or constipation. No melena or hematochezia.  Skin: No itching, burning, rashes or lesions       MEDICATIONS:  MEDICATIONS  (STANDING):  ceFAZolin   IVPB 2000 milliGRAM(s) IV Intermittent once  furosemide    Tablet 40 milliGRAM(s) Oral daily  lactulose Oral Liquid - Peds 10 Gram(s) Oral three times a day  multivitamin 1 Tablet(s) Oral daily  pantoprazole    Tablet 40 milliGRAM(s) Oral before breakfast  senna 2 Tablet(s) Oral at bedtime    MEDICATIONS  (PRN):  oxyCODONE    IR 5 milliGRAM(s) Oral every 4 hours PRN Mild Pain (1 - 3)      Allergies    No Known Allergies    Intolerances        Vital Signs Last 24 Hrs  T(C): 36.8 (19 Apr 2018 00:08), Max: 36.8 (19 Apr 2018 00:08)  T(F): 98.2 (19 Apr 2018 00:08), Max: 98.2 (19 Apr 2018 00:08)  HR: 84 (19 Apr 2018 05:29) (84 - 106)  BP: 85/50 (19 Apr 2018 05:29) (85/50 - 124/85)  BP(mean): --  RR: 20 (19 Apr 2018 05:29) (20 - 20)  SpO2: 100% (19 Apr 2018 05:29) (96% - 100%)    04-18 @ 07:01  -  04-19 @ 07:00  --------------------------------------------------------  IN: 0 mL / OUT: 4400 mL / NET: -4400 mL        PHYSICAL EXAM:    General: Well developed; well nourished; in no acute distress  HEENT: MMM, conjunctiva and sclera clear  Lungs: right sided chest tube with decreased breath sounds.  Cor: RRR S1, S2 only w/o mrg or clicks  Gastrointestinal: Abdomen: Softy distended; Normal bowel sounds; No palpable hepatosplenomegaly.  Extremities: 2+ lower extremity edema.  Skin: Warm and dry. No obvious rash  Neuro: Pt. a + o x 3, no tremulousness or asterixsis    LABS:      CBC Full  -  ( 18 Apr 2018 03:22 )  WBC Count : 11.1 K/uL  Hemoglobin : 12.8 g/dL  Hematocrit : 36.7 %  Platelet Count - Automated : 216 K/uL  Mean Cell Volume : 93.4 fl  Mean Cell Hemoglobin : 32.6 pg  Mean Cell Hemoglobin Concentration : 34.9 g/dL  Auto Neutrophil # : 6.9 K/uL  Auto Lymphocyte # : 2.7 K/uL  Auto Monocyte # : 1.0 K/uL  Auto Eosinophil # : 0.4 K/uL  Auto Basophil # : 0.1 K/uL  Auto Neutrophil % : 62.2 %  Auto Lymphocyte % : 24.4 %  Auto Monocyte % : 8.9 %  Auto Eosinophil % : 3.3 %  Auto Basophil % : 0.7 %    04-18    135  |  105  |  43.0<H>  ----------------------------<  180<H>  4.6   |  22.0  |  0.77    Ca    8.2<L>      18 Apr 2018 03:22  Phos  4.0     04-18  Mg     2.1     04-18    TPro  5.2<L>  /  Alb  1.9<L>  /  TBili  0.7  /  DBili  x   /  AST  84<H>  /  ALT  73<H>  /  AlkPhos  533<H>  04-18    PT/INR - ( 18 Apr 2018 03:22 )   PT: 15.0 sec;   INR: 1.36 ratio         PTT - ( 18 Apr 2018 03:22 )  PTT:36.0 sec                  RADIOLOGY & ADDITIONAL STUDIES (The following images were personally reviewed):

## 2018-04-19 NOTE — PATIENT PROFILE ADULT. - NS TRANSFER PATIENT BELONGINGS
Cell Phone/PDA (specify)/Clothing/Jewelry/Money (specify)/jeans, shoes shirt cap sweatshirt gold chain

## 2018-04-19 NOTE — PROGRESS NOTE ADULT - PROBLEM SELECTOR PLAN 1
Doppler U/S ordered to evaluate patency of TIPS. May ultimately need referral to liver transplant facility. Repeat labs ordered. May need paracentesis as well.

## 2018-04-19 NOTE — PROGRESS NOTE ADULT - ASSESSMENT
A/P  62 year old male alcoholic cirrhosis, recurrent hepatic hydrothorax, PE 2/8/2018 on eliquis, TIPS 3/29/2018, DM, status post chest tube at [place] on 4/3/2018. Patient was at home in usual poor state of health when he developed worsening shortness of breath. He came to [facility] ER where cxr revealed a large right sided pleural effusion.  Cardio-thoracic surgery has been following and they plan on eventually draining the chest but would like to monitor the patient first off eliquis for fear of bleeding --> CT surgery drain via right sided pigtail on 4/18 --> allowed for to drain 2L then clamped.       4/19 s/p right pigtail (4/18) currently clamped. Will hold off on Pleurex for today, unclamp and allow for max 1L drain (ED Nurse notified). Continue to hold Eliquis monitor daily CXR, Will continue to follow and re-evaluate.

## 2018-04-20 LAB
ALBUMIN SERPL ELPH-MCNC: 1.6 G/DL — LOW (ref 3.3–5.2)
ALP SERPL-CCNC: 362 U/L — HIGH (ref 40–120)
ALT FLD-CCNC: 48 U/L — HIGH
ANION GAP SERPL CALC-SCNC: 9 MMOL/L — SIGNIFICANT CHANGE UP (ref 5–17)
AST SERPL-CCNC: 63 U/L — HIGH
BASOPHILS # BLD AUTO: 0.1 K/UL — SIGNIFICANT CHANGE UP (ref 0–0.2)
BASOPHILS NFR BLD AUTO: 1 % — SIGNIFICANT CHANGE UP (ref 0–2)
BILIRUB SERPL-MCNC: 0.5 MG/DL — SIGNIFICANT CHANGE UP (ref 0.4–2)
BUN SERPL-MCNC: 57 MG/DL — HIGH (ref 8–20)
CALCIUM SERPL-MCNC: 7.6 MG/DL — LOW (ref 8.6–10.2)
CHLORIDE SERPL-SCNC: 104 MMOL/L — SIGNIFICANT CHANGE UP (ref 98–107)
CO2 SERPL-SCNC: 21 MMOL/L — LOW (ref 22–29)
CREAT SERPL-MCNC: 0.89 MG/DL — SIGNIFICANT CHANGE UP (ref 0.5–1.3)
EOSINOPHIL # BLD AUTO: 0.3 K/UL — SIGNIFICANT CHANGE UP (ref 0–0.5)
EOSINOPHIL NFR BLD AUTO: 3.9 % — SIGNIFICANT CHANGE UP (ref 0–6)
GLUCOSE SERPL-MCNC: 115 MG/DL — SIGNIFICANT CHANGE UP (ref 70–115)
HCT VFR BLD CALC: 31.3 % — LOW (ref 42–52)
HGB BLD-MCNC: 10.9 G/DL — LOW (ref 14–18)
INR BLD: 1.08 RATIO — SIGNIFICANT CHANGE UP (ref 0.88–1.16)
LYMPHOCYTES # BLD AUTO: 2.1 K/UL — SIGNIFICANT CHANGE UP (ref 1–4.8)
LYMPHOCYTES # BLD AUTO: 26.1 % — SIGNIFICANT CHANGE UP (ref 20–55)
MAGNESIUM SERPL-MCNC: 2 MG/DL — SIGNIFICANT CHANGE UP (ref 1.6–2.6)
MCHC RBC-ENTMCNC: 32.2 PG — HIGH (ref 27–31)
MCHC RBC-ENTMCNC: 34.8 G/DL — SIGNIFICANT CHANGE UP (ref 32–36)
MCV RBC AUTO: 92.6 FL — SIGNIFICANT CHANGE UP (ref 80–94)
MONOCYTES # BLD AUTO: 0.8 K/UL — SIGNIFICANT CHANGE UP (ref 0–0.8)
MONOCYTES NFR BLD AUTO: 10.2 % — HIGH (ref 3–10)
NEUTROPHILS # BLD AUTO: 4.7 K/UL — SIGNIFICANT CHANGE UP (ref 1.8–8)
NEUTROPHILS NFR BLD AUTO: 58.6 % — SIGNIFICANT CHANGE UP (ref 37–73)
PHOSPHATE SERPL-MCNC: 3 MG/DL — SIGNIFICANT CHANGE UP (ref 2.4–4.7)
PLATELET # BLD AUTO: 156 K/UL — SIGNIFICANT CHANGE UP (ref 150–400)
POTASSIUM SERPL-MCNC: 4.4 MMOL/L — SIGNIFICANT CHANGE UP (ref 3.5–5.3)
POTASSIUM SERPL-SCNC: 4.4 MMOL/L — SIGNIFICANT CHANGE UP (ref 3.5–5.3)
PROT SERPL-MCNC: 4 G/DL — LOW (ref 6.6–8.7)
PROTHROM AB SERPL-ACNC: 11.9 SEC — SIGNIFICANT CHANGE UP (ref 9.8–12.7)
RBC # BLD: 3.38 M/UL — LOW (ref 4.6–6.2)
RBC # FLD: 14.5 % — SIGNIFICANT CHANGE UP (ref 11–15.6)
SODIUM SERPL-SCNC: 134 MMOL/L — LOW (ref 135–145)
WBC # BLD: 8 K/UL — SIGNIFICANT CHANGE UP (ref 4.8–10.8)
WBC # FLD AUTO: 8 K/UL — SIGNIFICANT CHANGE UP (ref 4.8–10.8)

## 2018-04-20 PROCEDURE — 71045 X-RAY EXAM CHEST 1 VIEW: CPT | Mod: 26

## 2018-04-20 PROCEDURE — 99254 IP/OBS CNSLTJ NEW/EST MOD 60: CPT

## 2018-04-20 PROCEDURE — 99233 SBSQ HOSP IP/OBS HIGH 50: CPT

## 2018-04-20 RX ORDER — ALBUMIN HUMAN 25 %
100 VIAL (ML) INTRAVENOUS ONCE
Qty: 0 | Refills: 0 | Status: COMPLETED | OUTPATIENT
Start: 2018-04-20 | End: 2018-04-20

## 2018-04-20 RX ORDER — ESCITALOPRAM OXALATE 10 MG/1
10 TABLET, FILM COATED ORAL DAILY
Qty: 0 | Refills: 0 | Status: DISCONTINUED | OUTPATIENT
Start: 2018-04-20 | End: 2018-04-25

## 2018-04-20 RX ADMIN — Medication 1 TABLET(S): at 13:33

## 2018-04-20 RX ADMIN — Medication 50 MILLILITER(S): at 13:33

## 2018-04-20 NOTE — PROGRESS NOTE ADULT - SUBJECTIVE AND OBJECTIVE BOX
INTERVAL HPI/OVERNIGHT EVENTS:FU for recurrent right hydrothorax s/p TIPS and despite that the patient is reaccumulating fluid. Underwent chest tube placement and drainage. Pig tail catheter is in place. Complaining of chest pain and cough. Abdominal US performed but the results are not available. Patient is very frustrated at this time. Otherwise, appetite is much better. Passing BM. No prior EGD.    MEDICATIONS  (STANDING):  ceFAZolin   IVPB 2000 milliGRAM(s) IV Intermittent once  furosemide    Tablet 40 milliGRAM(s) Oral daily  multivitamin 1 Tablet(s) Oral daily  pantoprazole    Tablet 40 milliGRAM(s) Oral before breakfast  senna 2 Tablet(s) Oral at bedtime    MEDICATIONS  (PRN):  oxyCODONE    IR 5 milliGRAM(s) Oral every 4 hours PRN Mild Pain (1 - 3)      Allergies    No Known Allergies    Intolerances        Vital Signs Last 24 Hrs  T(C): 36.6 (19 Apr 2018 15:41), Max: 36.7 (19 Apr 2018 15:20)  T(F): 97.9 (19 Apr 2018 15:41), Max: 98.1 (19 Apr 2018 15:20)  HR: 84 (19 Apr 2018 15:41) (84 - 90)  BP: 103/65 (19 Apr 2018 15:41) (101/56 - 138/90)  BP(mean): --  RR: 20 (19 Apr 2018 15:41) (19 - 20)  SpO2: 100% (19 Apr 2018 15:20) (98% - 100%)    LABS:    04-19    135  |  106  |  56.0<H>  ----------------------------<  125<H>  4.9   |  21.0<L>  |  0.90    Ca    7.9<L>      19 Apr 2018 10:39            RADIOLOGY & ADDITIONAL TESTS:  < from: US Duplex Venous Lower Ext Complete, Bilateral (04.18.18 @ 12:22) >  INTERPRETATION:  CLINICAL INFORMATION: Leg edema    COMPARISON: None available.    TECHNIQUE: Duplex sonography of the BILATERAL LOWER extremities with   color and spectral Doppler, with and without compression.      FINDINGS:    There is normal compressibility of the bilateral common femoral, femoral   and popliteal veins. No calf vein thrombosis is detected.    Doppler examination shows normalspontaneous and phasic flow.    IMPRESSION:     No evidence of bilateral lower extremity deep venous thrombosis.    < end of copied text >

## 2018-04-20 NOTE — CONSULT NOTE ADULT - SUBJECTIVE AND OBJECTIVE BOX
HPI: This is a 63 yo M with PMH of PE, DMEnd Stage cirrhosis, readmissions for Large recurrent Ascitis was also admitted for Anemia from acute blood loss. Received two units blood. R sided CT placed  drained 4L yesterday. He has dyspnea on exertion, not OOB today-extremely weak and lethargic; he is not able to keep eyes open during this consult.  He denies pain, N/V/D constipation poor appetite  CC: SOB on exertion, poor functional status very weak.    62 year old male alcoholic cirrhosis, recurrent hepatic hydrothorax, PE 2/8/2018 on eliquis, TIPS 3/29/2018, DM, status post chest tube at [place] on 4/3/2018. Patient was at home in usual poor state of health when he developed worsening shortness of breath. He came to [facility] ER where cxr revealed a large right sided pleural effusion.  Cardio-thoracic surgery has been following and they plan on eventually draining the chest but would like to monitor the patient first off eliquis for fear of bleeding. (18 Apr 2018 10:17)      PERTINENT PMH REVIEWED: Yes     PAST MEDICAL & SURGICAL HISTORY:  Pulmonary embolism  Cirrhosis  DM (diabetes mellitus)  S/P thoracentesis      SOCIAL HISTORY:  EtOH Yes   No ??                                   Drugs    No                                      nonsmoker                                    Admitted from: home  wife Melissa [phone]  FAMILY HISTORY:  No pertinent family history in first degree relatives    No Known Allergies  Baseline ADLs (prior to admission):   Dependent      Present Symptoms:     Dyspnea:  2- 3   Nausea/Vomiting:  No  Anxiety:  Yes   Depression: Yes   Fatigue: Yes   Loss of appetite: Yes     Pain: Denies            Character-            Duration-            Effect-            Factors-            Frequency-            Location-            Severity-    Review of Systems: Reviewed                  Unable to obtain due to poor mentation     MEDICATIONS  (STANDING):  ceFAZolin   IVPB 2000 milliGRAM(s) IV Intermittent once  furosemide    Tablet 40 milliGRAM(s) Oral daily  multivitamin 1 Tablet(s) Oral daily  pantoprazole    Tablet 40 milliGRAM(s) Oral before breakfast  senna 2 Tablet(s) Oral at bedtime    MEDICATIONS  (PRN):  oxyCODONE    IR 5 milliGRAM(s) Oral every 4 hours PRN Mild Pain (1 - 3)    PHYSICAL EXAM:    Vital Signs Last 24 Hrs  T(C): 36.8 (20 Apr 2018 07:49), Max: 36.8 (20 Apr 2018 07:49)  T(F): 98.2 (20 Apr 2018 07:49), Max: 98.2 (20 Apr 2018 07:49)  HR: 82 (20 Apr 2018 07:49) (82 - 90)  BP: 105/69 (20 Apr 2018 07:49) (103/65 - 138/90)  BP(mean): --  RR: 18 (20 Apr 2018 07:49) (18 - 20)  SpO2: 100% (19 Apr 2018 15:20) (100% - 100%)    General: alert  oriented x 1____ lethargic                 nonverbal  nodding head side to side when asked simple questions    HEENT:  dry mouth      Lungs: tachypnea/labored breathing     CV: normal    GI:  distended  tender         : normal      MSK: weakness  edema II+ B/L Lower extremities and feet            ambulatory-not this admission  bedbound/    Skin: normal  _  no rash    LABS:                        10.9   8.0   )-----------( 156      ( 20 Apr 2018 08:25 )             31.3     04-20    134<L>  |  104  |  57.0<H>  ----------------------------<  115  4.4   |  21.0<L>  |  0.89    Ca    7.6<L>      20 Apr 2018 08:25  Phos  3.0     04-20  Mg     2.0     04-20    TPro  4.0<L>  /  Alb  1.6<L>  /  TBili  0.5  /  DBili  x   /  AST  63<H>  /  ALT  48<H>  /  AlkPhos  362<H>  04-20    PT/INR - ( 20 Apr 2018 08:25 )   PT: 11.9 sec;   INR: 1.08 ratio         I&O's Summary    19 Apr 2018 07:01  -  20 Apr 2018 07:00  --------------------------------------------------------  IN: 0 mL / OUT: 2190 mL / NET: -2190 mL    RADIOLOGY & ADDITIONAL STUDIES:      ADVANCE DIRECTIVES: < from: Xray Chest 1 View AP/PA. (04.19.18 @ 12:55) >  Findings:  A right chest tube is noted as seen on the prior study. No evidence of   pneumothorax or pleural effusion. Linear atelectasis at the left lung   base, unchanged. The lungs are otherwise clear. The heart and mediastinum   are unremarkable..    Impression:  Right chest tube, unchanged. No evidence of pneumothorax..      DNR NO  Completed on:                     MOLST   NO   Completed on:  Living Will   NO   Completed on: HPI: This is a 61 yo M with PMH of PE, DMEnd Stage cirrhosis, readmissions for Large recurrent Ascitis was also admitted for Anemia from acute blood loss. Received two units blood. R sided CT placed  drained 4L yesterday. He has dyspnea on exertion, not OOB today-extremely weak and lethargic; he is not able to keep eyes open during this consult.  He denies pain, N/V/D constipation poor appetite  CC: SOB on exertion, once awake he has a cough with pleuritic CP poor functional status very weak. Mood low and withdrawn    62 year old male alcoholic cirrhosis, recurrent hepatic hydrothorax, PE 2/8/2018 on eliquis, TIPS 3/29/2018, DM, status post chest tube at [place] on 4/3/2018. Patient was at home in usual poor state of health when he developed worsening shortness of breath. He came to [facility] ER where cxr revealed a large right sided pleural effusion.  Cardio-thoracic surgery has been following and they plan on eventually draining the chest but would like to monitor the patient first off eliquis for fear of bleeding. (18 Apr 2018 10:17)      PERTINENT PMH REVIEWED: Yes     PAST MEDICAL & SURGICAL HISTORY:  Pulmonary embolism  Cirrhosis  DM (diabetes mellitus)  S/P thoracentesis      SOCIAL HISTORY:  EtOH Yes   No ??                                   Drugs    No                                      nonsmoker                                    Admitted from: home  wife Melissa [phone]  FAMILY HISTORY:  No pertinent family history in first degree relatives    No Known Allergies  Baseline ADLs (prior to admission):   Dependent      Present Symptoms:     Dyspnea:  2- 3   Nausea/Vomiting:  No  Anxiety:  Yes   Depression: Yes   Fatigue: Yes   Loss of appetite: Yes     Pain: Denies            Character-            Duration-            Effect-            Factors-            Frequency-            Location-            Severity-    Review of Systems: Reviewed                  Unable to obtain due to poor mentation     MEDICATIONS  (STANDING):  ceFAZolin   IVPB 2000 milliGRAM(s) IV Intermittent once  furosemide    Tablet 40 milliGRAM(s) Oral daily  multivitamin 1 Tablet(s) Oral daily  pantoprazole    Tablet 40 milliGRAM(s) Oral before breakfast  senna 2 Tablet(s) Oral at bedtime    MEDICATIONS  (PRN):  oxyCODONE    IR 5 milliGRAM(s) Oral every 4 hours PRN Mild Pain (1 - 3)    PHYSICAL EXAM:    Vital Signs Last 24 Hrs  T(C): 36.8 (20 Apr 2018 07:49), Max: 36.8 (20 Apr 2018 07:49)  T(F): 98.2 (20 Apr 2018 07:49), Max: 98.2 (20 Apr 2018 07:49)  HR: 82 (20 Apr 2018 07:49) (82 - 90)  BP: 105/69 (20 Apr 2018 07:49) (103/65 - 138/90)  BP(mean): --  RR: 18 (20 Apr 2018 07:49) (18 - 20)  SpO2: 100% (19 Apr 2018 15:20) (100% - 100%)    General: alert  oriented x 1____ lethargic                 nonverbal  nodding head side to side when asked simple questions    HEENT:  dry mouth      Lungs: tachypnea/labored breathing     CV: normal    GI:  distended  tender         : normal      MSK: weakness  edema II+ B/L Lower extremities and feet            ambulatory-not this admission  bedbound/    Skin: normal  _  no rash    LABS:                        10.9   8.0   )-----------( 156      ( 20 Apr 2018 08:25 )             31.3     04-20    134<L>  |  104  |  57.0<H>  ----------------------------<  115  4.4   |  21.0<L>  |  0.89    Ca    7.6<L>      20 Apr 2018 08:25  Phos  3.0     04-20  Mg     2.0     04-20    TPro  4.0<L>  /  Alb  1.6<L>  /  TBili  0.5  /  DBili  x   /  AST  63<H>  /  ALT  48<H>  /  AlkPhos  362<H>  04-20    PT/INR - ( 20 Apr 2018 08:25 )   PT: 11.9 sec;   INR: 1.08 ratio         I&O's Summary    19 Apr 2018 07:01  -  20 Apr 2018 07:00  --------------------------------------------------------  IN: 0 mL / OUT: 2190 mL / NET: -2190 mL    RADIOLOGY & ADDITIONAL STUDIES:      ADVANCE DIRECTIVES: < from: Xray Chest 1 View AP/PA. (04.19.18 @ 12:55) >  Findings:  A right chest tube is noted as seen on the prior study. No evidence of   pneumothorax or pleural effusion. Linear atelectasis at the left lung   base, unchanged. The lungs are otherwise clear. The heart and mediastinum   are unremarkable..    Impression:  Right chest tube, unchanged. No evidence of pneumothorax..      DNR NO  Completed on:                     MOLST   NO   Completed on:  Living Will   NO   Completed on:

## 2018-04-20 NOTE — PROGRESS NOTE ADULT - SUBJECTIVE AND OBJECTIVE BOX
Pt seen in fu.  Well know to service.  Pigtail in place.  Ok for DC at any point as tube can be removed when needed.  Will likely need outpt reg scheduled thoracentesis which should be arranged on DC so as to avoid further need for admission.

## 2018-04-20 NOTE — PROGRESS NOTE ADULT - ASSESSMENT
A/P  62 year old male alcoholic cirrhosis, recurrent hepatic hydrothorax, PE 2/8/2018 on eliquis, TIPS 3/29/2018, DM, status post chest tube at [place] on 4/3/2018. Patient was at home in usual poor state of health when he developed worsening shortness of breath. He came to [facility] ER where cxr revealed a large right sided pleural effusion.  Cardio-thoracic surgery has been following and they plan on eventually draining the chest but would like to monitor the patient first off eliquis for fear of bleeding --> CT surgery drain via right sided pigtail on 4/18 --> allowed for to drain 2L then clamped.       4/18 s/p right pigtail.  4/19 Pleurex cancelled due to pt refusal.  4/20 CT unclamped for a total of 1 liter of initial serous drainage.

## 2018-04-20 NOTE — CONSULT NOTE ADULT - ASSESSMENT
Needs thoracentesis but appropriate to wait off Elliquis but not contraindicated to place now in view of amount of effusion  Needs to continue anticoagulation post D/C for next 3-4 months at least  Consider followup CTA in next few months to establish if clot still present as patient is high risk for bleeding in view of liver disease.  As well thus far has been requiring recurrent procedures  Agree with Doppler.
Patient with recent TIPS at [place] for recurrent pleural effusion and then had chest tube placement and then signed out AMA from there, now admitted with recurrent pleural effusion, ascites and pedal edema.     1. US abdomen doppler for TIPS patency  2. Chest tube as per CT surgery team  3. Due to complicated refractory disease which is not responding to diuretics, TIPS, liver transplant candidacy  should be checked. Need to be discussed with hepatologist at [facility] and also liver centers
62yoM with Advanced  Alcoholic Liver Cirrhosis    Liver Cirrhosis with Ascitis  May need more Paracentesis    Severe depression--Calling a Psychiatry consult to evaluate and make recommendations    Large recurrent Hepatic Hydrothrow with need for regular thoracentesis draining as Out Pt F/U  Recommending he F/U at [facility] pursue Liver transplant    Chronic PE- w/o cor pulmonale   Elliquis on hold    Pleural Effusions CT to pleurovac Treat cough and oxycodone 5mg PO for severe pain.    Will try to meet with patient and his wife on Monday if he is still hospitalized to discuss how they are able to cope at home, mood  treatment options.

## 2018-04-20 NOTE — PROGRESS NOTE ADULT - PROBLEM SELECTOR PLAN 1
CT unclamped by myself.  1 liter of serous drainage noted and output slowed down.  Plan to leave unclamped.  Nursing instructed to call CT surgery if drainage  increases.  (can call [phone] until 7pm and between 7pm and 7am can call the CTICU at [phone]).  CXR in AM.  Pt appears depressed.  Discussed with Dr. Pack.  Discussed with Dr. Saez.

## 2018-04-20 NOTE — PROGRESS NOTE ADULT - PROBLEM SELECTOR PLAN 1
Chest tube in place, clamped on 4/19 Drained greater than 4 liters 4/19. On 4/20 the chest tube has been unclamped and is no longer actively draining.

## 2018-04-20 NOTE — CHART NOTE - NSCHARTNOTEFT_GEN_A_CORE
Palliative Medicine NP Note:    Patient was extremely lethargic and difficult to arouse when I tried to communicate with him during my consult. He was not able to keep his eyes open,  slept through phone ringing. I called his wife Melissa asked if she had seen him today, she had not; I commented that he seemed very sleepy.  She immediately became anxious and alarmed, asking me: "why I was asking her, why aren't you talking to the nurse...I need to call my daughter now"   Sarah RN came to bedside to do an immediate assessment-he gradually woke up, able to keep his eyes open, was more alert  and oriented x3  Able to speak clearly, his  voice very low.VSS /88 HR 75-88 R 16 POX 95-97% RA. Patient denies pain or discomfort.   Affect flat, did not get OOB today, nor did he eat his lunch.  I will be requesting a Psychiatry consult to evaluate for severe depression and recommendations for  medication management. Dr. Pack was called left message to return my call and discuss patient's lethargy and mood.  Will continue to monitor closely.  Pattie Ashley NP

## 2018-04-20 NOTE — CONSULT NOTE ADULT - ATTENDING COMMENTS
COUNSELING:    Face to face meeting to discuss Advanced Care Planning - Time Spent ______ Minutes.  See goals of care note.    More than 50% time spent in counseling and coordinating care. _35_____ Minutes.     Thank you for the opportunity to assist with the care of this patient.   [facility] Consult [facility] [phone]. COUNSELING:    Face to face meeting to discuss Advanced Care Planning - Time Spent __16____ Minutes.  See goals of care note.    More than 50% time spent in counseling and coordinating care. _60_____ Minutes.     Thank you for the opportunity to assist with the care of this patient.   [facility] Consult Service [phone].

## 2018-04-20 NOTE — PROGRESS NOTE ADULT - ASSESSMENT
62 year old male end stage cirrosis, pulmonary embolus, recurrent pleural effusions.  Chest tube remains in place. 62 year old male end stage cirrosis, pulmonary embolus, recurrent pleural effusions.  Chest tube remains in place. Eliquis on hold while chest tube in place. 62 year old male end stage cirrosis, pulmonary embolus, recurrent pleural effusions.  Chest tube remains in place. Eliquis on hold while chest tube in place.  Depression add Lexapro

## 2018-04-20 NOTE — PROGRESS NOTE ADULT - ASSESSMENT
Patient with liver cirrhosis, recurrent right sided pleural effusion, s/p TIPS but reaccumulation of the pleural fluid, PE on eliquis    1. Confirm patency of the TIPS  2. Consider giving albumin as we are draining fluid  3. Will need to contact liver center after the US doppler results regarding management Patient with liver cirrhosis, recurrent right sided pleural effusion, s/p TIPS but reaccumulation of the pleural fluid, PE on eliquis    1. Confirm patency of the TIPS  2. Consider giving albumin as we are draining fluid  3. I spoke to the  intervention radiology team and  the TIPS appeared to be patent. However wait for the official report.  4. I discussed with the hepatologist Dr. Anthony Butcher he recommended  after the thoracentesis, the patient should followup with them at [facility] upon discharge.

## 2018-04-20 NOTE — PROGRESS NOTE ADULT - SUBJECTIVE AND OBJECTIVE BOX
ERIC NAVARRETEZ     Chief Complaint: Patient is a 62y old  Male who presents with a chief complaint of Shortness of breath (18 Apr 2018 10:17)      PAST MEDICAL & SURGICAL HISTORY:  Pulmonary embolism  Cirrhosis  DM (diabetes mellitus)  S/P thoracentesis      HPI/OVERNIGHT EVENTS: Patient has chest tube in place, unclamped no longer actively draining.    MEDICATIONS  (STANDING):  ceFAZolin   IVPB 2000 milliGRAM(s) IV Intermittent once  escitalopram 10 milliGRAM(s) Oral daily  furosemide    Tablet 40 milliGRAM(s) Oral daily  multivitamin 1 Tablet(s) Oral daily  pantoprazole    Tablet 40 milliGRAM(s) Oral before breakfast  senna 2 Tablet(s) Oral at bedtime      Vital Signs Last 24 Hrs  T(C): 36.6 (20 Apr 2018 16:24), Max: 36.8 (20 Apr 2018 07:49)  T(F): 97.9 (20 Apr 2018 16:24), Max: 98.2 (20 Apr 2018 07:49)  HR: 88 (20 Apr 2018 16:24) (82 - 88)  BP: 110/73 (20 Apr 2018 16:24) (105/69 - 110/80)  BP(mean): --  RR: 20 (20 Apr 2018 16:24) (18 - 20)  SpO2: 95% (20 Apr 2018 16:24) (95% - 97%)    PHYSICAL EXAM:  Constitutional: NAD, well-groomed, well-developed  HEENT: PERRLA, EOMI, Normal Hearing, MMM  Neck: No LAD, No JVD  Back: Normal spine flexure, No CVA tenderness  Respiratory: Chest tube in place Cardiovascular: S1 and S2, RRR, no M/G/R  Gastrointestinal: BS+, soft, NT/ND  Extremities: No peripheral edema  Vascular: 2+ peripheral pulses  Neurological: A/O x 3, no focal deficits  Psychiatric: Normal mood, normal affect  Musculoskeletal: 5/5 strength b/l upper and lower extremities  Skin: No rashes    CAPILLARY BLOOD GLUCOSE    LABS:                        10.9   8.0   )-----------( 156      ( 20 Apr 2018 08:25 )             31.3     04-20    134<L>  |  104  |  57.0<H>  ----------------------------<  115  4.4   |  21.0<L>  |  0.89    Ca    7.6<L>      20 Apr 2018 08:25  Phos  3.0     04-20  Mg     2.0     04-20    TPro  4.0<L>  /  Alb  1.6<L>  /  TBili  0.5  /  DBili  x   /  AST  63<H>  /  ALT  48<H>  /  AlkPhos  362<H>  04-20    PT/INR - ( 20 Apr 2018 08:25 )   PT: 11.9 sec;   INR: 1.08 ratio               RADIOLOGY & ADDITIONAL TESTS:

## 2018-04-20 NOTE — PROGRESS NOTE ADULT - SUBJECTIVE AND OBJECTIVE BOX
Subjective:  Pt lying in bed.  Denies CP or SOB.  NAD noted.  Sleeping.  Answers "Yes" and "No" but does not open eyes to conversate.                             T(F): 98.2 (04-20-18 @ 07:49), Max: 98.2 (04-20-18 @ 07:49)  HR: 82 (04-20-18 @ 07:49) (82 - 82)  BP: 105/69 (04-20-18 @ 07:49) (105/69 - 105/69)  RR: 18 (04-20-18 @ 07:49) (18 - 18)      No Known Allergies      04-20    134<L>  |  104  |  57.0<H>  ----------------------------<  115  4.4   |  21.0<L>  |  0.89    Ca    7.6<L>      20 Apr 2018 08:25  Phos  3.0     04-20  Mg     2.0     04-20    TPro  4.0<L>  /  Alb  1.6<L>  /  TBili  0.5  /  DBili  x   /  AST  63<H>  /  ALT  48<H>  /  AlkPhos  362<H>  04-20                               10.9   8.0   )-----------( 156      ( 20 Apr 2018 08:25 )             31.3        PT/INR - ( 20 Apr 2018 08:25 )   PT: 11.9 sec;   INR: 1.08 ratio            CXR: < from: Xray Chest 1 View- PORTABLE-Routine (04.20.18 @ 05:45) >     EXAM:  XR CHEST PORTABLE ROUTINE 1V                          PROCEDURE DATE:  04/20/2018        INTERPRETATION:  Indication: Follow-up history of cirrhosis right pigtail   catheter    Technique: Frontal view of chest.    Comparison exam: 4/19/2018 12:46 PM.    Findings:  Again noted, there is small right effusion and right chest tube in the   right lung base unchanged. Subsegmental atelectasis or scarring in the   left lung base unchanged. The heart and mediastinum are  unremarkable.. There is thoracic spondylosis    Impression:  Right chest tube, small right effusion unchanged. Subsegmental   atelectasis.    PANDA LIEBERMAN M.D., ATTENDING RADIOLOGIST  This document has been electronically signed. Apr 20 2018 11:51AM    < end of copied text >      I&O's Detail    19 Apr 2018 07:01  -  20 Apr 2018 07:00  --------------------------------------------------------  IN:  Total IN: 0 mL    OUT:    Chest Tube: 2190 mL  Total OUT: 2190 mL    Total NET: -2190 mL        CHEST TUBE:  [ x] YES [ ] NO  OUTPUT: 940ml overnight and 2190ml over the last 24 hours.  Tube remained clamped overnight and was unclamped by myself at 1530 for a total of 1 liter of serous fluid.  Tube left unclamped.   AIR LEAKS:  [ ] YES [x ] NO          Active Medications:  ceFAZolin   IVPB 2000 milliGRAM(s) IV Intermittent once  furosemide    Tablet 40 milliGRAM(s) Oral daily  multivitamin 1 Tablet(s) Oral daily  oxyCODONE    IR 5 milliGRAM(s) Oral every 4 hours PRN  pantoprazole    Tablet 40 milliGRAM(s) Oral before breakfast  senna 2 Tablet(s) Oral at bedtime      Physical Exam:    Neuro: Sleepy but arousable to verbal.  Answers "Yes" and "No", but does not engage in conversation.    Pulm: Diminished BLL R>L with scattered rhonchi.    CV: RRR.  +S1+S2.    Abd: Softly distended/NT.  +BS.       PAST MEDICAL & SURGICAL HISTORY:  Pulmonary embolism  Cirrhosis  DM (diabetes mellitus)  S/P thoracentesis

## 2018-04-21 DIAGNOSIS — F43.20 ADJUSTMENT DISORDER, UNSPECIFIED: ICD-10-CM

## 2018-04-21 PROCEDURE — 99222 1ST HOSP IP/OBS MODERATE 55: CPT

## 2018-04-21 PROCEDURE — 99233 SBSQ HOSP IP/OBS HIGH 50: CPT

## 2018-04-21 RX ADMIN — ESCITALOPRAM OXALATE 10 MILLIGRAM(S): 10 TABLET, FILM COATED ORAL at 12:02

## 2018-04-21 RX ADMIN — OXYCODONE HYDROCHLORIDE 5 MILLIGRAM(S): 5 TABLET ORAL at 22:36

## 2018-04-21 RX ADMIN — OXYCODONE HYDROCHLORIDE 5 MILLIGRAM(S): 5 TABLET ORAL at 22:06

## 2018-04-21 RX ADMIN — Medication 1 TABLET(S): at 12:02

## 2018-04-21 NOTE — BEHAVIORAL HEALTH ASSESSMENT NOTE - NSBHCONSULTFOLLOWAFTERCARE_PSY_A_CORE FT
Given current medical comorbidities, will benefit from outpatient psychiatry for therapy upon discharge from the hospital.

## 2018-04-21 NOTE — PROGRESS NOTE ADULT - SUBJECTIVE AND OBJECTIVE BOX
Thoracic PA    Chest tube output noted to be 2.5 L in 24 hours. Should maintain tube for now, to waterseal, per Dr Saez and remove when ready for discharge. Appointment should be made on discharge for outpatient thoracentesis.    D/W Dr Saez and Dr Pack. Possible discharge early in the week. Will re-evaluate daily. details… detailed exam

## 2018-04-21 NOTE — PROGRESS NOTE ADULT - SUBJECTIVE AND OBJECTIVE BOX
Pt seen and examined Condition unchanged. Still has right sided chest tube. Doppler U/S of abdomen reveals patent TIPS. Dr. Pearl spoke with Dr. Anthony Pruitt yesterday who is chief of Hepatology @ [facility] [place] who recommended keeping pt. on Lasix and referring him back to see Dr. Anthony Pruitt for eventual referral to [facility] for liver transplantation evaluation.    REVIEW OF SYSTEMS:  Constitutional: No fever, weight loss or fatigue  Cardiovascular: No chest pain, palpitations, dizziness or leg swelling  Gastrointestinal: No abdominal or epigastric pain. No nausea, vomiting or hematemesis; No diarrhea or constipation. No melena or hematochezia.  Skin: No itching, burning, rashes or lesions       MEDICATIONS:  MEDICATIONS  (STANDING):  ceFAZolin   IVPB 2000 milliGRAM(s) IV Intermittent once  escitalopram 10 milliGRAM(s) Oral daily  furosemide    Tablet 40 milliGRAM(s) Oral daily  multivitamin 1 Tablet(s) Oral daily  pantoprazole    Tablet 40 milliGRAM(s) Oral before breakfast  senna 2 Tablet(s) Oral at bedtime    MEDICATIONS  (PRN):  oxyCODONE    IR 5 milliGRAM(s) Oral every 4 hours PRN Mild Pain (1 - 3)      Allergies    No Known Allergies    Intolerances        Vital Signs Last 24 Hrs  T(C): 36.5 (21 Apr 2018 08:08), Max: 36.7 (20 Apr 2018 15:45)  T(F): 97.7 (21 Apr 2018 08:08), Max: 98.1 (20 Apr 2018 15:45)  HR: 89 (21 Apr 2018 08:08) (86 - 96)  BP: 112/76 (21 Apr 2018 08:08) (101/56 - 112/76)  BP(mean): --  RR: 20 (21 Apr 2018 08:08) (18 - 20)  SpO2: 100% (21 Apr 2018 00:45) (95% - 100%)    04-20 @ 07:01  -  04-21 @ 07:00  --------------------------------------------------------  IN: 360 mL / OUT: 2500 mL / NET: -2140 mL        PHYSICAL EXAM:    General: Well developed; well nourished; in no acute distress  HEENT: MMM, conjunctiva pink and sclera anicteric.  Lungs: Right sided chest tube  Cor: RRR S1, S2 only.  Gastrointestinal: Abdomen: Soft non-tender non-distended; Normal bowel sounds; No splenomegaly  Extremities: no cyanosis, clubbing or edema.  Skin: Warm and dry. No obvious rash  Neuro: Pt. a + o x 3    LABS:      CBC Full  -  ( 20 Apr 2018 08:25 )  WBC Count : 8.0 K/uL  Hemoglobin : 10.9 g/dL  Hematocrit : 31.3 %  Platelet Count - Automated : 156 K/uL  Mean Cell Volume : 92.6 fl  Mean Cell Hemoglobin : 32.2 pg  Mean Cell Hemoglobin Concentration : 34.8 g/dL  Auto Neutrophil # : 4.7 K/uL  Auto Lymphocyte # : 2.1 K/uL  Auto Monocyte # : 0.8 K/uL  Auto Eosinophil # : 0.3 K/uL  Auto Basophil # : 0.1 K/uL  Auto Neutrophil % : 58.6 %  Auto Lymphocyte % : 26.1 %  Auto Monocyte % : 10.2 %  Auto Eosinophil % : 3.9 %  Auto Basophil % : 1.0 %    04-20    134<L>  |  104  |  57.0<H>  ----------------------------<  115  4.4   |  21.0<L>  |  0.89    Ca    7.6<L>      20 Apr 2018 08:25  Phos  3.0     04-20  Mg     2.0     04-20    TPro  4.0<L>  /  Alb  1.6<L>  /  TBili  0.5  /  DBili  x   /  AST  63<H>  /  ALT  48<H>  /  AlkPhos  362<H>  04-20    PT/INR - ( 20 Apr 2018 08:25 )   PT: 11.9 sec;   INR: 1.08 ratio                           RADIOLOGY & ADDITIONAL STUDIES (The following images were personally reviewed):

## 2018-04-21 NOTE — PROGRESS NOTE ADULT - ASSESSMENT
62 year old male end stage cirrosis, pulmonary embolus, recurrent pleural effusions. Chest tube remains in place. Eliquis on hold while chest tube in place. Depression add Lexapro.  Ct surgery recommends to keep the chest tube in place until discharge. GI input, patient is a candidate for transplant. Continue supportive care.

## 2018-04-21 NOTE — PROGRESS NOTE ADULT - PROBLEM SELECTOR PLAN 1
Management as outlined above. Repeat labs ordered for the AM. TIPS patent but ineffective for this pt. making him a potential candidate for  possible liver transplantation. Overall prognosis, however, remains guarded.

## 2018-04-21 NOTE — BEHAVIORAL HEALTH ASSESSMENT NOTE - NSBHCHARTREVIEWLAB_PSY_A_CORE FT
CBC Full  -  ( 20 Apr 2018 08:25 )  WBC Count : 8.0 K/uL  Hemoglobin : 10.9 g/dL  Hematocrit : 31.3 %  Platelet Count - Automated : 156 K/uL  Mean Cell Volume : 92.6 fl  Mean Cell Hemoglobin : 32.2 pg  Mean Cell Hemoglobin Concentration : 34.8 g/dL  Auto Neutrophil # : 4.7 K/uL  Auto Lymphocyte # : 2.1 K/uL  Auto Monocyte # : 0.8 K/uL  Auto Eosinophil # : 0.3 K/uL  Auto Basophil # : 0.1 K/uL  Auto Neutrophil % : 58.6 %  Auto Lymphocyte % : 26.1 %  Auto Monocyte % : 10.2 %  Auto Eosinophil % : 3.9 %  Auto Basophil % : 1.0 %  04-20    134<L>  |  104  |  57.0<H>  ----------------------------<  115  4.4   |  21.0<L>  |  0.89    Ca    7.6<L>      20 Apr 2018 08:25  Phos  3.0     04-20  Mg     2.0     04-20    TPro  4.0<L>  /  Alb  1.6<L>  /  TBili  0.5  /  DBili  x   /  AST  63<H>  /  ALT  48<H>  /  AlkPhos  362<H>  04-20

## 2018-04-21 NOTE — PROGRESS NOTE ADULT - PROBLEM SELECTOR PLAN 1
Chest tube in place, clamped on 4/19 Drained greater than 4 liters 4/19. On 4/20 the chest tube has been unclamped and drainage.

## 2018-04-21 NOTE — BEHAVIORAL HEALTH ASSESSMENT NOTE - HPI (INCLUDE ILLNESS QUALITY, SEVERITY, DURATION, TIMING, CONTEXT, MODIFYING FACTORS, ASSOCIATED SIGNS AND SYMPTOMS)
62 year old, , non-care-giver, retired ; domiciles in [place]; without any history of psychiatric treatment or psychiatric hospitalizations; with a history of alcohol and illicit drug use; with a past medical history of PE, DM, recurrent hepatic hydrothorax, End Stage cirrhosis, readmissions for Large recurrent Ascites was also admitted for Anemia from acute blood loss; received two units blood. Brought in by ambulance due to dyspnea on exertion. R sided CT in place. Psychiatric assessment requested due to assess for depression.    On assessment today, patient is irritable; states he does not have a psychiatric history, then states he had to use the bathroom; spent at least 15 minutes in there.  Upon assessment, patient endorses current abdominal pain. States "my stomach feels really bad right now; I'm tired". His affect is notably tired congruent with his "tired" mood. He adamantly denies current depressive symptoms, and seems surprised about being evaluated. He adamantly denies current depressive symptoms; states "I feel wiped out, but I'm not depressed". He denies any  history of suicidal ideations or suicidal attempts. Denies any history of psychosis, persistent anxiety and appears drowsy, keeping both eyes closed for the majority of the assessment. He adamantly denies current suicidal ideations, intent or plans to die. States "I love my life: I love driving my cars; especially my corvette". He denies any current psychotic symptoms and none noted. He states he used to abuse cocaine a while ago, and has stopped using about 10 years ago; he also admitted to a history of alcohol abuse, and noted he has not had a drink since one year ago.

## 2018-04-21 NOTE — BEHAVIORAL HEALTH ASSESSMENT NOTE - DESCRIPTION (FIRST USE, LAST USE, QUANTITY, FREQUENCY, DURATION)
History of alcohol abuse; last used a year ago per patient. History of cocaine abuse; last used 10 years ago as reported by patient.

## 2018-04-21 NOTE — BEHAVIORAL HEALTH ASSESSMENT NOTE - AXIS III
PAST MEDICAL & SURGICAL HISTORY:  Pulmonary embolism  Cirrhosis  DM (diabetes mellitus)  S/P thoracentesis

## 2018-04-21 NOTE — PROGRESS NOTE ADULT - SUBJECTIVE AND OBJECTIVE BOX
ERIC ARIAS     Chief Complaint: Patient is a 62y old  Male who presents with a chief complaint of Shortness of breath (18 Apr 2018 10:17)      PAST MEDICAL & SURGICAL HISTORY:  Pulmonary embolism  Cirrhosis  DM (diabetes mellitus)  S/P thoracentesis      HPI/OVERNIGHT EVENTS: Patient walking comfortably, carrying his chest tube. Ct surgery and GI input noted.    MEDICATIONS  (STANDING):  ceFAZolin   IVPB 2000 milliGRAM(s) IV Intermittent once  escitalopram 10 milliGRAM(s) Oral daily  furosemide    Tablet 40 milliGRAM(s) Oral daily  multivitamin 1 Tablet(s) Oral daily  pantoprazole    Tablet 40 milliGRAM(s) Oral before breakfast  senna 2 Tablet(s) Oral at bedtime      Vital Signs Last 24 Hrs  T(C): 36.5 (21 Apr 2018 08:08), Max: 36.7 (20 Apr 2018 15:45)  T(F): 97.7 (21 Apr 2018 08:08), Max: 98.1 (20 Apr 2018 15:45)  HR: 89 (21 Apr 2018 08:08) (86 - 96)  BP: 112/76 (21 Apr 2018 08:08) (101/56 - 112/76)  BP(mean): --  RR: 20 (21 Apr 2018 08:08) (18 - 20)  SpO2: 100% (21 Apr 2018 00:45) (95% - 100%)    PHYSICAL EXAM:  Constitutional: NAD, well-groomed, well-developed  HEENT: PERRLA, EOMI, Normal Hearing, MMM  Neck: No LAD, No JVD  Back: Normal spine flexure, No CVA tenderness  Respiratory: Chest tube in place Cardiovascular: S1 and S2, RRR, no M/G/R  Gastrointestinal: BS+, soft, NT/ND  Extremities: No peripheral edema  Vascular: 2+ peripheral pulses  Neurological: A/O x 3, no focal deficits  Psychiatric: Normal mood, normal affect  Musculoskeletal: 5/5 strength b/l upper and lower extremities  Skin: No rashes    CAPILLARY BLOOD GLUCOSE    LABS:                        10.9   8.0   )-----------( 156      ( 20 Apr 2018 08:25 )             31.3     04-20    134<L>  |  104  |  57.0<H>  ----------------------------<  115  4.4   |  21.0<L>  |  0.89    Ca    7.6<L>      20 Apr 2018 08:25  Phos  3.0     04-20  Mg     2.0     04-20    TPro  4.0<L>  /  Alb  1.6<L>  /  TBili  0.5  /  DBili  x   /  AST  63<H>  /  ALT  48<H>  /  AlkPhos  362<H>  04-20    PT/INR - ( 20 Apr 2018 08:25 )   PT: 11.9 sec;   INR: 1.08 ratio               RADIOLOGY & ADDITIONAL TESTS:

## 2018-04-21 NOTE — BEHAVIORAL HEALTH ASSESSMENT NOTE - NSBHCHARTREVIEWVS_PSY_A_CORE FT
Vital Signs Last 24 Hrs  T(C): 36.5 (21 Apr 2018 08:08), Max: 36.6 (20 Apr 2018 16:24)  T(F): 97.7 (21 Apr 2018 08:08), Max: 97.9 (20 Apr 2018 16:24)  HR: 89 (21 Apr 2018 08:08) (88 - 96)  BP: 112/76 (21 Apr 2018 08:08) (101/56 - 112/76)  BP(mean): --  RR: 20 (21 Apr 2018 08:08) (20 - 20)  SpO2: 100% (21 Apr 2018 00:45) (95% - 100%)

## 2018-04-21 NOTE — BEHAVIORAL HEALTH ASSESSMENT NOTE - SUMMARY
This is a 61 yo, , retired ; with a past medical PE, DM, HTN, DM, End Stage cirrhosis, readmissions for Large recurrent Ascites was also admitted for Anemia from acute blood loss.  With a history of alcohol abuse; sober X 1 year; with a history of cocaine abuse; last used 10 years ago. Without any history of psychiatric treatment or psychiatric hospitalization. Presented to the hospital due to shortness of breath. R sided CT  in place; drained 4L yesterday.      Patient denies current suicidality, homicidality, acute anxiety, psychosis and none noted.  He denies current global insomnia or poor appetite. At this time he is not an acute risk to self or others to warrant a higher of care. He is psychiatrically stable, and will not benefit from inpatient psychiatric admission. At this time he is denying current depressive symptoms to warrant antidepressant treatment.

## 2018-04-22 LAB
ALBUMIN SERPL ELPH-MCNC: 1.7 G/DL — LOW (ref 3.3–5.2)
ALP SERPL-CCNC: 321 U/L — HIGH (ref 40–120)
ALT FLD-CCNC: 42 U/L — HIGH
ANION GAP SERPL CALC-SCNC: 10 MMOL/L — SIGNIFICANT CHANGE UP (ref 5–17)
AST SERPL-CCNC: 61 U/L — HIGH
BASOPHILS # BLD AUTO: 0 K/UL — SIGNIFICANT CHANGE UP (ref 0–0.2)
BASOPHILS NFR BLD AUTO: 0.2 % — SIGNIFICANT CHANGE UP (ref 0–2)
BILIRUB SERPL-MCNC: 0.7 MG/DL — SIGNIFICANT CHANGE UP (ref 0.4–2)
BUN SERPL-MCNC: 48 MG/DL — HIGH (ref 8–20)
CALCIUM SERPL-MCNC: 7.9 MG/DL — LOW (ref 8.6–10.2)
CHLORIDE SERPL-SCNC: 107 MMOL/L — SIGNIFICANT CHANGE UP (ref 98–107)
CO2 SERPL-SCNC: 21 MMOL/L — LOW (ref 22–29)
CREAT SERPL-MCNC: 0.88 MG/DL — SIGNIFICANT CHANGE UP (ref 0.5–1.3)
EOSINOPHIL # BLD AUTO: 0 K/UL — SIGNIFICANT CHANGE UP (ref 0–0.5)
EOSINOPHIL NFR BLD AUTO: 0.6 % — SIGNIFICANT CHANGE UP (ref 0–5)
GLUCOSE SERPL-MCNC: 138 MG/DL — HIGH (ref 70–115)
HCT VFR BLD CALC: 30.8 % — LOW (ref 42–52)
HGB BLD-MCNC: 10.6 G/DL — LOW (ref 14–18)
INR BLD: 1.05 RATIO — SIGNIFICANT CHANGE UP (ref 0.88–1.16)
LYMPHOCYTES # BLD AUTO: 1.4 K/UL — SIGNIFICANT CHANGE UP (ref 1–4.8)
LYMPHOCYTES # BLD AUTO: 17.3 % — LOW (ref 20–55)
MCHC RBC-ENTMCNC: 31.9 PG — HIGH (ref 27–31)
MCHC RBC-ENTMCNC: 34.4 G/DL — SIGNIFICANT CHANGE UP (ref 32–36)
MCV RBC AUTO: 92.8 FL — SIGNIFICANT CHANGE UP (ref 80–94)
MONOCYTES # BLD AUTO: 0.6 K/UL — SIGNIFICANT CHANGE UP (ref 0–0.8)
MONOCYTES NFR BLD AUTO: 6.8 % — SIGNIFICANT CHANGE UP (ref 3–10)
NEUTROPHILS # BLD AUTO: 6.2 K/UL — SIGNIFICANT CHANGE UP (ref 1.8–8)
NEUTROPHILS NFR BLD AUTO: 74.7 % — HIGH (ref 37–73)
PLATELET # BLD AUTO: 152 K/UL — SIGNIFICANT CHANGE UP (ref 150–400)
POTASSIUM SERPL-MCNC: 4.8 MMOL/L — SIGNIFICANT CHANGE UP (ref 3.5–5.3)
POTASSIUM SERPL-SCNC: 4.8 MMOL/L — SIGNIFICANT CHANGE UP (ref 3.5–5.3)
PROT SERPL-MCNC: 4.2 G/DL — LOW (ref 6.6–8.7)
PROTHROM AB SERPL-ACNC: 11.6 SEC — SIGNIFICANT CHANGE UP (ref 9.8–12.7)
RBC # BLD: 3.32 M/UL — LOW (ref 4.6–6.2)
RBC # FLD: 14.6 % — SIGNIFICANT CHANGE UP (ref 11–15.6)
SODIUM SERPL-SCNC: 138 MMOL/L — SIGNIFICANT CHANGE UP (ref 135–145)
WBC # BLD: 8.3 K/UL — SIGNIFICANT CHANGE UP (ref 4.8–10.8)
WBC # FLD AUTO: 8.3 K/UL — SIGNIFICANT CHANGE UP (ref 4.8–10.8)

## 2018-04-22 PROCEDURE — 71045 X-RAY EXAM CHEST 1 VIEW: CPT | Mod: 26

## 2018-04-22 PROCEDURE — 99233 SBSQ HOSP IP/OBS HIGH 50: CPT

## 2018-04-22 PROCEDURE — 99232 SBSQ HOSP IP/OBS MODERATE 35: CPT

## 2018-04-22 RX ORDER — ONDANSETRON 8 MG/1
4 TABLET, FILM COATED ORAL ONCE
Qty: 0 | Refills: 0 | Status: COMPLETED | OUTPATIENT
Start: 2018-04-22 | End: 2018-04-22

## 2018-04-22 RX ADMIN — ESCITALOPRAM OXALATE 10 MILLIGRAM(S): 10 TABLET, FILM COATED ORAL at 11:31

## 2018-04-22 RX ADMIN — OXYCODONE HYDROCHLORIDE 5 MILLIGRAM(S): 5 TABLET ORAL at 11:31

## 2018-04-22 RX ADMIN — ONDANSETRON 4 MILLIGRAM(S): 8 TABLET, FILM COATED ORAL at 04:12

## 2018-04-22 RX ADMIN — OXYCODONE HYDROCHLORIDE 5 MILLIGRAM(S): 5 TABLET ORAL at 23:30

## 2018-04-22 RX ADMIN — Medication 40 MILLIGRAM(S): at 11:32

## 2018-04-22 RX ADMIN — Medication 1 TABLET(S): at 11:31

## 2018-04-22 RX ADMIN — OXYCODONE HYDROCHLORIDE 5 MILLIGRAM(S): 5 TABLET ORAL at 12:15

## 2018-04-22 RX ADMIN — OXYCODONE HYDROCHLORIDE 5 MILLIGRAM(S): 5 TABLET ORAL at 23:00

## 2018-04-22 NOTE — PROGRESS NOTE ADULT - PROBLEM SELECTOR PROBLEM 3
Cirrhosis of liver with ascites, unspecified hepatic cirrhosis type

## 2018-04-22 NOTE — PROGRESS NOTE ADULT - SUBJECTIVE AND OBJECTIVE BOX
ERIC ARIAS     Chief Complaint: Patient is a 62y old  Male who presents with a chief complaint of Shortness of breath (18 Apr 2018 10:17)      PAST MEDICAL & SURGICAL HISTORY:  Pulmonary embolism  Cirrhosis  DM (diabetes mellitus)  S/P thoracentesis      HPI/OVERNIGHT EVENTS:    MEDICATIONS  (STANDING):  escitalopram 10 milliGRAM(s) Oral daily  furosemide    Tablet 40 milliGRAM(s) Oral daily  multivitamin 1 Tablet(s) Oral daily  pantoprazole    Tablet 40 milliGRAM(s) Oral before breakfast  senna 2 Tablet(s) Oral at bedtime      Vital Signs Last 24 Hrs  T(C): 36.6 (22 Apr 2018 08:56), Max: 36.7 (21 Apr 2018 23:30)  T(F): 97.8 (22 Apr 2018 08:56), Max: 98.1 (21 Apr 2018 23:30)  HR: 79 (22 Apr 2018 08:56) (79 - 95)  BP: 96/57 (22 Apr 2018 08:56) (96/57 - 116/73)  BP(mean): --  RR: 18 (22 Apr 2018 08:56) (18 - 18)  SpO2: 100% (22 Apr 2018 08:56) (100% - 100%)    PHYSICAL EXAM:  Constitutional: NAD, well-groomed, well-developed  HEENT: PERRLA, EOMI, Normal Hearing, MMM  Neck: No LAD, No JVD  Back: Normal spine flexure, No CVA tenderness  Respiratory: CTAB Cardiovascular: S1 and S2, RRR, no M/G/R  Gastrointestinal: BS+, soft, NT/ND  Extremities: No peripheral edema  Vascular: 2+ peripheral pulses  Neurological: A/O x 3, no focal deficits  Psychiatric: Normal mood, normal affect  Musculoskeletal: 5/5 strength b/l upper and lower extremities  Skin: No rashes    CAPILLARY BLOOD GLUCOSE    LABS:                        10.6   8.3   )-----------( 152      ( 22 Apr 2018 10:08 )             30.8     04-22    138  |  107  |  48.0<H>  ----------------------------<  138<H>  4.8   |  21.0<L>  |  0.88    Ca    7.9<L>      22 Apr 2018 10:08    TPro  4.2<L>  /  Alb  1.7<L>  /  TBili  0.7  /  DBili  x   /  AST  61<H>  /  ALT  42<H>  /  AlkPhos  321<H>  04-22    PT/INR - ( 22 Apr 2018 10:08 )   PT: 11.6 sec;   INR: 1.05 ratio               RADIOLOGY & ADDITIONAL TESTS: ERIC NAVARRETEZ     Chief Complaint: Patient is a 62y old  Male who presents with a chief complaint of Shortness of breath (18 Apr 2018 10:17)      PAST MEDICAL & SURGICAL HISTORY:  Pulmonary embolism  Cirrhosis  DM (diabetes mellitus)  S/P thoracentesis      HPI/OVERNIGHT EVENTS: Patient in no distress. Chest tube in place with minimal drainage.    MEDICATIONS  (STANDING):  escitalopram 10 milliGRAM(s) Oral daily  furosemide    Tablet 40 milliGRAM(s) Oral daily  multivitamin 1 Tablet(s) Oral daily  pantoprazole    Tablet 40 milliGRAM(s) Oral before breakfast  senna 2 Tablet(s) Oral at bedtime      Vital Signs Last 24 Hrs  T(C): 36.6 (22 Apr 2018 08:56), Max: 36.7 (21 Apr 2018 23:30)  T(F): 97.8 (22 Apr 2018 08:56), Max: 98.1 (21 Apr 2018 23:30)  HR: 79 (22 Apr 2018 08:56) (79 - 95)  BP: 96/57 (22 Apr 2018 08:56) (96/57 - 116/73)  BP(mean): --  RR: 18 (22 Apr 2018 08:56) (18 - 18)  SpO2: 100% (22 Apr 2018 08:56) (100% - 100%)    PHYSICAL EXAM:  Constitutional: NAD, well-groomed, well-developed  HEENT: PERRLA, EOMI, Normal Hearing, MMM  Neck: No LAD, No JVD  Back: Normal spine flexure, No CVA tenderness  Respiratory: Chest tube in place Cardiovascular: S1 and S2, RRR, no M/G/R  Gastrointestinal: BS+, soft, NT/ND  Extremities: No peripheral edema  Vascular: 2+ peripheral pulses  Neurological: A/O x 3, no focal deficits  Psychiatric: Normal mood, normal affect  Musculoskeletal: 5/5 strength b/l upper and lower extremities  Skin: No rashes    CAPILLARY BLOOD GLUCOSE    LABS:                        10.6   8.3   )-----------( 152      ( 22 Apr 2018 10:08 )             30.8     04-22    138  |  107  |  48.0<H>  ----------------------------<  138<H>  4.8   |  21.0<L>  |  0.88    Ca    7.9<L>      22 Apr 2018 10:08    TPro  4.2<L>  /  Alb  1.7<L>  /  TBili  0.7  /  DBili  x   /  AST  61<H>  /  ALT  42<H>  /  AlkPhos  321<H>  04-22    PT/INR - ( 22 Apr 2018 10:08 )   PT: 11.6 sec;   INR: 1.05 ratio               RADIOLOGY & ADDITIONAL TESTS:

## 2018-04-22 NOTE — PROGRESS NOTE ADULT - ASSESSMENT
62 year old male end stage cirrosis, pulmonary embolus, recurrent pleural effusions. Chest tube remains in place. Eliquis on hold while chest tube in place. Depression add Lexapro.  Ct surgery recommends to keep the chest tube in place until discharge. GI input, patient is a candidate for transplant. Continue supportive care. 62 year old male end stage cirrosis, pulmonary embolus, recurrent pleural effusions. Chest tube remains in place. Eliquis on hold while chest tube in place. Depression add Lexapro.  Ct surgery recommends to keep the chest tube in place until discharge. GI input, patient is a candidate for transplant. Continue supportive care. Anticipate remove chest tube on 4/23 and resume eliquis.

## 2018-04-22 NOTE — PROGRESS NOTE ADULT - PROBLEM SELECTOR PLAN 3
GI recommends transplant as an outpatient GI recommends transplant as an outpatient. Call Dr Pruitt as an outpatient

## 2018-04-22 NOTE — PROGRESS NOTE ADULT - PROBLEM SELECTOR PLAN 1
Chest tube in place, clamped on 4/19 Drained greater than 4 liters 4/19. On 4/20 the chest tube has been unclamped and drainage. Chest tube in place, clamped on 4/19 Drained greater than 4 liters 4/19. On 4/20 the chest tube has been unclamped and drainage is minimal.

## 2018-04-22 NOTE — PROGRESS NOTE ADULT - PROBLEM SELECTOR PROBLEM 5
Other chronic pulmonary embolism without acute cor pulmonale

## 2018-04-22 NOTE — PROGRESS NOTE ADULT - SUBJECTIVE AND OBJECTIVE BOX
Subjective:  "OK"      V/S  T(C): 36.6 (04-22-18 @ 08:56), Max: 36.7 (04-21-18 @ 23:30)  HR: 79 (04-22-18 @ 08:56) (79 - 95)  BP: 96/57 (04-22-18 @ 08:56) (96/57 - 120/78)  RR: 18 (04-22-18 @ 08:56) (18 - 20)  SpO2: 100% (04-22-18 @ 08:56) (100% - 100%)      CHEST TUBE:       R pigtail     > waterseal               OUTPUT:   2100          per 24 hours     Drainage:  serous drainage  AIR LEAKS:  [ ] YES [x ] NO      MEDICATIONS  (STANDING):  escitalopram 10 milliGRAM(s) Oral daily  furosemide    Tablet 40 milliGRAM(s) Oral daily  multivitamin 1 Tablet(s) Oral daily  pantoprazole    Tablet 40 milliGRAM(s) Oral before breakfast  senna 2 Tablet(s) Oral at bedtime                                 CXR: 4/20  Right chest tube, small right effusion unchanged. Subsegmental   atelectasis.        Physical Exam:    Neuro: alert, no deficits    Pulm: R post pigtail to waterseal> serous fluid noted  No air leak    CV: S1 S2    Abd:  flat   soft  non tender  +  bowel sounds     Extremities: no edema                  PAST MEDICAL & SURGICAL HISTORY:  Pulmonary embolism  Cirrhosis  DM (diabetes mellitus)  S/P thoracentesis

## 2018-04-22 NOTE — PROGRESS NOTE ADULT - PROBLEM SELECTOR PROBLEM 1
Cirrhosis of liver with ascites, unspecified hepatic cirrhosis type
Cirrhosis of liver with ascites, unspecified hepatic cirrhosis type
Pleural effusion

## 2018-04-22 NOTE — PROGRESS NOTE ADULT - PROBLEM SELECTOR PROBLEM 4
Type 2 diabetes mellitus with other neurologic complication, without long-term current use of insulin

## 2018-04-22 NOTE — PROGRESS NOTE ADULT - ASSESSMENT
A/P  62 year old male alcoholic cirrhosis, recurrent hepatic hydrothorax, PE 2/8/2018 on eliquis, TIPS 3/29/2018, DM, status post chest tube at [facility] on 4/3/2018. Patient was at home in usual poor state of health when he developed worsening shortness of breath. He came to [facility] ER where cxr revealed a large right sided pleural effusion.  Cardio-thoracic surgery has been following and they plan on eventually draining the chest but would like to monitor the patient first off eliquis for fear of bleeding --> CT surgery drain via right sided pigtail on 4/18 --> allowed for to drain 2L+      4/18 s/p right pigtail.

## 2018-04-23 ENCOUNTER — TRANSCRIPTION ENCOUNTER (OUTPATIENT)
Age: 63
End: 2018-04-23

## 2018-04-23 LAB
ANION GAP SERPL CALC-SCNC: 11 MMOL/L — SIGNIFICANT CHANGE UP (ref 5–17)
BUN SERPL-MCNC: 46 MG/DL — HIGH (ref 8–20)
CALCIUM SERPL-MCNC: 7.7 MG/DL — LOW (ref 8.6–10.2)
CHLORIDE SERPL-SCNC: 104 MMOL/L — SIGNIFICANT CHANGE UP (ref 98–107)
CO2 SERPL-SCNC: 21 MMOL/L — LOW (ref 22–29)
CREAT SERPL-MCNC: 0.9 MG/DL — SIGNIFICANT CHANGE UP (ref 0.5–1.3)
GLUCOSE SERPL-MCNC: 118 MG/DL — HIGH (ref 70–115)
HCT VFR BLD CALC: 29.1 % — LOW (ref 42–52)
HGB BLD-MCNC: 10.2 G/DL — LOW (ref 14–18)
MAGNESIUM SERPL-MCNC: 2 MG/DL — SIGNIFICANT CHANGE UP (ref 1.6–2.6)
MCHC RBC-ENTMCNC: 32.6 PG — HIGH (ref 27–31)
MCHC RBC-ENTMCNC: 35.1 G/DL — SIGNIFICANT CHANGE UP (ref 32–36)
MCV RBC AUTO: 93 FL — SIGNIFICANT CHANGE UP (ref 80–94)
PHOSPHATE SERPL-MCNC: 3.6 MG/DL — SIGNIFICANT CHANGE UP (ref 2.4–4.7)
PLATELET # BLD AUTO: 161 K/UL — SIGNIFICANT CHANGE UP (ref 150–400)
POTASSIUM SERPL-MCNC: 4.2 MMOL/L — SIGNIFICANT CHANGE UP (ref 3.5–5.3)
POTASSIUM SERPL-SCNC: 4.2 MMOL/L — SIGNIFICANT CHANGE UP (ref 3.5–5.3)
RBC # BLD: 3.13 M/UL — LOW (ref 4.6–6.2)
RBC # FLD: 14.8 % — SIGNIFICANT CHANGE UP (ref 11–15.6)
SODIUM SERPL-SCNC: 136 MMOL/L — SIGNIFICANT CHANGE UP (ref 135–145)
WBC # BLD: 7.6 K/UL — SIGNIFICANT CHANGE UP (ref 4.8–10.8)
WBC # FLD AUTO: 7.6 K/UL — SIGNIFICANT CHANGE UP (ref 4.8–10.8)

## 2018-04-23 PROCEDURE — 99233 SBSQ HOSP IP/OBS HIGH 50: CPT

## 2018-04-23 PROCEDURE — 71045 X-RAY EXAM CHEST 1 VIEW: CPT | Mod: 26

## 2018-04-23 PROCEDURE — 71045 X-RAY EXAM CHEST 1 VIEW: CPT | Mod: 26,77

## 2018-04-23 PROCEDURE — 99232 SBSQ HOSP IP/OBS MODERATE 35: CPT

## 2018-04-23 RX ORDER — APIXABAN 2.5 MG/1
10 TABLET, FILM COATED ORAL EVERY 12 HOURS
Qty: 0 | Refills: 0 | Status: DISCONTINUED | OUTPATIENT
Start: 2018-04-24 | End: 2018-04-24

## 2018-04-23 RX ORDER — ONDANSETRON 8 MG/1
4 TABLET, FILM COATED ORAL ONCE
Qty: 0 | Refills: 0 | Status: COMPLETED | OUTPATIENT
Start: 2018-04-23 | End: 2018-04-23

## 2018-04-23 RX ADMIN — Medication 1 TABLET(S): at 12:40

## 2018-04-23 RX ADMIN — SENNA PLUS 2 TABLET(S): 8.6 TABLET ORAL at 22:53

## 2018-04-23 RX ADMIN — Medication 40 MILLIGRAM(S): at 05:56

## 2018-04-23 RX ADMIN — PANTOPRAZOLE SODIUM 40 MILLIGRAM(S): 20 TABLET, DELAYED RELEASE ORAL at 05:56

## 2018-04-23 RX ADMIN — ONDANSETRON 4 MILLIGRAM(S): 8 TABLET, FILM COATED ORAL at 14:25

## 2018-04-23 RX ADMIN — ESCITALOPRAM OXALATE 10 MILLIGRAM(S): 10 TABLET, FILM COATED ORAL at 12:40

## 2018-04-23 NOTE — PROGRESS NOTE ADULT - SUBJECTIVE AND OBJECTIVE BOX
INTERVAL HPI/OVERNIGHT EVENTS: 63yo Male Patient with Alcoholic Hepatic Cirrhosis, recurrent Ascitis with more frequent paracentesis, also has CT  to waterseal pleurovac drainage,   · Subjective and Objective: 	  INTERVAL HPI/OVERNIGHT EVENTS:FU for recurrent right sided pleural effusion.   Complains of cough, chest pain on deep breathing. Has chest tube in place.   Still has significant pedal edema. On furosemide.    He is very weak, tired, sleeping most of day, denies pain and intention to pursue Liver transplant if possible.   CC: F/U visit for Overwhelming weakness, fatigue, denies depression, N/V/D/constipation, able to get up walk to bathroom-affect remains flat  disinterested in his environment, staff and talking about goals of care    62y old  Male who presents with a chief complaint of Shortness of breath (18 Apr 2018 10:17)  PAST MEDICAL & SURGICAL HISTORY:  Pulmonary embolism  Cirrhosis  DM (diabetes mellitus)  S/P thoracentesis    Present Symptoms:     Dyspnea:  1 on exertion    Nausea/Vomiting:  No  Anxiety:  No  Depression: Yes Patient had Psychiatry consult, did not open up, or share his feelings, and isolation  Fatigue: Yes   Loss of appetite: Yes     Pain: Intermittent abdominal pain from increasing ascitis            Character-            Duration-            Effect-            Factors-            Frequency-            Location-            Severity-    Review of Systems: Reviewed                   All others negative    MEDICATIONS  (STANDING):  escitalopram 10 milliGRAM(s) Oral daily  furosemide    Tablet 40 milliGRAM(s) Oral daily  multivitamin 1 Tablet(s) Oral daily  pantoprazole    Tablet 40 milliGRAM(s) Oral before breakfast  senna 2 Tablet(s) Oral at bedtime    MEDICATIONS  (PRN):  oxyCODONE    IR 5 milliGRAM(s) Oral every 4 hours PRN Mild Pain (1 - 3)      PHYSICAL EXAM:    Vital Signs Last 24 Hrs  T(C): 37.1 (23 Apr 2018 07:18), Max: 37.1 (23 Apr 2018 07:18)  T(F): 98.7 (23 Apr 2018 07:18), Max: 98.7 (23 Apr 2018 07:18)  HR: 85 (23 Apr 2018 07:18) (82 - 85)  BP: 100/63 (23 Apr 2018 07:18) (100/63 - 112/62)  BP(mean): --  RR: 19 (23 Apr 2018 07:18) (18 - 19)  SpO2: 97% (22 Apr 2018 22:53) (97% - 97%)    General: alert  oriented x _2___ lethargic                  thin nonverbal          HEENT:dry mouth     Lungs: comfortable    CV: normal      GI:  distended  tender             constipation  last BM:     : normal     MSK:  weakness  edema             ambulatory OOB> BR- sleeping most of the day    Skin: normal _  no rash    LABS:                        10.2   7.6   )-----------( 161      ( 23 Apr 2018 09:04 )             29.1     04-23    136  |  104  |  46.0<H>  ----------------------------<  118<H>  4.2   |  21.0<L>  |  0.90    Ca    7.7<L>      23 Apr 2018 09:04  Phos  3.6     04-23  Mg     2.0     04-23    TPro  4.2<L>  /  Alb  1.7<L>  /  TBili  0.7  /  DBili  x   /  AST  61<H>  /  ALT  42<H>  /  AlkPhos  321<H>  04-22    PT/INR - ( 22 Apr 2018 10:08 )   PT: 11.6 sec;   INR: 1.05 ratio          I&O's Summary    22 Apr 2018 07:01  -  23 Apr 2018 07:00  --------------------------------------------------------  IN: 0 mL / OUT: 1120 mL / NET: -1120 mL    RADIOLOGY & ADDITIONAL STUDIES:    ADVANCE DIRECTIVES:   DNR  NO  Completed on:                     MOLST   NO   Completed on:  Living Will   NO   Completed on:

## 2018-04-23 NOTE — DISCHARGE NOTE ADULT - CARE PROVIDER_API CALL
Anthony Pruitt), Gastroenterology; Internal Medicine  [address] [place] [address]  Phone: [phone]  Fax: [phone]    iRchy Saez), Surgery; Thoracic Surgery  [address] [place] [address]  Phone: [phone]  Fax: [phone] Anthony Pruitt), Gastroenterology; Internal Medicine  [address] [place] [address]  Phone: [phone]  Fax: [phone]    Richy Saez), Surgery; Thoracic Surgery  [address] [place] [address]  Phone: [phone]  Fax: [phone] Anthony Pruitt), Gastroenterology; Internal Medicine  [address] [place] [address]  Phone: [phone]  Fax: [phone]    Richy Saez), Surgery; Thoracic Surgery  [address] [place] [address]  Phone: [phone]  Fax: [phone]    Fortunato Bland), Family Medicine  [address] [place] [address]  Phone: [phone]  Fax: [phone]    Zelalem Pearl), Gastroenterology; Internal Medicine  [address] [place] [address]  Phone: [phone]  Fax: [phone]

## 2018-04-23 NOTE — DISCHARGE NOTE ADULT - PATIENT PORTAL LINK FT
You can access the [facility] Patient Portal, offered by [facility], by registering with the following website: [email or web address]

## 2018-04-23 NOTE — DISCHARGE NOTE ADULT - PROVIDER TOKENS
TOKEN:'[id number]',TOKEN:'[id number]' TOKEN:'[id number]:'[id number]:'[id number]'

## 2018-04-23 NOTE — DISCHARGE NOTE ADULT - OTHER SIGNIFICANT FINDINGS
Pt is seen and examined, denied chest pain, SOB, nausea and vomiting, discussed with Dr. Saez suggest weekly thoracenteses, defer to GI - discussed with Dr. Pearl - he agree to take care of outpatient thoracenteses q weekly, pt is informed to f/u Dr. Pearl this week and he will arrange thoracentesis outpatient. Pt has multiple admission with similar problem,   HIGH RISK FOR READMISSION DUE TO RECURRENT PLEURAL EFFUSION DUE TO LIVER CIRRHOSIS.     Pt is informed to f/u with hepatologist at [place]  prognosis guarded   time spent 36 minutes Pt is seen and examined, denied chest pain, SOB, nausea and vomiting, discussed with Dr. Saez suggest weekly thoracenteses, defer to GI - discussed with Dr. Pearl - he agree to take care of outpatient thoracenteses q weekly, pt is informed to f/u Dr. Pearl this week and he will arrange thoracentesis outpatient. Pt has multiple admission with similar problem.  Talked to his PCP - Dr. Bland, updated and also informed to arrange outpatient qweekly thoracentesis    HIGH RISK FOR READMISSION DUE TO RECURRENT PLEURAL EFFUSION DUE TO LIVER CIRRHOSIS.     Pt is informed to f/u with hepatologist at [place]  prognosis guarded   time spent 36 minutes Pt is seen and examined, denied chest pain, SOB, nausea and vomiting, discussed with Dr. Saez suggest weekly thoracenteses, defer to GI - discussed with Dr. Pearl - he agree to take care of outpatient thoracenteses q weekly, pt is informed to f/u Dr. Pearl this week and he will arrange thoracentesis outpatient. Pt has multiple admission with similar problem.  Talked to his PCP - Dr. Bland, updated and also informed to arrange outpatient qweekly thoracentesis.  Pt has h/o PE on Eliquis, not covered by insurance, discussed with pharmacy need to pay $150/month, pt and wife willing to pay.     HIGH RISK FOR READMISSION DUE TO RECURRENT PLEURAL EFFUSION DUE TO LIVER CIRRHOSIS.     Pt is informed to f/u with hepatologist at [place]  prognosis guarded   time spent 36 minutes

## 2018-04-23 NOTE — DISCHARGE NOTE ADULT - SECONDARY DIAGNOSIS.
Cirrhosis of liver with ascites, unspecified hepatic cirrhosis type S/P thoracentesis Type 2 diabetes mellitus with other neurologic complication, without long-term current use of insulin Pulmonary embolism Goals of care, counseling/discussion

## 2018-04-23 NOTE — DISCHARGE NOTE ADULT - MEDICATION SUMMARY - MEDICATIONS TO TAKE
I will START or STAY ON the medications listed below when I get home from the hospital:    oxyCODONE 5 mg oral tablet  -- 1 tab(s) by mouth every 4 hours, As needed, Pain  -- Indication: For Pain    apixaban 5 mg oral tablet  -- 1 tab(s) by mouth every 12 hours   -- Indication: For Pulmonary embolism    escitalopram 10 mg oral tablet  -- 1 tab(s) by mouth once a day  -- Indication: For Depression    furosemide 40 mg oral tablet  -- 1 tab(s) by mouth once a day  -- Indication: For Alcoholic cirrhosis of liver with ascites    lactulose 10 g/15 mL oral syrup  -- 15 milliliter(s) by mouth 3 times a day titrate to two bowel movements daily  -- Indication: For Alcoholic cirrhosis of liver with ascites    senna oral tablet  -- 2 tab(s) by mouth once a day (at bedtime) use as needed for consitpation  -- Indication: For Constipation    pantoprazole 40 mg oral delayed release tablet  -- 1 tab(s) by mouth once a day (before a meal)  -- Indication: For GI prophylaxis    Multiple Vitamins oral tablet  -- 1 tab(s) by mouth once a day  -- Indication: For Supplement

## 2018-04-23 NOTE — DISCHARGE NOTE ADULT - HOSPITAL COURSE
62 year old male alcoholic cirrhosis, recurrent hepatic hydrothorax, PE 2/8/2018 on eliquis, TIPS 3/29/2018, DM, status post chest tube at [place] on 4/3/2018.   Patient was at home in usual poor state of health when he developed worsening shortness of breath.   He came to [facility] ER where cxr revealed a large right sided pleural effusion.    Cardio-thoracic surgery has been following and drained the chest. Hx of Chronic PE- w/o cor pulmonale   Eliquis on hold.  Previously  pt. was at [facility] on 3/29/18 for TIPS procedure. MELD score 8 from most recent labs, Child-Colvin score 8.   During this stay, pt. had dyspnea on exertion, during cough, inspiration/expiration  He received 2 units PRBCs for Anemia. and blood loss.  recurrent pleural effusions s/p chest tube 4/18 --> 4/23.  CT surgery followed pt closely.  Pt. with pleural effusion associated with hepatic disorder and hepatic hydrothorax.  Pt. was followed up by GI. Pt with complicated refractory disease which is not responding to diuretics, TIPS.   Pt. can be  liver transplant candidate which need to be discussed with hepatologist at [facility] .  weekly thoracentesis per CT is recomended  GI recommends transplant as an outpatient  Call Dr Pruitt as an outpatient.    Pt. had multiple CXR and the last was done post CT removal.  < from: Xray Chest 1 View- PORTABLE-Urgent (04.23.18 @ 10:59) >  Since April 23, 2018 at 4:39 AM, unchanged small right pneumothorax.     < from: US Abdomen Doppler (04.19.18 @ 09:36) >  Patent TIPS without evidence of significant stenosis.    < from: US Duplex Venous Lower Ext Complete, Bilateral (04.18.18 @ 12:22) >  No evidence of bilateral lower extremity deep venous thrombosis.    Pt. need to arrange Q weekly thoracentesis  Resume anticoagulation since chest tube removed  Non-insulin dependent type 2 diabetes mellitus.   A1c is 5.6% from last month. On metformin at home.  on - consistent carb diet. BS were monitored at the hospital.    ICU Vital Signs Last 24 Hrs  T(C): 37.1 (23 Apr 2018 07:18), Max: 37.1 (23 Apr 2018 07:18)  T(F): 98.7 (23 Apr 2018 07:18), Max: 98.7 (23 Apr 2018 07:18)  HR: 85 (23 Apr 2018 07:18) (82 - 85)  BP: 100/63 (23 Apr 2018 07:18) (100/63 - 112/62)  RR: 19 (23 Apr 2018 07:18) (18 - 19)  SpO2: 97% (22 Apr 2018 22:53) (97% - 97%)    ROS: Pt. with dyspnea on exertion which is better than on admission  No acute events overnight, patient resting comfortably in bed in NAD.   Pt ate all of breakfast.   Pt seen at bedside c/o weakness/fatigue, dizziness, confusion, constipation, nausea and dyspnea during inspiration, expiration, and coughing  Pt denied CP, SOB at rest, ABD pain, or appetite changes , pt. denied fever/chills    PHYSICAL EXAM:  GENERAL:  resting in bed in NAD, frail, pale  SKIN: No rashes or lesions  EYES: EOMI, PERRLA, conjunctiva and sclera clear  CHEST/LUNG: no chest wall tenderness; no intercostal retractions; no rales; no rhonchi; no wheezes; diminished breath ion bilaterally;  sounds, L; diminished breath sounds, R diminished R sounds more than Left, at the time of examination CT tube was intact, 640cc clear yellow    HEART: Regular rate and rhythm; No murmurs, rubs, or gallops  ABDOMEN: Soft, Nontender, Nondistended; Bowel sounds present  EXTREMITIES: 3+ pitting edema b/l on LE, 2+ pitting edema right UE/hand  2+ Peripheral Pulses, No clubbing, cyanosis,   NERVOUS SYSTEM:  Alert & Oriented X3, depressed; flat affect 62 year old male alcoholic cirrhosis, recurrent hepatic hydrothorax, PE 2/8/2018 on eliquis, TIPS 3/29/2018, DM, status post chest tube at [place] on 4/3/2018.   Patient was at home in usual poor state of health when he developed worsening shortness of breath.   He came to [facility] ER where cxr revealed a large right sided pleural effusion.    Cardio-thoracic surgery has been following and drained the chest. Hx of Chronic PE- w/o cor pulmonale   Eliquis on hold.  Previously  pt. was at [facility] on 3/29/18 for TIPS procedure. MELD score 8 from most recent labs, Child-Colvin score 8.   During this stay, pt. had dyspnea on exertion, during cough, inspiration/expiration  He received 2 units PRBCs for Anemia. and blood loss.  recurrent pleural effusions s/p chest tube 4/18 --> 4/23.  CT surgery followed pt closely.  Pt. with pleural effusion associated with hepatic disorder and hepatic hydrothorax.  Pt. was followed up by GI. Pt with complicated refractory disease which is not responding to diuretics, TIPS.   Pt. can be  liver transplant candidate which need to be discussed with hepatologist at [facility] .  weekly thoracentesis per CT is recomended  GI recommends transplant as an outpatient  Call Dr Pruitt as an outpatient.    Pt. had multiple CXR and the last was done post CT removal.  < from: Xray Chest 1 View- PORTABLE-Urgent (04.23.18 @ 10:59) >  Since April 23, 2018 at 4:39 AM, unchanged small right pneumothorax.     < from: US Abdomen Doppler (04.19.18 @ 09:36) >  Patent TIPS without evidence of significant stenosis.    < from: US Duplex Venous Lower Ext Complete, Bilateral (04.18.18 @ 12:22) >  No evidence of bilateral lower extremity deep venous thrombosis.    Pt. need to arrange Q weekly thoracentesis  Resume anticoagulation since chest tube removed  Non-insulin dependent type 2 diabetes mellitus.   A1c is 5.6% from last month. On metformin at home.  on - consistent carb diet. BS were monitored at the hospital.    ICU Vital Signs Last 24 Hrs  T(C): 37.1 (23 Apr 2018 07:18), Max: 37.1 (23 Apr 2018 07:18)  T(F): 98.7 (23 Apr 2018 07:18), Max: 98.7 (23 Apr 2018 07:18)  HR: 85 (23 Apr 2018 07:18) (82 - 85)  BP: 100/63 (23 Apr 2018 07:18) (100/63 - 112/62)  RR: 19 (23 Apr 2018 07:18) (18 - 19)  SpO2: 97% (22 Apr 2018 22:53) (97% - 97%)    ROS: Pt. with dyspnea on exertion which is better than on admission  No acute events overnight, patient resting comfortably in bed in NAD.   Pt ate all of breakfast.   Pt seen at bedside c/o weakness/fatigue, dizziness, confusion, constipation, nausea and dyspnea during inspiration, expiration, and coughing  Pt denied CP, SOB at rest, ABD pain, or appetite changes , pt. denied fever/chills    PHYSICAL EXAM:  GENERAL:  resting in bed in NAD, frail, pale  SKIN: No rashes or lesions  EYES: EOMI, PERRLA, conjunctiva and sclera clear  CHEST/LUNG: no chest wall tenderness; no intercostal retractions; no rales; no rhonchi; no wheezes; diminished breath ion bilaterally;  sounds, L; diminished breath sounds, R diminished R sounds more than Left, at the time of examination CT tube was intact, 640cc clear yellow    HEART: Regular rate and rhythm; No murmurs, rubs, or gallops  ABDOMEN: Soft, Nontender, Nondistended; Bowel sounds present  EXTREMITIES: 3+ pitting edema b/l on LE, 2+ pitting edema right UE/hand  2+ Peripheral Pulses, No clubbing, cyanosis,   NERVOUS SYSTEM:  Alert & Oriented X3, depressed; flat affect      Pt. states he is not willing to take Lasix. Educated about the importance of maintaining on Lasix.   Pt. with high risk of readmission. Compliancy and follow up with the MDs are explained. 62 year old male alcoholic cirrhosis, recurrent hepatic hydrothorax, PE 2/8/2018 on eliquis, TIPS 3/29/2018, DM, status post chest tube at [place] on 4/3/2018.   Patient was at home in usual poor state of health when he developed worsening shortness of breath.   He came to [facility] ER where cxr revealed a large right sided pleural effusion.    Cardio-thoracic surgery has been following and drained the chest. Hx of Chronic PE- w/o cor pulmonale   Eliquis on hold.  Previously  pt. was at [facility] on 3/29/18 for TIPS procedure. MELD score 8 from most recent labs, Child-Colvin score 8.   During this stay, pt. had dyspnea on exertion, during cough, inspiration/expiration  He received 2 units PRBCs for Anemia. and blood loss.  recurrent pleural effusions s/p chest tube 4/18 --> 4/23.  CT surgery followed pt closely.  Pt. with pleural effusion associated with hepatic disorder and hepatic hydrothorax.  Pt. was followed up by GI. Pt with complicated refractory disease which is not responding to diuretics, TIPS.   Pt. can be  liver transplant candidate which need to be discussed with hepatologist at [facility] .  weekly thoracentesis per CT is recomended  GI recommends transplant as an outpatient  Call Dr Pruitt as an outpatient.    Pt. had multiple CXR and the last was done post CT removal.  < from: Xray Chest 1 View- PORTABLE-Urgent (04.23.18 @ 10:59) >  Since April 23, 2018 at 4:39 AM, unchanged small right pneumothorax.     < from: US Abdomen Doppler (04.19.18 @ 09:36) >  Patent TIPS without evidence of significant stenosis.    < from: US Duplex Venous Lower Ext Complete, Bilateral (04.18.18 @ 12:22) >  No evidence of bilateral lower extremity deep venous thrombosis.    Pt. need to arrange Q weekly thoracentesis  Resume anticoagulation since chest tube removed  Non-insulin dependent type 2 diabetes mellitus.   A1c is 5.6% from last month. On metformin at home.  on - consistent carb diet. BS were monitored at the hospital.    ICU Vital Signs Last 24 Hrs  T(C): 37.1 (23 Apr 2018 07:18), Max: 37.1 (23 Apr 2018 07:18)  T(F): 98.7 (23 Apr 2018 07:18), Max: 98.7 (23 Apr 2018 07:18)  HR: 85 (23 Apr 2018 07:18) (82 - 85)  BP: 100/63 (23 Apr 2018 07:18) (100/63 - 112/62)  RR: 19 (23 Apr 2018 07:18) (18 - 19)  SpO2: 97% (22 Apr 2018 22:53) (97% - 97%)    ROS: Pt. with dyspnea on exertion which is better than on admission  No acute events overnight, patient resting comfortably in bed in NAD.   Pt ate all of breakfast.   Pt seen at bedside c/o weakness/fatigue, dizziness, confusion, constipation, nausea and dyspnea during inspiration, expiration, and coughing  Pt denied CP, SOB at rest, ABD pain, or appetite changes , pt. denied fever/chills    PHYSICAL EXAM:  GENERAL:  resting in bed in NAD, frail, pale  SKIN: No rashes or lesions  EYES: EOMI, PERRLA, conjunctiva and sclera clear  CHEST/LUNG: no chest wall tenderness; no intercostal retractions; no rales; no rhonchi; no wheezes; diminished breath ion bilaterally;  sounds, L; diminished breath sounds, R diminished R sounds more than Left, at the time of examination CT tube was intact, 640cc clear yellow    HEART: Regular rate and rhythm; No murmurs, rubs, or gallops  ABDOMEN: Soft, Nontender, Nondistended; Bowel sounds present  EXTREMITIES: 3+ pitting edema b/l on LE, 2+ pitting edema right UE/hand  2+ Peripheral Pulses, No clubbing, cyanosis,   NERVOUS SYSTEM:  Alert & Oriented X3, depressed; flat affect      Pt. states he is not willing to take Lasix. Educated about the importance of maintaining on Lasix.   Pt. with high risk of readmission. Compliancy and follow up with the MDs are explained.  Pt. was offered home care services by [facility] but refuses all of them. 62 year old male alcoholic cirrhosis, recurrent hepatic hydrothorax, PE 2/8/2018 on eliquis, TIPS 3/29/2018, DM, status post chest tube at [place] on 4/3/2018.   Patient was at home in usual poor state of health when he developed worsening shortness of breath.   He came to [facility] ER where cxr revealed a large right sided pleural effusion.    Cardio-thoracic surgery has been following and drained the chest. Hx of Chronic PE- w/o cor pulmonale  on Eliquis   Previously  pt. was at [facility] on 3/29/18 for TIPS procedure. MELD score 8 from most recent labs, Child-Colvin score 8.   During this stay, pt. had dyspnea on exertion, during cough, inspiration/expiration  He received 2 units PRBCs for Anemia. and blood loss.  recurrent pleural effusions s/p chest tube 4/18 --> 4/23.  CT surgery followed pt closely.  Pt. with pleural effusion associated with hepatic disorder and hepatic hydrothorax.  Pt. was followed up by GI. Pt with complicated refractory disease which is not responding to diuretics, TIPS.   Pt. can be  liver transplant candidate which need to be discussed with hepatologist at [facility] .  weekly thoracentesis per CT is recomended  GI recommends transplant as an outpatient  Call Dr Pruitt as an outpatient.    Pt. had multiple CXR and the last was done post CT removal.  < from: Xray Chest 1 View- PORTABLE-Urgent (04.23.18 @ 10:59) >  Since April 23, 2018 at 4:39 AM, unchanged small right pneumothorax.     < from: US Abdomen Doppler (04.19.18 @ 09:36) >  Patent TIPS without evidence of significant stenosis.    < from: US Duplex Venous Lower Ext Complete, Bilateral (04.18.18 @ 12:22) >  No evidence of bilateral lower extremity deep venous thrombosis.    Pt. need to arrange Q weekly thoracentesis  Resume anticoagulation since chest tube removed  Non-insulin dependent type 2 diabetes mellitus.   A1c is 5.6% from last month. On metformin at home.  on - consistent carb diet. BS were monitored at the hospital.    ICU Vital Signs Last 24 Hrs  T(C): 37.1 (25 Apr 2018 07:52), Max: 37.1 (24 Apr 2018 16:08)  T(F): 98.8 (25 Apr 2018 07:52), Max: 98.8 (25 Apr 2018 07:52)  HR: 74 (25 Apr 2018 07:52) (74 - 77)  BP: 97/62 (25 Apr 2018 07:52) (97/62 - 112/76)  BP(mean): --  ABP: --  ABP(mean): --  RR: 18 (25 Apr 2018 07:52) (18 - 18)  SpO2: 96% (25 Apr 2018 07:52) (95% - 97%)    PHYSICAL EXAM:  GENERAL:  resting in bed in NAD, frail, pale  CHEST/LUNG: improved air entry, decrease breath sounds over bases   HEART: s1/s2 audible   ABDOMEN: Soft, Nontender, Nondistended; Bowel sounds present  EXTREMITIES: positive edema - improved   NERVOUS SYSTEM:  Alert & Oriented X3, depressed; flat affect      Pt. with high risk of readmission. Compliancy and follow up with the MDs are explained.  Pt. was offered home care services by [facility] but refuses all of them.  Pt has h/o PE on Eliquis, not covered by insurance, discussed with pharmacy need to pay $150/month, discussed with patient and wife willing to pay.

## 2018-04-23 NOTE — DISCHARGE NOTE ADULT - MEDICATION SUMMARY - MEDICATIONS TO CHANGE
I will SWITCH the dose or number of times a day I take the medications listed below when I get home from the hospital:    Eliquis 5 mg oral tablet  -- 2 tab(s) by mouth 2 times a day   -- Check with your doctor before becoming pregnant.  It is very important that you take or use this exactly as directed.  Do not skip doses or discontinue unless directed by your doctor.  Obtain medical advice before taking any non-prescription drugs as some may affect the action of this medication.

## 2018-04-23 NOTE — PROGRESS NOTE ADULT - ASSESSMENT
61 yo male w/ pmhx of alcoholic cirrhosis, pulmonary embolus, DM and recurrent pleural effusions s/p chest tube 4/18 --> 4/23     Problem/Plan - 1:  ·  Problem: Pleural effusion.    -per CT surg chest tube d/c 4/23  -weekly thoracentesis per CT     Plan - 2:  ·  Problem: Dyspnea on exertion.   -chest tube d/c     Problem/Plan - 3:  ·  Problem: Cirrhosis of liver with ascites, unspecified hepatic cirrhosis type.   - GI recommends transplant as an outpatient. Call Dr Pruitt as an outpatient.     Problem/Plan - 4:  ·  Problem: Type 2 diabetes mellitus with other neurologic complication, without long-term current use of insulin.    -Tolerable glycemia     Problem/Plan - 5:  ·  Problem: Other chronic pulmonary embolism without acute cor pulmonale.    -Resume anticoagulation when chest tube removed????? 63 yo male w/ pmhx of alcoholic cirrhosis, pulmonary embolus, DM and recurrent pleural effusions s/p chest tube 4/18 --> 4/23     Problem/Plan - 1:  ·  Problem: Pleural effusion.    -per CT surg chest tube d/c 4/23  -weekly thoracentesis per CT     Plan - 2:  ·  Problem: Dyspnea on exertion.   -chest tube d/c     Problem/Plan - 3:  ·  Problem: Cirrhosis of liver with ascites, unspecified hepatic cirrhosis type.   - GI recommends transplant as an outpatient.   Call Dr Pruitt as an outpatient.  Pt. need to arrange Q weekly thoracentesis     Problem/Plan - 4:  ·  Problem: Type 2 diabetes mellitus with other neurologic complication, without long-term current use of insulin.    -Tolerable glycemia     Problem/Plan - 5:  ·  Problem: Other chronic pulmonary embolism without acute cor pulmonale.    -Resume anticoagulation when chest tube removed 61 yo male w/ pmhx of alcoholic cirrhosis, pulmonary embolus, DM and recurrent pleural effusions s/p chest tube 4/18 --> 4/23. Patient had chest tube removed on 4/23 then developed nausea and vomiting. Possible dc in am if stable. GI recommends liver transplant clinic with Dr Anthony Pruitt in [place] as an outpatient.    Problem/Plan - 1:  ·  Problem: Pleural effusion.    -per CT surg chest tube d/c 4/23  -weekly thoracentesis per CT     Plan - 2:  ·  Problem: Dyspnea on exertion.   -chest tube d/c     Problem/Plan - 3:  ·  Problem: Cirrhosis of liver with ascites, unspecified hepatic cirrhosis type.   - GI recommends transplant as an outpatient.   Call Dr Pruitt as an outpatient.  Pt. need to arrange Q weekly thoracentesis     Problem/Plan - 4:  ·  Problem: Type 2 diabetes mellitus with other neurologic complication, without long-term current use of insulin.    -Tolerable glycemia     Problem/Plan - 5:  ·  Problem: Other chronic pulmonary embolism without acute cor pulmonale.    -Resume anticoagulation when chest tube removed    Proble/Plan -6  Nausea and vomiting secondary to end stage cirrosis  supportive care. Patient is very stressed and depressed because of poor condition and recurrent disease.

## 2018-04-23 NOTE — PROGRESS NOTE ADULT - SUBJECTIVE AND OBJECTIVE BOX
Patient is a 62y old  Male who presents with a chief complaint of Shortness of breath (18 Apr 2018 10:17)    HPI:  62 year old male alcoholic cirrhosis, recurrent hepatic hydrothorax, PE 2/8/2018 on eliquis, TIPS 3/29/2018, DM, status post chest tube at [place] on 4/3/2018. Patient was at home in usual poor state of health when he developed worsening shortness of breath. He came to [facility] ER where cxr revealed a large right sided pleural effusion.  Cardio-thoracic surgery has been following and they plan on eventually draining the chest but would like to monitor the patient first off eliquis for fear of bleeding. (18 Apr 2018 10:17)    FAMILY HISTORY:  No pertinent family history in first degree relatives    INTERVAL HPI/OVERNIGHT EVENTS:  No acute events overnight, patient resting comfortably in bed in NAD.   Pt ate all of breakfast.   Pt seen at bedside c/o weakness/fatigue, confusion, constipation, nausea and dyspnea during inspiration   Pt denied CP, SOB at rest, ABD pain, appetite changes and fever/chills    PAST MEDICAL & SURGICAL HISTORY:  Pulmonary embolism  Cirrhosis  DM (diabetes mellitus)  S/P thoracentesis    Allergies  No Known Allergies    Intolerances    Vital Signs Last 24 Hrs  T(C): 37.1 (23 Apr 2018 07:18), Max: 37.1 (23 Apr 2018 07:18)  T(F): 98.7 (23 Apr 2018 07:18), Max: 98.7 (23 Apr 2018 07:18)  HR: 85 (23 Apr 2018 07:18) (82 - 85)  BP: 100/63 (23 Apr 2018 07:18) (100/63 - 112/62)  RR: 19 (23 Apr 2018 07:18) (18 - 19)  SpO2: 97% (22 Apr 2018 22:53) (97% - 97%)                 10.2   7.6   )-----------( 161      ( 23 Apr 2018 09:04 )             29.1     LIVER FUNCTIONS - ( 22 Apr 2018 10:08 )  Alb: 1.7 g/dL / Pro: 4.2 g/dL / ALK PHOS: 321 U/L / ALT: 42 U/L / AST: 61 U/L / GGT: x           PT/INR - ( 22 Apr 2018 10:08 )   PT: 11.6 sec;   INR: 1.05 ratio      RADIOLOGY & ADDITIONAL STUDIES:        MEDICATIONS:  escitalopram 10 milliGRAM(s) Oral daily  furosemide    Tablet 40 milliGRAM(s) Oral daily  multivitamin 1 Tablet(s) Oral daily  oxyCODONE    IR 5 milliGRAM(s) Oral every 4 hours PRN  pantoprazole    Tablet 40 milliGRAM(s) Oral before breakfast  senna 2 Tablet(s) Oral at bedtime Patient is a 62y old  Male who presents with a chief complaint of Shortness of breath (18 Apr 2018 10:17)    HPI:  62 year old male alcoholic cirrhosis, recurrent hepatic hydrothorax, PE 2/8/2018 on eliquis, TIPS 3/29/2018, DM, status post chest tube at [place] on 4/3/2018. Patient was at home in usual poor state of health when he developed worsening shortness of breath. He came to [facility] ER where cxr revealed a large right sided pleural effusion.  Cardio-thoracic surgery has been following and they plan on eventually draining the chest but would like to monitor the patient first off eliquis for fear of bleeding. (18 Apr 2018 10:17)    FAMILY HISTORY:  No pertinent family history in first degree relatives    INTERVAL HPI/OVERNIGHT EVENTS:  No acute events overnight, patient resting comfortably in bed in NAD.   Pt ate all of breakfast.   Pt seen at bedside c/o weakness/fatigue, dizziness, confusion, constipation, nausea and dyspnea during inspiration, expiration, and coughing  Pt denied CP, SOB at rest, ABD pain, appetite changes and denied fever/chills    PAST MEDICAL & SURGICAL HISTORY:  Pulmonary embolism  Cirrhosis  DM (diabetes mellitus)  S/P thoracentesis    Allergies  No Known Allergies    Intolerances    Vital Signs Last 24 Hrs  T(C): 37.1 (23 Apr 2018 07:18), Max: 37.1 (23 Apr 2018 07:18)  T(F): 98.7 (23 Apr 2018 07:18), Max: 98.7 (23 Apr 2018 07:18)  HR: 85 (23 Apr 2018 07:18) (82 - 85)  BP: 100/63 (23 Apr 2018 07:18) (100/63 - 112/62)  RR: 19 (23 Apr 2018 07:18) (18 - 19)  SpO2: 97% (22 Apr 2018 22:53) (97% - 97%)                 10.2   7.6   )-----------( 161      ( 23 Apr 2018 09:04 )             29.1     LIVER FUNCTIONS - ( 22 Apr 2018 10:08 )  Alb: 1.7 g/dL / Pro: 4.2 g/dL / ALK PHOS: 321 U/L / ALT: 42 U/L / AST: 61 U/L / GGT: x           PT/INR - ( 22 Apr 2018 10:08 )   PT: 11.6 sec;   INR: 1.05 ratio      RADIOLOGY & ADDITIONAL STUDIES:    < from: Xray Chest 1 View- PORTABLE-Urgent (04.23.18 @ 10:59) >  sallie April 23, 2018 at 4:39 AM, unchanged small right pneumothorax.         MEDICATIONS:  escitalopram 10 milliGRAM(s) Oral daily  furosemide    Tablet 40 milliGRAM(s) Oral daily  multivitamin 1 Tablet(s) Oral daily  oxyCODONE    IR 5 milliGRAM(s) Oral every 4 hours PRN  pantoprazole    Tablet 40 milliGRAM(s) Oral before breakfast  senna 2 Tablet(s) Oral at bedtime Patient is a 62 year old Male who presents with a chief complaint of Shortness of breath (18 Apr 2018 10:17)    HPI:  62 year old male alcoholic cirrhosis, recurrent hepatic hydrothorax, PE 2/8/2018 on eliquis, TIPS 3/29/2018, DM, status post chest tube at [place] on 4/3/2018. Patient was at home in usual poor state of health when he developed worsening shortness of breath. He came to [facility] ER where cxr revealed a large right sided pleural effusion.  Cardio-thoracic surgery has been following and they plan on eventually draining the chest but would like to monitor the patient first off eliquis for fear of bleeding. (18 Apr 2018 10:17)    FAMILY HISTORY:  No pertinent family history in first degree relatives    INTERVAL HPI/OVERNIGHT EVENTS:  No acute events overnight, patient resting comfortably in bed in NAD.   Pt ate all of breakfast.   Pt seen at bedside c/o weakness/fatigue, dizziness, confusion, constipation, nausea and dyspnea during inspiration, expiration, and coughing  Pt denied CP, SOB at rest, ABD pain, appetite changes and denied fever/chills    PAST MEDICAL & SURGICAL HISTORY:  Pulmonary embolism  Cirrhosis  DM (diabetes mellitus)  S/P thoracentesis    Allergies  No Known Allergies    Intolerances    Vital Signs Last 24 Hrs  T(C): 37.1 (23 Apr 2018 07:18), Max: 37.1 (23 Apr 2018 07:18)  T(F): 98.7 (23 Apr 2018 07:18), Max: 98.7 (23 Apr 2018 07:18)  HR: 85 (23 Apr 2018 07:18) (82 - 85)  BP: 100/63 (23 Apr 2018 07:18) (100/63 - 112/62)  RR: 19 (23 Apr 2018 07:18) (18 - 19)  SpO2: 97% (22 Apr 2018 22:53) (97% - 97%)                 10.2   7.6   )-----------( 161      ( 23 Apr 2018 09:04 )             29.1     LIVER FUNCTIONS - ( 22 Apr 2018 10:08 )  Alb: 1.7 g/dL / Pro: 4.2 g/dL / ALK PHOS: 321 U/L / ALT: 42 U/L / AST: 61 U/L / GGT: x           PT/INR - ( 22 Apr 2018 10:08 )   PT: 11.6 sec;   INR: 1.05 ratio      RADIOLOGY & ADDITIONAL STUDIES:    < from: Xray Chest 1 View- PORTABLE-Urgent (04.23.18 @ 10:59) >  sallie April 23, 2018 at 4:39 AM, unchanged small right pneumothorax.         MEDICATIONS:  escitalopram 10 milliGRAM(s) Oral daily  furosemide    Tablet 40 milliGRAM(s) Oral daily  multivitamin 1 Tablet(s) Oral daily  oxyCODONE    IR 5 milliGRAM(s) Oral every 4 hours PRN  pantoprazole    Tablet 40 milliGRAM(s) Oral before breakfast  senna 2 Tablet(s) Oral at bedtime Patient is a 62 year old Male who presents with a chief complaint of Shortness of breath (18 Apr 2018 10:17)    HPI:  62 year old male alcoholic cirrhosis, recurrent hepatic hydrothorax, PE 2/8/2018 on eliquis, TIPS 3/29/2018, DM, status post chest tube at [place] on 4/3/2018. Patient was at home in usual poor state of health when he developed worsening shortness of breath. He came to [facility] ER where cxr revealed a large right sided pleural effusion.  Cardio-thoracic surgery has been following and they plan on eventually draining the chest but would like to monitor the patient first off eliquis for fear of bleeding. (18 Apr 2018 10:17)  Patient advised to follow up with Dr Anthony Javed for Liver transplant.    FAMILY HISTORY:  No pertinent family history in first degree relatives    INTERVAL HPI/OVERNIGHT EVENTS:  No acute events overnight, patient resting comfortably in bed in NAD.   Pt ate all of breakfast.   Pt seen at bedside c/o weakness/fatigue, dizziness, confusion, constipation, nausea and dyspnea during inspiration, expiration, and coughing  Pt denied CP, SOB at rest, ABD pain, appetite changes and denied fever/chills    PAST MEDICAL & SURGICAL HISTORY:  Pulmonary embolism  Cirrhosis  DM (diabetes mellitus)  S/P thoracentesis    Allergies  No Known Allergies    Intolerances    Vital Signs Last 24 Hrs  T(C): 37.1 (23 Apr 2018 07:18), Max: 37.1 (23 Apr 2018 07:18)  T(F): 98.7 (23 Apr 2018 07:18), Max: 98.7 (23 Apr 2018 07:18)  HR: 85 (23 Apr 2018 07:18) (82 - 85)  BP: 100/63 (23 Apr 2018 07:18) (100/63 - 112/62)  RR: 19 (23 Apr 2018 07:18) (18 - 19)  SpO2: 97% (22 Apr 2018 22:53) (97% - 97%)                 10.2   7.6   )-----------( 161      ( 23 Apr 2018 09:04 )             29.1     LIVER FUNCTIONS - ( 22 Apr 2018 10:08 )  Alb: 1.7 g/dL / Pro: 4.2 g/dL / ALK PHOS: 321 U/L / ALT: 42 U/L / AST: 61 U/L / GGT: x           PT/INR - ( 22 Apr 2018 10:08 )   PT: 11.6 sec;   INR: 1.05 ratio      RADIOLOGY & ADDITIONAL STUDIES:    < from: Xray Chest 1 View- PORTABLE-Urgent (04.23.18 @ 10:59) >  sallie April 23, 2018 at 4:39 AM, unchanged small right pneumothorax.         MEDICATIONS:  escitalopram 10 milliGRAM(s) Oral daily  furosemide    Tablet 40 milliGRAM(s) Oral daily  multivitamin 1 Tablet(s) Oral daily  oxyCODONE    IR 5 milliGRAM(s) Oral every 4 hours PRN  pantoprazole    Tablet 40 milliGRAM(s) Oral before breakfast  senna 2 Tablet(s) Oral at bedtime

## 2018-04-23 NOTE — PROGRESS NOTE ADULT - ASSESSMENT
61 yo M with Alcoholic Cirrhosis    Recurrent R Pleural Effusion-CT to pleurovac waterseal  Leave in place until ready for discharge      Ascitis- draining prn while in hospital- paracentesis as needed    Depression: impatient and short tempered about having a Psychiatric consult, not taking Xanax or  participating with PT  not  making an effort to increase his activity.

## 2018-04-23 NOTE — DISCHARGE NOTE ADULT - CARE PLAN
Principal Discharge DX:	Dyspnea on exertion  Goal:	keep sxs free  Assessment and plan of treatment:	weekly thoracentesis per CT is recommended  Secondary Diagnosis:	Cirrhosis of liver with ascites, unspecified hepatic cirrhosis type  Assessment and plan of treatment:	can be  liver transplant candidate which need to be discussed with hepatologist at [facility] .  weekly thoracentesis per CT is recommended  Secondary Diagnosis:	S/P thoracentesis  Assessment and plan of treatment:	keep area clean and dry  Secondary Diagnosis:	Type 2 diabetes mellitus with other neurologic complication, without long-term current use of insulin  Assessment and plan of treatment:	monitor blood sugars  c/w Metformin & home meds  Secondary Diagnosis:	Pulmonary embolism  Assessment and plan of treatment:	Eliquis Principal Discharge DX:	Dyspnea on exertion  Goal:	keep sxs free  Assessment and plan of treatment:	weekly thoracentesis per CT is recommended  Follow up with  in 1 week  Secondary Diagnosis:	Cirrhosis of liver with ascites, unspecified hepatic cirrhosis type  Assessment and plan of treatment:	can be  liver transplant candidate which need to be discussed with hepatologist at [facility] .  weekly thoracentesis per CT is recommended  Follow up with  in 1 week  Secondary Diagnosis:	S/P thoracentesis  Assessment and plan of treatment:	keep area clean and dry  Follow up with  in 1 week  Secondary Diagnosis:	Type 2 diabetes mellitus with other neurologic complication, without long-term current use of insulin  Assessment and plan of treatment:	monitor blood sugars  c/w Metformin & home meds  Secondary Diagnosis:	Pulmonary embolism  Assessment and plan of treatment:	Eliquis restarted Principal Discharge DX:	Dyspnea on exertion  Goal:	keep sxs free  Assessment and plan of treatment:	weekly thoracentesis per CT is recommended  Follow up with  in 1 week  Follow up with IR for the procedures  Secondary Diagnosis:	Cirrhosis of liver with ascites, unspecified hepatic cirrhosis type  Assessment and plan of treatment:	can be  liver transplant candidate which need to be discussed with hepatologist at [facility] .  weekly thoracentesis per CT is recommended  Follow up with  in 1 week  Follow up with IR for the procedures  Secondary Diagnosis:	S/P thoracentesis  Assessment and plan of treatment:	keep area clean and dry  Follow up with  in 1 week  Secondary Diagnosis:	Type 2 diabetes mellitus with other neurologic complication, without long-term current use of insulin  Assessment and plan of treatment:	monitor blood sugars  c/w Metformin & home meds  Secondary Diagnosis:	Pulmonary embolism  Assessment and plan of treatment:	Eliquis restarted at 5mg Q12H Principal Discharge DX:	Dyspnea on exertion  Goal:	keep sxs free  Assessment and plan of treatment:	weekly thoracentesis per CT is recommended  Follow up with Dr. Pearl in 1 week   Follow up with IR for the procedures  Secondary Diagnosis:	Cirrhosis of liver with ascites, unspecified hepatic cirrhosis type  Assessment and plan of treatment:	can be  liver transplant candidate which need to be discussed with hepatologist at [facility] .  weekly thoracentesis per CT is recommended  Follow up with  in 1 week  Follow up with IR for the procedures  Secondary Diagnosis:	S/P thoracentesis  Assessment and plan of treatment:	keep area clean and dry  Follow up with  in 1 week  Secondary Diagnosis:	Type 2 diabetes mellitus with other neurologic complication, without long-term current use of insulin  Assessment and plan of treatment:	monitor blood sugars  c/w Metformin & home meds  Secondary Diagnosis:	Pulmonary embolism  Assessment and plan of treatment:	Eliquis restarted at 5mg Q12H Principal Discharge DX:	Pleural effusion  Goal:	keep sxs free  Assessment and plan of treatment:	weekly thoracentesis per CT is recommended  Follow up with Dr. Pearl in 1 week   Follow up with IR for the procedures  Secondary Diagnosis:	Cirrhosis of liver with ascites, unspecified hepatic cirrhosis type  Assessment and plan of treatment:	can be  liver transplant candidate which need to be discussed with hepatologist at [facility] .  weekly thoracentesis per CT is recommended  Follow up with  in 1 week  Follow up with IR for the procedures  Secondary Diagnosis:	S/P thoracentesis  Assessment and plan of treatment:	keep area clean and dry  Follow up with  in 1 week  Secondary Diagnosis:	Type 2 diabetes mellitus with other neurologic complication, without long-term current use of insulin  Assessment and plan of treatment:	monitor blood sugars  c/w Metformin & home meds  Secondary Diagnosis:	Pulmonary embolism  Assessment and plan of treatment:	Eliquis restarted at 5mg Q12H  Secondary Diagnosis:	Goals of care, counseling/discussion Principal Discharge DX:	Pleural effusion  Goal:	keep sxs free  Assessment and plan of treatment:	weekly thoracentesis per CT recommendation   Follow up with Dr. Pearl and outpatient IR guided thoracentesis   Follow up with IR for the procedures  Secondary Diagnosis:	Cirrhosis of liver with ascites, unspecified hepatic cirrhosis type  Assessment and plan of treatment:	f/u  hepatologist at [facility] .  weekly thoracentesis per CT is recommended  Follow up with  in 1 week  Follow up with IR for the procedures  Secondary Diagnosis:	S/P thoracentesis  Assessment and plan of treatment:	keep area clean and dry  Follow up with  in 1 week  Secondary Diagnosis:	Type 2 diabetes mellitus with other neurologic complication, without long-term current use of insulin  Assessment and plan of treatment:	monitor blood sugars  c/w Metformin & home meds  Secondary Diagnosis:	Pulmonary embolism  Assessment and plan of treatment:	Eliquis restarted at 5mg Q12H  Secondary Diagnosis:	Goals of care, counseling/discussion

## 2018-04-23 NOTE — PROGRESS NOTE ADULT - SUBJECTIVE AND OBJECTIVE BOX
INTERVAL HPI/OVERNIGHT EVENTS:    MEDICATIONS  (STANDING):  escitalopram 10 milliGRAM(s) Oral daily  furosemide    Tablet 40 milliGRAM(s) Oral daily  multivitamin 1 Tablet(s) Oral daily  pantoprazole    Tablet 40 milliGRAM(s) Oral before breakfast  senna 2 Tablet(s) Oral at bedtime    MEDICATIONS  (PRN):  oxyCODONE    IR 5 milliGRAM(s) Oral every 4 hours PRN Mild Pain (1 - 3)      Allergies    No Known Allergies    Intolerances        Vital Signs Last 24 Hrs  T(C): 37.1 (23 Apr 2018 07:18), Max: 37.1 (23 Apr 2018 07:18)  T(F): 98.7 (23 Apr 2018 07:18), Max: 98.7 (23 Apr 2018 07:18)  HR: 85 (23 Apr 2018 07:18) (79 - 85)  BP: 100/63 (23 Apr 2018 07:18) (96/57 - 112/62)  BP(mean): --  RR: 19 (23 Apr 2018 07:18) (18 - 19)  SpO2: 97% (22 Apr 2018 22:53) (97% - 100%)    LABS:                        10.6   8.3   )-----------( 152      ( 22 Apr 2018 10:08 )             30.8     04-22    138  |  107  |  48.0<H>  ----------------------------<  138<H>  4.8   |  21.0<L>  |  0.88    Ca    7.9<L>      22 Apr 2018 10:08    TPro  4.2<L>  /  Alb  1.7<L>  /  TBili  0.7  /  DBili  x   /  AST  61<H>  /  ALT  42<H>  /  AlkPhos  321<H>  04-22    PT/INR - ( 22 Apr 2018 10:08 )   PT: 11.6 sec;   INR: 1.05 ratio               RADIOLOGY & ADDITIONAL TESTS: INTERVAL HPI/OVERNIGHT EVENTS:FU for recurrent right sided pleural effusion    MEDICATIONS  (STANDING):  escitalopram 10 milliGRAM(s) Oral daily  furosemide    Tablet 40 milliGRAM(s) Oral daily  multivitamin 1 Tablet(s) Oral daily  pantoprazole    Tablet 40 milliGRAM(s) Oral before breakfast  senna 2 Tablet(s) Oral at bedtime    MEDICATIONS  (PRN):  oxyCODONE    IR 5 milliGRAM(s) Oral every 4 hours PRN Mild Pain (1 - 3)      Allergies    No Known Allergies    Intolerances        Vital Signs Last 24 Hrs  T(C): 37.1 (23 Apr 2018 07:18), Max: 37.1 (23 Apr 2018 07:18)  T(F): 98.7 (23 Apr 2018 07:18), Max: 98.7 (23 Apr 2018 07:18)  HR: 85 (23 Apr 2018 07:18) (79 - 85)  BP: 100/63 (23 Apr 2018 07:18) (96/57 - 112/62)  BP(mean): --  RR: 19 (23 Apr 2018 07:18) (18 - 19)  SpO2: 97% (22 Apr 2018 22:53) (97% - 100%)    LABS:                        10.6   8.3   )-----------( 152      ( 22 Apr 2018 10:08 )             30.8     04-22    138  |  107  |  48.0<H>  ----------------------------<  138<H>  4.8   |  21.0<L>  |  0.88    Ca    7.9<L>      22 Apr 2018 10:08    TPro  4.2<L>  /  Alb  1.7<L>  /  TBili  0.7  /  DBili  x   /  AST  61<H>  /  ALT  42<H>  /  AlkPhos  321<H>  04-22    PT/INR - ( 22 Apr 2018 10:08 )   PT: 11.6 sec;   INR: 1.05 ratio               RADIOLOGY & ADDITIONAL TESTS: INTERVAL HPI/OVERNIGHT EVENTS:FU for recurrent right sided pleural effusion. Complains of cough, chest pain on deep breathing. Has chest tube in place. Still has significant pedal edema. On furosemide.    MEDICATIONS  (STANDING):  escitalopram 10 milliGRAM(s) Oral daily  furosemide    Tablet 40 milliGRAM(s) Oral daily  multivitamin 1 Tablet(s) Oral daily  pantoprazole    Tablet 40 milliGRAM(s) Oral before breakfast  senna 2 Tablet(s) Oral at bedtime    MEDICATIONS  (PRN):  oxyCODONE    IR 5 milliGRAM(s) Oral every 4 hours PRN Mild Pain (1 - 3)      Allergies    No Known Allergies    Intolerances        Vital Signs Last 24 Hrs  T(C): 37.1 (23 Apr 2018 07:18), Max: 37.1 (23 Apr 2018 07:18)  T(F): 98.7 (23 Apr 2018 07:18), Max: 98.7 (23 Apr 2018 07:18)  HR: 85 (23 Apr 2018 07:18) (79 - 85)  BP: 100/63 (23 Apr 2018 07:18) (96/57 - 112/62)  BP(mean): --  RR: 19 (23 Apr 2018 07:18) (18 - 19)  SpO2: 97% (22 Apr 2018 22:53) (97% - 100%)    LABS:                        10.6   8.3   )-----------( 152      ( 22 Apr 2018 10:08 )             30.8     04-22    138  |  107  |  48.0<H>  ----------------------------<  138<H>  4.8   |  21.0<L>  |  0.88    Ca    7.9<L>      22 Apr 2018 10:08    TPro  4.2<L>  /  Alb  1.7<L>  /  TBili  0.7  /  DBili  x   /  AST  61<H>  /  ALT  42<H>  /  AlkPhos  321<H>  04-22    PT/INR - ( 22 Apr 2018 10:08 )   PT: 11.6 sec;   INR: 1.05 ratio               RADIOLOGY & ADDITIONAL TESTS:

## 2018-04-23 NOTE — PROGRESS NOTE ADULT - RS GEN PE MLT RESP DETAILS PC
no chest wall tenderness/no wheezes/diminished breath sounds, R/no intercostal retractions no wheezes/diminished breath sounds, R/no intercostal retractions/diminished breath sounds, L/no rales/no rhonchi/no chest wall tenderness

## 2018-04-23 NOTE — PROGRESS NOTE ADULT - EXTREMITIES COMMENTS
3+ pitting edema b/l on LE, 2+ pitting edema right UE 3+ pitting edema b/l on LE, 2+ pitting edema right UE/hand

## 2018-04-23 NOTE — CHART NOTE - NSCHARTNOTEFT_GEN_A_CORE
Patient seen bedside; discussion with team; patient to go home today    As per previously discussed plan chest tube to come out day of discharge; as such chest tube pulled without incident.  Patient tolerated procedure well; post procedural film checked without evidence of overt pneumothorax.  Pending official results.        ICU Vital Signs Last 24 Hrs  T(C): 37.1 (23 Apr 2018 07:18), Max: 37.1 (23 Apr 2018 07:18)  T(F): 98.7 (23 Apr 2018 07:18), Max: 98.7 (23 Apr 2018 07:18)  HR: 85 (23 Apr 2018 07:18) (82 - 85)  BP: 100/63 (23 Apr 2018 07:18) (100/63 - 112/62)  BP(mean): --  ABP: --  ABP(mean): --  RR: 19 (23 Apr 2018 07:18) (18 - 19)  SpO2: 97% (22 Apr 2018 22:53) (97% - 97%)      04-23    136  |  104  |  46.0<H>  ----------------------------<  118<H>  4.2   |  21.0<L>  |  0.90    Ca    7.7<L>      23 Apr 2018 09:04  Phos  3.6     04-23  Mg     2.0     04-23    TPro  4.2<L>  /  Alb  1.7<L>  /  TBili  0.7  /  DBili  x   /  AST  61<H>  /  ALT  42<H>  /  AlkPhos  321<H>  04-22                          10.2   7.6   )-----------( 161      ( 23 Apr 2018 09:04 )             29.1     PT/INR - ( 22 Apr 2018 10:08 )   PT: 11.6 sec;   INR: 1.05 ratio         < from: Xray Chest 1 View- PORTABLE-Routine (04.23.18 @ 05:00) >      INTERPRETATION:  Chest, single portable view    HISTORY: Respiratory distress    Comparison: 4/22    IMPRESSION:    Support Devices:  Unchanged  Heart: Cardiomegaly  Mediastinum:  Unremarkable  Lungs/Airways: Small right apical pneumothorax.  Bones/Soft tissues: Unremarkable      < end of copied text >    A/P  62 year old male alcoholic cirrhosis, recurrent hepatic hydrothorax, PE 2/8/2018 on eliquis, TIPS 3/29/2018, DM, status post chest tube at [place] on 4/3/2018. Patient was at home in usual poor state of health when he developed worsening shortness of breath. He came to [facility] ER where cxr revealed a large right sided pleural effusion.  Cardio-thoracic surgery has been following and they plan on eventually draining the chest but would like to monitor the patient first off eliquis for fear of bleeding --> CT surgery drain via right sided pigtail on 4/18 --> allowed for to drain 2L+ 4/23 as per team discussion patient set to go home today; as such chest tube pulled    -patient to go for therapeutic thoracentesis weekly   -to be set up by primary team   -consult appreciated  -will sign off at this time   -plan discussed with Dr Saez

## 2018-04-23 NOTE — DISCHARGE NOTE ADULT - ADDITIONAL INSTRUCTIONS
weekly thoracentesis per CT surgery is recommended  can be  liver transplant candidate which need to be discussed with hepatologist at [facility]   F/U with Dr. Saez next week  F/u with  next week  monitor blood sugars  Restart Eliquis as per CT  c/w Metformin & home meds weekly thoracentesis per CT surgery is recommended  can be  liver transplant candidate which need to be discussed with hepatologist at [facility]   F/U with Dr. Saez next week  F/U with GI  next week  F/u with  next week  monitor blood sugars  Restarted Eliquis as per CT  c/w Metformin & home meds weekly thoracentesis per CT surgery is recommended  can be  liver transplant candidate which need to be discussed with hepatologist at [facility]   F/U with Dr. Saez next week  F/U with IR for thoracentesis [phone]  F/U with GI  next week  F/u with  next week  monitor blood sugars  Restarted Eliquis as per CT  c/w Metformin & home meds weekly thoracentesis per CT surgery is recommended  can be  liver transplant candidate which need to be discussed with hepatologist at [facility]   F/U with Dr. Saez next week  F/U with IR for thoracentesis [phone]  F/U with GI  next week  F/u with  next week  monitor blood sugars  Restarted Eliquis   c/w Metformin & home meds  Talked to his PCP - Dr. Bland, updated and also informed to arrange outpatient qweekly thoracentesis weekly thoracentesis per CT surgery  recommendation   f/u hepatologist at [facility]   F/U with Dr. Saez next week  F/U with IR for thoracentesis [phone]  F/U with GI  next week  F/u with  next week  monitor blood sugars  Restarted Eliquis   Medication compliance   Talked to his PCP - Dr. Bland, updated and also informed to arrange outpatient qweekly thoracentesis

## 2018-04-23 NOTE — DISCHARGE NOTE ADULT - PLAN OF CARE
keep sxs free weekly thoracentesis per CT is recommended can be  liver transplant candidate which need to be discussed with hepatologist at [facility] .  weekly thoracentesis per CT is recommended keep area clean and dry monitor blood sugars  c/w Metformin & home meds Eliquis weekly thoracentesis per CT is recommended  Follow up with  in 1 week can be  liver transplant candidate which need to be discussed with hepatologist at [facility] .  weekly thoracentesis per CT is recommended  Follow up with  in 1 week keep area clean and dry  Follow up with  in 1 week Eliquis restarted weekly thoracentesis per CT is recommended  Follow up with  in 1 week  Follow up with IR for the procedures can be  liver transplant candidate which need to be discussed with hepatologist at [facility] .  weekly thoracentesis per CT is recommended  Follow up with  in 1 week  Follow up with IR for the procedures Eliquis restarted at 5mg Q12H weekly thoracentesis per CT is recommended  Follow up with Dr. Pearl in 1 week   Follow up with IR for the procedures weekly thoracentesis per CT recommendation   Follow up with Dr. Pearl and outpatient IR guided thoracentesis   Follow up with IR for the procedures f/u  hepatologist at [facility] .  weekly thoracentesis per CT is recommended  Follow up with  in 1 week  Follow up with IR for the procedures

## 2018-04-23 NOTE — DISCHARGE NOTE ADULT - CARE PROVIDERS DIRECT ADDRESSES
,[email or web address],DirectAddress_Unknown

## 2018-04-23 NOTE — PROGRESS NOTE ADULT - RESPIRATORY COMMENTS
diminished R sounds more than Left, at the time of examination CT tube was intact, 640cc clear yellow fluid output

## 2018-04-23 NOTE — PROGRESS NOTE ADULT - ASSESSMENT
Patient with cirrhosis, refractory right sided pleural effusion, s/p TIPS and now with recurrent pleural effusion. Discussed the case with [facility] hepatologist    1. Needs to have standing appointment with IR for weekly thoracocentesis to prevent hospitalization  2. Needs to follow up with [facility] hepatology team for liver transplant evaluation  3. Continue lasix  4. Low salt diet

## 2018-04-24 PROCEDURE — 99233 SBSQ HOSP IP/OBS HIGH 50: CPT

## 2018-04-24 PROCEDURE — 99232 SBSQ HOSP IP/OBS MODERATE 35: CPT

## 2018-04-24 RX ORDER — APIXABAN 2.5 MG/1
5 TABLET, FILM COATED ORAL EVERY 12 HOURS
Qty: 0 | Refills: 0 | Status: DISCONTINUED | OUTPATIENT
Start: 2018-04-24 | End: 2018-04-25

## 2018-04-24 RX ORDER — FUROSEMIDE 40 MG
1 TABLET ORAL
Qty: 30 | Refills: 0 | OUTPATIENT
Start: 2018-04-24 | End: 2018-05-23

## 2018-04-24 RX ORDER — PANTOPRAZOLE SODIUM 20 MG/1
1 TABLET, DELAYED RELEASE ORAL
Qty: 30 | Refills: 0 | OUTPATIENT
Start: 2018-04-24 | End: 2018-05-23

## 2018-04-24 RX ORDER — LACTULOSE 10 G/15ML
15 SOLUTION ORAL
Qty: 1 | Refills: 2 | OUTPATIENT
Start: 2018-04-24 | End: 2018-07-22

## 2018-04-24 RX ORDER — APIXABAN 2.5 MG/1
1 TABLET, FILM COATED ORAL
Qty: 60 | Refills: 0 | OUTPATIENT
Start: 2018-04-24 | End: 2018-05-23

## 2018-04-24 RX ORDER — APIXABAN 2.5 MG/1
2 TABLET, FILM COATED ORAL
Qty: 120 | Refills: 0 | OUTPATIENT
Start: 2018-04-24 | End: 2018-05-23

## 2018-04-24 RX ORDER — ESCITALOPRAM OXALATE 10 MG/1
1 TABLET, FILM COATED ORAL
Qty: 30 | Refills: 0 | OUTPATIENT
Start: 2018-04-24 | End: 2018-05-23

## 2018-04-24 RX ORDER — ONDANSETRON 8 MG/1
4 TABLET, FILM COATED ORAL ONCE
Qty: 0 | Refills: 0 | Status: COMPLETED | OUTPATIENT
Start: 2018-04-24 | End: 2018-04-24

## 2018-04-24 RX ADMIN — ONDANSETRON 4 MILLIGRAM(S): 8 TABLET, FILM COATED ORAL at 18:03

## 2018-04-24 RX ADMIN — APIXABAN 5 MILLIGRAM(S): 2.5 TABLET, FILM COATED ORAL at 17:38

## 2018-04-24 RX ADMIN — PANTOPRAZOLE SODIUM 40 MILLIGRAM(S): 20 TABLET, DELAYED RELEASE ORAL at 07:20

## 2018-04-24 RX ADMIN — Medication 1 TABLET(S): at 11:32

## 2018-04-24 RX ADMIN — Medication 40 MILLIGRAM(S): at 07:20

## 2018-04-24 RX ADMIN — OXYCODONE HYDROCHLORIDE 5 MILLIGRAM(S): 5 TABLET ORAL at 08:20

## 2018-04-24 RX ADMIN — SENNA PLUS 2 TABLET(S): 8.6 TABLET ORAL at 21:42

## 2018-04-24 RX ADMIN — OXYCODONE HYDROCHLORIDE 5 MILLIGRAM(S): 5 TABLET ORAL at 09:20

## 2018-04-24 RX ADMIN — APIXABAN 10 MILLIGRAM(S): 2.5 TABLET, FILM COATED ORAL at 07:20

## 2018-04-24 RX ADMIN — ESCITALOPRAM OXALATE 10 MILLIGRAM(S): 10 TABLET, FILM COATED ORAL at 11:33

## 2018-04-24 NOTE — PROGRESS NOTE BEHAVIORAL HEALTH - RISK ASSESSMENT
Chronic risk factors are hx of alcoholism. Acute risk factors due to acute medical illness. However patientr denies suicidal ideation plan or intent, denies hx of suicide attempts, is not depressed, psychotic, has supportive wife, is , has grandchildren. No evidence of any suicidality. Patient assessed to not be at imminent risk of harm to self or others.

## 2018-04-24 NOTE — PROGRESS NOTE BEHAVIORAL HEALTH - SUMMARY
63 yo, , retired ; with a past medical PE, DM, HTN, DM, End Stage cirrhosis, readmissions for Large recurrent Ascites was also admitted for Anemia from acute blood loss.  With a history of alcohol abuse; sober X 1 year; with a history of cocaine abuse; last used 10 years ago. Without any history of psychiatric treatment or psychiatric hospitalization.     patient observed to appear depressed by primary team , denied depression during initial psychiatric evaluation last week; re-evaluated patient today ; he continues to deny depression and all depressive symptoms.  Patient endorses chronic fatigue which makes it difficult to engage in activities. He has no interest in outpatient therapy.  Overall appeared mentally slowed down possibly secondary to liver decline;   -consider checking ammonia level, TSH, B12 and folate given cognitive slowing 61 yo, , retired ; with a past medical PE, DM, HTN, DM, End Stage cirrhosis, readmissions for Large recurrent Ascites was also admitted for Anemia from acute blood loss.  With a history of alcohol abuse; sober X 1 year; with a history of cocaine abuse; last used 10 years ago. Without any history of psychiatric treatment or psychiatric hospitalization.     patient observed to appear depressed by primary team , denied depression during initial psychiatric evaluation last week; re-evaluated patient today ; he continues to deny depression and all depressive symptoms.  Patient endorses chronic fatigue which makes it difficult to engage in activities also reports ongoing dyspnea and nausea. He has no interest in outpatient therapy.  Overall appeared mentally slowed down possibly secondary to liver decline;   -consider checking ammonia level, TSH, B12 and folate given cognitive slowing

## 2018-04-24 NOTE — DIETITIAN INITIAL EVALUATION ADULT. - OTHER INFO
Pt reports appetite as good. Noted completed breakfast tray at time of interview. Denies N/V/D, swallowing or chewing issues. Pt states before he got sick he was 180#. Since then, he reports a decrease in appetite due to his disease state. Pt reports appetite as good. Noted completed breakfast tray at time of interview. Denies N/V/D, swallowing or chewing issues. Pt reports before he got sick 5 mo ago, he weighted 180#. Since then, his appetite has been the same but cannot keep any weight on. States his wife noticed him losing wt over time.

## 2018-04-24 NOTE — PROGRESS NOTE ADULT - SUBJECTIVE AND OBJECTIVE BOX
INTERVAL HPI/OVERNIGHT EVENTS:    MEDICATIONS  (STANDING):  apixaban 10 milliGRAM(s) Oral every 12 hours  escitalopram 10 milliGRAM(s) Oral daily  furosemide    Tablet 40 milliGRAM(s) Oral daily  multivitamin 1 Tablet(s) Oral daily  pantoprazole    Tablet 40 milliGRAM(s) Oral before breakfast  senna 2 Tablet(s) Oral at bedtime    MEDICATIONS  (PRN):  oxyCODONE    IR 5 milliGRAM(s) Oral every 4 hours PRN Mild Pain (1 - 3)      Allergies    No Known Allergies    Intolerances        Vital Signs Last 24 Hrs  T(C): 36.7 (24 Apr 2018 07:46), Max: 36.9 (23 Apr 2018 22:54)  T(F): 98 (24 Apr 2018 07:46), Max: 98.4 (23 Apr 2018 22:54)  HR: 81 (24 Apr 2018 07:46) (77 - 86)  BP: 108/63 (24 Apr 2018 07:46) (95/65 - 108/63)  BP(mean): --  RR: 19 (24 Apr 2018 07:46) (18 - 19)  SpO2: 97% (24 Apr 2018 07:46) (97% - 98%)    LABS:                        10.2   7.6   )-----------( 161      ( 23 Apr 2018 09:04 )             29.1     04-23    136  |  104  |  46.0<H>  ----------------------------<  118<H>  4.2   |  21.0<L>  |  0.90    Ca    7.7<L>      23 Apr 2018 09:04  Phos  3.6     04-23  Mg     2.0     04-23            RADIOLOGY & ADDITIONAL TESTS: INTERVAL HPI/OVERNIGHT EVENTS:The patient is doing much better. The chest tube was removed. He still has pedal edema. Denies any other complaints.    MEDICATIONS  (STANDING):  apixaban 10 milliGRAM(s) Oral every 12 hours  escitalopram 10 milliGRAM(s) Oral daily  furosemide    Tablet 40 milliGRAM(s) Oral daily  multivitamin 1 Tablet(s) Oral daily  pantoprazole    Tablet 40 milliGRAM(s) Oral before breakfast  senna 2 Tablet(s) Oral at bedtime    MEDICATIONS  (PRN):  oxyCODONE    IR 5 milliGRAM(s) Oral every 4 hours PRN Mild Pain (1 - 3)      Allergies    No Known Allergies    Intolerances        Vital Signs Last 24 Hrs  T(C): 36.7 (24 Apr 2018 07:46), Max: 36.9 (23 Apr 2018 22:54)  T(F): 98 (24 Apr 2018 07:46), Max: 98.4 (23 Apr 2018 22:54)  HR: 81 (24 Apr 2018 07:46) (77 - 86)  BP: 108/63 (24 Apr 2018 07:46) (95/65 - 108/63)  BP(mean): --  RR: 19 (24 Apr 2018 07:46) (18 - 19)  SpO2: 97% (24 Apr 2018 07:46) (97% - 98%)    LABS:                        10.2   7.6   )-----------( 161      ( 23 Apr 2018 09:04 )             29.1     04-23    136  |  104  |  46.0<H>  ----------------------------<  118<H>  4.2   |  21.0<L>  |  0.90    Ca    7.7<L>      23 Apr 2018 09:04  Phos  3.6     04-23  Mg     2.0     04-23            RADIOLOGY & ADDITIONAL TESTS:

## 2018-04-24 NOTE — PROGRESS NOTE ADULT - ASSESSMENT
Assessment and Plan:   · Assessment		  Assessment and Plan:   · Assessment		  63 yo male w/PMH of alcoholic cirrhosis, pulmonary embolus, DM and recurrent pleural effusions s/p chest tube 4/18 --> 4/23.Possible dc in am if stable.   Problem/Plan - 1:  ·  Problem: Pleural effusion.    CT in R thoracic dc'd yesterday  Weekly thoracentesis per CT     Plan - 2:  ·  Problem: Dyspnea on exertion.   -chest tube F/U with IR for weekly thoracentesis if symptomatic     Problem/Plan - 3:  ·  Problem: Cirrhosis of liver with ascites, unspecified hepatic cirrhosis type.   . GI recommends liver transplant clinic with Dr Anthony Pruitt in [place] as an outpatient.  Pt. need to arrange Q weekly thoracentesis      Problem/Plan - 4:  ·  Problem: Other chronic pulmonary embolism without acute cor pulmonale.    -Resume anticoagulation  chest tube removed    Proble/Plan -5  Nausea and vomiting secondary to end stage cirrhosis.  Subsided and is eating without incident today  .  Patient is very stressed and depressed because of poor condition and recurrent disease.  Denied he was depressed during Psychiatric consult, demonstrated a whole different personna.              The patient with liver cirrhosis, recurrent right-sided pleural effusion status post TIPS. However the fluid reaccumulated and the patient required chest tube placement.    1.  Followup with us within one week.  We'll schedule him for thoracentesis on the weekly basis.  2.I discussed at length with the patient that he needs to followup with a liver center.  3. Discharge patient on Lasix 40 mg orally once daily  4. Low-salt diet                              The patient with liver cirrhosis, recurrent right-sided pleural effusion status post TIPS. However the fluid reaccumulated and the patient required chest tube placement.    1.  Followup with us within one week.  We'll schedule him for thoracentesis on the weekly basis.  2.I discussed at length with the patient that he needs to followup with a liver center.  3. Discharge patient on Lasix 40 mg orally once daily  4. Low-salt diet

## 2018-04-24 NOTE — PROGRESS NOTE ADULT - SUBJECTIVE AND OBJECTIVE BOX
INTERVAL HPI/OVERNIGHT EVENTS: 63 yo Male with Advanced Alcoholic Cirrhosis, admitted with SOB pleural effusion R CT discontinued and patient feeling better today.  Awake alert eating lunch thirsty, more engaging, we discussed discharge plan, his intention to get better.    62y old  Male who presents with a chief complaint of Shortness of breath (23 Apr 2018 12:36)      Present Symptoms:     Dyspnea: 0    Nausea/Vomiting:  No  Anxiety:  Yes   Depression: Yes   Fatigue: Yes   Loss of appetite: Yes improving    Pain: Denies            Character-            Duration-            Effect-            Factors-            Frequency-            Location-            Severity-    Review of Systems: Reviewed                    All others negative    MEDICATIONS  (STANDING):  apixaban 5 milliGRAM(s) Oral every 12 hours  escitalopram 10 milliGRAM(s) Oral daily  furosemide    Tablet 40 milliGRAM(s) Oral daily  multivitamin 1 Tablet(s) Oral daily  pantoprazole    Tablet 40 milliGRAM(s) Oral before breakfast  senna 2 Tablet(s) Oral at bedtime    MEDICATIONS  (PRN):  oxyCODONE    IR 5 milliGRAM(s) Oral every 4 hours PRN Mild Pain (1 - 3)      PHYSICAL EXAM:    Vital Signs Last 24 Hrs  T(C): 36.7 (24 Apr 2018 07:46), Max: 36.9 (23 Apr 2018 22:54)  T(F): 98 (24 Apr 2018 07:46), Max: 98.4 (23 Apr 2018 22:54)  HR: 81 (24 Apr 2018 07:46) (77 - 86)  BP: 108/63 (24 Apr 2018 07:46) (95/65 - 108/63)  BP(mean): --  RR: 19 (24 Apr 2018 07:46) (18 - 19)  SpO2: 97% (24 Apr 2018 07:46) (97% - 98%)    General: alert  oriented x __3__                   cachexia    HEENT:   dry mouth      Lungs: comfortable      CV: normal      GI:  distended  tender            constipation  last BM:     : normal      MSK:  weakness  edema             ambulatory     Skin: normal   Stage_____  no rash    LABS:                        10.2   7.6   )-----------( 161      ( 23 Apr 2018 09:04 )             29.1     04-23    136  |  104  |  46.0<H>  ----------------------------<  118<H>  4.2   |  21.0<L>  |  0.90    Ca    7.7<L>      23 Apr 2018 09:04  Phos  3.6     04-23  Mg     2.0     04-23      I&O's Summary    RADIOLOGY & ADDITIONAL STUDIES:    ADVANCE DIRECTIVES:   DNR  NO  Completed on:                     MOLST  NO   Completed on:  Living Will   NO   Completed on:

## 2018-04-24 NOTE — PROGRESS NOTE BEHAVIORAL HEALTH - NSBHCHARTREVIEWVS_PSY_A_CORE FT
Vital Signs Last 24 Hrs  T(C): 37.1 (24 Apr 2018 16:08), Max: 37.1 (24 Apr 2018 16:08)  T(F): 98.7 (24 Apr 2018 16:08), Max: 98.7 (24 Apr 2018 16:08)  HR: 77 (24 Apr 2018 16:08) (77 - 86)  BP: 112/76 (24 Apr 2018 16:08) (104/64 - 112/76)  BP(mean): --  RR: 18 (24 Apr 2018 16:08) (18 - 19)  SpO2: 97% (24 Apr 2018 16:08) (97% - 98%)

## 2018-04-24 NOTE — CHART NOTE - NSCHARTNOTEFT_GEN_A_CORE
Upon Nutritional Assessment by the Registered Dietitian your patient was determined to meet criteria / has evidence of the following diagnosis/diagnoses:          [x ]  Mild Protein Calorie Malnutrition        [ ]  Moderate Protein Calorie Malnutrition        [ ] Severe Protein Calorie Malnutrition        [ ] Unspecified Protein Calorie Malnutrition        [ ] Underweight / BMI <19        [ ] Morbid Obesity / BMI > 40      Findings as based on:  •  Comprehensive nutrition assessment and consultation  •  Calorie counts (nutrient intake analysis)  •  Food acceptance and intake status from observations by staff  •  Follow up  •  Patient education  •  Intervention secondary to interdisciplinary rounds  •   concerns      Treatment:    The following diet has been recommended:  Rec add CCHO and Glucerna BID      PROVIDER Section:     By signing this assessment you are acknowledging and agree with the diagnosis/diagnoses assigned by the Registered Dietitian    Comments:

## 2018-04-24 NOTE — PROGRESS NOTE ADULT - SUBJECTIVE AND OBJECTIVE BOX
Patient is a 62y old  Male who presents with a chief complaint of Shortness of breath (23 Apr 2018 12:36)      HPI:  62 year old male alcoholic cirrhosis, recurrent hepatic hydrothorax, PE 2/8/2018 on eliquis, TIPS 3/29/2018, DM, status post chest tube at [place] on 4/3/2018. Patient was at home in usual poor state of health when he developed worsening shortness of breath. He came to [facility] [place] ER where cxr revealed a large right sided pleural effusion.  Cardio-thoracic surgery has been following and they plan on eventually draining the chest but would like to monitor the patient first off Eliquis for fear of bleeding. (18 Apr 2018 10:17)    FAMILY HISTORY:  No pertinent family history in first degree relatives      INTERVAL HPI/OVERNIGHT EVENTS:  Pt. c/o weakness, nausea, and vomiting  Pt. was supposed to be D/Dany today  Denies SOB, states breathing is better than it was on admission, no CP, or palpitations    PAST MEDICAL & SURGICAL HISTORY:  Pulmonary embolism  Cirrhosis  DM (diabetes mellitus)  S/P thoracentesis      Allergies    No Known Allergies    Vital Signs Last 24 Hrs  T(C): 37.1 (24 Apr 2018 16:08), Max: 37.1 (24 Apr 2018 16:08)  T(F): 98.7 (24 Apr 2018 16:08), Max: 98.7 (24 Apr 2018 16:08)  HR: 77 (24 Apr 2018 16:08) (77 - 86)  BP: 112/76 (24 Apr 2018 16:08) (95/65 - 112/76)  BP(mean): --  RR: 18 (24 Apr 2018 16:08) (18 - 19)  SpO2: 97% (24 Apr 2018 16:08) (97% - 98%)                          10.2   7.6   )-----------( 161      ( 23 Apr 2018 09:04 )             29.1       RADIOLOGY & ADDITIONAL STUDIES:  < from: Xray Chest 1 View- PORTABLE-Urgent (04.23.18 @ 10:59) >  Since April 23, 2018 at 4:39 AM, unchanged small right pneumothorax    MEDICATIONS:  apixaban 5 milliGRAM(s) Oral every 12 hours  escitalopram 10 milliGRAM(s) Oral daily  furosemide    Tablet 40 milliGRAM(s) Oral daily  multivitamin 1 Tablet(s) Oral daily  oxyCODONE    IR 5 milliGRAM(s) Oral every 4 hours PRN  pantoprazole    Tablet 40 milliGRAM(s) Oral before breakfast  senna 2 Tablet(s) Oral at bedtime

## 2018-04-24 NOTE — DIETITIAN INITIAL EVALUATION ADULT. - ETIOLOGY
related to alcoholic cirrhosis related to inadequate protein energy intake in setting of alcoholic cirrhosis

## 2018-04-24 NOTE — PROGRESS NOTE ADULT - ASSESSMENT
61 yo male w/ pmhx of alcoholic cirrhosis, pulmonary embolus, DM and recurrent pleural effusions s/p chest tube 4/18 --> 4/23. Patient had chest tube removed on 4/23 then developed nausea and vomiting. Possible dc in am if stable. GI recommends liver transplant clinic with Dr Anthony Pruitt in [place] as an outpatient.    Problem/Plan - 1:  ·  Problem: Recurrent Pleural effusion/ Recurrent hepatic hydrothorax, post TIPS on 3/29  - chest tube d/virgen on 4/23 by CT surgery  -weekly thoracentesis to be arranged by GI ()    Plan - 2:  ·  Problem: Dyspnea on exertion.   -chest tube d/virgen on 4/23, no adverse rxns  CXR: Since April 23, 2018 at 4:39 AM, unchanged small right pneumothorax       Problem/Plan - 3:  ·  Problem: Cirrhosis of liver with ascites, unspecified hepatic cirrhosis type.   - GI recommends transplant as an outpatient.   - pt encouraged to take Lasix 40mg daily  Call Dr Pruitt as an outpatient.  - Pt. encouraged to F/U with hepatologist   Pt. need to arrange Q weekly thoracentesis thru GI  IR will need to have prescription and clinical Hx fax # [phone]  Pt's PMD  has been updated by Dr.Jaipal collins pt's hospital course, management, and plan     Problem/Plan - 4:  ·  Problem: Type 2 diabetes mellitus with other neurologic complication, without long-term current use of insulin.    -Tolerable glycemia     Problem/Plan - 5:  ·  Problem: Other chronic pulmonary embolism without acute cor pulmonale.    -anticoagulation with Eliquis 5mg Q12H resumed after chest tube was removed yesterday  -Eliquis is not under pt's insurance, he is made aware and is willing to pay reduced co-pay each month possibly will need it until August  -Pt. encouraged to follow up with Dr. Baldo Acuña and other medical issues.      Problem/Plan -6:  Nausea and vomiting secondary to end stage cirrosis  supportive care.   Zofran PRN  GI prx- Protonix    Problem/Plan- 7:  Depression:  Patient is very stressed and depressed because of poor condition and recurrent disease  Psychiatry input appreciated  will  check ammonia level, TSH, B12 and folate 61 yo male w/ pmhx of alcoholic cirrhosis, pulmonary embolus, DM and recurrent pleural effusions s/p chest tube 4/18 --> 4/23. Patient had chest tube removed on 4/23 then developed nausea and vomiting. Possible dc in am if stable. GI recommends liver transplant clinic with Dr Anthony Pruitt in [place] as an outpatient.    Problem/Plan - 1:  ·  Problem: Recurrent Pleural effusion/ Recurrent hepatic hydrothorax, post TIPS on 3/29  - chest tube d/virgen on 4/23 by CT surgery  -weekly thoracentesis to be arranged by GI ()    Plan - 2:  ·  Problem: Dyspnea on exertion.   -chest tube d/virgen on 4/23, no adverse rxns  CXR: Since April 23, 2018 at 4:39 AM, unchanged small right pneumothorax       Problem/Plan - 3:  ·  Problem: Cirrhosis of liver with ascites, unspecified hepatic cirrhosis type.   - GI recommends transplant as an outpatient.   - pt encouraged to take Lasix 40mg daily  Call Dr Pruitt as an outpatient.  - Pt. encouraged to F/U with hepatologist   Pt. need to arrange Q weekly thoracentesis thru GI  IR will need to have prescription and clinical Hx fax # [phone]  Pt's PMD  has been updated by Dr.Jaipal collins pt's hospital course, management, and plan     Problem/Plan - 4:  ·  Problem: Type 2 diabetes mellitus with other neurologic complication, without long-term current use of insulin.    -Tolerable glycemia     Problem/Plan - 5:  ·  Problem: Other chronic pulmonary embolism without acute cor pulmonale.    -anticoagulation with Eliquis 5mg Q12H resumed after chest tube was removed yesterday  -Eliquis is not under pt's insurance, he is made aware and is willing to pay reduced co-pay each month possibly will need it until August  -Pt. encouraged to follow up with Dr. Baldo Acuña and other medical issues.      Problem/Plan -6:  Nausea and vomiting secondary to end stage cirrosis  supportive care.   Zofran PRN  GI prx- Protonix    Problem/Plan- 7:  Depression:  was started on Lexapro on 4/21  Patient is very stressed and depressed because of poor condition and recurrent disease  Psychiatry input appreciated  will  check ammonia level, TSH, B12 and folate    Problem/Plan- 8:  Weakness/Deconditioning:  labs in am  PT consult to assess bed mobilty, strengthening, ROM, endurance, balance  Dietitian Consult

## 2018-04-24 NOTE — PROGRESS NOTE BEHAVIORAL HEALTH - NSBHFUPINTERVALHXFT_PSY_A_CORE
Patient continues to deny depression, also denies anhedonia, denies suicidal ideation, denies appetite disturbance, Patient endorses severe fatigue for the past several weeks which makes it hard to get out of bed and at time of eval reports  he feels nauseous. He reports he continues to enjoy living, and is doing well with his marriage, has grandchildren. He reports being treated well here by staff.  asked patient year initially said 2008 then self corrected; asked season stated May, when given multiple choice of seasons picked correct season.

## 2018-04-24 NOTE — PROGRESS NOTE ADULT - ASSESSMENT
The patient with liver cirrhosis, recurrent right-sided pleural effusion status post TIPS. However the fluid reaccumulated and the patient required chest tube placement.    1.  Followup with us within one week.  We'll schedule him for thoracentesis on the weekly basis.  2.I discussed at length with the patient that he needs to followup with a liver center.  3. Discharge patient on Lasix 40 mg orally once daily  4. Low-salt diet

## 2018-04-24 NOTE — PROGRESS NOTE ADULT - ATTENDING COMMENTS
COUNSELING:    Face to face meeting to discuss Advanced Care Planning - Time Spent ______ Minutes.  See goals of care note.    More than 50% time spent in counseling and coordinating care. _35_____ Minutes.     Thank you for the opportunity to assist with the care of this patient.   [facility] [phone].
COUNSELING:    Face to face meeting to discuss Advanced Care Planning - Time Spent ______ Minutes.  See goals of care note.    More than 50% time spent in counseling and coordinating care. ___35___ Minutes.     Thank you for the opportunity to assist with the care of this patient.   [place] [facility] [phone].
Pt is seen and examined, discussed with Talita. Pt report feeling weak and depressed, will get Pt and psych eval.  Pt has multiple admission with recurrent admission due to pleural effusion due to cirrhosis, discussed with Dr. Saez and Dr. Rogers, decided to get outpatient q weekly thoracentesis to prevent rehospitalization, Dr. Rogers will f/u as an outpatient and will arrange thoracentesis.   Pt has h/o PE on Eliquis, not covered by insurance, discussed with pharmacy need to pay $150/month, discussed with SW to help him reduce co pay.  Also talked to Dr. Bland- PCP - informed about q weekly thoracentesis and eliquis  Pt informed to f/u with hepatologist.  Also seen by Palliative - prognosis guarded

## 2018-04-25 VITALS
DIASTOLIC BLOOD PRESSURE: 66 MMHG | RESPIRATION RATE: 18 BRPM | OXYGEN SATURATION: 99 % | HEART RATE: 80 BPM | TEMPERATURE: 99 F | SYSTOLIC BLOOD PRESSURE: 104 MMHG

## 2018-04-25 LAB
ALBUMIN SERPL ELPH-MCNC: 1.5 G/DL — LOW (ref 3.3–5.2)
ALP SERPL-CCNC: 359 U/L — HIGH (ref 40–120)
ALT FLD-CCNC: 53 U/L — HIGH
AMMONIA BLD-MCNC: 128 UMOL/L — HIGH (ref 11–55)
ANION GAP SERPL CALC-SCNC: 12 MMOL/L — SIGNIFICANT CHANGE UP (ref 5–17)
AST SERPL-CCNC: 67 U/L — HIGH
BASOPHILS # BLD AUTO: 0 K/UL — SIGNIFICANT CHANGE UP (ref 0–0.2)
BASOPHILS NFR BLD AUTO: 0.5 % — SIGNIFICANT CHANGE UP (ref 0–2)
BILIRUB DIRECT SERPL-MCNC: 0.3 MG/DL — SIGNIFICANT CHANGE UP (ref 0–0.3)
BILIRUB INDIRECT FLD-MCNC: 0.6 MG/DL — SIGNIFICANT CHANGE UP (ref 0.2–1)
BILIRUB SERPL-MCNC: 0.9 MG/DL — SIGNIFICANT CHANGE UP (ref 0.4–2)
BUN SERPL-MCNC: 42 MG/DL — HIGH (ref 8–20)
CALCIUM SERPL-MCNC: 7.9 MG/DL — LOW (ref 8.6–10.2)
CHLORIDE SERPL-SCNC: 101 MMOL/L — SIGNIFICANT CHANGE UP (ref 98–107)
CHOLEST SERPL-MCNC: 272 MG/DL — HIGH (ref 110–199)
CK SERPL-CCNC: 52 U/L — SIGNIFICANT CHANGE UP (ref 30–200)
CO2 SERPL-SCNC: 22 MMOL/L — SIGNIFICANT CHANGE UP (ref 22–29)
CREAT SERPL-MCNC: 1.24 MG/DL — SIGNIFICANT CHANGE UP (ref 0.5–1.3)
EOSINOPHIL # BLD AUTO: 0.1 K/UL — SIGNIFICANT CHANGE UP (ref 0–0.5)
EOSINOPHIL NFR BLD AUTO: 1.7 % — SIGNIFICANT CHANGE UP (ref 0–6)
FOLATE SERPL-MCNC: 16.5 NG/ML — SIGNIFICANT CHANGE UP
GLUCOSE SERPL-MCNC: 173 MG/DL — HIGH (ref 70–115)
HCT VFR BLD CALC: 30.8 % — LOW (ref 42–52)
HDLC SERPL-MCNC: 40 MG/DL — LOW
HGB BLD-MCNC: 10.8 G/DL — LOW (ref 14–18)
INR BLD: 1.31 RATIO — HIGH (ref 0.88–1.16)
LACTATE SERPL-SCNC: <0.2 MMOL/L — LOW (ref 0.5–2)
LIDOCAIN IGE QN: 49 U/L — SIGNIFICANT CHANGE UP (ref 22–51)
LIPID PNL WITH DIRECT LDL SERPL: 191 MG/DL — SIGNIFICANT CHANGE UP
LYMPHOCYTES # BLD AUTO: 1 K/UL — SIGNIFICANT CHANGE UP (ref 1–4.8)
LYMPHOCYTES # BLD AUTO: 12.6 % — LOW (ref 20–55)
MAGNESIUM SERPL-MCNC: 2 MG/DL — SIGNIFICANT CHANGE UP (ref 1.8–2.6)
MCHC RBC-ENTMCNC: 33.3 PG — HIGH (ref 27–31)
MCHC RBC-ENTMCNC: 35.1 G/DL — SIGNIFICANT CHANGE UP (ref 32–36)
MCV RBC AUTO: 95.1 FL — HIGH (ref 80–94)
MONOCYTES # BLD AUTO: 0.6 K/UL — SIGNIFICANT CHANGE UP (ref 0–0.8)
MONOCYTES NFR BLD AUTO: 7.1 % — SIGNIFICANT CHANGE UP (ref 3–10)
NEUTROPHILS # BLD AUTO: 6.5 K/UL — SIGNIFICANT CHANGE UP (ref 1.8–8)
NEUTROPHILS NFR BLD AUTO: 77.9 % — HIGH (ref 37–73)
PHOSPHATE SERPL-MCNC: 3.3 MG/DL — SIGNIFICANT CHANGE UP (ref 2.4–4.7)
PLATELET # BLD AUTO: 194 K/UL — SIGNIFICANT CHANGE UP (ref 150–400)
POTASSIUM SERPL-MCNC: 4.4 MMOL/L — SIGNIFICANT CHANGE UP (ref 3.5–5.3)
POTASSIUM SERPL-SCNC: 4.4 MMOL/L — SIGNIFICANT CHANGE UP (ref 3.5–5.3)
PREALB SERPL-MCNC: 12 MG/DL — LOW (ref 18–38)
PROT SERPL-MCNC: 4.4 G/DL — LOW (ref 6.6–8.7)
PROTHROM AB SERPL-ACNC: 14.5 SEC — HIGH (ref 9.8–12.7)
RBC # BLD: 3.24 M/UL — LOW (ref 4.6–6.2)
RBC # FLD: 15 % — SIGNIFICANT CHANGE UP (ref 11–15.6)
SODIUM SERPL-SCNC: 135 MMOL/L — SIGNIFICANT CHANGE UP (ref 135–145)
TOTAL CHOLESTEROL/HDL RATIO MEASUREMENT: 7 RATIO — SIGNIFICANT CHANGE UP (ref 3.4–9.6)
TRIGL SERPL-MCNC: 205 MG/DL — HIGH (ref 10–200)
TSH SERPL-MCNC: 6.44 UIU/ML — HIGH (ref 0.27–4.2)
VIT B12 SERPL-MCNC: 1326 PG/ML — HIGH (ref 232–1245)
WBC # BLD: 8.3 K/UL — SIGNIFICANT CHANGE UP (ref 4.8–10.8)
WBC # FLD AUTO: 8.3 K/UL — SIGNIFICANT CHANGE UP (ref 4.8–10.8)

## 2018-04-25 PROCEDURE — 80053 COMPREHEN METABOLIC PANEL: CPT

## 2018-04-25 PROCEDURE — 85730 THROMBOPLASTIN TIME PARTIAL: CPT

## 2018-04-25 PROCEDURE — 99285 EMERGENCY DEPT VISIT HI MDM: CPT

## 2018-04-25 PROCEDURE — 86901 BLOOD TYPING SEROLOGIC RH(D): CPT

## 2018-04-25 PROCEDURE — 84484 ASSAY OF TROPONIN QUANT: CPT

## 2018-04-25 PROCEDURE — 80048 BASIC METABOLIC PNL TOTAL CA: CPT

## 2018-04-25 PROCEDURE — 93970 EXTREMITY STUDY: CPT

## 2018-04-25 PROCEDURE — 83605 ASSAY OF LACTIC ACID: CPT

## 2018-04-25 PROCEDURE — 86850 RBC ANTIBODY SCREEN: CPT

## 2018-04-25 PROCEDURE — P9047: CPT

## 2018-04-25 PROCEDURE — 86923 COMPATIBILITY TEST ELECTRIC: CPT

## 2018-04-25 PROCEDURE — 83735 ASSAY OF MAGNESIUM: CPT

## 2018-04-25 PROCEDURE — 82607 VITAMIN B-12: CPT

## 2018-04-25 PROCEDURE — 99239 HOSP IP/OBS DSCHRG MGMT >30: CPT

## 2018-04-25 PROCEDURE — 84100 ASSAY OF PHOSPHORUS: CPT

## 2018-04-25 PROCEDURE — 82140 ASSAY OF AMMONIA: CPT

## 2018-04-25 PROCEDURE — 82248 BILIRUBIN DIRECT: CPT

## 2018-04-25 PROCEDURE — 93975 VASCULAR STUDY: CPT

## 2018-04-25 PROCEDURE — 80061 LIPID PANEL: CPT

## 2018-04-25 PROCEDURE — 82550 ASSAY OF CK (CPK): CPT

## 2018-04-25 PROCEDURE — 71045 X-RAY EXAM CHEST 1 VIEW: CPT

## 2018-04-25 PROCEDURE — 86900 BLOOD TYPING SEROLOGIC ABO: CPT

## 2018-04-25 PROCEDURE — 84134 ASSAY OF PREALBUMIN: CPT

## 2018-04-25 PROCEDURE — 97163 PT EVAL HIGH COMPLEX 45 MIN: CPT

## 2018-04-25 PROCEDURE — 84145 PROCALCITONIN (PCT): CPT

## 2018-04-25 PROCEDURE — 85610 PROTHROMBIN TIME: CPT

## 2018-04-25 PROCEDURE — 85027 COMPLETE CBC AUTOMATED: CPT

## 2018-04-25 PROCEDURE — 93005 ELECTROCARDIOGRAM TRACING: CPT

## 2018-04-25 PROCEDURE — 82746 ASSAY OF FOLIC ACID SERUM: CPT

## 2018-04-25 PROCEDURE — 36415 COLL VENOUS BLD VENIPUNCTURE: CPT

## 2018-04-25 PROCEDURE — 99232 SBSQ HOSP IP/OBS MODERATE 35: CPT

## 2018-04-25 PROCEDURE — 84443 ASSAY THYROID STIM HORMONE: CPT

## 2018-04-25 PROCEDURE — 83690 ASSAY OF LIPASE: CPT

## 2018-04-25 RX ADMIN — PANTOPRAZOLE SODIUM 40 MILLIGRAM(S): 20 TABLET, DELAYED RELEASE ORAL at 05:17

## 2018-04-25 RX ADMIN — APIXABAN 5 MILLIGRAM(S): 2.5 TABLET, FILM COATED ORAL at 17:09

## 2018-04-25 RX ADMIN — APIXABAN 5 MILLIGRAM(S): 2.5 TABLET, FILM COATED ORAL at 05:17

## 2018-04-25 RX ADMIN — Medication 1 TABLET(S): at 12:02

## 2018-04-25 RX ADMIN — Medication 40 MILLIGRAM(S): at 05:17

## 2018-04-25 RX ADMIN — ESCITALOPRAM OXALATE 10 MILLIGRAM(S): 10 TABLET, FILM COATED ORAL at 12:03

## 2018-04-25 NOTE — PROGRESS NOTE ADULT - SUBJECTIVE AND OBJECTIVE BOX
INTERVAL HPI/OVERNIGHT EVENTS:FU for recurrent pleural effusion-right sided, cirrhosis, pedal edema. Feels ok. Will be discharged.     MEDICATIONS  (STANDING):  apixaban 5 milliGRAM(s) Oral every 12 hours  escitalopram 10 milliGRAM(s) Oral daily  furosemide    Tablet 40 milliGRAM(s) Oral daily  multivitamin 1 Tablet(s) Oral daily  pantoprazole    Tablet 40 milliGRAM(s) Oral before breakfast  senna 2 Tablet(s) Oral at bedtime    MEDICATIONS  (PRN):  oxyCODONE    IR 5 milliGRAM(s) Oral every 4 hours PRN Mild Pain (1 - 3)      Allergies    No Known Allergies    Intolerances        Vital Signs Last 24 Hrs  T(C): 37 (24 Apr 2018 23:48), Max: 37.1 (24 Apr 2018 16:08)  T(F): 98.6 (24 Apr 2018 23:48), Max: 98.7 (24 Apr 2018 16:08)  HR: 77 (24 Apr 2018 23:48) (77 - 77)  BP: 103/57 (24 Apr 2018 23:48) (103/57 - 112/76)  BP(mean): --  RR: 18 (24 Apr 2018 23:48) (18 - 18)  SpO2: 95% (24 Apr 2018 23:48) (95% - 97%)    LABS:                        10.2   7.6   )-----------( 161      ( 23 Apr 2018 09:04 )             29.1     04-23    136  |  104  |  46.0<H>  ----------------------------<  118<H>  4.2   |  21.0<L>  |  0.90    Ca    7.7<L>      23 Apr 2018 09:04  Phos  3.6     04-23  Mg     2.0     04-23            RADIOLOGY & ADDITIONAL TESTS:

## 2018-04-25 NOTE — PROGRESS NOTE ADULT - RESPIRATORY
Breath Sounds equal & clear to percussion & auscultation, no accessory muscle use
detailed exam
detailed exam

## 2018-04-25 NOTE — PHYSICAL THERAPY INITIAL EVALUATION ADULT - PERTINENT HX OF CURRENT PROBLEM, REHAB EVAL
pt presents to [facility] due to pleural effusion, dyspnea, SOB, s/p chest tube

## 2018-04-25 NOTE — PROGRESS NOTE ADULT - ASSESSMENT
Patient with liver cirrhosis, right sided pleural effusion and now s/p TIPS, but with reaccumulation of fluid. Patient required chest tube which is discontinued.    1. Discharge home on lasix  2. Will arrange scheduled thoracenetesis as outpatient with IR q weekly to 2 weekly. May be after 2 weeks as patient will have to hold eliquis for that  3. Liver center follow up discussed  4. Gave my business card to the patient

## 2018-04-25 NOTE — PROGRESS NOTE ADULT - CONSTITUTIONAL
Well-developed, well nourished
detailed exam
detailed exam

## 2018-04-25 NOTE — PROGRESS NOTE ADULT - PROVIDER SPECIALTY LIST ADULT
Anesthesia
CT Surgery
Gastroenterology
Hospitalist
Hospitalist
Palliative Care
Palliative Care
Pulmonology
Thoracic Surgery
Hospitalist
Gastroenterology
Hospitalist

## 2018-05-01 ENCOUNTER — OUTPATIENT (OUTPATIENT)
Dept: OUTPATIENT SERVICES | Facility: HOSPITAL | Age: 63
LOS: 1 days | Discharge: HOME | End: 2018-05-01

## 2018-05-01 DIAGNOSIS — Z98.890 OTHER SPECIFIED POSTPROCEDURAL STATES: Chronic | ICD-10-CM

## 2018-05-01 DIAGNOSIS — I26.99 OTHER PULMONARY EMBOLISM WITHOUT ACUTE COR PULMONALE: ICD-10-CM

## 2018-05-09 LAB
CULTURE RESULTS: SIGNIFICANT CHANGE UP
SPECIMEN SOURCE: SIGNIFICANT CHANGE UP

## 2018-05-10 ENCOUNTER — INPATIENT (INPATIENT)
Facility: HOSPITAL | Age: 63
LOS: 7 days | Discharge: HOME | DRG: 433 | End: 2018-05-18
Attending: INTERNAL MEDICINE | Admitting: HOSPITALIST
Payer: SELF-PAY

## 2018-05-10 VITALS — OXYGEN SATURATION: 96 % | HEIGHT: 66 IN | WEIGHT: 169.98 LBS | HEART RATE: 103 BPM

## 2018-05-10 DIAGNOSIS — Z95.828 PRESENCE OF OTHER VASCULAR IMPLANTS AND GRAFTS: Chronic | ICD-10-CM

## 2018-05-10 DIAGNOSIS — Z98.890 OTHER SPECIFIED POSTPROCEDURAL STATES: Chronic | ICD-10-CM

## 2018-05-10 DIAGNOSIS — J90 PLEURAL EFFUSION, NOT ELSEWHERE CLASSIFIED: ICD-10-CM

## 2018-05-10 LAB
ALBUMIN SERPL ELPH-MCNC: 1.8 G/DL — LOW (ref 3.3–5.2)
ALP SERPL-CCNC: 399 U/L — HIGH (ref 40–120)
ALT FLD-CCNC: 48 U/L — HIGH
ANION GAP SERPL CALC-SCNC: 13 MMOL/L — SIGNIFICANT CHANGE UP (ref 5–17)
APTT BLD: 35.8 SEC — SIGNIFICANT CHANGE UP (ref 27.5–37.4)
AST SERPL-CCNC: 51 U/L — HIGH
B PERT IGG+IGM PNL SER: ABNORMAL
BASE EXCESS BLDV CALC-SCNC: -4.1 MMOL/L — LOW (ref -2–2)
BASOPHILS # BLD AUTO: 0 K/UL — SIGNIFICANT CHANGE UP (ref 0–0.2)
BASOPHILS NFR BLD AUTO: 0.1 % — SIGNIFICANT CHANGE UP (ref 0–2)
BILIRUB SERPL-MCNC: 1.5 MG/DL — SIGNIFICANT CHANGE UP (ref 0.4–2)
BUN SERPL-MCNC: 62 MG/DL — HIGH (ref 8–20)
CA-I SERPL-SCNC: 1.14 MMOL/L — LOW (ref 1.15–1.33)
CALCIUM SERPL-MCNC: 8.2 MG/DL — LOW (ref 8.6–10.2)
CHLORIDE BLDV-SCNC: 101 MMOL/L — SIGNIFICANT CHANGE UP (ref 98–107)
CHLORIDE SERPL-SCNC: 97 MMOL/L — LOW (ref 98–107)
CO2 SERPL-SCNC: 21 MMOL/L — LOW (ref 22–29)
COLOR FLD: ABNORMAL
CREAT SERPL-MCNC: 1.33 MG/DL — HIGH (ref 0.5–1.3)
EOSINOPHIL # BLD AUTO: 0 K/UL — SIGNIFICANT CHANGE UP (ref 0–0.5)
EOSINOPHIL NFR BLD AUTO: 0.1 % — SIGNIFICANT CHANGE UP (ref 0–5)
FLUID INTAKE SUBSTANCE CLASS: SIGNIFICANT CHANGE UP
FLUID SEGMENTED GRANULOCYTES: 74 % — SIGNIFICANT CHANGE UP
GAS PNL BLDV: 131 MMOL/L — LOW (ref 135–145)
GAS PNL BLDV: SIGNIFICANT CHANGE UP
GAS PNL BLDV: SIGNIFICANT CHANGE UP
GLUCOSE BLDV-MCNC: 158 MG/DL — HIGH (ref 70–99)
GLUCOSE SERPL-MCNC: 159 MG/DL — HIGH (ref 70–115)
HCO3 BLDV-SCNC: 21 MMOL/L — SIGNIFICANT CHANGE UP (ref 21–29)
HCT VFR BLD CALC: 33.4 % — LOW (ref 42–52)
HCT VFR BLDA CALC: 37 — LOW (ref 39–50)
HGB BLD CALC-MCNC: 12.1 G/DL — LOW (ref 13–17)
HGB BLD-MCNC: 11.6 G/DL — LOW (ref 14–18)
INR BLD: 1.39 RATIO — HIGH (ref 0.88–1.16)
LACTATE BLDV-MCNC: 2.4 MMOL/L — HIGH (ref 0.5–2)
LYMPHOCYTES # BLD AUTO: 1.1 K/UL — SIGNIFICANT CHANGE UP (ref 1–4.8)
LYMPHOCYTES # BLD AUTO: 5.6 % — LOW (ref 20–55)
LYMPHOCYTES # FLD: 6 % — SIGNIFICANT CHANGE UP
MCHC RBC-ENTMCNC: 32.5 PG — HIGH (ref 27–31)
MCHC RBC-ENTMCNC: 34.7 G/DL — SIGNIFICANT CHANGE UP (ref 32–36)
MCV RBC AUTO: 93.6 FL — SIGNIFICANT CHANGE UP (ref 80–94)
MESOTHL CELL # FLD: 1 % — SIGNIFICANT CHANGE UP
MONOCYTES # BLD AUTO: 0.6 K/UL — SIGNIFICANT CHANGE UP (ref 0–0.8)
MONOCYTES NFR BLD AUTO: 3.4 % — SIGNIFICANT CHANGE UP (ref 3–10)
MONOS+MACROS # FLD: 19 % — SIGNIFICANT CHANGE UP
NEUTROPHILS # BLD AUTO: 17.3 K/UL — HIGH (ref 1.8–8)
NEUTROPHILS NFR BLD AUTO: 90.6 % — HIGH (ref 37–73)
OTHER CELLS CSF MANUAL: 13 ML/DL — LOW (ref 18–22)
PCO2 BLDV: 49 MMHG — SIGNIFICANT CHANGE UP (ref 35–50)
PH BLDV: 7.28 — LOW (ref 7.32–7.43)
PH FLD: 8.5 — SIGNIFICANT CHANGE UP
PLAT MORPH BLD: NORMAL — SIGNIFICANT CHANGE UP
PLATELET # BLD AUTO: 243 K/UL — SIGNIFICANT CHANGE UP (ref 150–400)
PO2 BLDV: 46 MMHG — HIGH (ref 25–45)
POTASSIUM BLDV-SCNC: 4.1 MMOL/L — SIGNIFICANT CHANGE UP (ref 3.4–4.5)
POTASSIUM SERPL-MCNC: 4.4 MMOL/L — SIGNIFICANT CHANGE UP (ref 3.5–5.3)
POTASSIUM SERPL-SCNC: 4.4 MMOL/L — SIGNIFICANT CHANGE UP (ref 3.5–5.3)
PROT SERPL-MCNC: 5.3 G/DL — LOW (ref 6.6–8.7)
PROTHROM AB SERPL-ACNC: 15.4 SEC — HIGH (ref 9.8–12.7)
RBC # BLD: 3.57 M/UL — LOW (ref 4.6–6.2)
RBC # FLD: 14.6 % — SIGNIFICANT CHANGE UP (ref 11–15.6)
RBC BLD AUTO: NORMAL — SIGNIFICANT CHANGE UP
RCV VOL RI: 1872 /UL — HIGH (ref 0–5)
SAO2 % BLDV: 77 % — SIGNIFICANT CHANGE UP
SODIUM SERPL-SCNC: 131 MMOL/L — LOW (ref 135–145)
TOTAL NUCLEATED CELL COUNT, BODY FLUID: 2116 /UL — HIGH (ref 0–5)
TROPONIN T SERPL-MCNC: <0.01 NG/ML — SIGNIFICANT CHANGE UP (ref 0–0.06)
TUBE TYPE: SIGNIFICANT CHANGE UP
WBC # BLD: 19.1 K/UL — HIGH (ref 4.8–10.8)
WBC # FLD AUTO: 19.1 K/UL — HIGH (ref 4.8–10.8)

## 2018-05-10 PROCEDURE — 71045 X-RAY EXAM CHEST 1 VIEW: CPT | Mod: 26

## 2018-05-10 PROCEDURE — 99285 EMERGENCY DEPT VISIT HI MDM: CPT

## 2018-05-10 PROCEDURE — 32556 INSERT CATH PLEURA W/O IMAGE: CPT

## 2018-05-10 PROCEDURE — 99232 SBSQ HOSP IP/OBS MODERATE 35: CPT | Mod: 25

## 2018-05-10 PROCEDURE — 71045 X-RAY EXAM CHEST 1 VIEW: CPT | Mod: 26,77

## 2018-05-10 PROCEDURE — 71250 CT THORAX DX C-: CPT | Mod: 26

## 2018-05-10 PROCEDURE — 93010 ELECTROCARDIOGRAM REPORT: CPT

## 2018-05-10 RX ORDER — PIPERACILLIN AND TAZOBACTAM 4; .5 G/20ML; G/20ML
3.38 INJECTION, POWDER, LYOPHILIZED, FOR SOLUTION INTRAVENOUS EVERY 8 HOURS
Qty: 0 | Refills: 0 | Status: DISCONTINUED | OUTPATIENT
Start: 2018-05-10 | End: 2018-05-11

## 2018-05-10 RX ORDER — PANTOPRAZOLE SODIUM 20 MG/1
40 TABLET, DELAYED RELEASE ORAL
Qty: 0 | Refills: 0 | Status: DISCONTINUED | OUTPATIENT
Start: 2018-05-10 | End: 2018-05-18

## 2018-05-10 RX ORDER — SODIUM CHLORIDE 9 MG/ML
1000 INJECTION, SOLUTION INTRAVENOUS
Qty: 0 | Refills: 0 | Status: DISCONTINUED | OUTPATIENT
Start: 2018-05-10 | End: 2018-05-18

## 2018-05-10 RX ORDER — ESCITALOPRAM OXALATE 10 MG/1
10 TABLET, FILM COATED ORAL DAILY
Qty: 0 | Refills: 0 | Status: DISCONTINUED | OUTPATIENT
Start: 2018-05-10 | End: 2018-05-18

## 2018-05-10 RX ORDER — INSULIN LISPRO 100/ML
VIAL (ML) SUBCUTANEOUS
Qty: 0 | Refills: 0 | Status: DISCONTINUED | OUTPATIENT
Start: 2018-05-10 | End: 2018-05-18

## 2018-05-10 RX ORDER — SODIUM CHLORIDE 9 MG/ML
3 INJECTION INTRAMUSCULAR; INTRAVENOUS; SUBCUTANEOUS ONCE
Qty: 0 | Refills: 0 | Status: COMPLETED | OUTPATIENT
Start: 2018-05-10 | End: 2018-05-10

## 2018-05-10 RX ORDER — VANCOMYCIN HCL 1 G
1000 VIAL (EA) INTRAVENOUS EVERY 12 HOURS
Qty: 0 | Refills: 0 | Status: DISCONTINUED | OUTPATIENT
Start: 2018-05-10 | End: 2018-05-11

## 2018-05-10 RX ORDER — VANCOMYCIN HCL 1 G
1000 VIAL (EA) INTRAVENOUS ONCE
Qty: 0 | Refills: 0 | Status: COMPLETED | OUTPATIENT
Start: 2018-05-10 | End: 2018-05-10

## 2018-05-10 RX ORDER — GLUCAGON INJECTION, SOLUTION 0.5 MG/.1ML
1 INJECTION, SOLUTION SUBCUTANEOUS ONCE
Qty: 0 | Refills: 0 | Status: DISCONTINUED | OUTPATIENT
Start: 2018-05-10 | End: 2018-05-18

## 2018-05-10 RX ORDER — TIOTROPIUM BROMIDE 18 UG/1
1 CAPSULE ORAL; RESPIRATORY (INHALATION) DAILY
Qty: 0 | Refills: 0 | Status: DISCONTINUED | OUTPATIENT
Start: 2018-05-10 | End: 2018-05-11

## 2018-05-10 RX ORDER — DEXTROSE 50 % IN WATER 50 %
25 SYRINGE (ML) INTRAVENOUS ONCE
Qty: 0 | Refills: 0 | Status: DISCONTINUED | OUTPATIENT
Start: 2018-05-10 | End: 2018-05-18

## 2018-05-10 RX ORDER — DEXTROSE 50 % IN WATER 50 %
15 SYRINGE (ML) INTRAVENOUS ONCE
Qty: 0 | Refills: 0 | Status: DISCONTINUED | OUTPATIENT
Start: 2018-05-10 | End: 2018-05-18

## 2018-05-10 RX ORDER — INSULIN LISPRO 100/ML
VIAL (ML) SUBCUTANEOUS AT BEDTIME
Qty: 0 | Refills: 0 | Status: DISCONTINUED | OUTPATIENT
Start: 2018-05-10 | End: 2018-05-18

## 2018-05-10 RX ORDER — FUROSEMIDE 40 MG
40 TABLET ORAL DAILY
Qty: 0 | Refills: 0 | Status: DISCONTINUED | OUTPATIENT
Start: 2018-05-10 | End: 2018-05-11

## 2018-05-10 RX ORDER — LACTULOSE 10 G/15ML
10 SOLUTION ORAL
Qty: 0 | Refills: 0 | Status: DISCONTINUED | OUTPATIENT
Start: 2018-05-10 | End: 2018-05-15

## 2018-05-10 RX ORDER — PIPERACILLIN AND TAZOBACTAM 4; .5 G/20ML; G/20ML
3.38 INJECTION, POWDER, LYOPHILIZED, FOR SOLUTION INTRAVENOUS ONCE
Qty: 0 | Refills: 0 | Status: COMPLETED | OUTPATIENT
Start: 2018-05-10 | End: 2018-05-10

## 2018-05-10 RX ORDER — IPRATROPIUM/ALBUTEROL SULFATE 18-103MCG
3 AEROSOL WITH ADAPTER (GRAM) INHALATION EVERY 6 HOURS
Qty: 0 | Refills: 0 | Status: DISCONTINUED | OUTPATIENT
Start: 2018-05-10 | End: 2018-05-11

## 2018-05-10 RX ORDER — APIXABAN 2.5 MG/1
5 TABLET, FILM COATED ORAL EVERY 12 HOURS
Qty: 0 | Refills: 0 | Status: DISCONTINUED | OUTPATIENT
Start: 2018-05-10 | End: 2018-05-10

## 2018-05-10 RX ORDER — DEXTROSE 50 % IN WATER 50 %
12.5 SYRINGE (ML) INTRAVENOUS ONCE
Qty: 0 | Refills: 0 | Status: DISCONTINUED | OUTPATIENT
Start: 2018-05-10 | End: 2018-05-18

## 2018-05-10 RX ORDER — ALBUTEROL 90 UG/1
1 AEROSOL, METERED ORAL EVERY 4 HOURS
Qty: 0 | Refills: 0 | Status: DISCONTINUED | OUTPATIENT
Start: 2018-05-10 | End: 2018-05-11

## 2018-05-10 RX ADMIN — Medication 250 MILLIGRAM(S): at 21:37

## 2018-05-10 RX ADMIN — SODIUM CHLORIDE 3 MILLILITER(S): 9 INJECTION INTRAMUSCULAR; INTRAVENOUS; SUBCUTANEOUS at 20:44

## 2018-05-10 RX ADMIN — PIPERACILLIN AND TAZOBACTAM 200 GRAM(S): 4; .5 INJECTION, POWDER, LYOPHILIZED, FOR SOLUTION INTRAVENOUS at 21:36

## 2018-05-10 NOTE — CONSULT NOTE ADULT - SUBJECTIVE AND OBJECTIVE BOX
ERIC ROLANDQUEZ  --------------------------------------------------  HPI:  63 y/o male with pmh of dm, PE (2/8/18) currently on eliquis, alcoholic liver cirrhosis, TIPS procedure at [place] (3/29/18), recurrent hepatic hydrothorax presented to the ED c/o of worsening SOB x 1 week which has progressed. Pt is well known to the service with similar SOB presentations in the past with right sided pleural effusion that required draining. Upon present  examination pt appears uncomfortable at rest, unable to speak in complete sentences due to his shortness of breath. CXR done in ED reveals a large right sided pleural effusion. CT scan was also done by request of ED physician with results pending. AT this time pt denies CP, palpitations, dizziness, n/v/d. He admits to SOB at rest worsening with exertion. We were consulted, per Dr. Saez will place right sided pigtail and drain max 3 liters of pleural fluid, continue to monitor. (10 May 2018 19:25)    PAST MEDICAL & SURGICAL HISTORY:  Pleural effusion  Pulmonary embolism  Cirrhosis  DM (diabetes mellitus)  S/P TIPS (transjugular intrahepatic portosystemic shunt)  S/P thoracentesis    FAMILY HISTORY:  No pertinent family history in first degree relatives    REVIEW OF SYSTEMS: All negative except the one in documented in HPI  ----------------------------------  MEDICATIONS  (STANDING):  piperacillin/tazobactam IVPB. 3.375 Gram(s) IV Intermittent once  vancomycin  IVPB 1000 milliGRAM(s) IV Intermittent once    MEDICATIONS  (PRN):    Allergies    No Known Allergies    Intolerances      Vital Signs Last 24 Hrs  T(C): 36.8 (10 May 2018 17:12), Max: 36.8 (10 May 2018 17:12)  T(F): 98.2 (10 May 2018 17:12), Max: 98.2 (10 May 2018 17:12)  HR: 103 (10 May 2018 19:45) (98 - 103)  BP: 138/65 (10 May 2018 19:45) (92/59 - 138/65)  BP(mean): --  RR: 20 (10 May 2018 19:45) (20 - 20)  SpO2: 98% (10 May 2018 19:45) (96% - 98%)    PHYSICAL EXAMINATION:  General: Well developed. well nourished. not in distress  HEENT: AT, NC. PERRL. intact EOM. no throat erythema or exudate.   Neck: supple. no JVD. no palpable lymph nodes  Chest: CTA bilaterally  Heart: normal S1,S2. RRR. no heart murmur.  Abdomen: soft. non-tender. non-distended. + BS. no hernia or palpable masses.  Genital: not examined  Ext: no C/C/E. no calf tenderness.  Neuro: AAO x3. no focal weakness. no speech disorder  Skin: no rash  ---------------------------------------    LABS:  --------                        11.6   19.1  )-----------( 243      ( 10 May 2018 20:13 )             33.4     05-10    131<L>  |  97<L>  |  62.0<H>  ----------------------------<  159<H>  4.4   |  21.0<L>  |  1.33<H>    Ca    8.2<L>      10 May 2018 20:11    TPro  5.3<L>  /  Alb  1.8<L>  /  TBili  1.5  /  DBili  x   /  AST  51<H>  /  ALT  48<H>  /  AlkPhos  399<H>  05-10    PT/INR - ( 10 May 2018 20:13 )   PT: 15.4 sec;   INR: 1.39 ratio         PTT - ( 10 May 2018 20:13 )  PTT:35.8 sec ERIC ARIAS. H& P.  --------------------------------------------------  HPI:  63 y/o male with  sig. pmh of dm, PE (2/8/18) currently on eliquis, alcoholic liver cirrhosis, TIPS procedure at [place] (3/29/18), recurrent hepatic hydrothorax presented to the ED c/o of worsening SOB x 1 week with minimal exertion which has progressed. Sob is somewhat better with rest. pt. denies orthopnea and reports that he can lay flat at night. pt. has been admitted at [facility] multiple times with similar SOB presentations in the past with right sided pleural effusion that required draining. pt appeared uncomfortable upon arrival in the ER, was unable to speak in complete sentences due to his shortness of breath. CXR done in ED reveals a large right sided pleural effusion. Ct surgery evaluated pt. and rt. chest tube was placed which has helped sob to some extent but as per pt. he is not at his baseline. pt denies CP, palpitations, dizziness, n/v/d. no fever. no abd. pain.     PAST MEDICAL HISTORY:   Pleural effusion  Pulmonary embolism  Cirrhosis  DM (diabetes mellitus)    PSH :  S/P TIPS (transjugular intrahepatic portosystemic shunt)  S/P thoracentesis.    SOCIAL HISTORY:  stopped smoking 10 years ago, prior to that has smoked 1ppd for 20 years.  Stopped drinking etoh about one and a half years ago.    FAMILY HISTORY:  No pertinent family history in first degree relatives    REVIEW OF SYSTEMS: All negative except the one in documented in HPI  ----------------------------------  MEDICATIONS  (STANDING):  piperacillin/tazobactam IVPB. 3.375 Gram(s) IV Intermittent once  vancomycin  IVPB 1000 milliGRAM(s) IV Intermittent once    MEDICATIONS  (PRN):    Allergies    No Known Allergies    Intolerances      Vital Signs Last 24 Hrs  T(C): 36.8 (10 May 2018 17:12), Max: 36.8 (10 May 2018 17:12)  T(F): 98.2 (10 May 2018 17:12), Max: 98.2 (10 May 2018 17:12)  HR: 103 (10 May 2018 19:45) (98 - 103)  BP: 138/65 (10 May 2018 19:45) (92/59 - 138/65)  BP(mean): --  RR: 20 (10 May 2018 19:45) (20 - 20)  SpO2: 98% (10 May 2018 19:45) (96% - 98%)    PHYSICAL EXAMINATION:  General: Well developed. well nourished. not in distress  HEENT: AT, NC. PERRL. intact EOM. no throat erythema or exudate.   Neck: supple. no JVD. no palpable lymph nodes  Chest: CTA bilaterally  Heart: normal S1,S2. RRR. no heart murmur.  Abdomen: soft. non-tender. non-distended. + BS. no hernia or palpable masses.  Genital: not examined  Ext: no C/C/E. no calf tenderness.  Neuro: AAO x3. no focal weakness. no speech disorder  Skin: no rash  ---------------------------------------    LABS:  --------                        11.6   19.1  )-----------( 243      ( 10 May 2018 20:13 )             33.4     05-10    131<L>  |  97<L>  |  62.0<H>  ----------------------------<  159<H>  4.4   |  21.0<L>  |  1.33<H>    Ca    8.2<L>      10 May 2018 20:11    TPro  5.3<L>  /  Alb  1.8<L>  /  TBili  1.5  /  DBili  x   /  AST  51<H>  /  ALT  48<H>  /  AlkPhos  399<H>  05-10    PT/INR - ( 10 May 2018 20:13 )   PT: 15.4 sec;   INR: 1.39 ratio         PTT - ( 10 May 2018 20:13 )  PTT:35.8 sec ERIC ARIAS. H& P.  --------------------------------------------------  HPI:  63 y/o male with  sig. pmh of dm, PE (2/8/18) currently on eliquis, alcoholic liver cirrhosis, TIPS procedure at [place] (3/29/18), recurrent hepatic hydrothorax presented to the ED c/o of worsening SOB x 1 week with minimal exertion which has progressed. Sob is somewhat better with rest. pt. denies orthopnea and reports that he can lay flat at night. pt. has been admitted at [facility] multiple times with similar SOB presentations in the past with right sided pleural effusion that required draining. pt appeared uncomfortable upon arrival in the ER, was unable to speak in complete sentences due to his shortness of breath. CXR done in ED reveals a large right sided pleural effusion. Ct surgery evaluated pt. and rt. chest tube was placed which has helped sob to some extent but as per pt. he is not at his baseline. pt denies CP, palpitations, dizziness, n/v/d. no fever. no abd. pain. reports loose stools, pt. is on lactulose.     PAST MEDICAL HISTORY:   Pleural effusion  Pulmonary embolism  Cirrhosis  DM (diabetes mellitus)    PSH :  S/P TIPS (transjugular intrahepatic portosystemic shunt)  S/P thoracentesis.    SOCIAL HISTORY:  stopped smoking 10 years ago, prior to that has smoked 1ppd for 20 years.  Stopped drinking etoh about one and a half years ago.    FAMILY HISTORY:  No pertinent family history in first degree relatives    Allergies : NKDA      ----------------------------------  MEDICATIONS  (STANDING):  piperacillin/tazobactam IVPB. 3.375 Gram(s) IV Intermittent once  vancomycin  IVPB 1000 milliGRAM(s) IV Intermittent once.  Lasix.  apixaban  escitalopram  lactulose.  protonix.     ROS : REVIEW OF SYSTEMS: All  system negative except the one in documented in HPI      Vital Signs Last 24 Hrs  T(C): 36.8 (10 May 2018 17:12), Max: 36.8 (10 May 2018 17:12)  T(F): 98.2 (10 May 2018 17:12), Max: 98.2 (10 May 2018 17:12)  HR: 103 (10 May 2018 19:45) (98 - 103)  BP: 138/65 (10 May 2018 19:45) (92/59 - 138/65)  BP(mean): --  RR: 20 (10 May 2018 19:45) (20 - 20)  SpO2: 98% (10 May 2018 19:45) (96% - 98%)    PHYSICAL EXAMINATION:  General: somewhat malnourished , tired looking male lying in bed no acute distress.   HEENT: AT, NC. PERRL. intact EOM. no throat erythema or exudate.   Neck: supple. + ve JVD.   Chest: Somewhat diminished BS with basal crackle over rt. lung field, left lung with fair air entry. no sig. wheeze.   Heart: normal S1,S2. RRR. no heart murmur.  Abdomen: soft. non-tender. non-distended. + BS.   Genital / rectal : deferred by pt.  Ext: 1 to 2 + edema of b/L LE.  pt. moves all ext and ROM is intact.  Neuro: AAO x3. no focal weakness. no speech disorder.   Skin: no rash noted, somewhat pale looking skin noted. no diaphoresis.   Psychiatric : somewhat flat affect, tired looking. no SI/HI.  ---------------------------------------    LABS:  --------                        11.6   19.1  )-----------( 243      ( 10 May 2018 20:13 )             33.4     05-10    131<L>  |  97<L>  |  62.0<H>  ----------------------------<  159<H>  4.4   |  21.0<L>  |  1.33<H>    Ca    8.2<L>      10 May 2018 20:11    TPro  5.3<L>  /  Alb  1.8<L>  /  TBili  1.5  /  DBili  x   /  AST  51<H>  /  ALT  48<H>  /  AlkPhos  399<H>  05-10    PT/INR - ( 10 May 2018 20:13 )   PT: 15.4 sec;   INR: 1.39 ratio         PTT - ( 10 May 2018 20:13 )  PTT:35.8 sec ERIC ARIAS. H& P.  --------------------------------------------------  HPI:  61 y/o male with  sig. pmh of dm, PE (2/8/18) currently on eliquis, alcoholic liver cirrhosis, TIPS procedure at [place] (3/29/18), recurrent hepatic hydrothorax presented to the ED c/o of worsening SOB x 1 week with minimal exertion which has progressed. Sob is somewhat better with rest. pt. denies orthopnea and reports that he can lay flat at night. pt. has been admitted at [facility] multiple times with similar SOB presentations in the past with right sided pleural effusion that required draining. pt appeared uncomfortable upon arrival in the ER, was unable to speak in complete sentences due to his shortness of breath. CXR done in ED reveals a large right sided pleural effusion. Ct surgery evaluated pt. and rt. chest tube was placed which has helped sob to some extent but as per pt. he is not at his baseline. pt denies CP, palpitations, dizziness, n/v/d. no fever. no abd. pain. reports loose stools, pt. is on lactulose.     PAST MEDICAL HISTORY:   Pleural effusion  Pulmonary embolism  Cirrhosis  DM (diabetes mellitus)    PSH :  S/P TIPS (transjugular intrahepatic portosystemic shunt)  S/P thoracentesis.    SOCIAL HISTORY:  stopped smoking 10 years ago, prior to that has smoked 1ppd for 20 years.  Stopped drinking etoh about one and a half years ago.    FAMILY HISTORY:  No pertinent family history in first degree relatives    Allergies : NKDA      ----------------------------------  MEDICATIONS  (STANDING):  piperacillin/tazobactam IVPB. 3.375 Gram(s) IV Intermittent once  vancomycin  IVPB 1000 milliGRAM(s) IV Intermittent once.  Lasix.  apixaban  escitalopram  lactulose.  protonix.     ROS : REVIEW OF SYSTEMS: All  system negative except the one in documented in HPI      Vital Signs Last 24 Hrs  T(C): 36.8 (10 May 2018 17:12), Max: 36.8 (10 May 2018 17:12)  T(F): 98.2 (10 May 2018 17:12), Max: 98.2 (10 May 2018 17:12)  HR: 103 (10 May 2018 19:45) (98 - 103)  BP: 138/65 (10 May 2018 19:45) (92/59 - 138/65)  BP(mean): --  RR: 20 (10 May 2018 19:45) (20 - 20)  SpO2: 98% (10 May 2018 19:45) (96% - 98%)    PHYSICAL EXAMINATION:  General: somewhat malnourished , tired looking male lying in bed no acute distress.   HEENT: AT, NC. PERRL. intact EOM. no throat erythema or exudate.   Neck: supple. + ve JVD.   Chest: Somewhat diminished BS with basal crackle over rt. lung field, left lung with fair air entry. no sig. wheeze.   Heart: normal S1,S2. RRR. no heart murmur.  Abdomen: soft. non-tender. non-distended. + BS.   Genital / rectal : deferred by pt.  Ext: 1 to 2 + edema of b/L LE.  pt. moves all ext and ROM is intact.  Neuro: AAO x3. no focal weakness. no speech disorder.   Skin: no rash noted, somewhat pale looking skin noted. no diaphoresis.   Psychiatric : somewhat flat affect, tired looking. no SI/HI.  ---------------------------------------    LABS:  --------                        11.6   19.1  )-----------( 243      ( 10 May 2018 20:13 )             33.4     05-10    131<L>  |  97<L>  |  62.0<H>  ----------------------------<  159<H>  4.4   |  21.0<L>  |  1.33<H>    Ca    8.2<L>      10 May 2018 20:11    TPro  5.3<L>  /  Alb  1.8<L>  /  TBili  1.5  /  DBili  x   /  AST  51<H>  /  ALT  48<H>  /  AlkPhos  399<H>  05-10    PT/INR - ( 10 May 2018 20:13 )   PT: 15.4 sec;   INR: 1.39 ratio         PTT - ( 10 May 2018 20:13 )  PTT:35.8 sec    cxr, large rt. pleural effusion.    ekg is sinus 100, short pr interval, no st elevation noted.

## 2018-05-10 NOTE — H&P ADULT - NSHPLABSRESULTS_GEN_ALL_CORE
CBC, CMP, Blood venous gas, PTT, Troponins pending     CXR: 5/10/18 Right sided effusion, pending official read    CT chest: Ordered

## 2018-05-10 NOTE — H&P ADULT - ASSESSMENT
61 y/o male presented to the ED c/o of worsening SOB x 1 week which has progressed. CXR done in ED reveals a large right sided pleural effusion. CT scan was also done by request of ED physician with results pending. Labs pending. AT this time pt denies CP, palpitations, dizziness, n/v/d. He admits to SOB at rest worsening with exertion. We were consulted, per Dr. Saez plan to do thoracentesis to remove pleural fluid to achieve comfort. 63 y/o male presented to the ED c/o of worsening SOB x 1 week which has progressed. CXR done in ED reveals a large right sided pleural effusion. CT scan was also done by request of ED physician with results pending. Labs pending. AT this time pt denies CP, palpitations, dizziness, n/v/d. He admits to SOB at rest worsening with exertion. We were consulted, per Dr. Saez plan to place right sided pigtail to remove pleural fluid to achieve comfort.

## 2018-05-10 NOTE — ED ADULT NURSE REASSESSMENT NOTE - NS ED NURSE REASSESS COMMENT FT1
Pt care assumed at 1930, no apparent distress noted at this time, charting as noted. pt received Alert and Oriented to person, place, situation and time laying in bed c/o shortness of breath. Pt has h/o pleural effusion and chest tubes. Pt has b/l upper extremity +1 pitting edema. Pt is taken by RAHEEM Corral to critical care room for chest tube placement. Bloodwork and IV was done at this time. pt is sinus tach on cardiac monitor. pt educated on plan of care.

## 2018-05-10 NOTE — ED STATDOCS - PROGRESS NOTE DETAILS
here for sob hx of cirhosis of the liver has diminished breath sounds right chest r/o chf r/o decompensated cirhosis

## 2018-05-10 NOTE — ED PROVIDER NOTE - MUSCULOSKELETAL, MLM
Spine appears normal, range of motion is not limited, no muscle or joint tenderness Spine appears normal, range of motion is not limited, no muscle or joint tenderness. edema to b/l lower extremities

## 2018-05-10 NOTE — ED PROVIDER NOTE - PROGRESS NOTE DETAILS
cardiothoracic pa alex in ed knows pt for chest tube drainage signed out to [facility] Pig tail catheter place by CT surgery with large amount of fluid return.  Case d/w Hospitalist/Dr. Otto and will admit

## 2018-05-10 NOTE — ED PROVIDER NOTE - CARE PLAN
Principal Discharge DX:	Pleural effusion  Secondary Diagnosis:	SOB (shortness of breath)  Secondary Diagnosis:	Cirrhosis

## 2018-05-10 NOTE — ED ADULT NURSE REASSESSMENT NOTE - NS ED NURSE REASSESS COMMENT FT1
pt tolerated chest tube insertion with 2L fluid removed. Pt states "breathing is a little better but feels more fluid can come out." pt transferred back to CDU in sable condition. pt educated on plan of care. pt is now Normal Sinus Rhythm on cardiac monitor.

## 2018-05-10 NOTE — ED ADULT NURSE NOTE - ED STAT RN HANDOFF DETAILS
Pt alert and oriented, in no distress, pt transferred to bed without incident. pig tail intact. Pt handed off in stable condition.

## 2018-05-10 NOTE — H&P ADULT - HISTORY OF PRESENT ILLNESS
61 y/o male with pmh of dm, PE (2/8/18) currently on eliquis, alcoholic liver cirrhosis, TIPS procedure at [place] (3/29/18), recurrent hepatic hydrothorax presented to the ED c/o of worsening SOB x 1 week which has progressed. Pt is well known to the service with similar SOB presentations in the past with right sided pleural effusion that required draining. Upon present  examination pt appears uncomfortable at rest, unable to speak in complete sentences due to his shortness of breath. CXR done in ED reveals a large right sided pleural effusion. CT scan was also done by request of ED physician with results pending. AT this time pt denies CP, palpitations, dizziness, n/v/d. He admits to SOB at rest worsening with exertion. We were consulted, per Dr. Saez will undergo thoracentesis with 8F catheter to remove right sided pleural fluid and evaluate pts symtpoms. 61 y/o male with pmh of dm, PE (2/8/18) currently on eliquis, alcoholic liver cirrhosis, TIPS procedure at [place] (3/29/18), recurrent hepatic hydrothorax presented to the ED c/o of worsening SOB x 1 week which has progressed. Pt is well known to the service with similar SOB presentations in the past with right sided pleural effusion that required draining. Upon present  examination pt appears uncomfortable at rest, unable to speak in complete sentences due to his shortness of breath. CXR done in ED reveals a large right sided pleural effusion. CT scan was also done by request of ED physician with results pending. AT this time pt denies CP, palpitations, dizziness, n/v/d. He admits to SOB at rest worsening with exertion. We were consulted, per Dr. Saez will place right sided pigtail and drain max 3 liters of pleural fluid, continue to monitor.

## 2018-05-10 NOTE — CHART NOTE - NSCHARTNOTEFT_GEN_A_CORE
Pt evaluated after chest tube placement at 1800 hours pt stable reports he is feeling better since fluid drained. Pt to be admitted to Medicine service as per Dr. Saez. Pt will remain on CT Consult list and we will continue to follow chest tube with further recommendations.

## 2018-05-10 NOTE — ED PROVIDER NOTE - OBJECTIVE STATEMENT
63 y/o M pt with hx of cirrhosis, DM, PE presents to ED c/o worsening trouble breathing and SOB x 1 week. Pt also has b/l lower leg edema. Pt is on Eliquis. Lactulose, Lasix and took his medications today 63 y/o M pt with hx of cirrhosis, DM, PE presents to ED c/o worsening trouble breathing and SOB x 1 week. Pt also has b/l lower leg edema. Pt is on Eliquis. Lactulose, Lasix and took his medications today. Pt has had fluid from lungs drained in the past.

## 2018-05-10 NOTE — H&P ADULT - PROBLEM SELECTOR PLAN 1
Recommend thoracentesis with fluid drainage. Fluid removal not to exceed 3L.   Will monitor hemodynamics.  Will continue to follow pt.   Appreciate consult.   Discussed with Dr. Saez, Contact CT surgery service. Recommend right sided pigtail with fluid drainage. Fluid removal not to exceed 3L.   rec. routine CXR  Will monitor hemodynamics.  Will continue to follow pt.   Appreciate consult.   Discussed with Dr. Saez, Contact CT surgery service.

## 2018-05-10 NOTE — ED ADULT NURSE REASSESSMENT NOTE - NS ED NURSE REASSESS COMMENT FT1
2100 cc removed from pleura vac, changed to new tolerated well removed from suction and clamped for transfer to floor

## 2018-05-10 NOTE — CONSULT NOTE ADULT - ASSESSMENT
A/P     PT. is admitted for exertional Dyspnea very likely related to large rt. pleural effusion. s/p chest tube.     Rt. pleural effusion. 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  s/p chest tube with some relief, further plan for recurrent rt. pleural effusion as per ct surgery team.     Leukocytosis, unspecified, underline pneumonia ? will keep on vanco , zosyn. follow blood, pleural fluid , sputum cx.     Edema of b/l lower ext. will continue with lasix.    BRYNN, likely pre -renal, needs diuresis to reduce edema with close f/u of labs.     H/o liver cirrhosis.     Total time spent 70 minute, plan discussed with pt. A/P     PT. is admitted for exertional Dyspnea very likely related to large rt. pleural effusion. s/p chest tube. echo from march, 2018 showed EF 60 to 65 %, normal LV function.     Rt. pleural effusion. 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  s/p chest tube with some relief, further plan for recurrent rt. pleural effusion as per ct surgery team.     Leukocytosis, unspecified, underline pneumonia ? will keep on vanco , zosyn. follow blood, pleural fluid , sputum cx.     Edema of b/l lower ext. will continue with lasix.    BRYNN, likely pre -renal, needs diuresis to reduce edema with close f/u of labs.     H/o liver cirrhosis.     Total time spent 70 minute, plan discussed with pt.

## 2018-05-10 NOTE — H&P ADULT - NSHPPHYSICALEXAM_GEN_ALL_CORE
General: Pt SOB uncomfortable while lying down  Neuro: A+O x 3, non-focal, speech clear and intact  HEENT: PERRL, EOMI, oral mucosa pink and moist  Neck: supple, no JVD  CV: regular rate,  no murmurs or rub  Pulm/chest: Right sided lung sounds absent, left sided lung sounds CTA, no accessory muscle use noted  Abd: soft, NT, distended, +BS  Ext: 3+ b/l LE pitting edema  Skin: warm, well perfused, no rashes

## 2018-05-11 ENCOUNTER — APPOINTMENT (OUTPATIENT)
Dept: PULMONOLOGY | Facility: CLINIC | Age: 63
End: 2018-05-11

## 2018-05-11 DIAGNOSIS — K70.31 ALCOHOLIC CIRRHOSIS OF LIVER WITH ASCITES: ICD-10-CM

## 2018-05-11 DIAGNOSIS — E11.9 TYPE 2 DIABETES MELLITUS WITHOUT COMPLICATIONS: ICD-10-CM

## 2018-05-11 LAB
ALBUMIN SERPL ELPH-MCNC: 1.5 G/DL — LOW (ref 3.3–5.2)
ALP SERPL-CCNC: 328 U/L — HIGH (ref 40–120)
ALT FLD-CCNC: 37 U/L — SIGNIFICANT CHANGE UP
ANION GAP SERPL CALC-SCNC: 15 MMOL/L — SIGNIFICANT CHANGE UP (ref 5–17)
AST SERPL-CCNC: 38 U/L — SIGNIFICANT CHANGE UP
BASOPHILS # BLD AUTO: 0 K/UL — SIGNIFICANT CHANGE UP (ref 0–0.2)
BASOPHILS NFR BLD AUTO: 0.1 % — SIGNIFICANT CHANGE UP (ref 0–2)
BILIRUB SERPL-MCNC: 1.1 MG/DL — SIGNIFICANT CHANGE UP (ref 0.4–2)
BUN SERPL-MCNC: 69 MG/DL — HIGH (ref 8–20)
CALCIUM SERPL-MCNC: 8 MG/DL — LOW (ref 8.6–10.2)
CHLORIDE SERPL-SCNC: 96 MMOL/L — LOW (ref 98–107)
CO2 SERPL-SCNC: 20 MMOL/L — LOW (ref 22–29)
CREAT SERPL-MCNC: 1.56 MG/DL — HIGH (ref 0.5–1.3)
EOSINOPHIL # BLD AUTO: 0 K/UL — SIGNIFICANT CHANGE UP (ref 0–0.5)
EOSINOPHIL NFR BLD AUTO: 0.3 % — SIGNIFICANT CHANGE UP (ref 0–6)
GLUCOSE BLDC GLUCOMTR-MCNC: 124 MG/DL — HIGH (ref 70–99)
GLUCOSE BLDC GLUCOMTR-MCNC: 131 MG/DL — HIGH (ref 70–99)
GLUCOSE BLDC GLUCOMTR-MCNC: 136 MG/DL — HIGH (ref 70–99)
GLUCOSE BLDC GLUCOMTR-MCNC: 166 MG/DL — HIGH (ref 70–99)
GLUCOSE SERPL-MCNC: 129 MG/DL — HIGH (ref 70–115)
HBA1C BLD-MCNC: 5.7 % — HIGH (ref 4–5.6)
HCT VFR BLD CALC: 27.5 % — LOW (ref 42–52)
HGB BLD-MCNC: 9.9 G/DL — LOW (ref 14–18)
LYMPHOCYTES # BLD AUTO: 1.7 K/UL — SIGNIFICANT CHANGE UP (ref 1–4.8)
LYMPHOCYTES # BLD AUTO: 11.5 % — LOW (ref 20–55)
MCHC RBC-ENTMCNC: 33.4 PG — HIGH (ref 27–31)
MCHC RBC-ENTMCNC: 36 G/DL — SIGNIFICANT CHANGE UP (ref 32–36)
MCV RBC AUTO: 92.9 FL — SIGNIFICANT CHANGE UP (ref 80–94)
MONOCYTES # BLD AUTO: 0.6 K/UL — SIGNIFICANT CHANGE UP (ref 0–0.8)
MONOCYTES NFR BLD AUTO: 4.2 % — SIGNIFICANT CHANGE UP (ref 3–10)
NEUTROPHILS # BLD AUTO: 12.4 K/UL — HIGH (ref 1.8–8)
NEUTROPHILS NFR BLD AUTO: 83.6 % — HIGH (ref 37–73)
PLATELET # BLD AUTO: 206 K/UL — SIGNIFICANT CHANGE UP (ref 150–400)
POTASSIUM SERPL-MCNC: 4.3 MMOL/L — SIGNIFICANT CHANGE UP (ref 3.5–5.3)
POTASSIUM SERPL-SCNC: 4.3 MMOL/L — SIGNIFICANT CHANGE UP (ref 3.5–5.3)
PROT SERPL-MCNC: 4.5 G/DL — LOW (ref 6.6–8.7)
RBC # BLD: 2.96 M/UL — LOW (ref 4.6–6.2)
RBC # FLD: 14.3 % — SIGNIFICANT CHANGE UP (ref 11–15.6)
SODIUM SERPL-SCNC: 131 MMOL/L — LOW (ref 135–145)
WBC # BLD: 14.9 K/UL — HIGH (ref 4.8–10.8)
WBC # FLD AUTO: 14.9 K/UL — HIGH (ref 4.8–10.8)

## 2018-05-11 PROCEDURE — 93010 ELECTROCARDIOGRAM REPORT: CPT

## 2018-05-11 PROCEDURE — 99232 SBSQ HOSP IP/OBS MODERATE 35: CPT

## 2018-05-11 PROCEDURE — 99233 SBSQ HOSP IP/OBS HIGH 50: CPT

## 2018-05-11 PROCEDURE — 71045 X-RAY EXAM CHEST 1 VIEW: CPT | Mod: 26

## 2018-05-11 RX ORDER — SODIUM CHLORIDE 9 MG/ML
1000 INJECTION INTRAMUSCULAR; INTRAVENOUS; SUBCUTANEOUS
Qty: 0 | Refills: 0 | Status: COMPLETED | OUTPATIENT
Start: 2018-05-11 | End: 2018-05-12

## 2018-05-11 RX ORDER — PIPERACILLIN AND TAZOBACTAM 4; .5 G/20ML; G/20ML
3.38 INJECTION, POWDER, LYOPHILIZED, FOR SOLUTION INTRAVENOUS EVERY 8 HOURS
Qty: 0 | Refills: 0 | Status: DISCONTINUED | OUTPATIENT
Start: 2018-05-11 | End: 2018-05-11

## 2018-05-11 RX ORDER — APIXABAN 2.5 MG/1
5 TABLET, FILM COATED ORAL EVERY 12 HOURS
Qty: 0 | Refills: 0 | Status: DISCONTINUED | OUTPATIENT
Start: 2018-05-11 | End: 2018-05-11

## 2018-05-11 RX ORDER — LACTOBACILLUS ACIDOPHILUS 100MM CELL
1 CAPSULE ORAL
Qty: 0 | Refills: 0 | Status: DISCONTINUED | OUTPATIENT
Start: 2018-05-11 | End: 2018-05-18

## 2018-05-11 RX ORDER — ENOXAPARIN SODIUM 100 MG/ML
40 INJECTION SUBCUTANEOUS DAILY
Qty: 0 | Refills: 0 | Status: DISCONTINUED | OUTPATIENT
Start: 2018-05-11 | End: 2018-05-11

## 2018-05-11 RX ORDER — ALBUTEROL 90 UG/1
2.5 AEROSOL, METERED ORAL EVERY 4 HOURS
Qty: 0 | Refills: 0 | Status: DISCONTINUED | OUTPATIENT
Start: 2018-05-11 | End: 2018-05-18

## 2018-05-11 RX ORDER — IPRATROPIUM/ALBUTEROL SULFATE 18-103MCG
3 AEROSOL WITH ADAPTER (GRAM) INHALATION EVERY 6 HOURS
Qty: 0 | Refills: 0 | Status: DISCONTINUED | OUTPATIENT
Start: 2018-05-11 | End: 2018-05-16

## 2018-05-11 RX ORDER — IBUPROFEN 200 MG
400 TABLET ORAL ONCE
Qty: 0 | Refills: 0 | Status: COMPLETED | OUTPATIENT
Start: 2018-05-11 | End: 2018-05-11

## 2018-05-11 RX ADMIN — PANTOPRAZOLE SODIUM 40 MILLIGRAM(S): 20 TABLET, DELAYED RELEASE ORAL at 06:54

## 2018-05-11 RX ADMIN — Medication 1 TABLET(S): at 08:41

## 2018-05-11 RX ADMIN — LACTULOSE 10 GRAM(S): 10 SOLUTION ORAL at 06:51

## 2018-05-11 RX ADMIN — Medication 2: at 11:34

## 2018-05-11 RX ADMIN — PIPERACILLIN AND TAZOBACTAM 25 GRAM(S): 4; .5 INJECTION, POWDER, LYOPHILIZED, FOR SOLUTION INTRAVENOUS at 06:51

## 2018-05-11 RX ADMIN — Medication 250 MILLIGRAM(S): at 06:51

## 2018-05-11 RX ADMIN — ESCITALOPRAM OXALATE 10 MILLIGRAM(S): 10 TABLET, FILM COATED ORAL at 11:34

## 2018-05-11 RX ADMIN — Medication 40 MILLIGRAM(S): at 01:02

## 2018-05-11 RX ADMIN — Medication 1 TABLET(S): at 17:20

## 2018-05-11 RX ADMIN — Medication 400 MILLIGRAM(S): at 23:49

## 2018-05-11 RX ADMIN — Medication 3 MILLILITER(S): at 15:58

## 2018-05-11 RX ADMIN — Medication 3 MILLILITER(S): at 21:18

## 2018-05-11 NOTE — PROGRESS NOTE ADULT - ASSESSMENT
62yoM with PMH DM2, Hx PE, Hx Pleural effusions, ETOH Cirrhosis presenting with sob, admitted for Large Rt sided pleural effusion.    Rt Sided Pleural Effusion- IMPROVING. in setting of hx of recurrent effusions in past. CT Sx consult appreciated, sp chest tube placement 5/10, uncomplicated well tolerated for therapeutic + diagnostic reasons. initially 1200mL fluid removed, since has had addition 2L fluid outpt. PLAN- Chest tube management + removal per ct surgery. no signs of malignancy on CT Chest however pt is @ elevated risk given hx of smoking + cirrhosis, should have screening MRI or CT Abd done as outpt to eval for underlying intraabd ca such as liver ca. PT eval when chest tube removed.     Leukocytosis- IMRPOVING. unclear source. pt with complaints of chills + sob prior to arrival, poss resp infection. no infiltrates noted on CT or CXR, if resp illness more likely bronchitis picture. currently on zosyn started 5/11 and vanco started 5/10 empirically for HCAP. PLAN- stop abx. check procal. repeat serial cbc to trend. check RVP. serial CXR. stop tiotropium. change duoneb to standing q6h. alb q4h prn.     BRYNN- WORSENING. baseling Cr 0.9. likely sec to poor circulatory effective volume in setting of hypoalbuminemia + from pre renal azotemia given massive volume loss post chest tube placement. PLAN- 1L iv gentle hydration. serial bmp. if continues to worsen will consider albumin challenge with lasix diuresis to enhance circulation. protein supplementation. hold lasix.     Hypotension- STABLE. asymptomatic. likely sec to poor oncotic pressure. PLAN- gentle iv hydration. plan as above for BRYNN. serial BP checks, manual preferred.     Hx PE- STABLE. dx 2/8/18. currently on eliquis 5mg bid. PLAN- cont holding eliquis, resume post removal of chest tube. should be on AC x6mo total, anticipated stop date 8/8/18    ETOH related Cirrhosis- STABLE. complicated by hypoalbuminemia causing mod/severe generalized edema. edema less likely related to HF, pt had echo 3/2018 with normal EF and LV function. also unlikely sec to underlying DVT given chronic use of eliquis for PE. PLAN- nutrition consult. add prostat. supportive care for edema, elevate LE, compression stockings if poss inpatient. outpt GI follow up, should have imaging to eval for malignancy of liver. continued abstinence from etoh.

## 2018-05-11 NOTE — PROGRESS NOTE ADULT - ASSESSMENT
61 y/o mal0e, pmhx DM, PE (2/8/18) on eliquis, ETOH cirrhosis s/p TIPS procedure (3/29/18) at [facility], with h/o recurrent hepatic hydrothorax, presents o the ED c/o of worsening SOB x 1 week which has progressed, found to have recurrent large Right pleural effusion, s/p 8F pigtail placement.     5/11 hypotensive requiring IVF

## 2018-05-11 NOTE — PROGRESS NOTE ADULT - SUBJECTIVE AND OBJECTIVE BOX
PMD: unknown    CHIEF COMPLAINT: sob     Patient seen and examined at the bedside. No acute overnight events. sp Chest tube placement yesterday. Complaining of edema this AM, breathing has improved. Denies fever/chills, headache, lightheadedness, dizziness, chest pain, palpitations, shortness of breath, cough, abd pain, nausea/vomiting/diarrhea, muscle pain.      =========================================================================================  T(C): 36.8 (05-11-18 @ 04:26), Max: 37.2 (05-11-18 @ 00:30)  HR: 97 (05-11-18 @ 04:45) (94 - 103)  BP: 90/48 (05-11-18 @ 04:45) (79/50 - 138/65)  RR: 18 (05-11-18 @ 04:00) (18 - 20)  SpO2: 100% (05-11-18 @ 07:39) (92% - 100%)    PHYSICAL EXAM.    GEN - appears age appropriate. well nourished. no distress.   HEENT - NCAT, EOMI, VAISHNAVI  RESP - CTA BL, no wheeze/stridor/rhonchi/crackles. ADRIEL @ Rt base. on supplemental O2. able to speak in full sentences without distress. Rt posterior chest wall tube in place, clamped.  CARDIO - NS1S2, RRR. No murmurs/rubs/gallops.  ABD - Soft/Non tender/Non distended. Normal BS x4 quadrants. no guarding/rebound tenderness.  Ext - RUE +2 edema of hand. BL LE +3 pitting edema.   MSK - BL 5/5 strength on upper and lower extremities.   Neuro - AAOx3. cn 2-12 grossly intact  Psych - normal affect  Skin - c/d/i. no rashes/lesions      I&O's Summary    10 May 2018 07:01  -  11 May 2018 07:00  --------------------------------------------------------  IN: 240 mL / OUT: 2000 mL / NET: -1760 mL      Daily Height in cm: 167.64 (10 May 2018 16:54)    Daily     =========================================================================================  LABS.    05-11    131<L>  |  96<L>  |  69.0<H>  ----------------------------<  129<H>  4.3   |  20.0<L>  |  1.56<H>    Ca    8.0<L>      11 May 2018 06:24    TPro  4.5<L>  /  Alb  1.5<L>  /  TBili  1.1  /  DBili  x   /  AST  38  /  ALT  37  /  AlkPhos  328<H>  05-11                          9.9    14.9  )-----------( 206      ( 11 May 2018 06:24 )             27.5     LIVER FUNCTIONS - ( 11 May 2018 06:24 )  Alb: 1.5 g/dL / Pro: 4.5 g/dL / ALK PHOS: 328 U/L / ALT: 37 U/L / AST: 38 U/L / GGT: x           PT/INR - ( 10 May 2018 20:13 )   PT: 15.4 sec;   INR: 1.39 ratio         PTT - ( 10 May 2018 20:13 )  PTT:35.8 sec  CARDIAC MARKERS ( 10 May 2018 20:11 )  x     / <0.01 ng/mL / x     / x     / x                =========================================================================================  IMAGING.     < from: Xray Chest 1 View AP/PA. (05.11.18 @ 05:08) >  Findings:  A right pigtail chest tube has been placed. There has been marked   reduction in right pleural effusion since the prior examination. Right   basilar atelectasis is noted. No evidence of pneumothorax. The left lung   remains clear. The heart is not enlarged..    Impression:  Significant decrease in right pleural effusion since the prior day..    < end of copied text >    =========================================================================================    MEDICATIONS  (STANDING):  apixaban 5 milliGRAM(s) Oral every 12 hours  dextrose 5%. 1000 milliLiter(s) (50 mL/Hr) IV Continuous <Continuous>  dextrose 50% Injectable 12.5 Gram(s) IV Push once  dextrose 50% Injectable 25 Gram(s) IV Push once  dextrose 50% Injectable 25 Gram(s) IV Push once  escitalopram 10 milliGRAM(s) Oral daily  insulin lispro (HumaLOG) corrective regimen sliding scale   SubCutaneous three times a day before meals  insulin lispro (HumaLOG) corrective regimen sliding scale   SubCutaneous at bedtime  lactobacillus acidophilus 1 Tablet(s) Oral two times a day with meals  lactulose Syrup 10 Gram(s) Oral two times a day  pantoprazole    Tablet 40 milliGRAM(s) Oral before breakfast  piperacillin/tazobactam IVPB. 3.375 Gram(s) IV Intermittent every 8 hours  sodium chloride 0.9%. 1000 milliLiter(s) (200 mL/Hr) IV Continuous <Continuous>  tiotropium 18 MICROgram(s) Capsule 1 Capsule(s) Inhalation daily  vancomycin  IVPB 1000 milliGRAM(s) IV Intermittent every 12 hours    MEDICATIONS  (PRN):  ALBUTerol/ipratropium for Nebulization 3 milliLiter(s) Nebulizer every 6 hours PRN Shortness of Breath and/or Wheezing  dextrose 40% Gel 15 Gram(s) Oral once PRN Blood Glucose LESS THAN 70 milliGRAM(s)/deciliter  glucagon  Injectable 1 milliGRAM(s) IntraMuscular once PRN Glucose LESS THAN 70 milligrams/deciliter

## 2018-05-11 NOTE — PROGRESS NOTE ADULT - SUBJECTIVE AND OBJECTIVE BOX
Subjective: reports feeling tired, states he SOB is improved since pigtail placement last night, also c/o generalized back pain, states he had it before he came in  denies CP, palpitations, fever, chills, N/V, diarrhea, numbness/tingling.    T(C): 36.4 (05-11-18 @ 11:58), Max: 37.2 (05-11-18 @ 00:30)  HR: 90 (05-11-18 @ 11:58) (90 - 103)  BP: 102/64 (05-11-18 @ 17:07) (79/50 - 138/65)  ABP: --  ABP(mean): --  RR: 18 (05-11-18 @ 11:58) (18 - 20)  SpO2: 100% (05-11-18 @ 07:39) (92% - 100%)    05-11    131<L>  |  96<L>  |  69.0<H>  ----------------------------<  129<H>  4.3   |  20.0<L>  |  1.56<H>    Ca    8.0<L>      11 May 2018 06:24    TPro  4.5<L>  /  Alb  1.5<L>  /  TBili  1.1  /  DBili  x   /  AST  38  /  ALT  37  /  AlkPhos  328<H>  05-11                               9.9    14.9  )-----------( 206      ( 11 May 2018 06:24 )             27.5     PT/INR - ( 10 May 2018 20:13 )   PT: 15.4 sec;   INR: 1.39 ratio      PTT - ( 10 May 2018 20:13 )  PTT:35.8 sec              CAPILLARY BLOOD GLUCOSE  POCT Blood Glucose.: 136 mg/dL (11 May 2018 17:00)  POCT Blood Glucose.: 166 mg/dL (11 May 2018 11:32)  POCT Blood Glucose.: 124 mg/dL (11 May 2018 07:55)        I&O's Detail    10 May 2018 07:01  -  11 May 2018 07:00  --------------------------------------------------------  IN:    Oral Fluid: 240 mL  Total IN: 240 mL    OUT:    Chest Tube: 2000 mL  Total OUT: 2000 mL    Total NET: -1760 mL    Drug Dosing Weight  Height (cm): 167.64 (10 May 2018 16:54)  Weight (kg): 77.1 (10 May 2018 16:54)  BMI (kg/m2): 27.4 (10 May 2018 16:54)  BSA (m2): 1.87 (10 May 2018 16:54)    MEDICATIONS  (STANDING):  ALBUTerol/ipratropium for Nebulization 3 milliLiter(s) Nebulizer every 6 hours  dextrose 5%. 1000 milliLiter(s) (50 mL/Hr) IV Continuous <Continuous>  dextrose 50% Injectable 12.5 Gram(s) IV Push once  dextrose 50% Injectable 25 Gram(s) IV Push once  dextrose 50% Injectable 25 Gram(s) IV Push once  escitalopram 10 milliGRAM(s) Oral daily  insulin lispro (HumaLOG) corrective regimen sliding scale   SubCutaneous three times a day before meals  insulin lispro (HumaLOG) corrective regimen sliding scale   SubCutaneous at bedtime  lactobacillus acidophilus 1 Tablet(s) Oral two times a day with meals  lactulose Syrup 10 Gram(s) Oral two times a day  pantoprazole    Tablet 40 milliGRAM(s) Oral before breakfast  sodium chloride 0.9%. 1000 milliLiter(s) (200 mL/Hr) IV Continuous <Continuous>    MEDICATIONS  (PRN):  ALBUTerol    0.083% 2.5 milliGRAM(s) Nebulizer every 4 hours PRN Shortness of Breath and/or Wheezing  dextrose 40% Gel 15 Gram(s) Oral once PRN Blood Glucose LESS THAN 70 milliGRAM(s)/deciliter  glucagon  Injectable 1 milliGRAM(s) IntraMuscular once PRN Glucose LESS THAN 70 milligrams/deciliter    Physical Exam  Neuro: A&Ox3  Pulm: decreased breath sounds b/l  CV: S1S2 RRR  Abd: hypoactive BS soft NT ND, + ascites and abd swelling continuing around back to flank area  Extrem/MS: diffuse anasarca of UE/LE, WWP  R 8F pigtail intact to waterseal, no air leak

## 2018-05-11 NOTE — PROGRESS NOTE ADULT - PROBLEM SELECTOR PLAN 1
maintain R pigtail to waterseal  clamped overnight for hypotension and high output, unclamped this evening  RN instructed to notify provider if rapid drainage of 1L  d/w Dr. Saez  daily CXR while pigtail in place  cont medical management per primary team

## 2018-05-11 NOTE — PROGRESS NOTE ADULT - PROBLEM SELECTOR PLAN 3
consistent carb diet  ELVI achs  fingersticks appeared control  management per primary team consistent carb diet  ELVI achs  fingersticks appear control  management per primary team

## 2018-05-12 LAB
ALBUMIN FLD-MCNC: <0.5 G/DL — SIGNIFICANT CHANGE UP
ALBUMIN SERPL ELPH-MCNC: 1.1 G/DL — LOW (ref 3.3–5.2)
ALP SERPL-CCNC: 289 U/L — HIGH (ref 40–120)
ALT FLD-CCNC: 31 U/L — SIGNIFICANT CHANGE UP
ANION GAP SERPL CALC-SCNC: 10 MMOL/L — SIGNIFICANT CHANGE UP (ref 5–17)
ANISOCYTOSIS BLD QL: SLIGHT — SIGNIFICANT CHANGE UP
AST SERPL-CCNC: 42 U/L — HIGH
BASOPHILS # BLD AUTO: 0 K/UL — SIGNIFICANT CHANGE UP (ref 0–0.2)
BASOPHILS NFR BLD AUTO: 0.1 % — SIGNIFICANT CHANGE UP (ref 0–2)
BILIRUB SERPL-MCNC: 0.3 MG/DL — LOW (ref 0.4–2)
BLD GP AB SCN SERPL QL: SIGNIFICANT CHANGE UP
BUN SERPL-MCNC: 76 MG/DL — HIGH (ref 8–20)
CALCIUM SERPL-MCNC: 7.3 MG/DL — LOW (ref 8.6–10.2)
CHLORIDE SERPL-SCNC: 98 MMOL/L — SIGNIFICANT CHANGE UP (ref 98–107)
CO2 SERPL-SCNC: 22 MMOL/L — SIGNIFICANT CHANGE UP (ref 22–29)
CREAT SERPL-MCNC: 1.69 MG/DL — HIGH (ref 0.5–1.3)
EOSINOPHIL # BLD AUTO: 0.1 K/UL — SIGNIFICANT CHANGE UP (ref 0–0.5)
EOSINOPHIL NFR BLD AUTO: 1.7 % — SIGNIFICANT CHANGE UP (ref 0–5)
GGT SERPL-CCNC: 260 U/L — HIGH (ref 8–61)
GLUCOSE BLDC GLUCOMTR-MCNC: 126 MG/DL — HIGH (ref 70–99)
GLUCOSE BLDC GLUCOMTR-MCNC: 130 MG/DL — HIGH (ref 70–99)
GLUCOSE BLDC GLUCOMTR-MCNC: 133 MG/DL — HIGH (ref 70–99)
GLUCOSE BLDC GLUCOMTR-MCNC: 165 MG/DL — HIGH (ref 70–99)
GLUCOSE FLD-MCNC: 154 MG/DL — SIGNIFICANT CHANGE UP
GLUCOSE SERPL-MCNC: 117 MG/DL — HIGH (ref 70–115)
HCT VFR BLD CALC: 21.8 % — LOW (ref 42–52)
HCT VFR BLD CALC: 26.5 % — LOW (ref 42–52)
HGB BLD-MCNC: 7.9 G/DL — LOW (ref 14–18)
HGB BLD-MCNC: 9.3 G/DL — LOW (ref 14–18)
HYPOCHROMIA BLD QL: SLIGHT — SIGNIFICANT CHANGE UP
LDH SERPL L TO P-CCNC: 84 U/L — SIGNIFICANT CHANGE UP
LYMPHOCYTES # BLD AUTO: 1.5 K/UL — SIGNIFICANT CHANGE UP (ref 1–4.8)
LYMPHOCYTES # BLD AUTO: 20.8 % — SIGNIFICANT CHANGE UP (ref 20–55)
MAGNESIUM SERPL-MCNC: 1.9 MG/DL — SIGNIFICANT CHANGE UP (ref 1.6–2.6)
MCHC RBC-ENTMCNC: 32.3 PG — HIGH (ref 27–31)
MCHC RBC-ENTMCNC: 33.2 PG — HIGH (ref 27–31)
MCHC RBC-ENTMCNC: 35.1 G/DL — SIGNIFICANT CHANGE UP (ref 32–36)
MCHC RBC-ENTMCNC: 36.2 G/DL — HIGH (ref 32–36)
MCV RBC AUTO: 91.6 FL — SIGNIFICANT CHANGE UP (ref 80–94)
MCV RBC AUTO: 92 FL — SIGNIFICANT CHANGE UP (ref 80–94)
MONOCYTES # BLD AUTO: 0.6 K/UL — SIGNIFICANT CHANGE UP (ref 0–0.8)
MONOCYTES NFR BLD AUTO: 7.8 % — SIGNIFICANT CHANGE UP (ref 3–10)
NEUTROPHILS # BLD AUTO: 4.9 K/UL — SIGNIFICANT CHANGE UP (ref 1.8–8)
NEUTROPHILS NFR BLD AUTO: 69.2 % — SIGNIFICANT CHANGE UP (ref 37–73)
PHOSPHATE SERPL-MCNC: 3.6 MG/DL — SIGNIFICANT CHANGE UP (ref 2.4–4.7)
PLAT MORPH BLD: NORMAL — SIGNIFICANT CHANGE UP
PLATELET # BLD AUTO: 137 K/UL — LOW (ref 150–400)
PLATELET # BLD AUTO: 193 K/UL — SIGNIFICANT CHANGE UP (ref 150–400)
POIKILOCYTOSIS BLD QL AUTO: SLIGHT — SIGNIFICANT CHANGE UP
POTASSIUM SERPL-MCNC: 3.8 MMOL/L — SIGNIFICANT CHANGE UP (ref 3.5–5.3)
POTASSIUM SERPL-SCNC: 3.8 MMOL/L — SIGNIFICANT CHANGE UP (ref 3.5–5.3)
PROCALCITONIN SERPL-MCNC: 20.69 NG/ML — HIGH (ref 0–0.04)
PROT SERPL-MCNC: 3.8 G/DL — LOW (ref 6.6–8.7)
RBC # BLD: 2.38 M/UL — LOW (ref 4.6–6.2)
RBC # BLD: 2.88 M/UL — LOW (ref 4.6–6.2)
RBC # FLD: 14.3 % — SIGNIFICANT CHANGE UP (ref 11–15.6)
RBC # FLD: 14.3 % — SIGNIFICANT CHANGE UP (ref 11–15.6)
RBC BLD AUTO: ABNORMAL
SODIUM SERPL-SCNC: 130 MMOL/L — LOW (ref 135–145)
TYPE + AB SCN PNL BLD: SIGNIFICANT CHANGE UP
VANCOMYCIN TROUGH SERPL-MCNC: 9.4 UG/ML — LOW (ref 10–20)
WBC # BLD: 7 K/UL — SIGNIFICANT CHANGE UP (ref 4.8–10.8)
WBC # BLD: 9 K/UL — SIGNIFICANT CHANGE UP (ref 4.8–10.8)
WBC # FLD AUTO: 7 K/UL — SIGNIFICANT CHANGE UP (ref 4.8–10.8)
WBC # FLD AUTO: 9 K/UL — SIGNIFICANT CHANGE UP (ref 4.8–10.8)

## 2018-05-12 PROCEDURE — 99232 SBSQ HOSP IP/OBS MODERATE 35: CPT

## 2018-05-12 PROCEDURE — 71045 X-RAY EXAM CHEST 1 VIEW: CPT | Mod: 26

## 2018-05-12 PROCEDURE — 71045 X-RAY EXAM CHEST 1 VIEW: CPT | Mod: 26,77

## 2018-05-12 PROCEDURE — 99233 SBSQ HOSP IP/OBS HIGH 50: CPT

## 2018-05-12 RX ORDER — METHOCARBAMOL 500 MG/1
1500 TABLET, FILM COATED ORAL THREE TIMES A DAY
Qty: 0 | Refills: 0 | Status: DISCONTINUED | OUTPATIENT
Start: 2018-05-12 | End: 2018-05-18

## 2018-05-12 RX ORDER — ACETAMINOPHEN 500 MG
650 TABLET ORAL EVERY 6 HOURS
Qty: 0 | Refills: 0 | Status: DISCONTINUED | OUTPATIENT
Start: 2018-05-12 | End: 2018-05-18

## 2018-05-12 RX ORDER — OXYCODONE HYDROCHLORIDE 5 MG/1
5 TABLET ORAL EVERY 8 HOURS
Qty: 0 | Refills: 0 | Status: DISCONTINUED | OUTPATIENT
Start: 2018-05-12 | End: 2018-05-18

## 2018-05-12 RX ORDER — IBUPROFEN 200 MG
400 TABLET ORAL EVERY 6 HOURS
Qty: 0 | Refills: 0 | Status: DISCONTINUED | OUTPATIENT
Start: 2018-05-12 | End: 2018-05-12

## 2018-05-12 RX ORDER — FUROSEMIDE 40 MG
40 TABLET ORAL EVERY 6 HOURS
Qty: 0 | Refills: 0 | Status: COMPLETED | OUTPATIENT
Start: 2018-05-12 | End: 2018-05-13

## 2018-05-12 RX ORDER — FUROSEMIDE 40 MG
40 TABLET ORAL EVERY 6 HOURS
Qty: 0 | Refills: 0 | Status: DISCONTINUED | OUTPATIENT
Start: 2018-05-12 | End: 2018-05-12

## 2018-05-12 RX ORDER — ALBUMIN HUMAN 25 %
50 VIAL (ML) INTRAVENOUS EVERY 6 HOURS
Qty: 0 | Refills: 0 | Status: COMPLETED | OUTPATIENT
Start: 2018-05-12 | End: 2018-05-12

## 2018-05-12 RX ADMIN — Medication 1 TABLET(S): at 12:45

## 2018-05-12 RX ADMIN — Medication 1 TABLET(S): at 17:26

## 2018-05-12 RX ADMIN — Medication 650 MILLIGRAM(S): at 20:54

## 2018-05-12 RX ADMIN — Medication 2: at 12:48

## 2018-05-12 RX ADMIN — Medication 40 MILLIGRAM(S): at 18:59

## 2018-05-12 RX ADMIN — Medication 50 MILLILITER(S): at 18:59

## 2018-05-12 RX ADMIN — Medication 50 MILLILITER(S): at 12:46

## 2018-05-12 RX ADMIN — Medication 3 MILLILITER(S): at 09:18

## 2018-05-12 RX ADMIN — ESCITALOPRAM OXALATE 10 MILLIGRAM(S): 10 TABLET, FILM COATED ORAL at 12:47

## 2018-05-12 RX ADMIN — OXYCODONE HYDROCHLORIDE 5 MILLIGRAM(S): 5 TABLET ORAL at 16:07

## 2018-05-12 RX ADMIN — LACTULOSE 10 GRAM(S): 10 SOLUTION ORAL at 07:36

## 2018-05-12 RX ADMIN — Medication 3 MILLILITER(S): at 15:28

## 2018-05-12 RX ADMIN — Medication 400 MILLIGRAM(S): at 00:49

## 2018-05-12 RX ADMIN — OXYCODONE HYDROCHLORIDE 5 MILLIGRAM(S): 5 TABLET ORAL at 17:26

## 2018-05-12 RX ADMIN — Medication 650 MILLIGRAM(S): at 09:32

## 2018-05-12 RX ADMIN — SODIUM CHLORIDE 200 MILLILITER(S): 9 INJECTION INTRAMUSCULAR; INTRAVENOUS; SUBCUTANEOUS at 17:27

## 2018-05-12 RX ADMIN — PANTOPRAZOLE SODIUM 40 MILLIGRAM(S): 20 TABLET, DELAYED RELEASE ORAL at 07:21

## 2018-05-12 RX ADMIN — Medication 650 MILLIGRAM(S): at 21:54

## 2018-05-12 RX ADMIN — METHOCARBAMOL 1500 MILLIGRAM(S): 500 TABLET, FILM COATED ORAL at 20:56

## 2018-05-12 RX ADMIN — Medication 3 MILLILITER(S): at 20:47

## 2018-05-12 RX ADMIN — Medication 650 MILLIGRAM(S): at 10:38

## 2018-05-12 NOTE — PROGRESS NOTE ADULT - SUBJECTIVE AND OBJECTIVE BOX
Subjective: reports feeling tired, states he SOB is improved since pigtail placement.   denies CP, palpitations, fever, chills, N/V, diarrhea, numbness/tingling.    Vital Signs Last 24 Hrs  T(C): 36.7 (12 May 2018 12:39), Max: 37.5 (11 May 2018 23:44)  T(F): 98 (12 May 2018 12:39), Max: 99.5 (11 May 2018 23:44)  HR: 91 (12 May 2018 12:39) (75 - 97)  BP: 96/54 (12 May 2018 12:39) (29/42 - 102/64)  BP(mean): --  RR: 18 (12 May 2018 12:39) (16 - 18)  SpO2: 100% (11 May 2018 23:44) (96% - 100%)                          9.3    9.0   )-----------( 193      ( 12 May 2018 13:27 )             26.5   130<L>  |  98  |  76.0<H>  ----------------------------<  117<H>  3.8   |  22.0  |  1.69<H>      05-11 @ 07:01  -  05-12 @ 07:00  --------------------------------------------------------  IN: 120 mL / OUT: 1000 mL / NET: -880 mL      Drug Dosing Weight  Height (cm): 167.64 (10 May 2018 16:54)  Weight (kg): 77.1 (10 May 2018 16:54)  BMI (kg/m2): 27.4 (10 May 2018 16:54)  BSA (m2): 1.87 (10 May 2018 16:54)    MEDICATIONS  (STANDING):  albumin human 25% IVPB 50 milliLiter(s) IV Intermittent every 6 hours  ALBUTerol/ipratropium for Nebulization 3 milliLiter(s) Nebulizer every 6 hours  dextrose 5%. 1000 milliLiter(s) (50 mL/Hr) IV Continuous <Continuous>  dextrose 50% Injectable 12.5 Gram(s) IV Push once  dextrose 50% Injectable 25 Gram(s) IV Push once  dextrose 50% Injectable 25 Gram(s) IV Push once  escitalopram 10 milliGRAM(s) Oral daily  furosemide   Injectable 40 milliGRAM(s) IV Push every 6 hours  insulin lispro (HumaLOG) corrective regimen sliding scale   SubCutaneous three times a day before meals  insulin lispro (HumaLOG) corrective regimen sliding scale   SubCutaneous at bedtime  lactobacillus acidophilus 1 Tablet(s) Oral two times a day with meals  lactulose Syrup 10 Gram(s) Oral two times a day  pantoprazole    Tablet 40 milliGRAM(s) Oral before breakfast  sodium chloride 0.9%. 1000 milliLiter(s) (200 mL/Hr) IV Continuous <Continuous>    MEDICATIONS  (PRN):  acetaminophen   Tablet. 650 milliGRAM(s) Oral every 6 hours PRN pain  ALBUTerol    0.083% 2.5 milliGRAM(s) Nebulizer every 4 hours PRN Shortness of Breath and/or Wheezing  dextrose 40% Gel 15 Gram(s) Oral once PRN Blood Glucose LESS THAN 70 milliGRAM(s)/deciliter  glucagon  Injectable 1 milliGRAM(s) IntraMuscular once PRN Glucose LESS THAN 70 milligrams/deciliter  methocarbamol 1500 milliGRAM(s) Oral three times a day PRN muscle spasm/pain    Physical Exam  Neuro: A&Ox3  Pulm: decreased breath sounds b/l  CV: S1S2 RRR  Abd: hypoactive BS soft NT ND, + ascites and abd swelling continuing around back to flank area  Extrem/MS: diffuse anasarca of UE/LE, WWP  R 8F pigtail intact clamped, no air leak when unclamped

## 2018-05-12 NOTE — PROGRESS NOTE ADULT - PROBLEM SELECTOR PLAN 1
Related to cirrhosis  tube removed, f/u chest xray  Will need to set up Outpatient follow up for repeat thoracentesis within a week of discharge to prevent re-admission  d/w Dr. Nadja chou medical management per primary team Related to cirrhosis  tube removed, f/u chest xray  Will need to set up Outpatient follow up for repeat thoracentesis within a week of discharge to prevent re-admission  Thoracic Surgery will sign off.  d/w Dr. Saez  cont medical management per primary team

## 2018-05-12 NOTE — PROGRESS NOTE ADULT - SUBJECTIVE AND OBJECTIVE BOX
PMD: unknown    CHIEF COMPLAINT: sob     Patient seen and examined at the bedside. Pain overnight, MSK of the back, relieved with motrin 400mg. Chest tube unclamped yesterday. Complaining of continued pain in the back this AM, breathing has improved. Denies fever/chills, headache, lightheadedness, dizziness, chest pain, palpitations, shortness of breath, cough, abd pain, nausea/vomiting/diarrhea, muscle pain.      =========================================================================================    PHYSICAL EXAM.    GEN - appears age appropriate. well nourished. no distress.   HEENT - NCAT, EOMI, VAISHNAVI  RESP - CTA BL, no wheeze/stridor/rhonchi/crackles. ADRIEL @ Rt base. on supplemental O2. able to speak in full sentences without distress. Rt posterior chest wall tube in place, clamped.  CARDIO - NS1S2, RRR. No murmurs/rubs/gallops.  ABD - Soft/Non tender/Non distended. Normal BS x4 quadrants. no guarding/rebound tenderness.  Ext - RUE +2 edema of hand. BL LE +3 pitting edema.   MSK - BL 5/5 strength on upper and lower extremities.   Neuro - AAOx3. cn 2-12 grossly intact  Psych - normal affect  Skin - c/d/i. no rashes/lesions        VITAL SIGNS.    Vital Signs Last 24 Hrs  T(C): 36.9 (12 May 2018 05:11), Max: 37.5 (11 May 2018 23:44)  T(F): 98.4 (12 May 2018 05:11), Max: 99.5 (11 May 2018 23:44)  HR: 89 (12 May 2018 07:15) (75 - 97)  BP: 90/44 (12 May 2018 07:15) (29/42 - 102/64)  BP(mean): --  RR: 16 (11 May 2018 23:44) (16 - 18)  SpO2: 100% (11 May 2018 23:44) (96% - 100%)        =================================================    LABS.                          7.9    7.0   )-----------( 137      ( 12 May 2018 06:01 )             21.8     05-12    130<L>  |  98  |  76.0<H>  ----------------------------<  117<H>  3.8   |  22.0  |  1.69<H>    Ca    7.3<L>      12 May 2018 06:01  Phos  3.6     05-12  Mg     1.9     05-12    TPro  3.8<L>  /  Alb  1.1<L>  /  TBili  0.3<L>  /  DBili  x   /  AST  42<H>  /  ALT  31  /  AlkPhos  289<H>  05-12    LIVER FUNCTIONS - ( 12 May 2018 06:01 )  Alb: 1.1 g/dL / Pro: 3.8 g/dL / ALK PHOS: 289 U/L / ALT: 31 U/L / AST: 42 U/L / GGT: 260 U/L       PT/INR - ( 10 May 2018 20:13 )   PT: 15.4 sec;   INR: 1.39 ratio         PTT - ( 10 May 2018 20:13 )  PTT:35.8 sec  I&O's Summary    11 May 2018 07:01  -  12 May 2018 07:00  --------------------------------------------------------  IN: 120 mL / OUT: 1000 mL / NET: -880 mL          ================================================    IMAGING.      ================================================    MEDICATIONS  (STANDING):  ALBUTerol/ipratropium for Nebulization 3 milliLiter(s) Nebulizer every 6 hours  dextrose 5%. 1000 milliLiter(s) (50 mL/Hr) IV Continuous <Continuous>  dextrose 50% Injectable 12.5 Gram(s) IV Push once  dextrose 50% Injectable 25 Gram(s) IV Push once  dextrose 50% Injectable 25 Gram(s) IV Push once  escitalopram 10 milliGRAM(s) Oral daily  insulin lispro (HumaLOG) corrective regimen sliding scale   SubCutaneous three times a day before meals  insulin lispro (HumaLOG) corrective regimen sliding scale   SubCutaneous at bedtime  lactobacillus acidophilus 1 Tablet(s) Oral two times a day with meals  lactulose Syrup 10 Gram(s) Oral two times a day  pantoprazole    Tablet 40 milliGRAM(s) Oral before breakfast  sodium chloride 0.9%. 1000 milliLiter(s) (200 mL/Hr) IV Continuous <Continuous>    MEDICATIONS  (PRN):  acetaminophen   Tablet. 650 milliGRAM(s) Oral every 6 hours PRN pain  ALBUTerol    0.083% 2.5 milliGRAM(s) Nebulizer every 4 hours PRN Shortness of Breath and/or Wheezing  dextrose 40% Gel 15 Gram(s) Oral once PRN Blood Glucose LESS THAN 70 milliGRAM(s)/deciliter  glucagon  Injectable 1 milliGRAM(s) IntraMuscular once PRN Glucose LESS THAN 70 milligrams/deciliter  ibuprofen  Tablet 400 milliGRAM(s) Oral every 6 hours PRN pain PMD: unknown    CHIEF COMPLAINT: sob     Patient seen and examined at the bedside. Pain overnight, MSK of the back, relieved with motrin 400mg. Chest tube unclamped yesterday. Complaining of continued pain in the back this AM, breathing has improved. Denies fever/chills, headache, lightheadedness, dizziness, chest pain, palpitations, shortness of breath, cough, abd pain, nausea/vomiting/diarrhea      =========================================================================================    PHYSICAL EXAM.    GEN - appears age appropriate. well nourished. no distress.   HEENT - NCAT, EOMI, VAISHNAVI  RESP - CTA BL, no wheeze/stridor/rhonchi/crackles. not on supplemental O2. able to speak in full sentences without distress. Rt posterior chest wall tube in place, clamped.  CARDIO - NS1S2, RRR. No murmurs/rubs/gallops.  ABD - Soft/Non tender/Non distended. Normal BS x4 quadrants. no guarding/rebound tenderness.  Ext - RUE +2 edema of hand. BL LE +3 pitting edema.   MSK - BL 5/5 strength on upper and lower extremities.   Neuro - AAOx3. cn 2-12 grossly intact  Psych - normal affect  Skin - c/d/i. no rashes/lesions        VITAL SIGNS.    Vital Signs Last 24 Hrs  T(C): 36.9 (12 May 2018 05:11), Max: 37.5 (11 May 2018 23:44)  T(F): 98.4 (12 May 2018 05:11), Max: 99.5 (11 May 2018 23:44)  HR: 89 (12 May 2018 07:15) (75 - 97)  BP: 90/44 (12 May 2018 07:15) (29/42 - 102/64)  BP(mean): --  RR: 16 (11 May 2018 23:44) (16 - 18)  SpO2: 100% (11 May 2018 23:44) (96% - 100%)        =================================================    LABS.                          7.9    7.0   )-----------( 137      ( 12 May 2018 06:01 )             21.8     05-12    130<L>  |  98  |  76.0<H>  ----------------------------<  117<H>  3.8   |  22.0  |  1.69<H>    Ca    7.3<L>      12 May 2018 06:01  Phos  3.6     05-12  Mg     1.9     05-12    TPro  3.8<L>  /  Alb  1.1<L>  /  TBili  0.3<L>  /  DBili  x   /  AST  42<H>  /  ALT  31  /  AlkPhos  289<H>  05-12    LIVER FUNCTIONS - ( 12 May 2018 06:01 )  Alb: 1.1 g/dL / Pro: 3.8 g/dL / ALK PHOS: 289 U/L / ALT: 31 U/L / AST: 42 U/L / GGT: 260 U/L       PT/INR - ( 10 May 2018 20:13 )   PT: 15.4 sec;   INR: 1.39 ratio         PTT - ( 10 May 2018 20:13 )  PTT:35.8 sec  I&O's Summary    11 May 2018 07:01  -  12 May 2018 07:00  --------------------------------------------------------  IN: 120 mL / OUT: 1000 mL / NET: -880 mL          ================================================    IMAGING.      ================================================    MEDICATIONS  (STANDING):  ALBUTerol/ipratropium for Nebulization 3 milliLiter(s) Nebulizer every 6 hours  dextrose 5%. 1000 milliLiter(s) (50 mL/Hr) IV Continuous <Continuous>  dextrose 50% Injectable 12.5 Gram(s) IV Push once  dextrose 50% Injectable 25 Gram(s) IV Push once  dextrose 50% Injectable 25 Gram(s) IV Push once  escitalopram 10 milliGRAM(s) Oral daily  insulin lispro (HumaLOG) corrective regimen sliding scale   SubCutaneous three times a day before meals  insulin lispro (HumaLOG) corrective regimen sliding scale   SubCutaneous at bedtime  lactobacillus acidophilus 1 Tablet(s) Oral two times a day with meals  lactulose Syrup 10 Gram(s) Oral two times a day  pantoprazole    Tablet 40 milliGRAM(s) Oral before breakfast  sodium chloride 0.9%. 1000 milliLiter(s) (200 mL/Hr) IV Continuous <Continuous>    MEDICATIONS  (PRN):  acetaminophen   Tablet. 650 milliGRAM(s) Oral every 6 hours PRN pain  ALBUTerol    0.083% 2.5 milliGRAM(s) Nebulizer every 4 hours PRN Shortness of Breath and/or Wheezing  dextrose 40% Gel 15 Gram(s) Oral once PRN Blood Glucose LESS THAN 70 milliGRAM(s)/deciliter  glucagon  Injectable 1 milliGRAM(s) IntraMuscular once PRN Glucose LESS THAN 70 milligrams/deciliter  ibuprofen  Tablet 400 milliGRAM(s) Oral every 6 hours PRN pain

## 2018-05-12 NOTE — PROGRESS NOTE ADULT - ASSESSMENT
61 y/o mal0e, pmhx DM, PE (2/8/18) on eliquis, ETOH cirrhosis s/p TIPS procedure (3/29/18) at [facility], with h/o recurrent hepatic hydrothorax, presents o the ED c/o of worsening SOB x 1 week which has progressed, found to have recurrent large Right pleural effusion, s/p 8F pigtail placement.     5/11 hypotensive requiring IVF    5/12 -R pigtail removed.  F/U chest x-ray

## 2018-05-12 NOTE — PROGRESS NOTE ADULT - ASSESSMENT
62yoM with PMH DM2, Hx PE, Hx Pleural effusions, ETOH Cirrhosis presenting with sob, admitted for Large Rt sided pleural effusion.    Rt Sided Pleural Effusion- IMPROVING. in setting of hx of recurrent effusions in past. CT Sx consult appreciated, sp chest tube placement 5/10, uncomplicated well tolerated for therapeutic + diagnostic reasons. initially 1200mL fluid removed followed by 2L rapid outpt prompting temporary clamping. PLAN- Chest tube management + removal per ct surgery. no signs of malignancy on CT Chest however pt is @ elevated risk given hx of smoking + cirrhosis, should have screening MRI or CT Abd done as outpt to eval for underlying intraabd ca such as liver ca. PT eval when chest tube removed.     Anemia- WORSENING> asymptomatic but pt with persistent hypotension which may be related to anemia as well as other factors. ? related to dilutional effect given recent volume administration PLAN. repeat h/h @ 12pm. check type + screen. transfuse for hgb <7 or if symptoms occur. serial h/h with frequency determined by 12pm result. FOBT.     Thrombocytopenia- NEW ONSET> unclear cause from overnight, may be dilutional sec to recent volume adminsitration. PLAN. cont to trend. work up if worsening or symptomatic.     Back Pain- STABLE> MSK etiology. sp motrin x1 with relief. PLAN. cont pain control with tylenol PRN, max 3g in 1d given cirrhosis. avoid NSAIDS until BRYNN resolved.     BRYNN- WORSENING. baseline Cr 0.9. likely sec to poor circulatory effective volume in setting of hypoalbuminemia. initially also thought to be from pre renal azotemia given massive volume loss post chest tube placement but did not respond to hydration, less likely. PLAN- albumin challenge with lasix diuresis to enhance circulation. protein supplementation. avoid nephrotoxic meds.     Hypotension- STABLE>persistent. asymptomatic. likely sec to poor oncotic pressure. ps gentle IV hydration with temporary improvement. given volume overload, will avoid continued volume administration PLAN- plan as above for BRYNN. serial BP checks, manual preferred.     Hyponatremia- WORSENING> moderate. likely dilutional from volume overload in setting or cirrhosis + poor oncotic pressure. PLAN. fluid restrict. trend bmp    Leukocytosis- RESOLVED. unclear source. no infiltrates noted on CT or CXR, sp zosyn started 5/11 and vanco started 5/10 empirically for HCAP, both stopped 5/12. PLAN- check procal. check RVP. serial CXR.     Hx PE- STABLE. dx 2/8/18. currently on eliquis 5mg bid. PLAN- cont holding eliquis, resume post removal of chest tube. should be on AC x6mo total, anticipated stop date 8/8/18    ETOH related Cirrhosis- STABLE. complicated by hypoalbuminemia causing mod/severe generalized edema. edema less likely related to HF, pt had echo 3/2018 with normal EF and LV function. also unlikely sec to underlying DVT given chronic use of eliquis for PE. PLAN- nutrition consult. add prostat. supportive care for edema, elevate LE, compression stockings if poss inpatient. outpt GI follow up, should have imaging to eval for malignancy of liver. continued abstinence from etoh. 62yoM with PMH DM2, Hx PE, Hx Pleural effusions, ETOH Cirrhosis presenting with sob, admitted for Large Rt sided pleural effusion.    Rt Sided Pleural Effusion- IMPROVING. in setting of hx of recurrent effusions in past. CT Sx consult appreciated, sp chest tube placement 5/10, uncomplicated well tolerated for therapeutic + diagnostic reasons. initially 1200mL fluid removed followed by 2L rapid outpt prompting temporary clamping. PLAN- Chest tube management + removal per ct surgery. no signs of malignancy on CT Chest however pt is @ elevated risk given hx of smoking + cirrhosis, should have screening MRI or CT Abd done as outpt to eval for underlying intraabd ca such as liver ca. PT eval when chest tube removed.     Anemia- WORSENING> asymptomatic but pt with persistent hypotension which may be related to anemia as well as other factors. ? related to dilutional effect given recent volume administration PLAN. repeat h/h @ 12pm. check type + screen. transfuse for hgb <7 or if symptoms occur. serial h/h with frequency determined by 12pm result. FOBT.     Thrombocytopenia- NEW ONSET> unclear cause from overnight, may be dilutional sec to recent volume adminsitration. PLAN. cont to trend. work up if worsening or symptomatic.     Back Pain- STABLE> MSK etiology. sp motrin x1 with relief. PLAN. cont pain control with tylenol PRN, max 3g in 1d given cirrhosis. avoid NSAIDS until BRYNN resolved.     BRYNN- WORSENING. baseline Cr 0.9. likely sec to poor circulatory effective volume in setting of hypoalbuminemia. initially also thought to be from pre renal azotemia given massive volume loss post chest tube placement but did not respond to hydration, less likely. PLAN- albumin challenge with lasix diuresis to enhance circulation. protein supplementation. avoid nephrotoxic meds.     Hypotension- STABLE>persistent. asymptomatic. likely sec to poor oncotic pressure. ps gentle IV hydration with temporary improvement. given volume overload, will avoid continued volume administration PLAN- plan as above for BRYNN. serial BP checks, manual preferred.     Hyponatremia- WORSENING> moderate. likely dilutional from volume overload in setting or cirrhosis + poor oncotic pressure. PLAN. fluid restrict. trend bmp    Leukocytosis- RESOLVED. unclear source. no infiltrates noted on CT or CXR, sp zosyn started 5/11 and vanco started 5/10 empirically for HCAP, both stopped 5/12. PLAN- no further intervention     Hx PE- STABLE. dx 2/8/18. currently on eliquis 5mg bid. PLAN- cont holding eliquis, resume post removal of chest tube. should be on AC x6mo total, anticipated stop date 8/8/18    ETOH related Cirrhosis- STABLE. complicated by hypoalbuminemia causing mod/severe generalized edema. edema less likely related to HF, pt had echo 3/2018 with normal EF and LV function. also unlikely sec to underlying DVT given chronic use of eliquis for PE. PLAN- nutrition consult. add prostat. supportive care for edema, elevate LE, compression stockings if poss inpatient. outpt GI follow up, should have imaging to eval for malignancy of liver. continued abstinence from etoh.

## 2018-05-13 LAB
ANION GAP SERPL CALC-SCNC: 10 MMOL/L — SIGNIFICANT CHANGE UP (ref 5–17)
BUN SERPL-MCNC: 87 MG/DL — HIGH (ref 8–20)
CALCIUM SERPL-MCNC: 7.4 MG/DL — LOW (ref 8.6–10.2)
CHLORIDE SERPL-SCNC: 99 MMOL/L — SIGNIFICANT CHANGE UP (ref 98–107)
CO2 SERPL-SCNC: 22 MMOL/L — SIGNIFICANT CHANGE UP (ref 22–29)
CREAT SERPL-MCNC: 1.68 MG/DL — HIGH (ref 0.5–1.3)
GLUCOSE BLDC GLUCOMTR-MCNC: 104 MG/DL — HIGH (ref 70–99)
GLUCOSE BLDC GLUCOMTR-MCNC: 108 MG/DL — HIGH (ref 70–99)
GLUCOSE BLDC GLUCOMTR-MCNC: 118 MG/DL — HIGH (ref 70–99)
GLUCOSE BLDC GLUCOMTR-MCNC: 144 MG/DL — HIGH (ref 70–99)
GLUCOSE SERPL-MCNC: 101 MG/DL — SIGNIFICANT CHANGE UP (ref 70–115)
HCT VFR BLD CALC: 21.1 % — LOW (ref 42–52)
HGB BLD-MCNC: 7.5 G/DL — LOW (ref 14–18)
MCHC RBC-ENTMCNC: 32.8 PG — HIGH (ref 27–31)
MCHC RBC-ENTMCNC: 35.5 G/DL — SIGNIFICANT CHANGE UP (ref 32–36)
MCV RBC AUTO: 92.1 FL — SIGNIFICANT CHANGE UP (ref 80–94)
PLATELET # BLD AUTO: 160 K/UL — SIGNIFICANT CHANGE UP (ref 150–400)
POTASSIUM SERPL-MCNC: 3.1 MMOL/L — LOW (ref 3.5–5.3)
POTASSIUM SERPL-SCNC: 3.1 MMOL/L — LOW (ref 3.5–5.3)
RBC # BLD: 2.29 M/UL — LOW (ref 4.6–6.2)
RBC # FLD: 13.7 % — SIGNIFICANT CHANGE UP (ref 11–15.6)
SODIUM SERPL-SCNC: 131 MMOL/L — LOW (ref 135–145)
WBC # BLD: 5.7 K/UL — SIGNIFICANT CHANGE UP (ref 4.8–10.8)
WBC # FLD AUTO: 5.7 K/UL — SIGNIFICANT CHANGE UP (ref 4.8–10.8)

## 2018-05-13 PROCEDURE — 99233 SBSQ HOSP IP/OBS HIGH 50: CPT

## 2018-05-13 RX ORDER — FUROSEMIDE 40 MG
40 TABLET ORAL EVERY 6 HOURS
Qty: 0 | Refills: 0 | Status: COMPLETED | OUTPATIENT
Start: 2018-05-13 | End: 2018-05-14

## 2018-05-13 RX ORDER — ALBUMIN HUMAN 25 %
100 VIAL (ML) INTRAVENOUS EVERY 6 HOURS
Qty: 0 | Refills: 0 | Status: COMPLETED | OUTPATIENT
Start: 2018-05-13 | End: 2018-05-14

## 2018-05-13 RX ORDER — SODIUM CHLORIDE 0.65 %
1 AEROSOL, SPRAY (ML) NASAL DAILY
Qty: 0 | Refills: 0 | Status: DISCONTINUED | OUTPATIENT
Start: 2018-05-13 | End: 2018-05-18

## 2018-05-13 RX ORDER — POTASSIUM CHLORIDE 20 MEQ
40 PACKET (EA) ORAL EVERY 4 HOURS
Qty: 0 | Refills: 0 | Status: COMPLETED | OUTPATIENT
Start: 2018-05-13 | End: 2018-05-13

## 2018-05-13 RX ADMIN — Medication 3 MILLILITER(S): at 21:26

## 2018-05-13 RX ADMIN — OXYCODONE HYDROCHLORIDE 5 MILLIGRAM(S): 5 TABLET ORAL at 14:45

## 2018-05-13 RX ADMIN — Medication 1 TABLET(S): at 08:47

## 2018-05-13 RX ADMIN — Medication 50 MILLILITER(S): at 23:30

## 2018-05-13 RX ADMIN — OXYCODONE HYDROCHLORIDE 5 MILLIGRAM(S): 5 TABLET ORAL at 01:11

## 2018-05-13 RX ADMIN — PANTOPRAZOLE SODIUM 40 MILLIGRAM(S): 20 TABLET, DELAYED RELEASE ORAL at 06:35

## 2018-05-13 RX ADMIN — Medication 3 MILLILITER(S): at 09:38

## 2018-05-13 RX ADMIN — ESCITALOPRAM OXALATE 10 MILLIGRAM(S): 10 TABLET, FILM COATED ORAL at 12:48

## 2018-05-13 RX ADMIN — OXYCODONE HYDROCHLORIDE 5 MILLIGRAM(S): 5 TABLET ORAL at 22:23

## 2018-05-13 RX ADMIN — Medication 50 MILLILITER(S): at 17:34

## 2018-05-13 RX ADMIN — OXYCODONE HYDROCHLORIDE 5 MILLIGRAM(S): 5 TABLET ORAL at 13:54

## 2018-05-13 RX ADMIN — Medication 40 MILLIEQUIVALENT(S): at 14:52

## 2018-05-13 RX ADMIN — Medication 40 MILLIGRAM(S): at 01:38

## 2018-05-13 RX ADMIN — OXYCODONE HYDROCHLORIDE 5 MILLIGRAM(S): 5 TABLET ORAL at 23:00

## 2018-05-13 RX ADMIN — Medication 50 MILLILITER(S): at 00:12

## 2018-05-13 RX ADMIN — Medication 1 TABLET(S): at 17:36

## 2018-05-13 RX ADMIN — Medication 40 MILLIEQUIVALENT(S): at 12:48

## 2018-05-13 RX ADMIN — OXYCODONE HYDROCHLORIDE 5 MILLIGRAM(S): 5 TABLET ORAL at 00:11

## 2018-05-13 RX ADMIN — Medication 40 MILLIGRAM(S): at 20:00

## 2018-05-13 RX ADMIN — LACTULOSE 10 GRAM(S): 10 SOLUTION ORAL at 06:35

## 2018-05-13 NOTE — PROGRESS NOTE ADULT - ASSESSMENT
62yoM with PMH DM2, Hx PE, Hx Pleural effusions, ETOH Cirrhosis presenting with sob, admitted for Large Rt sided pleural effusion.    Rt Sided Pleural Effusion- IMPROVING. in setting of hx of recurrent effusions in past. CT Sx consult appreciated, sp chest tube placement 5/10, removed 5/13, uncomplicated well tolerated. initially 1200mL fluid removed followed by 2L rapid outpt. PLAN- serial fu CXR. outpt fu with CT Surg for repeat thoracentesis in 1wk w/ Dr Saez. no signs of malignancy on CT Chest however pt is @ elevated risk given hx of smoking + cirrhosis, should have screening MRI or CT Abd done as outpt to eval for underlying intraabd ca such as liver ca. PT eval    Anemia- WORSENING> asymptomatic. h/h labile. no signs acute/active bleeding. ? related to dilutional effect given recent volume administration PLAN. repeat h/h in AM. transfuse for hgb <7 or if symptoms occur. still pending FOBT.     BRYNN- IMPROVING> baseline Cr 0.9. likely sec to poor circulatory effective volume in setting of hypoalbuminemia. initially also thought to be from pre renal azotemia given massive volume loss post chest tube placement but did not respond to hydration, less likely. sp albumin challenge followed by lasix on 5/12. PLAN- cont protein supplementation. CMP in AM. avoid nephrotoxic meds.    Hypotension- STABLE>persistent. asymptomatic. likely sec to poor oncotic pressure. ps gentle IV hydration with temporary improvement. given volume overload, will avoid continued volume administration PLAN- plan as above for BRYNN. serial BP checks, manual preferred.     Hypokalemia- mild, asymptomatic. PLAN. po repletion. repeat in AM.     Hyponatremia- IMPROVING> moderate. likely dilutional from volume overload in setting or cirrhosis + poor oncotic pressure. PLAN. fluid restrict. trend bmp    Back Pain- STABLE> MSK etiology sec to chest tube. sp motrin x1 with relief. PLAN. cont pain control with tylenol PRN, max 3g in 1d given cirrhosis. avoid NSAIDS until BRYNN resolved.      Hx PE- STABLE. dx 2/8/18. currently on eliquis 5mg bid. PLAN- cont holding eliquis, resume post removal of chest tube. should be on AC x6mo total, anticipated stop date 8/8/18    ETOH related Cirrhosis- STABLE. sp TIPS in 03/2018. complicated by hypoalbuminemia causing mod/severe generalized edema. edema less likely related to HF, pt had echo 3/2018 with normal EF and LV function. also unlikely sec to underlying DVT given chronic use of eliquis for PE. PLAN- nutrition consult. added prostat. supportive care for edema, elevate LE, compression stockings if poss inpatient. outpt GI follow up, should have imaging to eval for malignancy of liver. continued abstinence from etoh.    Thrombocytopenia- RESOLVED> unclear cause during admission, was likely dilutional sec to recent volume adminsitration. PLAN. no further intervention/trending 62yoM with PMH DM2, Hx PE, Hx Pleural effusions, ETOH Cirrhosis presenting with sob, admitted for Large Rt sided pleural effusion.    Rt Sided Pleural Effusion- IMPROVING. in setting of hx of recurrent effusions in past. CT Sx consult appreciated, sp chest tube placement 5/10, removed 5/13, uncomplicated well tolerated. initially 1200mL fluid removed followed by 2L rapid outpt. PLAN- serial fu CXR. outpt fu with CT Surg for repeat thoracentesis in 1wk w/ Dr Saez. no signs of malignancy on CT Chest however pt is @ elevated risk given hx of smoking + cirrhosis, should have screening MRI or CT Abd done as outpt to eval for underlying intraabd ca such as liver ca. PT eval    Anemia- WORSENING> asymptomatic. h/h labile. no signs acute/active bleeding. ? related to dilutional effect given recent volume administration PLAN. repeat h/h in AM. transfuse for hgb <7 or if symptoms occur. still pending FOBT.     BRYNN- IMPROVING> baseline Cr 0.9. likely sec to poor circulatory effective volume in setting of hypoalbuminemia. initially also thought to be from pre renal azotemia given massive volume loss post chest tube placement but did not respond to hydration, less likely. sp albumin challenge followed by lasix on 5/12. PLAN- repeat albumin challenge with lasix 5/13. cont protein supplementation. CMP in AM. avoid nephrotoxic meds.    Hypotension- STABLE>persistent. asymptomatic. likely sec to poor oncotic pressure. ps gentle IV hydration with temporary improvement. given volume overload, will avoid continued volume administration PLAN- plan as above for BRYNN. serial BP checks, manual preferred.     Hypokalemia- mild, asymptomatic. PLAN. po repletion. repeat in AM.     Hyponatremia- IMPROVING> moderate. likely dilutional from volume overload in setting or cirrhosis + poor oncotic pressure. PLAN. fluid restrict. trend bmp    Back Pain- STABLE> MSK etiology sec to chest tube. sp motrin x1 with relief. PLAN. cont pain control with tylenol PRN, max 3g in 1d given cirrhosis. cont robaxin x3d total. avoid NSAIDS until BRYNN resolved.      Hx PE- STABLE. dx 2/8/18. currently on eliquis 5mg bid. PLAN- cont holding eliquis, resume post removal of chest tube. should be on AC x6mo total, anticipated stop date 8/8/18    ETOH related Cirrhosis- STABLE. sp TIPS in 03/2018. complicated by hypoalbuminemia causing mod/severe generalized edema. edema less likely related to HF, pt had echo 3/2018 with normal EF and LV function. also unlikely sec to underlying DVT given chronic use of eliquis for PE. PLAN- nutrition consult. added prostat. supportive care for edema, elevate LE, compression stockings if poss inpatient. outpt GI follow up, should have imaging to eval for malignancy of liver. continued abstinence from etoh.    Thrombocytopenia- RESOLVED> unclear cause during admission, was likely dilutional sec to recent volume administration PLAN. no further intervention/trending 62yoM with PMH DM2, Hx PE, Hx Pleural effusions, ETOH Cirrhosis presenting with sob, admitted for Large Rt sided pleural effusion.    Rt Sided Pleural Effusion- IMPROVING. in setting of hx of recurrent effusions in past. CT Sx consult appreciated, sp chest tube placement 5/10, removed 5/13, uncomplicated well tolerated. initially 1200mL fluid removed followed by 2L rapid outpt. PLAN- serial fu CXR. outpt fu with CT Surg for repeat thoracentesis in 1wk w/ Dr Saez. no signs of malignancy on CT Chest however pt is @ elevated risk given hx of smoking + cirrhosis, should have screening MRI or CT Abd done as outpt to eval for underlying intraabd ca such as liver ca. PT eval    Anemia- WORSENING> asymptomatic. h/h labile. no signs acute/active bleeding. ? related to dilutional effect given recent volume administration PLAN. repeat h/h in AM. transfuse for hgb <7 or if symptoms occur. still pending FOBT.     Epistaxis- NEW ONSET> no clear etiology. unlikely related to intracranial pathology as pt w/o any other complaints or neuro deficits. PLAN. cont to monitor for recurrent ep. saline nasal rinse in case it is due to dry nares. if persistent or recurrent will consider ENT eval.     BRYNN- IMPROVING> baseline Cr 0.9. likely sec to poor circulatory effective volume in setting of hypoalbuminemia. initially also thought to be from pre renal azotemia given massive volume loss post chest tube placement but did not respond to hydration, less likely. sp albumin challenge followed by lasix on 5/12. PLAN- repeat albumin challenge with lasix 5/13. cont protein supplementation. CMP in AM. avoid nephrotoxic meds.    Hypotension- STABLE>persistent. asymptomatic. likely sec to poor oncotic pressure. ps gentle IV hydration with temporary improvement. given volume overload, will avoid continued volume administration PLAN- plan as above for BRYNN. serial BP checks, manual preferred.     Hypokalemia- mild, asymptomatic. PLAN. po repletion. repeat in AM.     Hyponatremia- IMPROVING> moderate. likely dilutional from volume overload in setting or cirrhosis + poor oncotic pressure. PLAN. fluid restrict. trend bmp    Back Pain- STABLE> MSK etiology sec to chest tube. sp motrin x1 with relief. PLAN. cont pain control with tylenol PRN, max 3g in 1d given cirrhosis. cont robaxin x3d total. avoid NSAIDS until BRYNN resolved.      Hx PE- STABLE. dx 2/8/18. currently on eliquis 5mg bid. PLAN- cont holding eliquis, resume post removal of chest tube. should be on AC x6mo total, anticipated stop date 8/8/18    ETOH related Cirrhosis- STABLE. sp TIPS in 03/2018. complicated by hypoalbuminemia causing mod/severe generalized edema. edema less likely related to HF, pt had echo 3/2018 with normal EF and LV function. also unlikely sec to underlying DVT given chronic use of eliquis for PE. PLAN- nutrition consult. added prostat. supportive care for edema, elevate LE, compression stockings if poss inpatient. outpt GI follow up, should have imaging to eval for malignancy of liver. continued abstinence from etoh.    Thrombocytopenia- RESOLVED> unclear cause during admission, was likely dilutional sec to recent volume administration PLAN. no further intervention/trending

## 2018-05-13 NOTE — PROGRESS NOTE ADULT - SUBJECTIVE AND OBJECTIVE BOX
PMD: unknown    CHIEF COMPLAINT: sob     Patient seen and examined at the bedside. No events overnight. sp Chest tube removal yesterday, uncomplicated, well tolerated. Also sp albumin challenge to improve circulation + hypotension. Complaining of continued pain in the back this AM, breathing has improved. Denies fever/chills, headache, lightheadedness, dizziness, chest pain, palpitations, shortness of breath, cough, abd pain, nausea/vomiting/diarrhea      =========================================================================================    PHYSICAL EXAM.    GEN - appears age appropriate. well nourished. no distress.   HEENT - NCAT, EOMI, VAISHNAVI  RESP - CTA BL, no wheeze/stridor/rhonchi/crackles. not on supplemental O2. able to speak in full sentences without distress. Rt posterior chest wall tube in place, clamped.  CARDIO - NS1S2, RRR. No murmurs/rubs/gallops.  ABD - Soft/Non tender/Non distended. Normal BS x4 quadrants. no guarding/rebound tenderness.  Ext - RUE +2 edema of hand. BL LE +3 pitting edema.   MSK - BL 5/5 strength on upper and lower extremities.   Neuro - AAOx3. cn 2-12 grossly intact  Psych - normal affect  Skin - c/d/i. no rashes/lesions      VITAL SIGNS.    Vital Signs Last 24 Hrs  T(C): 36.7 (13 May 2018 05:04), Max: 36.8 (12 May 2018 20:47)  T(F): 98.1 (13 May 2018 05:04), Max: 98.3 (12 May 2018 20:47)  HR: 70 (13 May 2018 09:38) (70 - 93)  BP: 92/40 (13 May 2018 09:07) (92/40 - 105/59)  BP(mean): --  RR: 17 (13 May 2018 05:04) (17 - 18)  SpO2: 94% (13 May 2018 09:38) (94% - 100%)        =================================================    LABS.                          7.5    5.7   )-----------( 160      ( 13 May 2018 06:33 )             21.1     05-13    131<L>  |  99  |  87.0<H>  ----------------------------<  101  3.1<L>   |  22.0  |  1.68<H>    Ca    7.4<L>      13 May 2018 06:33  Phos  3.6     05-12  Mg     1.9     05-12    TPro  3.8<L>  /  Alb  1.1<L>  /  TBili  0.3<L>  /  DBili  x   /  AST  42<H>  /  ALT  31  /  AlkPhos  289<H>  05-12    LIVER FUNCTIONS - ( 12 May 2018 06:01 )  Alb: 1.1 g/dL / Pro: 3.8 g/dL / ALK PHOS: 289 U/L / ALT: 31 U/L / AST: 42 U/L / GGT: 260 U/L         I&O's Summary      Culture - Blood (collected 10 May 2018 21:29)  Source: .Blood Blood  Preliminary Report (12 May 2018 23:01):    No growth at 48 hours    Culture - Blood (collected 10 May 2018 21:29)  Source: .Blood Blood  Preliminary Report (12 May 2018 23:01):    No growth at 48 hours        ================================================    IMAGING.      ================================================    MEDICATIONS  (STANDING):  ALBUTerol/ipratropium for Nebulization 3 milliLiter(s) Nebulizer every 6 hours  dextrose 5%. 1000 milliLiter(s) (50 mL/Hr) IV Continuous <Continuous>  dextrose 50% Injectable 12.5 Gram(s) IV Push once  dextrose 50% Injectable 25 Gram(s) IV Push once  dextrose 50% Injectable 25 Gram(s) IV Push once  escitalopram 10 milliGRAM(s) Oral daily  insulin lispro (HumaLOG) corrective regimen sliding scale   SubCutaneous three times a day before meals  insulin lispro (HumaLOG) corrective regimen sliding scale   SubCutaneous at bedtime  lactobacillus acidophilus 1 Tablet(s) Oral two times a day with meals  lactulose Syrup 10 Gram(s) Oral two times a day  pantoprazole    Tablet 40 milliGRAM(s) Oral before breakfast    MEDICATIONS  (PRN):  acetaminophen   Tablet. 650 milliGRAM(s) Oral every 6 hours PRN pain  ALBUTerol    0.083% 2.5 milliGRAM(s) Nebulizer every 4 hours PRN Shortness of Breath and/or Wheezing  dextrose 40% Gel 15 Gram(s) Oral once PRN Blood Glucose LESS THAN 70 milliGRAM(s)/deciliter  glucagon  Injectable 1 milliGRAM(s) IntraMuscular once PRN Glucose LESS THAN 70 milligrams/deciliter  methocarbamol 1500 milliGRAM(s) Oral three times a day PRN muscle spasm/pain  oxyCODONE    IR 5 milliGRAM(s) Oral every 8 hours PRN Severe Pain (7 - 10) PMD: unknown    CHIEF COMPLAINT: sob     Patient seen and examined at the bedside. No events overnight. sp Chest tube removal yesterday, uncomplicated, well tolerated. Also sp albumin challenge to improve circulation + hypotension. Complaining of continued pain in the back this AM, 5/10, improved. Also complaining of nose bleeds when standing today. Denies fever/chills, headache, lightheadedness, dizziness, chest pain, palpitations, shortness of breath, cough, abd pain, nausea/vomiting/diarrhea      =========================================================================================    PHYSICAL EXAM.    GEN - appears age appropriate. well nourished. no distress.   HEENT - NCAT, EOMI, VAISHNAVI  RESP - CTA BL, no wheeze/stridor/rhonchi/crackles. not on supplemental O2. able to speak in full sentences without distress. sp removal of Rt posterior chest wall tube, dressing c/d/i  CARDIO - NS1S2, RRR. No murmurs/rubs/gallops.  ABD - Soft/Non tender/Non distended. Normal BS x4 quadrants. no guarding/rebound tenderness.  Ext - RUE +2 edema of hand. BL LE +3 pitting edema, mildly improved  MSK - BL 5/5 strength on upper and lower extremities.   Neuro - AAOx3. cn 2-12 grossly intact  Psych - normal affect  Skin - c/d/i. no rashes/lesions      VITAL SIGNS.    Vital Signs Last 24 Hrs  T(C): 36.7 (13 May 2018 05:04), Max: 36.8 (12 May 2018 20:47)  T(F): 98.1 (13 May 2018 05:04), Max: 98.3 (12 May 2018 20:47)  HR: 70 (13 May 2018 09:38) (70 - 93)  BP: 92/40 (13 May 2018 09:07) (92/40 - 105/59)  BP(mean): --  RR: 17 (13 May 2018 05:04) (17 - 18)  SpO2: 94% (13 May 2018 09:38) (94% - 100%)        =================================================    LABS.                          7.5    5.7   )-----------( 160      ( 13 May 2018 06:33 )             21.1     05-13    131<L>  |  99  |  87.0<H>  ----------------------------<  101  3.1<L>   |  22.0  |  1.68<H>    Ca    7.4<L>      13 May 2018 06:33  Phos  3.6     05-12  Mg     1.9     05-12    TPro  3.8<L>  /  Alb  1.1<L>  /  TBili  0.3<L>  /  DBili  x   /  AST  42<H>  /  ALT  31  /  AlkPhos  289<H>  05-12    LIVER FUNCTIONS - ( 12 May 2018 06:01 )  Alb: 1.1 g/dL / Pro: 3.8 g/dL / ALK PHOS: 289 U/L / ALT: 31 U/L / AST: 42 U/L / GGT: 260 U/L         I&O's Summary      Culture - Blood (collected 10 May 2018 21:29)  Source: .Blood Blood  Preliminary Report (12 May 2018 23:01):    No growth at 48 hours    Culture - Blood (collected 10 May 2018 21:29)  Source: .Blood Blood  Preliminary Report (12 May 2018 23:01):    No growth at 48 hours        ================================================    IMAGING.      ================================================    MEDICATIONS  (STANDING):  ALBUTerol/ipratropium for Nebulization 3 milliLiter(s) Nebulizer every 6 hours  dextrose 5%. 1000 milliLiter(s) (50 mL/Hr) IV Continuous <Continuous>  dextrose 50% Injectable 12.5 Gram(s) IV Push once  dextrose 50% Injectable 25 Gram(s) IV Push once  dextrose 50% Injectable 25 Gram(s) IV Push once  escitalopram 10 milliGRAM(s) Oral daily  insulin lispro (HumaLOG) corrective regimen sliding scale   SubCutaneous three times a day before meals  insulin lispro (HumaLOG) corrective regimen sliding scale   SubCutaneous at bedtime  lactobacillus acidophilus 1 Tablet(s) Oral two times a day with meals  lactulose Syrup 10 Gram(s) Oral two times a day  pantoprazole    Tablet 40 milliGRAM(s) Oral before breakfast    MEDICATIONS  (PRN):  acetaminophen   Tablet. 650 milliGRAM(s) Oral every 6 hours PRN pain  ALBUTerol    0.083% 2.5 milliGRAM(s) Nebulizer every 4 hours PRN Shortness of Breath and/or Wheezing  dextrose 40% Gel 15 Gram(s) Oral once PRN Blood Glucose LESS THAN 70 milliGRAM(s)/deciliter  glucagon  Injectable 1 milliGRAM(s) IntraMuscular once PRN Glucose LESS THAN 70 milligrams/deciliter  methocarbamol 1500 milliGRAM(s) Oral three times a day PRN muscle spasm/pain  oxyCODONE    IR 5 milliGRAM(s) Oral every 8 hours PRN Severe Pain (7 - 10)

## 2018-05-14 LAB
ALBUMIN SERPL ELPH-MCNC: 1.9 G/DL — LOW (ref 3.3–5.2)
ALP SERPL-CCNC: 342 U/L — HIGH (ref 40–120)
ALT FLD-CCNC: 29 U/L — SIGNIFICANT CHANGE UP
ANION GAP SERPL CALC-SCNC: 11 MMOL/L — SIGNIFICANT CHANGE UP (ref 5–17)
AST SERPL-CCNC: 47 U/L — HIGH
BILIRUB SERPL-MCNC: 0.4 MG/DL — SIGNIFICANT CHANGE UP (ref 0.4–2)
BUN SERPL-MCNC: 82 MG/DL — HIGH (ref 8–20)
CALCIUM SERPL-MCNC: 8.2 MG/DL — LOW (ref 8.6–10.2)
CHLORIDE SERPL-SCNC: 102 MMOL/L — SIGNIFICANT CHANGE UP (ref 98–107)
CO2 SERPL-SCNC: 22 MMOL/L — SIGNIFICANT CHANGE UP (ref 22–29)
CREAT SERPL-MCNC: 1.13 MG/DL — SIGNIFICANT CHANGE UP (ref 0.5–1.3)
GLUCOSE BLDC GLUCOMTR-MCNC: 123 MG/DL — HIGH (ref 70–99)
GLUCOSE BLDC GLUCOMTR-MCNC: 124 MG/DL — HIGH (ref 70–99)
GLUCOSE BLDC GLUCOMTR-MCNC: 135 MG/DL — HIGH (ref 70–99)
GLUCOSE BLDC GLUCOMTR-MCNC: 77 MG/DL — SIGNIFICANT CHANGE UP (ref 70–99)
GLUCOSE SERPL-MCNC: 86 MG/DL — SIGNIFICANT CHANGE UP (ref 70–115)
HCT VFR BLD CALC: 21.9 % — LOW (ref 42–52)
HGB BLD-MCNC: 7.9 G/DL — LOW (ref 14–18)
POTASSIUM SERPL-MCNC: 4.2 MMOL/L — SIGNIFICANT CHANGE UP (ref 3.5–5.3)
POTASSIUM SERPL-SCNC: 4.2 MMOL/L — SIGNIFICANT CHANGE UP (ref 3.5–5.3)
PROT SERPL-MCNC: 4.6 G/DL — LOW (ref 6.6–8.7)
SODIUM SERPL-SCNC: 135 MMOL/L — SIGNIFICANT CHANGE UP (ref 135–145)

## 2018-05-14 PROCEDURE — 99233 SBSQ HOSP IP/OBS HIGH 50: CPT

## 2018-05-14 RX ORDER — APIXABAN 2.5 MG/1
5 TABLET, FILM COATED ORAL EVERY 12 HOURS
Qty: 0 | Refills: 0 | Status: DISCONTINUED | OUTPATIENT
Start: 2018-05-14 | End: 2018-05-16

## 2018-05-14 RX ORDER — FUROSEMIDE 40 MG
40 TABLET ORAL EVERY 6 HOURS
Qty: 0 | Refills: 0 | Status: COMPLETED | OUTPATIENT
Start: 2018-05-14 | End: 2018-05-15

## 2018-05-14 RX ORDER — ALBUMIN HUMAN 25 %
100 VIAL (ML) INTRAVENOUS EVERY 6 HOURS
Qty: 0 | Refills: 0 | Status: COMPLETED | OUTPATIENT
Start: 2018-05-14 | End: 2018-05-15

## 2018-05-14 RX ADMIN — ESCITALOPRAM OXALATE 10 MILLIGRAM(S): 10 TABLET, FILM COATED ORAL at 12:15

## 2018-05-14 RX ADMIN — OXYCODONE HYDROCHLORIDE 5 MILLIGRAM(S): 5 TABLET ORAL at 20:30

## 2018-05-14 RX ADMIN — Medication 50 MILLILITER(S): at 06:28

## 2018-05-14 RX ADMIN — Medication 40 MILLIGRAM(S): at 02:07

## 2018-05-14 RX ADMIN — LACTULOSE 10 GRAM(S): 10 SOLUTION ORAL at 17:17

## 2018-05-14 RX ADMIN — APIXABAN 5 MILLIGRAM(S): 2.5 TABLET, FILM COATED ORAL at 17:17

## 2018-05-14 RX ADMIN — Medication 3 MILLILITER(S): at 09:23

## 2018-05-14 RX ADMIN — Medication 50 MILLILITER(S): at 12:09

## 2018-05-14 RX ADMIN — Medication 1 TABLET(S): at 17:17

## 2018-05-14 RX ADMIN — Medication 40 MILLIGRAM(S): at 12:04

## 2018-05-14 RX ADMIN — Medication 3 MILLILITER(S): at 15:59

## 2018-05-14 RX ADMIN — LACTULOSE 10 GRAM(S): 10 SOLUTION ORAL at 06:27

## 2018-05-14 RX ADMIN — OXYCODONE HYDROCHLORIDE 5 MILLIGRAM(S): 5 TABLET ORAL at 21:30

## 2018-05-14 RX ADMIN — Medication 1 TABLET(S): at 08:36

## 2018-05-14 RX ADMIN — Medication 3 MILLILITER(S): at 21:25

## 2018-05-14 RX ADMIN — Medication 50 MILLILITER(S): at 17:18

## 2018-05-14 RX ADMIN — PANTOPRAZOLE SODIUM 40 MILLIGRAM(S): 20 TABLET, DELAYED RELEASE ORAL at 06:27

## 2018-05-14 RX ADMIN — Medication 40 MILLIGRAM(S): at 14:12

## 2018-05-14 RX ADMIN — Medication 40 MILLIGRAM(S): at 18:39

## 2018-05-14 NOTE — PROGRESS NOTE ADULT - SUBJECTIVE AND OBJECTIVE BOX
PMD: unknown    CHIEF COMPLAINT: sob     Patient seen and examined at the bedside. No events overnight. sp albumin challenge to improve circulation + hypotension. Complaining of continued pain in the back this AM, 5/10, improved. Also complaining of nose bleeds when standing today. Denies fever/chills, headache, lightheadedness, dizziness, chest pain, palpitations, shortness of breath, cough, abd pain, nausea/vomiting/diarrhea      =========================================================================================    PHYSICAL EXAM.    GEN - appears age appropriate. well nourished. no distress.   HEENT - NCAT, EOMI, VAISHNAVI  RESP - CTA BL, no wheeze/stridor/rhonchi/crackles. not on supplemental O2. able to speak in full sentences without distress. sp removal of Rt posterior chest wall tube, dressing c/d/i  CARDIO - NS1S2, RRR. No murmurs/rubs/gallops.  ABD - Soft/Non tender/Non distended. Normal BS x4 quadrants. no guarding/rebound tenderness.  Ext - RUE +2 edema of hand. BL LE +3 pitting edema, mildly improved  MSK - BL 5/5 strength on upper and lower extremities.   Neuro - AAOx3. cn 2-12 grossly intact  Psych - normal affect  Skin - c/d/i. no rashes/lesions        VITAL SIGNS.    Vital Signs Last 24 Hrs  T(C): 37 (14 May 2018 05:00), Max: 37.1 (13 May 2018 23:27)  T(F): 98.6 (14 May 2018 05:00), Max: 98.7 (13 May 2018 23:27)  HR: 89 (14 May 2018 05:00) (70 - 94)  BP: 107/53 (14 May 2018 05:00) (92/40 - 107/53)  BP(mean): --  RR: 18 (14 May 2018 05:00) (18 - 18)  SpO2: 99% (14 May 2018 05:00) (94% - 99%)        =================================================    LABS.                          7.9    x     )-----------( x        ( 14 May 2018 05:56 )             21.9     05-14    135  |  102  |  82.0<H>  ----------------------------<  86  4.2   |  22.0  |  1.13    Ca    8.2<L>      14 May 2018 05:56    TPro  4.6<L>  /  Alb  1.9<L>  /  TBili  0.4  /  DBili  x   /  AST  47<H>  /  ALT  29  /  AlkPhos  342<H>  05-14    LIVER FUNCTIONS - ( 14 May 2018 05:56 )  Alb: 1.9 g/dL / Pro: 4.6 g/dL / ALK PHOS: 342 U/L / ALT: 29 U/L / AST: 47 U/L / GGT: x             I&O's Summary        ================================================    IMAGING.      ================================================    MEDICATIONS  (STANDING):  ALBUTerol/ipratropium for Nebulization 3 milliLiter(s) Nebulizer every 6 hours  dextrose 5%. 1000 milliLiter(s) (50 mL/Hr) IV Continuous <Continuous>  dextrose 50% Injectable 12.5 Gram(s) IV Push once  dextrose 50% Injectable 25 Gram(s) IV Push once  dextrose 50% Injectable 25 Gram(s) IV Push once  escitalopram 10 milliGRAM(s) Oral daily  insulin lispro (HumaLOG) corrective regimen sliding scale   SubCutaneous three times a day before meals  insulin lispro (HumaLOG) corrective regimen sliding scale   SubCutaneous at bedtime  lactobacillus acidophilus 1 Tablet(s) Oral two times a day with meals  lactulose Syrup 10 Gram(s) Oral two times a day  pantoprazole    Tablet 40 milliGRAM(s) Oral before breakfast    MEDICATIONS  (PRN):  acetaminophen   Tablet. 650 milliGRAM(s) Oral every 6 hours PRN pain  ALBUTerol    0.083% 2.5 milliGRAM(s) Nebulizer every 4 hours PRN Shortness of Breath and/or Wheezing  dextrose 40% Gel 15 Gram(s) Oral once PRN Blood Glucose LESS THAN 70 milliGRAM(s)/deciliter  glucagon  Injectable 1 milliGRAM(s) IntraMuscular once PRN Glucose LESS THAN 70 milligrams/deciliter  methocarbamol 1500 milliGRAM(s) Oral three times a day PRN muscle spasm/pain  oxyCODONE    IR 5 milliGRAM(s) Oral every 8 hours PRN Severe Pain (7 - 10)  sodium chloride 0.65% Nasal 1 Spray(s) Both Nostrils daily PRN Nasal Congestion/ nose bleed/ dry nose PMD: unknown    CHIEF COMPLAINT: sob     Patient seen and examined at the bedside. No events overnight. sp albumin challenge to improve circulation + hypotension. Complaining of HUBERT this AM. Denies fever/chills, headache, lightheadedness, dizziness, chest pain, palpitations, shortness of breath, cough, abd pain, nausea/vomiting/diarrhea      =========================================================================================    PHYSICAL EXAM.    GEN - appears age appropriate. well nourished. no distress.   HEENT - NCAT, EOMI, VAISHNAVI  RESP - CTA BL, no wheeze/stridor/rhonchi/crackles. not on supplemental O2. able to speak in full sentences without distress.   CARDIO - NS1S2, RRR. No murmurs/rubs/gallops.  ABD - Soft/Non tender/Non distended. Normal BS x4 quadrants. no guarding/rebound tenderness.  Ext - RUE +2 edema of hand. BL LE +3 pitting edema, improved  MSK - BL 5/5 strength on upper and lower extremities.   Neuro - AAOx3. cn 2-12 grossly intact  Psych - normal affect but appears somewhat distressed regarding medical condition  Skin - c/d/i. no rashes/lesions        VITAL SIGNS.    Vital Signs Last 24 Hrs  T(C): 37 (14 May 2018 05:00), Max: 37.1 (13 May 2018 23:27)  T(F): 98.6 (14 May 2018 05:00), Max: 98.7 (13 May 2018 23:27)  HR: 89 (14 May 2018 05:00) (70 - 94)  BP: 107/53 (14 May 2018 05:00) (92/40 - 107/53)  BP(mean): --  RR: 18 (14 May 2018 05:00) (18 - 18)  SpO2: 99% (14 May 2018 05:00) (94% - 99%)        =================================================    LABS.                          7.9    x     )-----------( x        ( 14 May 2018 05:56 )             21.9     05-14    135  |  102  |  82.0<H>  ----------------------------<  86  4.2   |  22.0  |  1.13    Ca    8.2<L>      14 May 2018 05:56    TPro  4.6<L>  /  Alb  1.9<L>  /  TBili  0.4  /  DBili  x   /  AST  47<H>  /  ALT  29  /  AlkPhos  342<H>  05-14    LIVER FUNCTIONS - ( 14 May 2018 05:56 )  Alb: 1.9 g/dL / Pro: 4.6 g/dL / ALK PHOS: 342 U/L / ALT: 29 U/L / AST: 47 U/L / GGT: x             I&O's Summary        ================================================    IMAGING.      ================================================    MEDICATIONS  (STANDING):  ALBUTerol/ipratropium for Nebulization 3 milliLiter(s) Nebulizer every 6 hours  dextrose 5%. 1000 milliLiter(s) (50 mL/Hr) IV Continuous <Continuous>  dextrose 50% Injectable 12.5 Gram(s) IV Push once  dextrose 50% Injectable 25 Gram(s) IV Push once  dextrose 50% Injectable 25 Gram(s) IV Push once  escitalopram 10 milliGRAM(s) Oral daily  insulin lispro (HumaLOG) corrective regimen sliding scale   SubCutaneous three times a day before meals  insulin lispro (HumaLOG) corrective regimen sliding scale   SubCutaneous at bedtime  lactobacillus acidophilus 1 Tablet(s) Oral two times a day with meals  lactulose Syrup 10 Gram(s) Oral two times a day  pantoprazole    Tablet 40 milliGRAM(s) Oral before breakfast    MEDICATIONS  (PRN):  acetaminophen   Tablet. 650 milliGRAM(s) Oral every 6 hours PRN pain  ALBUTerol    0.083% 2.5 milliGRAM(s) Nebulizer every 4 hours PRN Shortness of Breath and/or Wheezing  dextrose 40% Gel 15 Gram(s) Oral once PRN Blood Glucose LESS THAN 70 milliGRAM(s)/deciliter  glucagon  Injectable 1 milliGRAM(s) IntraMuscular once PRN Glucose LESS THAN 70 milligrams/deciliter  methocarbamol 1500 milliGRAM(s) Oral three times a day PRN muscle spasm/pain  oxyCODONE    IR 5 milliGRAM(s) Oral every 8 hours PRN Severe Pain (7 - 10)  sodium chloride 0.65% Nasal 1 Spray(s) Both Nostrils daily PRN Nasal Congestion/ nose bleed/ dry nose

## 2018-05-14 NOTE — PROGRESS NOTE ADULT - ASSESSMENT
62yoM with PMH DM2, Hx PE, Hx Pleural effusions, ETOH Cirrhosis presenting with sob, admitted for Large Rt sided pleural effusion.    Course complicated by brynn.    Course also complicated by Hypotension likely due to hypoalbuminemia.    Course complicated by anemia.    Hypotension- STABLE>persistent. asymptomatic. likely sec to poor oncotic pressure. ps gentle IV hydration with temporary improvement. sp albumin + lasix challenge x2 on 5/12+5/13 with resolution of BRYNN and improvement in BP likely due to improving oncotic pressure PLAN- reepat albumin + lasix challange as he is responding, goal serum alb >2. serial BP checks, manual preferred.     Anemia- STABLE> asymptomatic. no signs acute/active bleeding. ? related to dilutional effect given volume administration PLAN. repeat h/h in AM. transfuse for hgb <7 or if symptoms occur. still pending FOBT.     Epistaxis- NEW ONSET> no clear etiology. unlikely related to intracranial pathology as pt w/o any other complaints or neuro deficits. PLAN. cont to monitor for recurrent ep. saline nasal rinse in case it is due to dry nares. if persistent or recurrent will consider ENT eval.     ETOH related Cirrhosis- STABLE. sp TIPS in 03/2018. complicated by hypoalbuminemia causing mod/severe generalized edema. edema less likely related to HF, pt had echo 3/2018 with normal EF and LV function. also unlikely sec to underlying DVT given chronic use of eliquis for PE. PLAN- nutrition consult pending. prostat. supportive care for edema, elevate LE, he has said compression stockings have not worked in the past. albumin. outpt GI follow up, should have imaging to eval for malignancy of liver. continued abstinence from etoh.    BRYNN- RESOLVED> baseline Cr 0.9. likely sec to poor circulatory effective volume in setting of hypoalbuminemia. initially also thought to be from pre renal azotemia given massive volume loss post chest tube placement but did not respond to hydration, less likely. sp albumin challenge followed by lasixx3 with great improvement. PLAN- cont protein supplementation. avoid nephrotoxic meds.    Hypokalemia- RESOLVED> mild, asymptomatic.    Hyponatremia- RESOLVED> moderate. likely dilutional from volume overload in setting or cirrhosis + poor oncotic pressure, as fluid has been removed from his body it has gotten better. PLAN. fluid restrict. trend bmp    Back Pain- STABLE> MSK etiology sec to chest tube. sp motrin x1 with relief. PLAN. cont pain control with tylenol PRN, max 3g in 1d given cirrhosis. cont robaxin x3d total. caution with nsaids given recent brynn    Rt Sided Pleural Effusion- RESOLVED. in setting of hx of recurrent effusions in past. CT Sx consult appreciated, sp chest tube placement 5/10, removed 5/13, uncomplicated well tolerated. initially 1200mL fluid removed followed by 2L rapid outpt. PLAN-  outpt fu with CT Surg for repeat thoracentesis in 1wk w/ Dr Saez. no signs of malignancy on CT Chest however pt is @ elevated risk given hx of smoking + cirrhosis, should have screening MRI or CT Abd done as outpt to eval for underlying intraabd ca such as liver ca. PT eval pending    Hx PE- STABLE. dx 2/8/18. currently on eliquis 5mg bid. PLAN- resume eliquis, should be on AC x6mo total, anticipated stop date 8/8/18 62yoM with PMH DM2, Hx PE, Hx Pleural effusions, ETOH Cirrhosis presenting with sob, admitted for Large Rt sided pleural effusion.    Course complicated by brynn.    Course also complicated by Hypotension likely due to hypoalbuminemia.    Course complicated by anemia.    Hypotension- STABLE>persistent. asymptomatic. likely sec to poor oncotic pressure. ps gentle IV hydration with temporary improvement. sp albumin + lasix challenge x2 on 5/12+5/13 with resolution of BRYNN and improvement in BP likely due to improving oncotic pressure PLAN- reepat albumin + lasix challange as he is responding, goal serum alb >2. serial BP checks, manual preferred. palliative consult given frustration regarding general medical condition and prolonged hospital stay.     Anemia- STABLE> asymptomatic. no signs acute/active bleeding. ? related to dilutional effect given volume administration PLAN. repeat h/h in AM. transfuse for hgb <7 or if symptoms occur. still pending FOBT.     Epistaxis- RESOLVED> no clear etiology. unlikely related to intracranial pathology as pt w/o any other complaints or neuro deficits. 1x ep inpatient PLAN. monitor for recurrence.     ETOH related Cirrhosis- STABLE. sp TIPS in 03/2018. complicated by hypoalbuminemia causing mod/severe generalized edema. edema less likely related to HF, pt had echo 3/2018 with normal EF and LV function. also unlikely sec to underlying DVT given chronic use of eliquis for PE. PLAN- nutrition consult pending. prostat. supportive care for edema, elevate LE, he has said compression stockings have not worked in the past. albumin. outpt GI follow up, should have imaging to eval for malignancy of liver. continued abstinence from etoh.    BRYNN- RESOLVED> baseline Cr 0.9. likely sec to poor circulatory effective volume in setting of hypoalbuminemia. initially also thought to be from pre renal azotemia given massive volume loss post chest tube placement but did not respond to hydration, less likely. sp albumin challenge followed by lasixx3 with great improvement. PLAN- cont protein supplementation. avoid nephrotoxic meds.    Hypokalemia- RESOLVED> mild, asymptomatic.    Hyponatremia- RESOLVED> moderate. likely dilutional from volume overload in setting or cirrhosis + poor oncotic pressure, as fluid has been removed from his body it has gotten better. PLAN. fluid restrict. trend bmp    Back Pain- RESOLVED> MSK etiology sec to chest tube. sp motrin x1 with relief. PLAN. cont pain control with tylenol PRN, max 3g in 1d given cirrhosis. cont robaxin x3d total. caution with nsaids given recent brynn    Rt Sided Pleural Effusion- RESOLVED. in setting of hx of recurrent effusions in past. CT Sx consult appreciated, sp chest tube placement 5/10, removed 5/13, uncomplicated well tolerated. initially 1200mL fluid removed followed by 2L rapid outpt. PLAN-  outpt fu with CT Surg for repeat thoracentesis in 1wk w/ Dr Saez. no signs of malignancy on CT Chest however pt is @ elevated risk given hx of smoking + cirrhosis, should have screening MRI or CT Abd done as outpt to eval for underlying intraabd ca such as liver ca. PT eval pending    Hx PE- STABLE. dx 2/8/18. currently on eliquis 5mg bid. PLAN- resume eliquis, should be on AC x6mo total, anticipated stop date 8/8/18

## 2018-05-15 LAB
ALBUMIN SERPL ELPH-MCNC: 2.8 G/DL — LOW (ref 3.3–5.2)
ALP SERPL-CCNC: 356 U/L — HIGH (ref 40–120)
ALT FLD-CCNC: 23 U/L — SIGNIFICANT CHANGE UP
ANION GAP SERPL CALC-SCNC: 12 MMOL/L — SIGNIFICANT CHANGE UP (ref 5–17)
AST SERPL-CCNC: 38 U/L — SIGNIFICANT CHANGE UP
BILIRUB SERPL-MCNC: 0.4 MG/DL — SIGNIFICANT CHANGE UP (ref 0.4–2)
BUN SERPL-MCNC: 69 MG/DL — HIGH (ref 8–20)
CALCIUM SERPL-MCNC: 8.3 MG/DL — LOW (ref 8.6–10.2)
CHLORIDE SERPL-SCNC: 102 MMOL/L — SIGNIFICANT CHANGE UP (ref 98–107)
CO2 SERPL-SCNC: 23 MMOL/L — SIGNIFICANT CHANGE UP (ref 22–29)
CREAT SERPL-MCNC: 0.98 MG/DL — SIGNIFICANT CHANGE UP (ref 0.5–1.3)
GLUCOSE BLDC GLUCOMTR-MCNC: 106 MG/DL — HIGH (ref 70–99)
GLUCOSE BLDC GLUCOMTR-MCNC: 124 MG/DL — HIGH (ref 70–99)
GLUCOSE BLDC GLUCOMTR-MCNC: 127 MG/DL — HIGH (ref 70–99)
GLUCOSE BLDC GLUCOMTR-MCNC: 147 MG/DL — HIGH (ref 70–99)
GLUCOSE SERPL-MCNC: 104 MG/DL — SIGNIFICANT CHANGE UP (ref 70–115)
HCT VFR BLD CALC: 22 % — LOW (ref 42–52)
HGB BLD-MCNC: 7.7 G/DL — LOW (ref 14–18)
POTASSIUM SERPL-MCNC: 3.5 MMOL/L — SIGNIFICANT CHANGE UP (ref 3.5–5.3)
POTASSIUM SERPL-SCNC: 3.5 MMOL/L — SIGNIFICANT CHANGE UP (ref 3.5–5.3)
PROT SERPL-MCNC: 5.1 G/DL — LOW (ref 6.6–8.7)
SODIUM SERPL-SCNC: 137 MMOL/L — SIGNIFICANT CHANGE UP (ref 135–145)

## 2018-05-15 PROCEDURE — 71045 X-RAY EXAM CHEST 1 VIEW: CPT | Mod: 26

## 2018-05-15 PROCEDURE — 99233 SBSQ HOSP IP/OBS HIGH 50: CPT

## 2018-05-15 PROCEDURE — 99253 IP/OBS CNSLTJ NEW/EST LOW 45: CPT

## 2018-05-15 RX ORDER — POTASSIUM CHLORIDE 20 MEQ
40 PACKET (EA) ORAL EVERY 4 HOURS
Qty: 0 | Refills: 0 | Status: COMPLETED | OUTPATIENT
Start: 2018-05-15 | End: 2018-05-15

## 2018-05-15 RX ORDER — FUROSEMIDE 40 MG
20 TABLET ORAL ONCE
Qty: 0 | Refills: 0 | Status: COMPLETED | OUTPATIENT
Start: 2018-05-15 | End: 2018-05-15

## 2018-05-15 RX ADMIN — APIXABAN 5 MILLIGRAM(S): 2.5 TABLET, FILM COATED ORAL at 17:47

## 2018-05-15 RX ADMIN — PANTOPRAZOLE SODIUM 40 MILLIGRAM(S): 20 TABLET, DELAYED RELEASE ORAL at 06:53

## 2018-05-15 RX ADMIN — OXYCODONE HYDROCHLORIDE 5 MILLIGRAM(S): 5 TABLET ORAL at 08:27

## 2018-05-15 RX ADMIN — LACTULOSE 10 GRAM(S): 10 SOLUTION ORAL at 06:53

## 2018-05-15 RX ADMIN — Medication 3 MILLILITER(S): at 20:47

## 2018-05-15 RX ADMIN — OXYCODONE HYDROCHLORIDE 5 MILLIGRAM(S): 5 TABLET ORAL at 22:01

## 2018-05-15 RX ADMIN — ESCITALOPRAM OXALATE 10 MILLIGRAM(S): 10 TABLET, FILM COATED ORAL at 12:08

## 2018-05-15 RX ADMIN — Medication 1 TABLET(S): at 17:46

## 2018-05-15 RX ADMIN — APIXABAN 5 MILLIGRAM(S): 2.5 TABLET, FILM COATED ORAL at 06:53

## 2018-05-15 RX ADMIN — Medication 3 MILLILITER(S): at 14:19

## 2018-05-15 RX ADMIN — Medication 1 TABLET(S): at 08:25

## 2018-05-15 RX ADMIN — Medication 40 MILLIEQUIVALENT(S): at 13:47

## 2018-05-15 RX ADMIN — Medication 40 MILLIEQUIVALENT(S): at 17:46

## 2018-05-15 RX ADMIN — Medication 20 MILLIGRAM(S): at 13:47

## 2018-05-15 RX ADMIN — Medication 50 MILLILITER(S): at 00:50

## 2018-05-15 RX ADMIN — Medication 40 MILLIGRAM(S): at 06:52

## 2018-05-15 RX ADMIN — OXYCODONE HYDROCHLORIDE 5 MILLIGRAM(S): 5 TABLET ORAL at 23:00

## 2018-05-15 RX ADMIN — OXYCODONE HYDROCHLORIDE 5 MILLIGRAM(S): 5 TABLET ORAL at 09:00

## 2018-05-15 NOTE — PROGRESS NOTE ADULT - ASSESSMENT
62yoM with PMH DM2, Hx PE, Hx Pleural effusions, ETOH Cirrhosis presenting with sob, admitted for Large Rt sided pleural effusion.    Course complicated by brynn.    Course also complicated by Hypotension likely due to hypoalbuminemia.    Course complicated by anemia.    Hypotension- STABLE>persistent. asymptomatic. likely sec to poor oncotic pressure. ps gentle IV hydration with temporary improvement. sp albumin + lasix challenge x3 on 5/12, 5/13, 5/14 with resolution of BRYNN and improvement in BP likely due to improving oncotic pressure. PLAN- serial BP checks, manual preferred. palliative consult given frustration regarding general medical condition and prolonged hospital stay. overall pt prone to having this sort of complication for long term given his cirrhosis. GI consult to see if anything else can be done to optimize pt    Rt Sided Pleural Effusion- RESOLVED but POSSIBLY RECURRENT. in setting of hx of recurrent effusions in past. CT Sx consult appreciated, sp chest tube placement 5/10, removed 5/13, uncomplicated well tolerated. initially 1200mL fluid removed followed by 2L rapid outpt. now with worsening sob concerning for reaccumulation. PLAN- repeat XR, was due for repeat thoracentesis 1wk after inpt chest tube anyway, may need to be sooner based on results    Diarrhea- bristol 7 stools. no other signs/ symptoms to assume infectious source. suspect sec to medication. PLAN. stop lactulose. follow up BM. if persistent, check cx + cdiff.     ETOH related Cirrhosis- STABLE. sp TIPS in 03/2018. complicated by hypoalbuminemia causing mod/severe generalized edema + pleural effusion. edema less likely related to HF, pt had echo 3/2018 with normal EF and LV function. also unlikely sec to underlying DVT given chronic use of eliquis for PE. PLAN- nutrition consult pending. prostat. supportive care for edema, elevate LE, he has said compression stockings have not worked in the past. albumin. GI consult to see if any other measures available to optimize pt. continued abstinence from etoh. outpt GI fu as well for regular scheduled malingnancy screenings + cirrhosis management    Back Pain- initially RESOLVED but RECURRENT> MSK etiology sec to chest tube. sp motrin x1 with relief. now recurrent, poss due to what may be recurring pleural effusion PLAN. cont pain control with tylenol PRN, max 3g in 1d given cirrhosis. cont robaxin x3d total. caution with nsaids given recent brynn    Anemia- STABLE> asymptomatic. no signs acute/active bleeding. ? related to dilutional effect given volume administration PLAN. repeat h/h in AM. transfuse for hgb <7 or if symptoms occur. still pending FOBT.     BRYNN- RESOLVED> baseline Cr 0.9. likely sec to poor circulatory effective volume in setting of hypoalbuminemia. initially also thought to be from pre renal azotemia given massive volume loss post chest tube placement but did not respond to hydration, less likely. sp albumin challenge followed by lasixx3 with great improvement. PLAN- cont protein supplementation. avoid nephrotoxic meds.    Hx PE- STABLE. dx 2/8/18. currently on eliquis 5mg bid. PLAN- resume eliquis, should be on AC x6mo total, anticipated stop date 8/8/18

## 2018-05-15 NOTE — PROGRESS NOTE ADULT - SUBJECTIVE AND OBJECTIVE BOX
PMD: unknown    CHIEF COMPLAINT: sob     Patient seen and examined at the bedside. No events overnight. sp albumin challenge to improve circulation + hypotension. Complaining of continued HUBERT with addition of SOB/ NGUYEN + diarrhea this AM. Denies fever/chills, headache, lightheadedness, dizziness, chest pain, palpitations,cough, abd pain, nausea/vomiting    =========================================================================================    PHYSICAL EXAM.    GEN - appears age appropriate. well nourished. no distress. appears fatigued  HEENT - NCAT, EOMI, VAISHNAVI  RESP - CTA BL, no wheeze/stridor/rhonchi/crackles. not on supplemental O2. able to speak in full sentences without distress.   CARDIO - NS1S2, RRR. No murmurs/rubs/gallops.  ABD - Soft/Non tender/Non distended. Normal BS x4 quadrants. no guarding/rebound tenderness.  Ext - RUE +2 edema of hand. BL LE +3 pitting edema, small improvement  MSK - BL 5/5 strength on upper and lower extremities.   Neuro - AAOx3. cn 2-12 grossly intact  Psych - normal affect but saddened by med condition  Skin - c/d/i. no rashes/lesions        VITAL SIGNS.    Vital Signs Last 24 Hrs  T(C): 36.8 (15 May 2018 11:10), Max: 37.1 (14 May 2018 20:01)  T(F): 98.2 (15 May 2018 11:10), Max: 98.8 (14 May 2018 20:01)  HR: 99 (15 May 2018 11:10) (95 - 105)  BP: 98/59 (15 May 2018 11:10) (91/57 - 119/65)  BP(mean): --  RR: 18 (15 May 2018 11:10) (18 - 18)  SpO2: 98% (15 May 2018 05:05) (94% - 98%)        =================================================    LABS.                          7.7    x     )-----------( x        ( 15 May 2018 07:02 )             22.0     05-15    137  |  102  |  69.0<H>  ----------------------------<  104  3.5   |  23.0  |  0.98    Ca    8.3<L>      15 May 2018 07:02    TPro  5.1<L>  /  Alb  2.8<L>  /  TBili  0.4  /  DBili  x   /  AST  38  /  ALT  23  /  AlkPhos  356<H>  05-15    LIVER FUNCTIONS - ( 15 May 2018 07:02 )  Alb: 2.8 g/dL / Pro: 5.1 g/dL / ALK PHOS: 356 U/L / ALT: 23 U/L / AST: 38 U/L / GGT: x             I&O's Summary        ================================================    IMAGING.      ================================================    MEDICATIONS  (STANDING):  ALBUTerol/ipratropium for Nebulization 3 milliLiter(s) Nebulizer every 6 hours  apixaban 5 milliGRAM(s) Oral every 12 hours  dextrose 5%. 1000 milliLiter(s) (50 mL/Hr) IV Continuous <Continuous>  dextrose 50% Injectable 12.5 Gram(s) IV Push once  dextrose 50% Injectable 25 Gram(s) IV Push once  dextrose 50% Injectable 25 Gram(s) IV Push once  escitalopram 10 milliGRAM(s) Oral daily  insulin lispro (HumaLOG) corrective regimen sliding scale   SubCutaneous three times a day before meals  insulin lispro (HumaLOG) corrective regimen sliding scale   SubCutaneous at bedtime  lactobacillus acidophilus 1 Tablet(s) Oral two times a day with meals  lactulose Syrup 10 Gram(s) Oral two times a day  pantoprazole    Tablet 40 milliGRAM(s) Oral before breakfast    MEDICATIONS  (PRN):  acetaminophen   Tablet. 650 milliGRAM(s) Oral every 6 hours PRN pain  ALBUTerol    0.083% 2.5 milliGRAM(s) Nebulizer every 4 hours PRN Shortness of Breath and/or Wheezing  dextrose 40% Gel 15 Gram(s) Oral once PRN Blood Glucose LESS THAN 70 milliGRAM(s)/deciliter  glucagon  Injectable 1 milliGRAM(s) IntraMuscular once PRN Glucose LESS THAN 70 milligrams/deciliter  methocarbamol 1500 milliGRAM(s) Oral three times a day PRN muscle spasm/pain  oxyCODONE    IR 5 milliGRAM(s) Oral every 8 hours PRN Severe Pain (7 - 10)  sodium chloride 0.65% Nasal 1 Spray(s) Both Nostrils daily PRN Nasal Congestion/ nose bleed/ dry nose

## 2018-05-15 NOTE — CONSULT NOTE ADULT - ASSESSMENT
Patient with refractory pleural effusion and significant pedal edema.     1. CT surgery evaluation for replacement of the CT  2. Nephrology evaluation for ? bumex drip  3. Continue albumin   4. GI will follow up

## 2018-05-15 NOTE — CONSULT NOTE ADULT - SUBJECTIVE AND OBJECTIVE BOX
HPI:  61 y/o male with pmh of dm, PE (2/8/18) currently on eliquis, alcoholic liver cirrhosis, TIPS procedure at [place] (3/29/18), recurrent hepatic hydrothorax presented to the ED c/o of worsening SOB x 1 week which has progressed. Pt is well known to the service with similar SOB presentations in the past with right sided pleural effusion that required draining. Upon present  examination pt appears uncomfortable at rest, unable to speak in complete sentences due to his shortness of breath. CXR done in ED reveals a large right sided pleural effusion. CT scan was also done by request of ED physician with results pending. AT this time pt denies CP, palpitations, dizziness, n/v/d. He admits to SOB at rest worsening with exertion.    Patient underwent chest tube placement and then was given IV albumin and IV lasix. Chest tube discontinued on 5/13. Now feels fluid is reaccumulating. Chest X ray shows, recurrent pleural effusion. Still has 3 + edema. No other GI complaints    PAST MEDICAL & SURGICAL HISTORY:  Pleural effusion  Pulmonary embolism  Cirrhosis  DM (diabetes mellitus)  S/P TIPS (transjugular intrahepatic portosystemic shunt)  S/P thoracentesis      ROS:  No Heartburn, regurgitation, dysphagia, odynophagia.  No dyspepsia  No abdominal pain.    No Nausea, vomiting.  No Bleeding.  No hematemesis.   No diarrhea.    No hematochezia  No weight loss, anorexia.  +++edema.      MEDICATIONS  (STANDING):  ALBUTerol/ipratropium for Nebulization 3 milliLiter(s) Nebulizer every 6 hours  apixaban 5 milliGRAM(s) Oral every 12 hours  dextrose 5%. 1000 milliLiter(s) (50 mL/Hr) IV Continuous <Continuous>  dextrose 50% Injectable 12.5 Gram(s) IV Push once  dextrose 50% Injectable 25 Gram(s) IV Push once  dextrose 50% Injectable 25 Gram(s) IV Push once  escitalopram 10 milliGRAM(s) Oral daily  insulin lispro (HumaLOG) corrective regimen sliding scale   SubCutaneous three times a day before meals  insulin lispro (HumaLOG) corrective regimen sliding scale   SubCutaneous at bedtime  lactobacillus acidophilus 1 Tablet(s) Oral two times a day with meals  pantoprazole    Tablet 40 milliGRAM(s) Oral before breakfast  potassium chloride    Tablet ER 40 milliEquivalent(s) Oral every 4 hours    MEDICATIONS  (PRN):  acetaminophen   Tablet. 650 milliGRAM(s) Oral every 6 hours PRN pain  ALBUTerol    0.083% 2.5 milliGRAM(s) Nebulizer every 4 hours PRN Shortness of Breath and/or Wheezing  dextrose 40% Gel 15 Gram(s) Oral once PRN Blood Glucose LESS THAN 70 milliGRAM(s)/deciliter  glucagon  Injectable 1 milliGRAM(s) IntraMuscular once PRN Glucose LESS THAN 70 milligrams/deciliter  methocarbamol 1500 milliGRAM(s) Oral three times a day PRN muscle spasm/pain  oxyCODONE    IR 5 milliGRAM(s) Oral every 8 hours PRN Severe Pain (7 - 10)  sodium chloride 0.65% Nasal 1 Spray(s) Both Nostrils daily PRN Nasal Congestion/ nose bleed/ dry nose      Allergies    No Known Allergies    Intolerances        SOCIAL HISTORY:Alcohol use in the past. No drugs. No smoking    ENDOSCOPIC/GI HISTORY:Colonoscopy last year    FAMILY HISTORY:  No pertinent family history in first degree relatives      Vital Signs Last 24 Hrs  T(C): 37 (15 May 2018 16:05), Max: 37.1 (14 May 2018 20:01)  T(F): 98.6 (15 May 2018 16:05), Max: 98.8 (14 May 2018 20:01)  HR: 97 (15 May 2018 16:05) (94 - 105)  BP: 121/60 (15 May 2018 16:05) (91/57 - 122/64)  BP(mean): --  RR: 18 (15 May 2018 11:10) (18 - 18)  SpO2: 98% (15 May 2018 05:05) (94% - 98%)    PHYSICAL EXAM:    GENERAL: NAD, well-groomed, well-developed  HEAD:  Atraumatic, Normocephalic  EYES: EOMI, PERRLA, conjunctiva and sclera clear  ENMT: No tonsillar erythema, exudates, or enlargement; Moist mucous membranes, Good dentition, No lesions  NECK: Supple, No JVD, Normal thyroid  CHEST/LUNG: Clear to percussion bilaterally; No rales, rhonchi, wheezing, or rubs  HEART: Regular rate and rhythm; No murmurs, rubs, or gallops  ABDOMEN: Soft, Nontender, Nondistended; Bowel sounds present  EXTREMITIES:  2+ Peripheral Pulses, No clubbing, cyanosis, +++ edema  LYMPH: No lymphadenopathy noted  SKIN: No rashes or lesions      LABS:                        7.7    x     )-----------( x        ( 15 May 2018 07:02 )             22.0     05-15    137  |  102  |  69.0<H>  ----------------------------<  104  3.5   |  23.0  |  0.98    Ca    8.3<L>      15 May 2018 07:02    TPro  5.1<L>  /  Alb  2.8<L>  /  TBili  0.4  /  DBili  x   /  AST  38  /  ALT  23  /  AlkPhos  356<H>  05-15           LIVER FUNCTIONS - ( 15 May 2018 07:02 )  Alb: 2.8 g/dL / Pro: 5.1 g/dL / ALK PHOS: 356 U/L / ALT: 23 U/L / AST: 38 U/L / GGT: x               RADIOLOGY & ADDITIONAL STUDIES:  < from: Xray Chest 1 View- PORTABLE-Routine (05.15.18 @ 15:10) >  Single frontal view of chest 5/15/2018 at 1:07 PM.    Comparison: 5/12/2018    Findings: There is persistent opacification of the right hemithorax by   moderately large right pleural effusion and compressive atelectasis.   There is mild shift of tracheal and mediastinal structures to the left.   Cardiac mediastinal structures cannot be further assessed due to portable   technique. There is osteopenia and thoracic degenerative spondylosis.      Impression:    Moderately large right pleural effusion and compressive atelectasis.    < end of copied text >    < from: US Abdomen Doppler (04.19.18 @ 09:36) >  FINDINGS:    Main portal vein is patent and demonstrates hepatopedal flow and   phasicity.    The TIPS is patent. Velocities proximally  297 cm/s, mid segment 260   cm/s, distal segment 261 cm/s.     The visualized right hepatic vein and inferior vena cava are patent.    IMPRESSION: Patent TIPS without evidence of significant stenosis.    < end of copied text >

## 2018-05-16 LAB
ALBUMIN SERPL ELPH-MCNC: 2.3 G/DL — LOW (ref 3.3–5.2)
ALP SERPL-CCNC: 335 U/L — HIGH (ref 40–120)
ALT FLD-CCNC: 19 U/L — SIGNIFICANT CHANGE UP
ANION GAP SERPL CALC-SCNC: 11 MMOL/L — SIGNIFICANT CHANGE UP (ref 5–17)
AST SERPL-CCNC: 33 U/L — SIGNIFICANT CHANGE UP
BILIRUB SERPL-MCNC: 0.5 MG/DL — SIGNIFICANT CHANGE UP (ref 0.4–2)
BUN SERPL-MCNC: 72 MG/DL — HIGH (ref 8–20)
CALCIUM SERPL-MCNC: 8 MG/DL — LOW (ref 8.6–10.2)
CHLORIDE SERPL-SCNC: 104 MMOL/L — SIGNIFICANT CHANGE UP (ref 98–107)
CO2 SERPL-SCNC: 22 MMOL/L — SIGNIFICANT CHANGE UP (ref 22–29)
CREAT SERPL-MCNC: 1.05 MG/DL — SIGNIFICANT CHANGE UP (ref 0.5–1.3)
CULTURE RESULTS: SIGNIFICANT CHANGE UP
CULTURE RESULTS: SIGNIFICANT CHANGE UP
GLUCOSE BLDC GLUCOMTR-MCNC: 104 MG/DL — HIGH (ref 70–99)
GLUCOSE BLDC GLUCOMTR-MCNC: 113 MG/DL — HIGH (ref 70–99)
GLUCOSE BLDC GLUCOMTR-MCNC: 120 MG/DL — HIGH (ref 70–99)
GLUCOSE BLDC GLUCOMTR-MCNC: 139 MG/DL — HIGH (ref 70–99)
GLUCOSE SERPL-MCNC: 107 MG/DL — SIGNIFICANT CHANGE UP (ref 70–115)
HCT VFR BLD CALC: 23 % — LOW (ref 42–52)
HGB BLD-MCNC: 8.2 G/DL — LOW (ref 14–18)
MAGNESIUM SERPL-MCNC: 1.8 MG/DL — SIGNIFICANT CHANGE UP (ref 1.6–2.6)
PHOSPHATE SERPL-MCNC: 3 MG/DL — SIGNIFICANT CHANGE UP (ref 2.4–4.7)
POTASSIUM SERPL-MCNC: 4.3 MMOL/L — SIGNIFICANT CHANGE UP (ref 3.5–5.3)
POTASSIUM SERPL-SCNC: 4.3 MMOL/L — SIGNIFICANT CHANGE UP (ref 3.5–5.3)
PROT SERPL-MCNC: 4.6 G/DL — LOW (ref 6.6–8.7)
SODIUM SERPL-SCNC: 137 MMOL/L — SIGNIFICANT CHANGE UP (ref 135–145)
SPECIMEN SOURCE: SIGNIFICANT CHANGE UP
SPECIMEN SOURCE: SIGNIFICANT CHANGE UP

## 2018-05-16 PROCEDURE — 99232 SBSQ HOSP IP/OBS MODERATE 35: CPT

## 2018-05-16 PROCEDURE — 99233 SBSQ HOSP IP/OBS HIGH 50: CPT

## 2018-05-16 RX ORDER — ALBUMIN HUMAN 25 %
50 VIAL (ML) INTRAVENOUS EVERY 8 HOURS
Qty: 0 | Refills: 0 | Status: COMPLETED | OUTPATIENT
Start: 2018-05-16 | End: 2018-05-18

## 2018-05-16 RX ORDER — SPIRONOLACTONE 25 MG/1
25 TABLET, FILM COATED ORAL DAILY
Qty: 0 | Refills: 0 | Status: DISCONTINUED | OUTPATIENT
Start: 2018-05-16 | End: 2018-05-18

## 2018-05-16 RX ORDER — MIDODRINE HYDROCHLORIDE 2.5 MG/1
5 TABLET ORAL EVERY 8 HOURS
Qty: 0 | Refills: 0 | Status: DISCONTINUED | OUTPATIENT
Start: 2018-05-16 | End: 2018-05-18

## 2018-05-16 RX ORDER — FUROSEMIDE 40 MG
40 TABLET ORAL EVERY 8 HOURS
Qty: 0 | Refills: 0 | Status: COMPLETED | OUTPATIENT
Start: 2018-05-16 | End: 2018-05-18

## 2018-05-16 RX ORDER — FUROSEMIDE 40 MG
40 TABLET ORAL EVERY 8 HOURS
Qty: 0 | Refills: 0 | Status: DISCONTINUED | OUTPATIENT
Start: 2018-05-16 | End: 2018-05-16

## 2018-05-16 RX ADMIN — Medication 50 MILLILITER(S): at 23:27

## 2018-05-16 RX ADMIN — OXYCODONE HYDROCHLORIDE 5 MILLIGRAM(S): 5 TABLET ORAL at 21:55

## 2018-05-16 RX ADMIN — MIDODRINE HYDROCHLORIDE 5 MILLIGRAM(S): 2.5 TABLET ORAL at 23:27

## 2018-05-16 RX ADMIN — Medication 50 MILLILITER(S): at 13:30

## 2018-05-16 RX ADMIN — OXYCODONE HYDROCHLORIDE 5 MILLIGRAM(S): 5 TABLET ORAL at 22:30

## 2018-05-16 RX ADMIN — Medication 40 MILLIGRAM(S): at 13:30

## 2018-05-16 RX ADMIN — OXYCODONE HYDROCHLORIDE 5 MILLIGRAM(S): 5 TABLET ORAL at 06:55

## 2018-05-16 RX ADMIN — APIXABAN 5 MILLIGRAM(S): 2.5 TABLET, FILM COATED ORAL at 06:02

## 2018-05-16 RX ADMIN — SPIRONOLACTONE 25 MILLIGRAM(S): 25 TABLET, FILM COATED ORAL at 13:33

## 2018-05-16 RX ADMIN — OXYCODONE HYDROCHLORIDE 5 MILLIGRAM(S): 5 TABLET ORAL at 06:08

## 2018-05-16 RX ADMIN — PANTOPRAZOLE SODIUM 40 MILLIGRAM(S): 20 TABLET, DELAYED RELEASE ORAL at 06:02

## 2018-05-16 RX ADMIN — Medication 1 TABLET(S): at 11:56

## 2018-05-16 RX ADMIN — ESCITALOPRAM OXALATE 10 MILLIGRAM(S): 10 TABLET, FILM COATED ORAL at 11:57

## 2018-05-16 RX ADMIN — MIDODRINE HYDROCHLORIDE 5 MILLIGRAM(S): 2.5 TABLET ORAL at 13:32

## 2018-05-16 NOTE — PROGRESS NOTE ADULT - SUBJECTIVE AND OBJECTIVE BOX
PMD: unknown    CHIEF COMPLAINT: sob     Patient seen and examined at the bedside. No events overnight. sp albumin challenge to improve circulation + hypotension. Complaining of continued HUBERT with addition of SOB/ NGUYEN + diarrhea this AM. Denies fever/chills, headache, lightheadedness, dizziness, chest pain, palpitations,cough, abd pain, nausea/vomiting    =========================================================================================    PHYSICAL EXAM.    GEN - appears age appropriate. well nourished. no distress. appears fatigued  HEENT - NCAT, EOMI, VAISHNAVI  RESP - CTA BL, no wheeze/stridor/rhonchi/crackles. not on supplemental O2. able to speak in full sentences without distress.   CARDIO - NS1S2, RRR. No murmurs/rubs/gallops.  ABD - Soft/Non tender/Non distended. Normal BS x4 quadrants. no guarding/rebound tenderness.  Ext - RUE +2 edema of hand. BL LE +3 pitting edema, small improvement  MSK - BL 5/5 strength on upper and lower extremities.   Neuro - AAOx3. cn 2-12 grossly intact  Psych - normal affect but saddened by med condition  Skin - c/d/i. no rashes/lesions        VITAL SIGNS.    Vital Signs Last 24 Hrs  T(C): 36.9 (16 May 2018 04:18), Max: 37 (15 May 2018 16:05)  T(F): 98.4 (16 May 2018 04:18), Max: 98.6 (15 May 2018 16:05)  HR: 101 (16 May 2018 04:18) (94 - 108)  BP: 119/68 (16 May 2018 04:18) (107/60 - 122/64)  BP(mean): --  RR: 20 (16 May 2018 04:18) (20 - 28)  SpO2: 94% (16 May 2018 04:18) (94% - 100%)        =================================================    LABS.                          8.2    x     )-----------( x        ( 16 May 2018 06:35 )             23.0     05-16    137  |  104  |  72.0<H>  ----------------------------<  107  4.3   |  22.0  |  1.05    Ca    8.0<L>      16 May 2018 06:35  Phos  3.0     05-16  Mg     1.8     05-16    TPro  4.6<L>  /  Alb  2.3<L>  /  TBili  0.5  /  DBili  x   /  AST  33  /  ALT  19  /  AlkPhos  335<H>  05-16    LIVER FUNCTIONS - ( 16 May 2018 06:35 )  Alb: 2.3 g/dL / Pro: 4.6 g/dL / ALK PHOS: 335 U/L / ALT: 19 U/L / AST: 33 U/L / GGT: x             I&O's Summary    15 May 2018 07:01  -  16 May 2018 07:00  --------------------------------------------------------  IN: 720 mL / OUT: 0 mL / NET: 720 mL          ================================================    IMAGING.      ================================================    MEDICATIONS  (STANDING):  albumin human 25% IVPB 50 milliLiter(s) IV Intermittent every 8 hours  apixaban 5 milliGRAM(s) Oral every 12 hours  dextrose 5%. 1000 milliLiter(s) (50 mL/Hr) IV Continuous <Continuous>  dextrose 50% Injectable 12.5 Gram(s) IV Push once  dextrose 50% Injectable 25 Gram(s) IV Push once  dextrose 50% Injectable 25 Gram(s) IV Push once  escitalopram 10 milliGRAM(s) Oral daily  furosemide   Injectable 40 milliGRAM(s) IV Push every 8 hours  insulin lispro (HumaLOG) corrective regimen sliding scale   SubCutaneous three times a day before meals  insulin lispro (HumaLOG) corrective regimen sliding scale   SubCutaneous at bedtime  lactobacillus acidophilus 1 Tablet(s) Oral two times a day with meals  midodrine 5 milliGRAM(s) Oral every 8 hours  pantoprazole    Tablet 40 milliGRAM(s) Oral before breakfast  spironolactone 25 milliGRAM(s) Oral daily    MEDICATIONS  (PRN):  acetaminophen   Tablet. 650 milliGRAM(s) Oral every 6 hours PRN pain  ALBUTerol    0.083% 2.5 milliGRAM(s) Nebulizer every 4 hours PRN Shortness of Breath and/or Wheezing  dextrose 40% Gel 15 Gram(s) Oral once PRN Blood Glucose LESS THAN 70 milliGRAM(s)/deciliter  glucagon  Injectable 1 milliGRAM(s) IntraMuscular once PRN Glucose LESS THAN 70 milligrams/deciliter  methocarbamol 1500 milliGRAM(s) Oral three times a day PRN muscle spasm/pain  oxyCODONE    IR 5 milliGRAM(s) Oral every 8 hours PRN Severe Pain (7 - 10)  sodium chloride 0.65% Nasal 1 Spray(s) Both Nostrils daily PRN Nasal Congestion/ nose bleed/ dry nose PMD: unknown    CHIEF COMPLAINT: sob     Patient seen and examined at the bedside. No events overnight. No events yesterday. Complaining of continued HUBERT with SOB/ NGUYEN. Still has mild diarrhea this AM. Denies fever/chills, headache, lightheadedness, dizziness, chest pain, palpitations, cough, abd pain, nausea/vomiting    =========================================================================================    PHYSICAL EXAM.    GEN - appears age appropriate. well nourished. no distress.   HEENT - NCAT, EOMI, VAISHNAVI  RESP - CTA BL, no wheeze/stridor/rhonchi/crackles, ADRIEL @ Rt base. not on supplemental O2. able to speak in full sentences without distress.   CARDIO - NS1S2, RRR. No murmurs/rubs/gallops.  ABD - Soft/Non tender/Non distended. Normal BS x4 quadrants. no guarding/rebound tenderness.  Ext - RUE +2 edema of hand. BL LE +3 pitting edema, small improvement  MSK - BL 5/5 strength on upper and lower extremities.   Neuro - AAOx3. cn 2-12 grossly intact  Psych - normal affect but saddened by med condition  Skin - c/d/i. no rashes/lesions        VITAL SIGNS.    Vital Signs Last 24 Hrs  T(C): 36.9 (16 May 2018 04:18), Max: 37 (15 May 2018 16:05)  T(F): 98.4 (16 May 2018 04:18), Max: 98.6 (15 May 2018 16:05)  HR: 101 (16 May 2018 04:18) (94 - 108)  BP: 119/68 (16 May 2018 04:18) (107/60 - 122/64)  BP(mean): --  RR: 20 (16 May 2018 04:18) (20 - 28)  SpO2: 94% (16 May 2018 04:18) (94% - 100%)        =================================================    LABS.                          8.2    x     )-----------( x        ( 16 May 2018 06:35 )             23.0     05-16    137  |  104  |  72.0<H>  ----------------------------<  107  4.3   |  22.0  |  1.05    Ca    8.0<L>      16 May 2018 06:35  Phos  3.0     05-16  Mg     1.8     05-16    TPro  4.6<L>  /  Alb  2.3<L>  /  TBili  0.5  /  DBili  x   /  AST  33  /  ALT  19  /  AlkPhos  335<H>  05-16    LIVER FUNCTIONS - ( 16 May 2018 06:35 )  Alb: 2.3 g/dL / Pro: 4.6 g/dL / ALK PHOS: 335 U/L / ALT: 19 U/L / AST: 33 U/L / GGT: x             I&O's Summary    15 May 2018 07:01  -  16 May 2018 07:00  --------------------------------------------------------  IN: 720 mL / OUT: 0 mL / NET: 720 mL          ================================================    IMAGING.      ================================================    MEDICATIONS  (STANDING):  albumin human 25% IVPB 50 milliLiter(s) IV Intermittent every 8 hours  apixaban 5 milliGRAM(s) Oral every 12 hours  dextrose 5%. 1000 milliLiter(s) (50 mL/Hr) IV Continuous <Continuous>  dextrose 50% Injectable 12.5 Gram(s) IV Push once  dextrose 50% Injectable 25 Gram(s) IV Push once  dextrose 50% Injectable 25 Gram(s) IV Push once  escitalopram 10 milliGRAM(s) Oral daily  furosemide   Injectable 40 milliGRAM(s) IV Push every 8 hours  insulin lispro (HumaLOG) corrective regimen sliding scale   SubCutaneous three times a day before meals  insulin lispro (HumaLOG) corrective regimen sliding scale   SubCutaneous at bedtime  lactobacillus acidophilus 1 Tablet(s) Oral two times a day with meals  midodrine 5 milliGRAM(s) Oral every 8 hours  pantoprazole    Tablet 40 milliGRAM(s) Oral before breakfast  spironolactone 25 milliGRAM(s) Oral daily    MEDICATIONS  (PRN):  acetaminophen   Tablet. 650 milliGRAM(s) Oral every 6 hours PRN pain  ALBUTerol    0.083% 2.5 milliGRAM(s) Nebulizer every 4 hours PRN Shortness of Breath and/or Wheezing  dextrose 40% Gel 15 Gram(s) Oral once PRN Blood Glucose LESS THAN 70 milliGRAM(s)/deciliter  glucagon  Injectable 1 milliGRAM(s) IntraMuscular once PRN Glucose LESS THAN 70 milligrams/deciliter  methocarbamol 1500 milliGRAM(s) Oral three times a day PRN muscle spasm/pain  oxyCODONE    IR 5 milliGRAM(s) Oral every 8 hours PRN Severe Pain (7 - 10)  sodium chloride 0.65% Nasal 1 Spray(s) Both Nostrils daily PRN Nasal Congestion/ nose bleed/ dry nose

## 2018-05-16 NOTE — PROGRESS NOTE ADULT - PROBLEM SELECTOR PLAN 1
stable but with refractory ascites that preferentially accumulates I right pleural space. His ascites is refractory to diuretics!. Also had TIPS which has failed to control ascites. Only option would be liver transplant. From A GI standpoint at this facility we have nothing else to offer patient. Suggest patient be transferred to tertiary center for consideration of liver transplant. Will see prn your request.

## 2018-05-16 NOTE — DIETITIAN INITIAL EVALUATION ADULT. - PROBLEM SELECTOR PLAN 1
Recommend right sided pigtail with fluid drainage. Fluid removal not to exceed 3L.   rec. routine CXR  Will monitor hemodynamics.  Will continue to follow pt.   Appreciate consult.   Discussed with Dr. Saez, Contact CT surgery service.

## 2018-05-16 NOTE — PROCEDURE NOTE - NSPOSTCAREGUIDE_GEN_A_CORE
Keep the cast/splint/dressing clean and dry/Instructed patient/caregiver to follow-up with primary care physician/Care for catheter as per unit/ICU protocols/Verbal/written post procedure instructions were given to patient/caregiver/Instructed patient/caregiver regarding signs and symptoms of infection
Care for catheter as per unit/ICU protocols/Verbal/written post procedure instructions were given to patient/caregiver

## 2018-05-16 NOTE — DIETITIAN INITIAL EVALUATION ADULT. - OTHER INFO
BMI 27.4, pt eating well. Fluid weight gain noted verbalized by pt however states lost it again.  refused diet education

## 2018-05-16 NOTE — CONSULT NOTE ADULT - SUBJECTIVE AND OBJECTIVE BOX
Patient is a 62y old  Male who presents with a chief complaint of shortness of breath x1 week (10 May 2018 19:25)   Acute  renal failure.    HPI:  63 y/o male with pmh of dm, PE (2/8/18) currently on eliquis, alcoholic liver cirrhosis, TIPS procedure at [place] (3/29/18), recurrent hepatic hydrothorax presented to the ED c/o of worsening SOB x 1 week which has progressed. Pt is well known to the service with similar SOB presentations in the past with right sided pleural effusion that required draining. Upon present  examination pt appears uncomfortable at rest, unable to speak in complete sentences due to his shortness of breath. CXR done in ED reveals a large right sided pleural effusion. CT scan was also done by request of ED physician with results pending. AT this time pt denies CP, palpitations, dizziness, n/v/d. He admits to SOB at rest worsening with exertion. We were consulted, per Dr. Saez will place right sided pigtail and drain max 3 liters of pleural fluid, continue to monitor. (10 May 2018 19:25)      PAST MEDICAL & SURGICAL HISTORY:  Pleural effusion  Pulmonary embolism  Cirrhosis  DM (diabetes mellitus)  S/P TIPS (transjugular intrahepatic portosystemic shunt)  S/P thoracentesis      FAMILY HISTORY:  No pertinent family history in first degree relatives  [place]    Social History: presently a Non smoker    MEDICATIONS  (STANDING):  ALBUTerol/ipratropium for Nebulization 3 milliLiter(s) Nebulizer every 6 hours  apixaban 5 milliGRAM(s) Oral every 12 hours  dextrose 5%. 1000 milliLiter(s) (50 mL/Hr) IV Continuous <Continuous>  dextrose 50% Injectable 12.5 Gram(s) IV Push once  dextrose 50% Injectable 25 Gram(s) IV Push once  dextrose 50% Injectable 25 Gram(s) IV Push once  escitalopram 10 milliGRAM(s) Oral daily  insulin lispro (HumaLOG) corrective regimen sliding scale   SubCutaneous three times a day before meals  insulin lispro (HumaLOG) corrective regimen sliding scale   SubCutaneous at bedtime  lactobacillus acidophilus 1 Tablet(s) Oral two times a day with meals  pantoprazole    Tablet 40 milliGRAM(s) Oral before breakfast    MEDICATIONS  (PRN):  acetaminophen   Tablet. 650 milliGRAM(s) Oral every 6 hours PRN pain  ALBUTerol    0.083% 2.5 milliGRAM(s) Nebulizer every 4 hours PRN Shortness of Breath and/or Wheezing  dextrose 40% Gel 15 Gram(s) Oral once PRN Blood Glucose LESS THAN 70 milliGRAM(s)/deciliter  glucagon  Injectable 1 milliGRAM(s) IntraMuscular once PRN Glucose LESS THAN 70 milligrams/deciliter  methocarbamol 1500 milliGRAM(s) Oral three times a day PRN muscle spasm/pain  oxyCODONE    IR 5 milliGRAM(s) Oral every 8 hours PRN Severe Pain (7 - 10)  sodium chloride 0.65% Nasal 1 Spray(s) Both Nostrils daily PRN Nasal Congestion/ nose bleed/ dry nose   Meds reviewed    Allergies    No Known Allergies        REVIEW OF SYSTEMS:    CONSTITUTIONAL:  sob, weight gain, feels weak  EYES: No eye pain, visual disturbances, or discharge  ENMT:  No difficulty hearing, tinnitus, vertigo; No sinus or throat pain  NECK: No pain or stiffness  BREASTS: No pain, masses, or nipple discharge  RESPIRATORY: sobe  CARDIOVASCULAR: No chest pain, palpitations, dizziness,   GASTROINTESTINAL: No abdominal or epigastric pain. No nausea, vomiting, or hematemesis; No diarrhea or constipation.   GENITOURINARY: No dysuria, frequency, hematuria, or incontinence  NEUROLOGICAL: No headaches, memory loss, loss of strength, numbness, or tremors  SKIN: jaundice chronic  LYMPH NODES: No enlarged glands  ENDOCRINE: DM  MUSCULOSKELETAL: Chronic edema legs   PSYCHIATRIC:  depression,   HEME/LYMPH: No easy bruising, or bleeding gums  ALLERY AND IMMUNOLOGIC: No hives or eczema      Vital Signs Last 24 Hrs  T(C): 36.9 (16 May 2018 04:18), Max: 37 (15 May 2018 16:05)  T(F): 98.4 (16 May 2018 04:18), Max: 98.6 (15 May 2018 16:05)  HR: 101 (16 May 2018 04:18) (94 - 108)  BP: 119/68 (16 May 2018 04:18) (98/59 - 122/64)  BP(mean): --  RR: 20 (16 May 2018 04:18) (18 - 28)  SpO2: 94% (16 May 2018 04:18) (94% - 100%)      PHYSICAL EXAM:    GENERAL: appears chronically ill, oriented.  HEAD:  Atraumatic, Normocephalic  EYES: EOMI, PERRLA, conjunctiva and sclera jaundiced  ENMT: No tonsillar erythema, exudates, or enlargement;   NECK: Supple, neck  veins flat up right   NERVOUS SYSTEM:  Alert & Oriented X3, Good concentration; weak muscle strength  CHEST/LUNG: Clear but with decreased bs right lower lung  HEART: Regular rate and rhythm; No  rubs, or gallops  ABDOMEN: Distended, Bowel sounds present, body wall and flank edema  EXTREMITIES:   +3 leg  and pedal edema   LYMPH: No lymphadenopathy noted  SKIN: No rashes or lesions, pale   neg    LABS:                        8.2    x     )-----------( x        ( 16 May 2018 06:35 )             23.0     05-16    137  |  104  |  72.0<H>  ----------------------------<  107  4.3   |  22.0  |  1.05    Ca    8.0<L>      16 May 2018 06:35  Phos  3.0     05-16  Mg     1.8     05-16    TPro  4.6<L>  /  Alb  2.3<L>  /  TBili  0.5  /  DBili  x   /  AST  33  /  ALT  19  /  AlkPhos  335<H>  05-16        Magnesium, Serum: 1.8 mg/dL (05-16 @ 06:35)  Phosphorus Level, Serum: 3.0 mg/dL (05-16 @ 06:35)          RADIOLOGY & ADDITIONAL TESTS:

## 2018-05-16 NOTE — PROGRESS NOTE ADULT - ASSESSMENT
62yoM with PMH DM2, Hx PE, Hx Pleural effusions, ETOH Cirrhosis presenting with sob, admitted for Large Rt sided pleural effusion.    Course complicated by brynn.    Course also complicated by Hypotension likely due to hypoalbuminemia in setting of cirrhosis.    Course complicated by anemia.    ETOH related Cirrhosis- STABLE. sp TIPS in 03/2018, failed to control fluid reaccumulation. complicated by hypoalbuminemia causing severe generalized edema + pleural effusion. edema less likely related to HF, pt had echo 3/2018 with normal EF and LV function. also unlikely sec to underlying DVT given chronic use of eliquis for PE. PLAN- nutrition consult appreciated, cont prostat, nutrition dense snacks. supportive care for edema, elevate LE, he has said compression stockings have not worked in the past. sp albumin challenge with improvement of serum alb level. nephro consult appreciated, midodrine, aldactone, continuous diurectic added, repeat alb admin. GI consult appreciated, given refractory nature of symptoms/failure of previous tx, liver transplant is the last true option available. will look into poss transfer to [place]. continued abstinence from etoh. outpt GI fu as well for regular scheduled malingnancy screenings + cirrhosis management    Hypotension- IMPROVING> asymptomatic. likely sec to poor oncotic pressure. sp albumin + lasix challenge x3 on 5/12, 5/13, 5/14 with resolution of BRYNN and improvement in BP likely due to improving oncotic pressure. PLAN- serial BP checks, manual preferred. cont midodrine added this admission. palliative consult given frustration regarding general medical condition and prolonged hospital stay. overall pt prone to having this sort of complication for long term given his cirrhosis. see above    Rt Sided Pleural Effusion- RESOLVED initially but RECURRENT> in setting of hx of recurrent effusions in past. CT Sx consult appreciated, sp chest tube placement 5/10, removed 5/13, uncomplicated well tolerated. initially 1200mL fluid removed followed by 2L rapid outpt. noted to have symptomatic recurrence per XR 5/15, CT Sx reconsulted. PLAN- fu CT sx, may benefit from pleur-x placement given high likelihood of recurrent effusions sec to uncontrolled/refractory cirrhosis, will fu plan of Dr Saez    Anemia- IMPROVING> asymptomatic. no signs acute/active bleeding. ? related to dilutional effect given volume administration PLAN. repeat h/h in AM. transfuse for hgb <7 or if symptoms occur. still pending FOBT.     Diarrhea- bristol 7 stools. no other signs/ symptoms to assume infectious source. suspect sec to medication. PLAN. stop lactulose. follow up BM. if persistent, check cx + cdiff.     Back Pain- initially RESOLVED but RECURRENT> MSK etiology sec to chest tube. sp motrin x1 with relief. now recurrent, poss due to what may be recurring pleural effusion PLAN. cont pain control with tylenol PRN, max 3g in 1d given cirrhosis. cont robaxin x3d total. caution with nsaids given recent brynn. manage effusions.    BRYNN- RESOLVED> baseline Cr 0.9. likely sec to poor circulatory effective volume in setting of hypoalbuminemia. initially also thought to be from pre renal azotemia given massive volume loss post chest tube placement but did not respond to hydration, less likely. sp albumin challenge followed by lasixx3 with great improvement. PLAN- cont protein supplementation. avoid nephrotoxic meds. plan as per nephro    Hx PE- STABLE. dx 2/8/18. currently on eliquis 5mg bid. PLAN- cont eliquis, should be on AC x6mo total, anticipated stop date 8/8/18. hold PRN procedures. 62yoM with PMH DM2, Hx PE, Hx Pleural effusions, ETOH Cirrhosis presenting with sob, admitted for Large Rt sided pleural effusion.    Course complicated by brynn.    Course also complicated by Hypotension likely due to hypoalbuminemia in setting of cirrhosis.    Course complicated by anemia.    ETOH related Cirrhosis- STABLE> sp TIPS in 03/2018, failed to control fluid reaccumulation. complicated by hypoalbuminemia causing severe generalized edema + pleural effusion. edema less likely related to HF, pt had echo 3/2018 with normal EF and LV function. also unlikely sec to underlying DVT given chronic use of eliquis for PE. PLAN- nutrition consult appreciated, cont prostat, nutrition dense snacks (glucerna TID) supportive care for edema, elevate LE, he has said compression stockings have not worked in the past. sp albumin challenge with improvement of serum alb level. nephro consult appreciated, midodrine, aldactone, continuous diurectic added, repeat alb admin. GI consult appreciated, given refractory nature of symptoms/failure of previous tx, liver transplant is the last true option available. will look into poss transfer to [place]. continued abstinence from etoh. outpt GI fu as well for regular scheduled malingnancy screenings + cirrhosis management    Rt Sided Pleural Effusion- RESOLVED initially but RECURRENT> in setting of hx of recurrent effusions in past. CT Sx consult appreciated, sp chest tube placement 5/10, removed 5/13, uncomplicated well tolerated. initially 1200mL fluid removed followed by 2L rapid outpt. noted to have symptomatic recurrence per XR 5/15, CT Sx reconsulted. PLAN- fu CT sx, plan for repeat thoracentesis either by CT Sx or IR, hold AC in interim    Hypotension- IMPROVING> asymptomatic. likely sec to poor oncotic pressure. sp albumin + lasix challenge x3 on 5/12, 5/13, 5/14 with resolution of BRYNN and improvement in BP likely due to improving oncotic pressure. PLAN- serial BP checks, manual preferred. cont midodrine added this admission. palliative consult given frustration regarding general medical condition and prolonged hospital stay. overall pt prone to having this sort of complication for long term given his cirrhosis. see above    Diarrhea- STABLE> bristol 6/7 stools, no melena no hematochezia. no other signs/ symptoms to assume infectious source. suspect sec to medication. PLAN. cont holding lactulose. follow up BM. if persistent still by 5/17 will check stool cx + cdiff.     Anemia- IMPROVING> asymptomatic. no signs acute/active bleeding. ? related to dilutional effect given volume administration PLAN. repeat h/h in AM. transfuse for hgb <7 or if symptoms occur. still pending FOBT.     Back Pain- initially RESOLVED but RECURRENT> MSK etiology sec to chest tube. sp motrin x1 with relief. now recurrent, poss due to what may be recurring pleural effusion PLAN. cont pain control with tylenol PRN, max 3g in 1d given cirrhosis. cont robaxin x3d total. caution with nsaids given recent brynn. manage effusions.    BRYNN- RESOLVED> baseline Cr 0.9. likely sec to poor circulatory effective volume in setting of hypoalbuminemia. initially also thought to be from pre renal azotemia given massive volume loss post chest tube placement but did not respond to hydration, less likely. sp albumin challenge followed by lasixx3 with great improvement. PLAN- cont protein supplementation. avoid nephrotoxic meds. plan as per nephro    Hx PE- STABLE. dx 2/8/18. currently on eliquis 5mg bid. PLAN- cont eliquis, should be on AC x6mo total, anticipated stop date 8/8/18. hold PRN procedures.

## 2018-05-16 NOTE — PROCEDURE NOTE - NSPROCDETAILS_GEN_ALL_CORE
dressing applied/location identified, draped/prepped, sterile technique used/blood seen on insertion/flushes easily/secured in place/ultrasound utilization/sterile technique, catheter placed
sterile dressing applied/dressing applied/secured in place/Seldinger technique/ultrasound assessment of fluid (location)

## 2018-05-16 NOTE — PROCEDURE NOTE - PROCEDURE
<<-----Click on this checkbox to enter Procedure Peripheral intravenous catheter assessment  05/16/2018  #18G  1.75"  ns flush good heme back right brachial vein  Active  JSTEELE  Vascular access with ultrasound guidance  05/16/2018  patent right brachial vein  Active  JSTEELE

## 2018-05-16 NOTE — DIETITIAN INITIAL EVALUATION ADULT. - PERTINENT LABORATORY DATA
05-16 Na137 mmol/L Glu 107 mg/dL K+ 4.3 mmol/L Cr  1.05 mg/dL BUN 72.0 mg/dL<H> Phos 3.0 mg/dL Alb 2.3 g/dL<L> PAB n/a

## 2018-05-16 NOTE — DIETITIAN INITIAL EVALUATION ADULT. - DIET TYPE
prostat TID/1200ml/consistent carbohydrate (evening snack)/DASH/TLC (sodium and cholesterol restricted diet)

## 2018-05-17 LAB
ANION GAP SERPL CALC-SCNC: 13 MMOL/L — SIGNIFICANT CHANGE UP (ref 5–17)
BUN SERPL-MCNC: 74 MG/DL — HIGH (ref 8–20)
CALCIUM SERPL-MCNC: 8.2 MG/DL — LOW (ref 8.6–10.2)
CHLORIDE SERPL-SCNC: 102 MMOL/L — SIGNIFICANT CHANGE UP (ref 98–107)
CO2 SERPL-SCNC: 21 MMOL/L — LOW (ref 22–29)
CREAT SERPL-MCNC: 0.87 MG/DL — SIGNIFICANT CHANGE UP (ref 0.5–1.3)
FERRITIN SERPL-MCNC: 758 NG/ML — HIGH (ref 30–400)
GLUCOSE BLDC GLUCOMTR-MCNC: 103 MG/DL — HIGH (ref 70–99)
GLUCOSE BLDC GLUCOMTR-MCNC: 117 MG/DL — HIGH (ref 70–99)
GLUCOSE BLDC GLUCOMTR-MCNC: 173 MG/DL — HIGH (ref 70–99)
GLUCOSE BLDC GLUCOMTR-MCNC: 179 MG/DL — HIGH (ref 70–99)
GLUCOSE SERPL-MCNC: 102 MG/DL — SIGNIFICANT CHANGE UP (ref 70–115)
HCT VFR BLD CALC: 22.6 % — LOW (ref 42–52)
HGB BLD-MCNC: 7.7 G/DL — LOW (ref 14–18)
INR BLD: 1.22 RATIO — HIGH (ref 0.88–1.16)
IRON SATN MFR SERPL: 37 % — SIGNIFICANT CHANGE UP (ref 16–55)
IRON SATN MFR SERPL: 37 UG/DL — LOW (ref 59–158)
POTASSIUM SERPL-MCNC: 3.9 MMOL/L — SIGNIFICANT CHANGE UP (ref 3.5–5.3)
POTASSIUM SERPL-SCNC: 3.9 MMOL/L — SIGNIFICANT CHANGE UP (ref 3.5–5.3)
PROTHROM AB SERPL-ACNC: 13.5 SEC — HIGH (ref 9.8–12.7)
SODIUM SERPL-SCNC: 136 MMOL/L — SIGNIFICANT CHANGE UP (ref 135–145)
TIBC SERPL-MCNC: 100 UG/DL — LOW (ref 220–430)
TRANSFERRIN SERPL-MCNC: <80 MG/DL — LOW (ref 180–329)

## 2018-05-17 PROCEDURE — 71045 X-RAY EXAM CHEST 1 VIEW: CPT | Mod: 26

## 2018-05-17 PROCEDURE — 99254 IP/OBS CNSLTJ NEW/EST MOD 60: CPT

## 2018-05-17 PROCEDURE — 99233 SBSQ HOSP IP/OBS HIGH 50: CPT

## 2018-05-17 RX ADMIN — MIDODRINE HYDROCHLORIDE 5 MILLIGRAM(S): 2.5 TABLET ORAL at 21:34

## 2018-05-17 RX ADMIN — Medication 40 MILLIGRAM(S): at 22:55

## 2018-05-17 RX ADMIN — Medication 40 MILLIGRAM(S): at 06:00

## 2018-05-17 RX ADMIN — Medication 50 MILLILITER(S): at 21:34

## 2018-05-17 RX ADMIN — OXYCODONE HYDROCHLORIDE 5 MILLIGRAM(S): 5 TABLET ORAL at 22:30

## 2018-05-17 RX ADMIN — Medication 2: at 17:41

## 2018-05-17 RX ADMIN — ESCITALOPRAM OXALATE 10 MILLIGRAM(S): 10 TABLET, FILM COATED ORAL at 13:59

## 2018-05-17 RX ADMIN — ALBUTEROL 2.5 MILLIGRAM(S): 90 AEROSOL, METERED ORAL at 02:29

## 2018-05-17 RX ADMIN — Medication 1 TABLET(S): at 08:14

## 2018-05-17 RX ADMIN — SPIRONOLACTONE 25 MILLIGRAM(S): 25 TABLET, FILM COATED ORAL at 05:18

## 2018-05-17 RX ADMIN — MIDODRINE HYDROCHLORIDE 5 MILLIGRAM(S): 2.5 TABLET ORAL at 13:59

## 2018-05-17 RX ADMIN — Medication 50 MILLILITER(S): at 13:59

## 2018-05-17 RX ADMIN — PANTOPRAZOLE SODIUM 40 MILLIGRAM(S): 20 TABLET, DELAYED RELEASE ORAL at 06:00

## 2018-05-17 RX ADMIN — Medication 50 MILLILITER(S): at 05:17

## 2018-05-17 RX ADMIN — OXYCODONE HYDROCHLORIDE 5 MILLIGRAM(S): 5 TABLET ORAL at 21:35

## 2018-05-17 RX ADMIN — Medication 1 TABLET(S): at 17:41

## 2018-05-17 RX ADMIN — MIDODRINE HYDROCHLORIDE 5 MILLIGRAM(S): 2.5 TABLET ORAL at 05:18

## 2018-05-17 RX ADMIN — Medication 40 MILLIGRAM(S): at 00:53

## 2018-05-17 NOTE — PROGRESS NOTE ADULT - SUBJECTIVE AND OBJECTIVE BOX
PMD: unknown    CHIEF COMPLAINT: sob     Patient seen and examined at the bedside. No events overnight. No events yesterday. Complaining of continued HUBERT with SOB/ NGUYEN. sp thoracentesis today, did not tolerate full procedure. Denies fever/chills, headache, lightheadedness, dizziness, chest pain, palpitations, cough, abd pain, nausea/vomiting    =========================================================================================    PHYSICAL EXAM.    GEN - appears age appropriate. well nourished. no distress.   HEENT - NCAT, EOMI, VAISHNAVI  RESP - CTA BL, no wheeze/stridor/rhonchi/crackles, ADRIEL @ Rt base. not on supplemental O2. able to speak in full sentences without distress.   CARDIO - NS1S2, RRR. No murmurs/rubs/gallops.  ABD - Soft/Non tender/Non distended. Normal BS x4 quadrants. no guarding/rebound tenderness.  Ext - RUE +2 edema of hand. BL LE +3 pitting edema, unchanged  MSK - BL 5/5 strength on upper and lower extremities.   Neuro - AAOx3. cn 2-12 grossly intact  Psych - normal affect  Skin - c/d/i. no rashes/lesions        VITAL SIGNS.    Vital Signs Last 24 Hrs  T(C): 36.9 (16 May 2018 04:18), Max: 37 (15 May 2018 16:05)  T(F): 98.4 (16 May 2018 04:18), Max: 98.6 (15 May 2018 16:05)  HR: 101 (16 May 2018 04:18) (94 - 108)  BP: 119/68 (16 May 2018 04:18) (107/60 - 122/64)  BP(mean): --  RR: 20 (16 May 2018 04:18) (20 - 28)  SpO2: 94% (16 May 2018 04:18) (94% - 100%)        =================================================    LABS.                          8.2    x     )-----------( x        ( 16 May 2018 06:35 )             23.0     05-16    137  |  104  |  72.0<H>  ----------------------------<  107  4.3   |  22.0  |  1.05    Ca    8.0<L>      16 May 2018 06:35  Phos  3.0     05-16  Mg     1.8     05-16    TPro  4.6<L>  /  Alb  2.3<L>  /  TBili  0.5  /  DBili  x   /  AST  33  /  ALT  19  /  AlkPhos  335<H>  05-16    LIVER FUNCTIONS - ( 16 May 2018 06:35 )  Alb: 2.3 g/dL / Pro: 4.6 g/dL / ALK PHOS: 335 U/L / ALT: 19 U/L / AST: 33 U/L / GGT: x             I&O's Summary    15 May 2018 07:01  -  16 May 2018 07:00  --------------------------------------------------------  IN: 720 mL / OUT: 0 mL / NET: 720 mL          ================================================    IMAGING.      ================================================    MEDICATIONS  (STANDING):  albumin human 25% IVPB 50 milliLiter(s) IV Intermittent every 8 hours  apixaban 5 milliGRAM(s) Oral every 12 hours  dextrose 5%. 1000 milliLiter(s) (50 mL/Hr) IV Continuous <Continuous>  dextrose 50% Injectable 12.5 Gram(s) IV Push once  dextrose 50% Injectable 25 Gram(s) IV Push once  dextrose 50% Injectable 25 Gram(s) IV Push once  escitalopram 10 milliGRAM(s) Oral daily  furosemide   Injectable 40 milliGRAM(s) IV Push every 8 hours  insulin lispro (HumaLOG) corrective regimen sliding scale   SubCutaneous three times a day before meals  insulin lispro (HumaLOG) corrective regimen sliding scale   SubCutaneous at bedtime  lactobacillus acidophilus 1 Tablet(s) Oral two times a day with meals  midodrine 5 milliGRAM(s) Oral every 8 hours  pantoprazole    Tablet 40 milliGRAM(s) Oral before breakfast  spironolactone 25 milliGRAM(s) Oral daily    MEDICATIONS  (PRN):  acetaminophen   Tablet. 650 milliGRAM(s) Oral every 6 hours PRN pain  ALBUTerol    0.083% 2.5 milliGRAM(s) Nebulizer every 4 hours PRN Shortness of Breath and/or Wheezing  dextrose 40% Gel 15 Gram(s) Oral once PRN Blood Glucose LESS THAN 70 milliGRAM(s)/deciliter  glucagon  Injectable 1 milliGRAM(s) IntraMuscular once PRN Glucose LESS THAN 70 milligrams/deciliter  methocarbamol 1500 milliGRAM(s) Oral three times a day PRN muscle spasm/pain  oxyCODONE    IR 5 milliGRAM(s) Oral every 8 hours PRN Severe Pain (7 - 10)  sodium chloride 0.65% Nasal 1 Spray(s) Both Nostrils daily PRN Nasal Congestion/ nose bleed/ dry nose

## 2018-05-17 NOTE — PROGRESS NOTE ADULT - ASSESSMENT
62yoM with PMH DM2, Hx PE, Hx Pleural effusions, ETOH Cirrhosis presenting with sob, admitted for Large Rt sided pleural effusion.    Course complicated by brynn.    Course also complicated by Hypotension likely due to hypoalbuminemia in setting of cirrhosis.    Course complicated by anemia.    ETOH related Cirrhosis- STABLE> sp TIPS in 03/2018, failed to control fluid reaccumulation. complicated by hypoalbuminemia causing severe generalized edema + pleural effusion. edema less likely related to HF, pt had echo 3/2018 with normal EF and LV function. also unlikely sec to underlying DVT given chronic use of eliquis for PE. PLAN- nutrition consult appreciated, cont prostat, nutrition dense snacks (glucerna TID) supportive care for edema, elevate LE, he has said compression stockings have not worked in the past. sp albumin challenge with improvement of serum alb level. nephro consult appreciated, midodrine, aldactone, continuous diurectic added, repeat alb admin. GI consult appreciated, given refractory nature of symptoms/failure of previous tx, liver transplant is the last true option available. will look into poss transfer to [facility], if not then dc for outpt follow up to [facility] for liver transplant. continued abstinence from etoh. outpt GI fu as well for regular scheduled malingnancy screenings + cirrhosis management    Rt Sided Pleural Effusion- RESOLVED initially but RECURRENT> in setting of hx of recurrent effusions in past. CT Sx consult appreciated, sp chest tube placement 5/10, removed 5/13, uncomplicated well tolerated. initially 1200mL fluid removed followed by 2L rapid outpt. noted to have symptomatic recurrence per XR 5/15, CT Sx reconsulted, nothing further that they can add. SP repeat thoracentesis by IR 5/17, pt did not tolerate full procedure sec to discomfort. PLAN- fu IR note to see how much fluid removed. repeat XR in am to eval effusion. likely dc to have pt follow up with [facility] for further intervention as this is all related to his cirrhosis    Hypotension- IMPROVING> asymptomatic. likely sec to poor oncotic pressure. sp albumin + lasix challenge x3 on 5/12, 5/13, 5/14 with resolution of BRYNN and improvement in BP likely due to improving oncotic pressure. PLAN- serial BP checks, manual preferred. cont midodrine added this admission. palliative consult given frustration regarding general medical condition and prolonged hospital stay. overall pt prone to having this sort of complication for long term given his cirrhosis. see above    Diarrhea- STABLE> bristol 6/7 stools, no melena no hematochezia. no other signs/ symptoms to assume infectious source. suspect sec to medication. PLAN. cont holding lactulose. follow up BM. if persistent still by 5/17 will check stool cx + cdiff.     Anemia- IMPROVING> asymptomatic. no signs acute/active bleeding. ? related to dilutional effect given volume administration PLAN. repeat h/h in AM. transfuse for hgb <7 or if symptoms occur. still pending FOBT.     Back Pain- initially RESOLVED but RECURRENT> MSK etiology sec to chest tube. sp motrin x1 with relief. now recurrent, poss due to what may be recurring pleural effusion PLAN. cont pain control with tylenol PRN, max 3g in 1d given cirrhosis. cont robaxin x3d total. caution with nsaids given recent brynn. manage effusions.    BRYNN- RESOLVED> baseline Cr 0.9. likely sec to poor circulatory effective volume in setting of hypoalbuminemia. initially also thought to be from pre renal azotemia given massive volume loss post chest tube placement but did not respond to hydration, less likely. sp albumin challenge followed by lasixx3 with great improvement. PLAN- cont protein supplementation. avoid nephrotoxic meds. plan as per nephro    Hx PE- STABLE. dx 2/8/18. currently on eliquis 5mg bid. PLAN- cont eliquis, should be on AC x6mo total, anticipated stop date 8/8/18. hold PRN procedures.

## 2018-05-17 NOTE — PROGRESS NOTE ADULT - SUBJECTIVE AND OBJECTIVE BOX
Patient seen and examined    I&O's Summary    17 May 2018 07:01  -  17 May 2018 17:39  --------------------------------------------------------  IN: 720 mL / OUT: 0 mL / NET: 720 mL    Lying quietly  till sob but better    REVIEW OF SYSTEMS:    CONSTITUTIONAL: No F/C  RESPIRATORY: No cough,  mild SOB  CARDIOVASCULAR: No CP/palpitations,    GASTROINTESTINAL: No abdominal pain  or NVD   GENITOURINARY: No UTI sx  NEUROLOGICAL: No headaches, NO wk/numbness  MUSCULOSKELETAL:   EXTREMITIES : mod swelling,    Vital Signs Last 24 Hrs  T(C): 36.9 (17 May 2018 11:37), Max: 36.9 (17 May 2018 11:37)  T(F): 98.4 (17 May 2018 11:37), Max: 98.4 (17 May 2018 11:37)  HR: 91 (17 May 2018 11:37) (85 - 91)  BP: 112/68 (17 May 2018 11:37) (110/710 - 113/63)  BP(mean): --  RR: 18 (17 May 2018 11:37) (18 - 18)  SpO2: 98% (17 May 2018 02:29) (95% - 98%)    PHYSICAL EXAM:    GENERAL: NAD,   EYES:  conjunctiva and sclera clear  NECK: Supple, No JVD/Bruit  NERVOUS SYSTEM:  A/O x3,   CHEST:  No rales or rhonchi but poor BS chip R base  HEART:  RRR, No murmur  ABDOMEN: Soft, Nt. acitis +, BS+  EXTREMITIES:  2+ Edema;  SKIN: No rashes    LABS:                          7.7    x     )-----------( x        ( 17 May 2018 07:33 )             22.6     05-17    136  |  102  |  74.0<H>  ----------------------------<  102  3.9   |  21.0<L>  |  0.87    Ca    8.2<L>      17 May 2018 07:33  Phos  3.0     05-16  Mg     1.8     05-16    TPro  4.6<L>  /  Alb  2.3<L>  /  TBili  0.5  /  DBili  x   /  AST  33  /  ALT  19  /  AlkPhos  335<H>  05-16      MEDICATIONS  (STANDING):  acetaminophen   Tablet. PRN  albumin human 25% IVPB  ALBUTerol    0.083% PRN  dextrose 40% Gel PRN  dextrose 5%.  dextrose 50% Injectable  dextrose 50% Injectable  dextrose 50% Injectable  escitalopram  furosemide   Injectable  glucagon  Injectable PRN  insulin lispro (HumaLOG) corrective regimen sliding scale  insulin lispro (HumaLOG) corrective regimen sliding scale  lactobacillus acidophilus  methocarbamol PRN  midodrine  oxyCODONE    IR PRN  pantoprazole    Tablet  sodium chloride 0.65% Nasal PRN  spironolactone

## 2018-05-17 NOTE — PROGRESS NOTE ADULT - ASSESSMENT
Severe edema- likely d/t cirrhosis, hypoalbunemia  hypotension - beter on Midodrine  On Albumin and lasix for diuresis - watch  check iron panel am  stool OB

## 2018-05-18 ENCOUNTER — TRANSCRIPTION ENCOUNTER (OUTPATIENT)
Age: 63
End: 2018-05-18

## 2018-05-18 VITALS
HEART RATE: 80 BPM | SYSTOLIC BLOOD PRESSURE: 100 MMHG | OXYGEN SATURATION: 96 % | TEMPERATURE: 98 F | DIASTOLIC BLOOD PRESSURE: 60 MMHG | RESPIRATION RATE: 18 BRPM

## 2018-05-18 PROBLEM — J90 PLEURAL EFFUSION, NOT ELSEWHERE CLASSIFIED: Chronic | Status: ACTIVE | Noted: 2018-05-10

## 2018-05-18 LAB
ALBUMIN SERPL ELPH-MCNC: 2.1 G/DL — LOW (ref 3.3–5.2)
ALP SERPL-CCNC: 284 U/L — HIGH (ref 40–120)
ALT FLD-CCNC: 15 U/L — SIGNIFICANT CHANGE UP
ANION GAP SERPL CALC-SCNC: 10 MMOL/L — SIGNIFICANT CHANGE UP (ref 5–17)
AST SERPL-CCNC: 30 U/L — SIGNIFICANT CHANGE UP
BILIRUB SERPL-MCNC: 0.4 MG/DL — SIGNIFICANT CHANGE UP (ref 0.4–2)
BUN SERPL-MCNC: 75 MG/DL — HIGH (ref 8–20)
CALCIUM SERPL-MCNC: 7.9 MG/DL — LOW (ref 8.6–10.2)
CHLORIDE SERPL-SCNC: 107 MMOL/L — SIGNIFICANT CHANGE UP (ref 98–107)
CO2 SERPL-SCNC: 23 MMOL/L — SIGNIFICANT CHANGE UP (ref 22–29)
CREAT SERPL-MCNC: 0.88 MG/DL — SIGNIFICANT CHANGE UP (ref 0.5–1.3)
FERRITIN SERPL-MCNC: 790 NG/ML — HIGH (ref 30–400)
GLUCOSE BLDC GLUCOMTR-MCNC: 112 MG/DL — HIGH (ref 70–99)
GLUCOSE BLDC GLUCOMTR-MCNC: 155 MG/DL — HIGH (ref 70–99)
GLUCOSE SERPL-MCNC: 121 MG/DL — HIGH (ref 70–115)
HCT VFR BLD CALC: 22.3 % — LOW (ref 42–52)
HGB BLD-MCNC: 7.5 G/DL — LOW (ref 14–18)
IRON SATN MFR SERPL: 35 % — SIGNIFICANT CHANGE UP (ref 16–55)
IRON SATN MFR SERPL: 36 UG/DL — LOW (ref 59–158)
MCHC RBC-ENTMCNC: 32.3 PG — HIGH (ref 27–31)
MCHC RBC-ENTMCNC: 33.6 G/DL — SIGNIFICANT CHANGE UP (ref 32–36)
MCV RBC AUTO: 96.1 FL — HIGH (ref 80–94)
PLATELET # BLD AUTO: 202 K/UL — SIGNIFICANT CHANGE UP (ref 150–400)
POTASSIUM SERPL-MCNC: 4.3 MMOL/L — SIGNIFICANT CHANGE UP (ref 3.5–5.3)
POTASSIUM SERPL-SCNC: 4.3 MMOL/L — SIGNIFICANT CHANGE UP (ref 3.5–5.3)
PROT SERPL-MCNC: 4.2 G/DL — LOW (ref 6.6–8.7)
RBC # BLD: 2.32 M/UL — LOW (ref 4.6–6.2)
RBC # FLD: 14.9 % — SIGNIFICANT CHANGE UP (ref 11–15.6)
SODIUM SERPL-SCNC: 140 MMOL/L — SIGNIFICANT CHANGE UP (ref 135–145)
TIBC SERPL-MCNC: 103 UG/DL — LOW (ref 220–430)
TRANSFERRIN SERPL-MCNC: <80 MG/DL — LOW (ref 180–329)
WBC # BLD: 5.9 K/UL — SIGNIFICANT CHANGE UP (ref 4.8–10.8)
WBC # FLD AUTO: 5.9 K/UL — SIGNIFICANT CHANGE UP (ref 4.8–10.8)

## 2018-05-18 PROCEDURE — 86850 RBC ANTIBODY SCREEN: CPT

## 2018-05-18 PROCEDURE — 85027 COMPLETE CBC AUTOMATED: CPT

## 2018-05-18 PROCEDURE — 80048 BASIC METABOLIC PNL TOTAL CA: CPT

## 2018-05-18 PROCEDURE — 84466 ASSAY OF TRANSFERRIN: CPT

## 2018-05-18 PROCEDURE — 96375 TX/PRO/DX INJ NEW DRUG ADDON: CPT | Mod: XU

## 2018-05-18 PROCEDURE — 83550 IRON BINDING TEST: CPT

## 2018-05-18 PROCEDURE — 84145 PROCALCITONIN (PCT): CPT

## 2018-05-18 PROCEDURE — 82728 ASSAY OF FERRITIN: CPT

## 2018-05-18 PROCEDURE — 82042 OTHER SOURCE ALBUMIN QUAN EA: CPT

## 2018-05-18 PROCEDURE — 94640 AIRWAY INHALATION TREATMENT: CPT

## 2018-05-18 PROCEDURE — 84132 ASSAY OF SERUM POTASSIUM: CPT

## 2018-05-18 PROCEDURE — 85610 PROTHROMBIN TIME: CPT

## 2018-05-18 PROCEDURE — 71045 X-RAY EXAM CHEST 1 VIEW: CPT | Mod: 26

## 2018-05-18 PROCEDURE — 99285 EMERGENCY DEPT VISIT HI MDM: CPT | Mod: 25

## 2018-05-18 PROCEDURE — 89051 BODY FLUID CELL COUNT: CPT

## 2018-05-18 PROCEDURE — C1729: CPT

## 2018-05-18 PROCEDURE — 36415 COLL VENOUS BLD VENIPUNCTURE: CPT

## 2018-05-18 PROCEDURE — 80202 ASSAY OF VANCOMYCIN: CPT

## 2018-05-18 PROCEDURE — 83615 LACTATE (LD) (LDH) ENZYME: CPT

## 2018-05-18 PROCEDURE — 84295 ASSAY OF SERUM SODIUM: CPT

## 2018-05-18 PROCEDURE — 87102 FUNGUS ISOLATION CULTURE: CPT

## 2018-05-18 PROCEDURE — 83735 ASSAY OF MAGNESIUM: CPT

## 2018-05-18 PROCEDURE — 85018 HEMOGLOBIN: CPT

## 2018-05-18 PROCEDURE — 82962 GLUCOSE BLOOD TEST: CPT

## 2018-05-18 PROCEDURE — 96374 THER/PROPH/DIAG INJ IV PUSH: CPT | Mod: XU

## 2018-05-18 PROCEDURE — 71250 CT THORAX DX C-: CPT

## 2018-05-18 PROCEDURE — 87040 BLOOD CULTURE FOR BACTERIA: CPT

## 2018-05-18 PROCEDURE — 83036 HEMOGLOBIN GLYCOSYLATED A1C: CPT

## 2018-05-18 PROCEDURE — 32556 INSERT CATH PLEURA W/O IMAGE: CPT

## 2018-05-18 PROCEDURE — 80053 COMPREHEN METABOLIC PANEL: CPT

## 2018-05-18 PROCEDURE — 82435 ASSAY OF BLOOD CHLORIDE: CPT

## 2018-05-18 PROCEDURE — 82803 BLOOD GASES ANY COMBINATION: CPT

## 2018-05-18 PROCEDURE — 93005 ELECTROCARDIOGRAM TRACING: CPT

## 2018-05-18 PROCEDURE — 82945 GLUCOSE OTHER FLUID: CPT

## 2018-05-18 PROCEDURE — 97163 PT EVAL HIGH COMPLEX 45 MIN: CPT

## 2018-05-18 PROCEDURE — 99233 SBSQ HOSP IP/OBS HIGH 50: CPT

## 2018-05-18 PROCEDURE — 84100 ASSAY OF PHOSPHORUS: CPT

## 2018-05-18 PROCEDURE — 99239 HOSP IP/OBS DSCHRG MGMT >30: CPT

## 2018-05-18 PROCEDURE — 83986 ASSAY PH BODY FLUID NOS: CPT

## 2018-05-18 PROCEDURE — 82947 ASSAY GLUCOSE BLOOD QUANT: CPT

## 2018-05-18 PROCEDURE — 82977 ASSAY OF GGT: CPT

## 2018-05-18 PROCEDURE — P9047: CPT

## 2018-05-18 PROCEDURE — 71045 X-RAY EXAM CHEST 1 VIEW: CPT

## 2018-05-18 PROCEDURE — 84484 ASSAY OF TROPONIN QUANT: CPT

## 2018-05-18 PROCEDURE — 85730 THROMBOPLASTIN TIME PARTIAL: CPT

## 2018-05-18 PROCEDURE — 86901 BLOOD TYPING SEROLOGIC RH(D): CPT

## 2018-05-18 PROCEDURE — 83605 ASSAY OF LACTIC ACID: CPT

## 2018-05-18 PROCEDURE — 86900 BLOOD TYPING SEROLOGIC ABO: CPT

## 2018-05-18 PROCEDURE — 82330 ASSAY OF CALCIUM: CPT

## 2018-05-18 PROCEDURE — 85014 HEMATOCRIT: CPT

## 2018-05-18 RX ORDER — SPIRONOLACTONE 25 MG/1
1 TABLET, FILM COATED ORAL
Qty: 0 | Refills: 0 | COMMUNITY
Start: 2018-05-18

## 2018-05-18 RX ORDER — OXYCODONE HYDROCHLORIDE 5 MG/1
1 TABLET ORAL
Qty: 21 | Refills: 0 | OUTPATIENT
Start: 2018-05-18 | End: 2018-05-24

## 2018-05-18 RX ORDER — MIDODRINE HYDROCHLORIDE 2.5 MG/1
1 TABLET ORAL
Qty: 90 | Refills: 0 | OUTPATIENT
Start: 2018-05-18 | End: 2018-06-16

## 2018-05-18 RX ORDER — ESCITALOPRAM OXALATE 10 MG/1
1 TABLET, FILM COATED ORAL
Qty: 0 | Refills: 0 | COMMUNITY
Start: 2018-05-18

## 2018-05-18 RX ORDER — PANTOPRAZOLE SODIUM 20 MG/1
1 TABLET, DELAYED RELEASE ORAL
Qty: 0 | Refills: 0 | COMMUNITY
Start: 2018-05-18

## 2018-05-18 RX ORDER — APIXABAN 2.5 MG/1
5 TABLET, FILM COATED ORAL EVERY 12 HOURS
Qty: 0 | Refills: 0 | Status: DISCONTINUED | OUTPATIENT
Start: 2018-05-18 | End: 2018-05-18

## 2018-05-18 RX ORDER — LACTULOSE 10 G/15ML
15 SOLUTION ORAL
Qty: 900 | Refills: 2 | OUTPATIENT
Start: 2018-05-18 | End: 2018-08-15

## 2018-05-18 RX ORDER — ACETAMINOPHEN 500 MG
2 TABLET ORAL
Qty: 0 | Refills: 0 | COMMUNITY
Start: 2018-05-18

## 2018-05-18 RX ORDER — APIXABAN 2.5 MG/1
1 TABLET, FILM COATED ORAL
Qty: 0 | Refills: 0 | COMMUNITY
Start: 2018-05-18

## 2018-05-18 RX ADMIN — PANTOPRAZOLE SODIUM 40 MILLIGRAM(S): 20 TABLET, DELAYED RELEASE ORAL at 07:05

## 2018-05-18 RX ADMIN — Medication 1 TABLET(S): at 08:13

## 2018-05-18 RX ADMIN — Medication 2: at 11:45

## 2018-05-18 RX ADMIN — Medication 50 MILLILITER(S): at 05:47

## 2018-05-18 RX ADMIN — OXYCODONE HYDROCHLORIDE 5 MILLIGRAM(S): 5 TABLET ORAL at 06:30

## 2018-05-18 RX ADMIN — ESCITALOPRAM OXALATE 10 MILLIGRAM(S): 10 TABLET, FILM COATED ORAL at 11:45

## 2018-05-18 RX ADMIN — MIDODRINE HYDROCHLORIDE 5 MILLIGRAM(S): 2.5 TABLET ORAL at 05:50

## 2018-05-18 RX ADMIN — SPIRONOLACTONE 25 MILLIGRAM(S): 25 TABLET, FILM COATED ORAL at 05:50

## 2018-05-18 RX ADMIN — Medication 40 MILLIGRAM(S): at 07:05

## 2018-05-18 RX ADMIN — MIDODRINE HYDROCHLORIDE 5 MILLIGRAM(S): 2.5 TABLET ORAL at 14:00

## 2018-05-18 RX ADMIN — OXYCODONE HYDROCHLORIDE 5 MILLIGRAM(S): 5 TABLET ORAL at 05:55

## 2018-05-18 NOTE — PROGRESS NOTE ADULT - SUBJECTIVE AND OBJECTIVE BOX
NEPHROLOGY INTERVAL HPI/OVERNIGHT EVENTS:    feels better   Scheduled to go home     MEDICATIONS  (STANDING):  apixaban 5 milliGRAM(s) Oral every 12 hours  escitalopram 10 milliGRAM(s) Oral daily  insulin lispro (HumaLOG) corrective regimen sliding scale   SubCutaneous three times a day before meals  lactobacillus acidophilus 1 Tablet(s) Oral two times a day with meals  midodrine 5 milliGRAM(s) Oral every 8 hours  pantoprazole    Tablet 40 milliGRAM(s) Oral before breakfast  spironolactone 25 milliGRAM(s) Oral daily    MEDICATIONS  (PRN):  acetaminophen   Tablet. 650 milliGRAM(s) Oral every 6 hours PRN pain  ALBUTerol    0.083% 2.5 milliGRAM(s) Nebulizer every 4 hours PRN Shortness of Breath and/or Wheezing  methocarbamol 1500 milliGRAM(s) Oral three times a day PRN muscle spasm/pain  oxyCODONE    IR 5 milliGRAM(s) Oral every 8 hours PRN Severe Pain (7 - 10)  sodium chloride 0.65% Nasal 1 Spray(s) Both Nostrils daily PRN Nasal Congestion/ nose bleed/ dry nose      Allergies    No Known Allergies    Intolerances      Vital Signs Last 24 Hrs  T(C): 36.8 (18 May 2018 12:19), Max: 36.9 (17 May 2018 21:40)  T(F): 98.2 (18 May 2018 12:19), Max: 98.5 (17 May 2018 21:40)  HR: 81 (18 May 2018 12:19) (81 - 88)  BP: 104/64 (18 May 2018 12:19) (97/59 - 105/65)  BP(mean): --  RR: 18 (18 May 2018 12:19) (18 - 20)  SpO2: 96% (17 May 2018 21:40) (96% - 96%)  Daily     Daily   I&O's Detail    17 May 2018 07:01  -  18 May 2018 07:00  --------------------------------------------------------  IN:    Oral Fluid: 720 mL  Total IN: 720 mL    OUT:  Total OUT: 0 mL    Total NET: 720 mL      18 May 2018 07:01  -  18 May 2018 15:25  --------------------------------------------------------  IN:    Oral Fluid: 720 mL  Total IN: 720 mL    OUT:  Total OUT: 0 mL    Total NET: 720 mL        I&O's Summary    17 May 2018 07:01  -  18 May 2018 07:00  --------------------------------------------------------  IN: 720 mL / OUT: 0 mL / NET: 720 mL    18 May 2018 07:01  -  18 May 2018 15:25  --------------------------------------------------------  IN: 720 mL / OUT: 0 mL / NET: 720 mL        PHYSICAL EXAM:  EYES:  conjunctiva and sclera clear  NECK: Supple, No JVD/Bruit  NERVOUS SYSTEM:  A/O x3,   CHEST:  No rales or rhonchi but poor BS chip R base  HEART:  RRR, No murmur  ABDOMEN: Soft, no rigidity   EXTREMITIES:  + Edema;    LABS:                        7.5    5.9   )-----------( 202      ( 18 May 2018 05:48 )             22.3     05-18    140  |  107  |  75.0<H>  ----------------------------<  121<H>  4.3   |  23.0  |  0.88    Ca    7.9<L>      18 May 2018 05:46    TPro  4.2<L>  /  Alb  2.1<L>  /  TBili  0.4  /  DBili  x   /  AST  30  /  ALT  15  /  AlkPhos  284<H>  05-18    PT/INR - ( 17 May 2018 07:33 )   PT: 13.5 sec;   INR: 1.22 ratio                     RADIOLOGY & ADDITIONAL TESTS:

## 2018-05-18 NOTE — DISCHARGE NOTE ADULT - INSTRUCTIONS
low salt low fat low sugar low carb. Protein supplements like Prostat 3x daily. Glucerna shakes 3x daily

## 2018-05-18 NOTE — DISCHARGE NOTE ADULT - PLAN OF CARE
follow up Be sure to follow up for drainage of the fluid in your Rt lung, as per the last XRay, it is still present.     Continue taking all pain medications exactly as prescribed, be sure not to deviate and to take more medications than prescribed. You have been given a specific number of pills and/or patches for pain and must follow up with your Primary Doctor in order to get a prescription for more before it runs out. Be sure to look out for any signs of dependence or addiction while on these medications. If you or those around you believe this is becoming an issue, be sure to see your Primary Care Doctor to discuss it as soon as possible. There are many avenues available to help wean off of medications and to treat potential or actual addiction but best outcomes are if it is confronted in a speedy manner. Be sure to follow up with specialists as mentioned. At this time, you should have a consultation about having a liver transplant if possible.    Elevate legs when seated. Continue all medications as prescribed. resolution Continue taking lactulose, it is known to cause diarrhea. Take it once daily to begin with, the goal is for you to have 2-3 bowel movements a day, you may begin taking 2 doses a day or even 3 doses a day until you reach that goal. Please follow up with your primary care doctor. Your kidney function was abnormal but it has normalized. control as above During this hospitalization or prior to it, you have been or were diagnosed with a Pulmonary Embolism. This is a blood clot. In order to treat it, you must be on a blood thinner such as Xarelto, Eliquis, Pradaxa, Coumadin/Warfarin, or Lovenox for an extended period of time. Be sure to follow the directions for how to take the blood thinner closely and do not miss it! Untreated or partially treated blood clots and advance and spread and may even be life threatening. You should be done with treatment in august 2018 but consult your doctor before stopping Eliquis. Be sure to follow up for drainage of the fluid in your Right lung, as per the last XRay, it is still present.     Continue taking all pain medications exactly as prescribed, be sure not to deviate and to take more medications than prescribed. You have been given a specific number of pills and/or patches for pain and must follow up with your Primary Doctor in order to get a prescription for more before it runs out. Be sure to look out for any signs of dependence or addiction while on these medications. If you or those around you believe this is becoming an issue, be sure to see your Primary Care Doctor to discuss it as soon as possible. There are many avenues available to help wean off of medications and to treat potential or actual addiction but best outcomes are if it is confronted in a speedy manner.

## 2018-05-18 NOTE — CONSULT NOTE ADULT - ASSESSMENT
61 yo M with End Stage ETOH-Cirrhosis        Complications    R Pleural Effusion Persistent     Thoracentesis- x 2 needed   yesterdays procedure cut shorter-Experincing "Burning jaw and throat pain.    Anxiety/Depression  Patient distressed, unable to handle his illness alone  Needs to be on Antidepressant    BRYNN    Hypotension-stabalizing    Anemia 7.5/22.3  stable

## 2018-05-18 NOTE — PROGRESS NOTE ADULT - SUBJECTIVE AND OBJECTIVE BOX
PMD: unknown    CHIEF COMPLAINT: sob     Patient seen and examined at the bedside. No events overnight. SP attempted thoracentesis yesterday, did not tolerate full procedure due to discomfort. Complaining of continued HUBERT with SOB/ NGUYEN. sp thoracentesis today, did not tolerate full procedure. Denies fever/chills, headache, lightheadedness, dizziness, chest pain, palpitations, cough, abd pain, nausea/vomiting    =========================================================================================    PHYSICAL EXAM.    GEN - appears age appropriate. well nourished. no distress.   HEENT - NCAT, EOMI, VAISHNAVI  RESP - CTA BL, no wheeze/stridor/rhonchi/crackles, ADRIEL @ Rt base. not on supplemental O2. able to speak in full sentences without distress.   CARDIO - NS1S2, RRR. No murmurs/rubs/gallops.  ABD - Soft/Non tender/Non distended. Normal BS x4 quadrants. no guarding/rebound tenderness.  Ext - RUE +2 edema of hand. BL LE +3 pitting edema, unchanged  MSK - BL 5/5 strength on upper and lower extremities.   Neuro - AAOx3. cn 2-12 grossly intact  Psych - normal affect  Skin - c/d/i. no rashes/lesions        VITAL SIGNS.    Vital Signs Last 24 Hrs  T(C): 36.9 (16 May 2018 04:18), Max: 37 (15 May 2018 16:05)  T(F): 98.4 (16 May 2018 04:18), Max: 98.6 (15 May 2018 16:05)  HR: 101 (16 May 2018 04:18) (94 - 108)  BP: 119/68 (16 May 2018 04:18) (107/60 - 122/64)  BP(mean): --  RR: 20 (16 May 2018 04:18) (20 - 28)  SpO2: 94% (16 May 2018 04:18) (94% - 100%)        =================================================    LABS.                          8.2    x     )-----------( x        ( 16 May 2018 06:35 )             23.0     05-16    137  |  104  |  72.0<H>  ----------------------------<  107  4.3   |  22.0  |  1.05    Ca    8.0<L>      16 May 2018 06:35  Phos  3.0     05-16  Mg     1.8     05-16    TPro  4.6<L>  /  Alb  2.3<L>  /  TBili  0.5  /  DBili  x   /  AST  33  /  ALT  19  /  AlkPhos  335<H>  05-16    LIVER FUNCTIONS - ( 16 May 2018 06:35 )  Alb: 2.3 g/dL / Pro: 4.6 g/dL / ALK PHOS: 335 U/L / ALT: 19 U/L / AST: 33 U/L / GGT: x             I&O's Summary    15 May 2018 07:01  -  16 May 2018 07:00  --------------------------------------------------------  IN: 720 mL / OUT: 0 mL / NET: 720 mL          ================================================    IMAGING.      ================================================    MEDICATIONS  (STANDING):  albumin human 25% IVPB 50 milliLiter(s) IV Intermittent every 8 hours  apixaban 5 milliGRAM(s) Oral every 12 hours  dextrose 5%. 1000 milliLiter(s) (50 mL/Hr) IV Continuous <Continuous>  dextrose 50% Injectable 12.5 Gram(s) IV Push once  dextrose 50% Injectable 25 Gram(s) IV Push once  dextrose 50% Injectable 25 Gram(s) IV Push once  escitalopram 10 milliGRAM(s) Oral daily  furosemide   Injectable 40 milliGRAM(s) IV Push every 8 hours  insulin lispro (HumaLOG) corrective regimen sliding scale   SubCutaneous three times a day before meals  insulin lispro (HumaLOG) corrective regimen sliding scale   SubCutaneous at bedtime  lactobacillus acidophilus 1 Tablet(s) Oral two times a day with meals  midodrine 5 milliGRAM(s) Oral every 8 hours  pantoprazole    Tablet 40 milliGRAM(s) Oral before breakfast  spironolactone 25 milliGRAM(s) Oral daily    MEDICATIONS  (PRN):  acetaminophen   Tablet. 650 milliGRAM(s) Oral every 6 hours PRN pain  ALBUTerol    0.083% 2.5 milliGRAM(s) Nebulizer every 4 hours PRN Shortness of Breath and/or Wheezing  dextrose 40% Gel 15 Gram(s) Oral once PRN Blood Glucose LESS THAN 70 milliGRAM(s)/deciliter  glucagon  Injectable 1 milliGRAM(s) IntraMuscular once PRN Glucose LESS THAN 70 milligrams/deciliter  methocarbamol 1500 milliGRAM(s) Oral three times a day PRN muscle spasm/pain  oxyCODONE    IR 5 milliGRAM(s) Oral every 8 hours PRN Severe Pain (7 - 10)  sodium chloride 0.65% Nasal 1 Spray(s) Both Nostrils daily PRN Nasal Congestion/ nose bleed/ dry nose PMD: unknown    CHIEF COMPLAINT: sob     Patient seen and examined at the bedside. No events overnight. SP attempted thoracentesis yesterday, did not tolerate full procedure due to discomfort. Complaining of continued HUBERT. Denies fever/chills, headache, lightheadedness, dizziness, chest pain, palpitations, cough, abd pain, nausea/vomiting    =========================================================================================    PHYSICAL EXAM.    GEN - appears age appropriate. well nourished. no distress.   HEENT - NCAT, EOMI, VAISHNAVI  RESP - CTA BL, no wheeze/stridor/rhonchi/crackles, ADRIEL @ Rt base. not on supplemental O2. able to speak in full sentences without distress.   CARDIO - NS1S2, RRR. No murmurs/rubs/gallops.  ABD - Soft/Non tender/Non distended. Normal BS x4 quadrants. no guarding/rebound tenderness.  Ext - RUE +2 edema of hand. BL LE +3 pitting edema, unchanged  MSK - BL 5/5 strength on upper and lower extremities.   Neuro - AAOx3. cn 2-12 grossly intact  Psych - normal affect  Skin - c/d/i. no rashes/lesions        VITAL SIGNS.    Vital Signs Last 24 Hrs  T(C): 36.9 (16 May 2018 04:18), Max: 37 (15 May 2018 16:05)  T(F): 98.4 (16 May 2018 04:18), Max: 98.6 (15 May 2018 16:05)  HR: 101 (16 May 2018 04:18) (94 - 108)  BP: 119/68 (16 May 2018 04:18) (107/60 - 122/64)  BP(mean): --  RR: 20 (16 May 2018 04:18) (20 - 28)  SpO2: 94% (16 May 2018 04:18) (94% - 100%)        =================================================    LABS.                          8.2    x     )-----------( x        ( 16 May 2018 06:35 )             23.0     05-16    137  |  104  |  72.0<H>  ----------------------------<  107  4.3   |  22.0  |  1.05    Ca    8.0<L>      16 May 2018 06:35  Phos  3.0     05-16  Mg     1.8     05-16    TPro  4.6<L>  /  Alb  2.3<L>  /  TBili  0.5  /  DBili  x   /  AST  33  /  ALT  19  /  AlkPhos  335<H>  05-16    LIVER FUNCTIONS - ( 16 May 2018 06:35 )  Alb: 2.3 g/dL / Pro: 4.6 g/dL / ALK PHOS: 335 U/L / ALT: 19 U/L / AST: 33 U/L / GGT: x             I&O's Summary    15 May 2018 07:01  -  16 May 2018 07:00  --------------------------------------------------------  IN: 720 mL / OUT: 0 mL / NET: 720 mL          ================================================    IMAGING.      ================================================    MEDICATIONS  (STANDING):  albumin human 25% IVPB 50 milliLiter(s) IV Intermittent every 8 hours  apixaban 5 milliGRAM(s) Oral every 12 hours  dextrose 5%. 1000 milliLiter(s) (50 mL/Hr) IV Continuous <Continuous>  dextrose 50% Injectable 12.5 Gram(s) IV Push once  dextrose 50% Injectable 25 Gram(s) IV Push once  dextrose 50% Injectable 25 Gram(s) IV Push once  escitalopram 10 milliGRAM(s) Oral daily  furosemide   Injectable 40 milliGRAM(s) IV Push every 8 hours  insulin lispro (HumaLOG) corrective regimen sliding scale   SubCutaneous three times a day before meals  insulin lispro (HumaLOG) corrective regimen sliding scale   SubCutaneous at bedtime  lactobacillus acidophilus 1 Tablet(s) Oral two times a day with meals  midodrine 5 milliGRAM(s) Oral every 8 hours  pantoprazole    Tablet 40 milliGRAM(s) Oral before breakfast  spironolactone 25 milliGRAM(s) Oral daily    MEDICATIONS  (PRN):  acetaminophen   Tablet. 650 milliGRAM(s) Oral every 6 hours PRN pain  ALBUTerol    0.083% 2.5 milliGRAM(s) Nebulizer every 4 hours PRN Shortness of Breath and/or Wheezing  dextrose 40% Gel 15 Gram(s) Oral once PRN Blood Glucose LESS THAN 70 milliGRAM(s)/deciliter  glucagon  Injectable 1 milliGRAM(s) IntraMuscular once PRN Glucose LESS THAN 70 milligrams/deciliter  methocarbamol 1500 milliGRAM(s) Oral three times a day PRN muscle spasm/pain  oxyCODONE    IR 5 milliGRAM(s) Oral every 8 hours PRN Severe Pain (7 - 10)  sodium chloride 0.65% Nasal 1 Spray(s) Both Nostrils daily PRN Nasal Congestion/ nose bleed/ dry nose

## 2018-05-18 NOTE — PROGRESS NOTE ADULT - ASSESSMENT
Cirrhosis   Chronic edema   Rnal function remaining stable  Will f/u [facility] liver center on D/C   Told should follow up with Renal  on discharge

## 2018-05-18 NOTE — DISCHARGE NOTE ADULT - HOSPITAL COURSE
62yoM with PMH DM2, Hx PE, Hx Pleural effusions, ETOH Cirrhosis presenting with sob, admitted for Large Rt sided pleural effusion. Rt Sided Pleural Effusion- RESOLVED initially but RECURRENT, STABLE> in setting of hx of recurrent effusions in past. CT Sx consult appreciated, sp chest tube placement 5/10, removed 5/13, uncomplicated well tolerated. >3L removed. noted to have symptomatic recurrence per XR 5/15, CT Sx reconsulted, nothing further that they can add. SP repeat thoracentesis by IR 5/17, pt did not tolerate full procedure sec to discomfort. XR 5/18 showing stability. outpt thoracentesis with CT Sx Dr Saez within 1wk.      Course complicated by brynn. BRYNN- RESOLVED> baseline Cr 0.9. likely sec to poor circulatory effective volume in setting of hypoalbuminemia. initially also thought to be from pre renal azotemia given massive volume loss post chest tube placement but did not respond to hydration, less likely. sp albumin challenge followed by lasix with great improvement. cont protein supplementation. avoid nephrotoxic meds.     Course also complicated by Hypotension likely due to hypoalbuminemia in setting of cirrhosis. Hypotension- RESOLVED> asymptomatic. likely sec to poor oncotic pressure. sp albumin + lasix challenge several times this admission with resolution of BRYNN and improvement in BP likely due to improving oncotic pressure. ultimately improved with addition of midodrine. overall pt prone to having this sort of complication for long term given his cirrhosis.    Course complicated by anemia. Anemia- STABLE> asymptomatic. no signs acute/active bleeding. ? related to dilutional effect given volume administration. outpatient follow up for full work up.     Regarding ETOH related Cirrhosis- STABLE> sp TIPS in 03/2018, failed to control fluid reaccumulation. complicated by hypoalbuminemia causing severe generalized edema + recurrent pleural effusion. edema less likely related to HF, pt had echo 3/2018 with normal EF and LV function. also unlikely sec to underlying DVT given chronic use of eliquis for PE. nutrition consult appreciated, cont prostat, nutrition dense snacks (glucerna TID) supportive care for edema, elevate LE, he has said compression stockings have not worked in the past. sp albumin challenge with improvement of serum alb level. nephro consult appreciated, repeated alb + lasix challenge. also added midodrine, aldactone. GI consult appreciated, given refractory nature of symptoms/failure of previous tx, liver transplant is the last true option available. plan for dc for outpt follow up to eval for liver transplant. continued abstinence from etoh. Outpt follow up @ [facility] (appt made prior to dc)    Course complicated by Diarrhea- STABLE> bristol 6/7 stools, no melena no hematochezia. no other signs/ symptoms to assume infectious source. suspect sec to medication. resume lactulose daily, titrate to 2-3 BM daily.      Course complicated by Back Pain- STABLE> MSK etiology sec to chest tube earlier this admission as well as from pain of reaccumulated pleural fluid. sp motrin x1 with relief. PLAN. cont pain control with tylenol PRN, max 3g in 1d given cirrhosis. caution with nsaids given recent brynn. manage effusions. oxycodone 5mg prn, will give 7d prescription on dc ( REVIEWED 5/18: Reference #: [id number])    Regarding Hx PE- STABLE. dx 2/8/18. currently on eliquis 5mg bid. cont eliquis, should be on AC x6mo total, anticipated stop date 8/8/18.     PT eval appreciated, no home needs.    All electrolyte abnormalities were monitored carefully and repleted as necessary during this hospitalization. At the time of discharge patient was hemodynamically stable and amenable to all terms of discharge. The patient has received verbal instructions from myself regarding discharge plans.     Length of Discharge: 45MIN

## 2018-05-18 NOTE — DISCHARGE NOTE ADULT - OTHER SIGNIFICANT FINDINGS
05-18    140  |  107  |  75.0<H>  ----------------------------<  121<H>  4.3   |  23.0  |  0.88    Ca    7.9<L>      18 May 2018 05:46    TPro  4.2<L>  /  Alb  2.1<L>  /  TBili  0.4  /  DBili  x   /  AST  30  /  ALT  15  /  AlkPhos  284<H>  05-18                          7.5    5.9   )-----------( 202      ( 18 May 2018 05:48 )             22.3     LIVER FUNCTIONS - ( 18 May 2018 05:46 )  Alb: 2.1 g/dL / Pro: 4.2 g/dL / ALK PHOS: 284 U/L / ALT: 15 U/L / AST: 30 U/L / GGT: x           PT/INR - ( 17 May 2018 07:33 )   PT: 13.5 sec;   INR: 1.22 ratio       < from: Xray Chest 1 View- PORTABLE-Routine (05.18.18 @ 04:49) >    Findings:  Right pleural effusion, unchanged. The left lung remains clear. A skin   fold is superimposed over the left upper lung field. No evidence of   pneumothorax..    Impression:  Right pleural effusion. Stable exam without significant change since the   previous study..    < end of copied text >    < from: CT Chest No Cont (05.10.18 @ 19:16) >  FINDINGS:    CHEST:     LUNGS AND LARGE AIRWAYS: Patent central airways.  Right lung collapse.   Clear left lung.  PLEURA: Large right pleural effusion.  VESSELS: Atherosclerotic changes of thoracic aorta and coronary arteries.  HEART: Heart size is normal. Small pericardial effusion.  MEDIASTINUM AND ALPA: No lymphadenopathy. Leftward mediastinal shift.  CHEST WALL AND LOWER NECK: Bilateral gynecomastia. Subcutaneous soft   tissue edema.  VISUALIZED UPPER ABDOMEN: Cirrhosis status post TIPS. Mild ascites.  BONES: Spinal degenerative changes.    IMPRESSION:     Large right pleural effusion with mediastinal shift to the left.    < end of copied text >    Hemoglobin A1C, Whole Blood: 5.7 % (05.11.18 @ 06:24)    Iron with Total Binding Capacity in AM (05.18.18 @ 05:46)    Iron - Total Binding Capacity.: 103 ug/dL    % Saturation, Iron: 35 %    Iron Total, Serum: 36 ug/dL    Ferritin, Serum in AM (05.18.18 @ 05:46)    Ferritin, Serum: 790: Note: New reference ranges effective 04/16/2018. ng/mL

## 2018-05-18 NOTE — PROGRESS NOTE ADULT - NSHPATTENDINGPLANDISCUSS_GEN_ALL_CORE
the patient.  All imaging and results of lab/other studies reviewed by me. All questions answered to the satisfaction of the patient. At this time they agree with the current plan of therapy.

## 2018-05-18 NOTE — CONSULT NOTE ADULT - SUBJECTIVE AND OBJECTIVE BOX
HPI: This 61 yo M with PMH of DM2 PE recurrent pleural Effusions ETOH End Stage Cirrhosis-admitted with severe SOB NGUYEN having difficulty speaking.  R Pleural Effusion persistent. He has had 2 thoracentesis since admission, last procedure yesterday draining 3,200 ml from R Lung. For first time patient reported having difficulty during thoracentesis.  Sore tight throat "burning" and procedure was stopped earlier then planned. Today he is feeling slightly better, not using O2 +loose stool appetite fine-poor functional status  not ambulating except to the bathroom.  CC: SOB with prolonged speech- NGUYEN; mood depressed about worsening liver failure- "I need a cure" Patient has not amauri in contact with organ transplant program; is  waiting for MD to recommend where to F/U after discharge    63 y/o male with pmh of dm, PE (2/8/18) currently on eliquis, alcoholic liver cirrhosis, TIPS procedure at [place] (3/29/18), recurrent hepatic hydrothorax presented to the ED c/o of worsening SOB x 1 week which has progressed. Pt is well known to the service with similar SOB presentations in the past with right sided pleural effusion that required draining. Upon present  examination pt appears uncomfortable at rest, unable to speak in complete sentences due to his shortness of breath. CXR done in ED reveals a large right sided pleural effusion. CT scan was also done by request of ED physician with results pending. AT this time pt denies CP, palpitations, dizziness, n/v/d. He admits to SOB at rest worsening with exertion. We were consulted, per Dr. Saez will place right sided pigtail and drain max 3 liters of pleural fluid, continue to monitor. (10 May 2018 19:25)      PERTINENT PMH REVIEWED: Yes     PAST MEDICAL & SURGICAL HISTORY:  Pleural effusion  Pulmonary embolism  Cirrhosis  DM (diabetes mellitus)  S/P TIPS (transjugular intrahepatic portosystemic shunt)  S/P thoracentesis      SOCIAL HISTORY:  EtOH PMH Alcohol- Sober now                                   Drugs No                                    nonsmoker                                    Admitted from: home  Wife: Melissa Gilbert [phone]    FAMILY HISTORY:  No pertinent family history in first degree relatives    No Known Allergies    Baseline ADLs (prior to admission):  Partially Dependent  on wife    Present Symptoms:     Dyspnea 2- 3   Nausea/Vomiting: No  Anxiety:  Yes   Depression: Yes   Fatigue: Yes   Loss of appetite: No eating as per patient self report    Pain:  R lung thorax pain from R Pleural Effusion accumulation/pressure Thoracentesis            Character-            Duration-            Effect-            Factors-            Frequency-            Location-            Severity-moderate    Review of Systems: Reviewed                      All others negative    MEDICATIONS  (STANDING):  apixaban 5 milliGRAM(s) Oral every 12 hours  escitalopram 10 milliGRAM(s) Oral daily  insulin lispro (HumaLOG) corrective regimen sliding scale   SubCutaneous three times a day before meals  lactobacillus acidophilus 1 Tablet(s) Oral two times a day with meals  midodrine 5 milliGRAM(s) Oral every 8 hours  pantoprazole    Tablet 40 milliGRAM(s) Oral before breakfast  spironolactone 25 milliGRAM(s) Oral daily    MEDICATIONS  (PRN):  acetaminophen   Tablet. 650 milliGRAM(s) Oral every 6 hours PRN pain  ALBUTerol    0.083% 2.5 milliGRAM(s) Nebulizer every 4 hours PRN Shortness of Breath and/or Wheezing  methocarbamol 1500 milliGRAM(s) Oral three times a day PRN muscle spasm/pain  oxyCODONE    IR 5 milliGRAM(s) Oral every 8 hours PRN Severe Pain (7 - 10)  sodium chloride 0.65% Nasal 1 Spray(s) Both Nostrils daily PRN Nasal Congestion/ nose bleed/ dry nose      PHYSICAL EXAM:    Vital Signs Last 24 Hrs  T(C): 36.7 (18 May 2018 05:30), Max: 36.9 (17 May 2018 21:40)  T(F): 98 (18 May 2018 05:30), Max: 98.5 (17 May 2018 21:40)  HR: 84 (18 May 2018 05:30) (84 - 88)  BP: 97/59 (18 May 2018 05:30) (97/59 - 105/65)  BP(mean): --  RR: 18 (18 May 2018 05:30) (18 - 20)  SpO2: 96% (17 May 2018 21:40) (96% - 96%)    General: Alert and Oriented x 3 ____                   cachexia      HEENT: normal  dry mouth      Lungs: comfortable tachypnea/labored breathing      CV: normal      GI:  distended  tender             Loose stool  : normal     MSK:   weakness  edema BLLE's  and R hand edema             ambulatory to BR and bedside chair    Skin: normal    LABS:                        7.5    5.9   )-----------( 202      ( 18 May 2018 05:48 )             22.3     05-18    140  |  107  |  75.0<H>  ----------------------------<  121<H>  4.3   |  23.0  |  0.88    Ca    7.9<L>      18 May 2018 05:46    TPro  4.2<L>  /  Alb  2.1<L>  /  TBili  0.4  /  DBili  x   /  AST  30  /  ALT  15  /  AlkPhos  284<H>  05-18    PT/INR - ( 17 May 2018 07:33 )   PT: 13.5 sec;   INR: 1.22 ratio           I&O's Summary    17 May 2018 07:01  -  18 May 2018 07:00  --------------------------------------------------------  IN: 720 mL / OUT: 0 mL / NET: 720 mL    RADIOLOGY & ADDITIONAL STUDIES:    ADVANCE DIRECTIVES:   DNR NO  Completed on:                     MOLST   NO   Completed on:   Began speaking about MOLST, establishing end of life directives-Introduced MOLST went over all questions  Living Will   NO   Completed on:

## 2018-05-18 NOTE — DISCHARGE NOTE ADULT - CARE PLAN
Principal Discharge DX:	Pleural effusion  Goal:	follow up  Assessment and plan of treatment:	Be sure to follow up for drainage of the fluid in your Rt lung, as per the last XRay, it is still present.     Continue taking all pain medications exactly as prescribed, be sure not to deviate and to take more medications than prescribed. You have been given a specific number of pills and/or patches for pain and must follow up with your Primary Doctor in order to get a prescription for more before it runs out. Be sure to look out for any signs of dependence or addiction while on these medications. If you or those around you believe this is becoming an issue, be sure to see your Primary Care Doctor to discuss it as soon as possible. There are many avenues available to help wean off of medications and to treat potential or actual addiction but best outcomes are if it is confronted in a speedy manner.  Secondary Diagnosis:	Alcoholic cirrhosis of liver with ascites  Goal:	follow up  Assessment and plan of treatment:	Be sure to follow up with specialists as mentioned. At this time, you should have a consultation about having a liver transplant if possible.    Elevate legs when seated.  Secondary Diagnosis:	Other specified hypotension  Goal:	follow up  Assessment and plan of treatment:	Continue all medications as prescribed.  Secondary Diagnosis:	Diarrhea, unspecified type  Goal:	resolution  Assessment and plan of treatment:	Continue taking lactulose, it is known to cause diarrhea. Take it once daily to begin with, the goal is for you to have 2-3 bowel movements a day, you may begin taking 2 doses a day or even 3 doses a day until you reach that goal.  Secondary Diagnosis:	BRYNN (acute kidney injury)  Goal:	follow up  Assessment and plan of treatment:	Please follow up with your primary care doctor. Your kidney function was abnormal but it has normalized.  Secondary Diagnosis:	Acute right-sided thoracic back pain  Goal:	control  Assessment and plan of treatment:	as above  Secondary Diagnosis:	Other chronic pulmonary embolism without acute cor pulmonale  Goal:	resolution  Assessment and plan of treatment:	During this hospitalization or prior to it, you have been or were diagnosed with a Pulmonary Embolism. This is a blood clot. In order to treat it, you must be on a blood thinner such as Xarelto, Eliquis, Pradaxa, Coumadin/Warfarin, or Lovenox for an extended period of time. Be sure to follow the directions for how to take the blood thinner closely and do not miss it! Untreated or partially treated blood clots and advance and spread and may even be life threatening. You should be done with treatment in august 2018 but consult your doctor before stopping Eliquis. Principal Discharge DX:	Pleural effusion  Goal:	follow up  Assessment and plan of treatment:	Be sure to follow up for drainage of the fluid in your Right lung, as per the last XRay, it is still present.     Continue taking all pain medications exactly as prescribed, be sure not to deviate and to take more medications than prescribed. You have been given a specific number of pills and/or patches for pain and must follow up with your Primary Doctor in order to get a prescription for more before it runs out. Be sure to look out for any signs of dependence or addiction while on these medications. If you or those around you believe this is becoming an issue, be sure to see your Primary Care Doctor to discuss it as soon as possible. There are many avenues available to help wean off of medications and to treat potential or actual addiction but best outcomes are if it is confronted in a speedy manner.  Secondary Diagnosis:	Alcoholic cirrhosis of liver with ascites  Goal:	follow up  Assessment and plan of treatment:	Be sure to follow up with specialists as mentioned. At this time, you should have a consultation about having a liver transplant if possible.    Elevate legs when seated.  Secondary Diagnosis:	Other specified hypotension  Goal:	follow up  Assessment and plan of treatment:	Continue all medications as prescribed.  Secondary Diagnosis:	Diarrhea, unspecified type  Goal:	resolution  Assessment and plan of treatment:	Continue taking lactulose, it is known to cause diarrhea. Take it once daily to begin with, the goal is for you to have 2-3 bowel movements a day, you may begin taking 2 doses a day or even 3 doses a day until you reach that goal.  Secondary Diagnosis:	BRYNN (acute kidney injury)  Goal:	follow up  Assessment and plan of treatment:	Please follow up with your primary care doctor. Your kidney function was abnormal but it has normalized.  Secondary Diagnosis:	Acute right-sided thoracic back pain  Goal:	control  Assessment and plan of treatment:	as above  Secondary Diagnosis:	Other chronic pulmonary embolism without acute cor pulmonale  Goal:	resolution  Assessment and plan of treatment:	During this hospitalization or prior to it, you have been or were diagnosed with a Pulmonary Embolism. This is a blood clot. In order to treat it, you must be on a blood thinner such as Xarelto, Eliquis, Pradaxa, Coumadin/Warfarin, or Lovenox for an extended period of time. Be sure to follow the directions for how to take the blood thinner closely and do not miss it! Untreated or partially treated blood clots and advance and spread and may even be life threatening. You should be done with treatment in august 2018 but consult your doctor before stopping Eliquis.

## 2018-05-18 NOTE — DISCHARGE NOTE ADULT - ADDITIONAL INSTRUCTIONS
-Follow up with Primary Care Doctor within 1 week  -Follow up with Cardiothoracic Surgery within 1 week  -[facility] at [facility] @ 1045AM ([address], [address]) [phone].  -Follow up with Nephrology (kidney doctor) within 2 weeks

## 2018-05-18 NOTE — DISCHARGE NOTE ADULT - SECONDARY DIAGNOSIS.
Alcoholic cirrhosis of liver with ascites Other specified hypotension Diarrhea, unspecified type BRYNN (acute kidney injury) Acute right-sided thoracic back pain Other chronic pulmonary embolism without acute cor pulmonale

## 2018-05-18 NOTE — DISCHARGE NOTE ADULT - MEDICATION SUMMARY - MEDICATIONS TO TAKE
I will START or STAY ON the medications listed below when I get home from the hospital:    spironolactone 25 mg oral tablet  -- 1 tab(s) by mouth once a day  -- Indication: For fluid retention    acetaminophen 325 mg oral tablet  -- 2 tab(s) by mouth every 6 hours, As needed, pain. do NOT use more than 3 grams per day  -- Indication: For Pain    oxyCODONE 5 mg oral tablet  -- 1 tab(s) by mouth every 8 hours, As needed, Severe Pain (7 - 10) MDD:3 tabs  -- Indication: For Pain    apixaban 5 mg oral tablet  -- 1 tab(s) by mouth every 12 hours  -- Indication: For Pulmonary embolus    escitalopram 10 mg oral tablet  -- 1 tab(s) by mouth once a day  -- Indication: For mood    furosemide 40 mg oral tablet  -- 1 tab(s) by mouth once a day  -- Indication: For fluid retention    lactulose 10 g/15 mL oral syrup  -- 15 milliliter(s) by mouth once a day  titrate to two bowel movements daily   -- Indication: For cirrhosis    senna oral tablet  -- 2 tab(s) by mouth once a day (at bedtime) use as needed for consitpation  -- Indication: For constipation    midodrine 5 mg oral tablet  -- 1 tab(s) by mouth every 8 hours  -- Indication: For low blood pressure    pantoprazole 40 mg oral delayed release tablet  -- 1 tab(s) by mouth once a day (before a meal)  -- Indication: For reflux    Multiple Vitamins oral tablet  -- 1 tab(s) by mouth once a day  -- Indication: For vitamin

## 2018-05-18 NOTE — DISCHARGE NOTE ADULT - MEDICATION SUMMARY - MEDICATIONS TO STOP TAKING
I will STOP taking the medications listed below when I get home from the hospital:    oxyCODONE 5 mg oral tablet  -- 1 tab(s) by mouth every 4 hours, As needed, Pain    lactulose 10 g/15 mL oral syrup  -- 15 milliliter(s) by mouth 3 times a day titrate to two bowel movements daily

## 2018-05-18 NOTE — DISCHARGE NOTE ADULT - PATIENT PORTAL LINK FT
You can access the [facility] Patient Portal, offered by [facility], by registering with the following website: [email or web address]

## 2018-05-18 NOTE — PROGRESS NOTE ADULT - PROVIDER SPECIALTY LIST ADULT
Hospitalist
Nephrology
Nephrology
Thoracic Surgery
Thoracic Surgery
Gastroenterology
Hospitalist

## 2018-05-18 NOTE — PROGRESS NOTE ADULT - ASSESSMENT
62yoM with PMH DM2, Hx PE, Hx Pleural effusions, ETOH Cirrhosis presenting with sob, admitted for Large Rt sided pleural effusion.    Course complicated by brynn.    Course also complicated by Hypotension likely due to hypoalbuminemia in setting of cirrhosis.    Course complicated by anemia.    ETOH related Cirrhosis- STABLE> sp TIPS in 03/2018, failed to control fluid reaccumulation. complicated by hypoalbuminemia causing severe generalized edema + recurrent pleural effusion. edema less likely related to HF, pt had echo 3/2018 with normal EF and LV function. also unlikely sec to underlying DVT given chronic use of eliquis for PE. PLAN- nutrition consult appreciated, cont prostat, nutrition dense snacks (glucerna TID) supportive care for edema, elevate LE, he has said compression stockings have not worked in the past. sp albumin challenge with improvement of serum alb level. nephro consult appreciated, repeated alb + lasix challenge. also added midodrine, aldactone. GI consult appreciated, given refractory nature of symptoms/failure of previous tx, liver transplant is the last true option available. plan for dc for outpt follow up to eval for liver transplant. continued abstinence from etoh. Outpt follow up @ [facility] (appt made prior to dc)    Rt Sided Pleural Effusion- RESOLVED initially but RECURRENT, STABLE> in setting of hx of recurrent effusions in past. CT Sx consult appreciated, sp chest tube placement 5/10, removed 5/13, uncomplicated well tolerated. >3L removed. noted to have symptomatic recurrence per XR 5/15, CT Sx reconsulted, nothing further that they can add. SP repeat thoracentesis by IR 5/17, pt did not tolerate full procedure sec to discomfort. XR 5/18 showing stability. PLAN- outpt thoracentesis with CT Sx Dr Saez within 1wk.      Hypotension- RESOLVED> asymptomatic. likely sec to poor oncotic pressure. sp albumin + lasix challenge several times this admission with resolution of BRYNN and improvement in BP likely due to improving oncotic pressure. ultimately improved with addition of midodrine. PLAN- serial BP checks. overall pt prone to having this sort of complication for long term given his cirrhosis. see above    Diarrhea- STABLE> bristol 6/7 stools, no melena no hematochezia. no other signs/ symptoms to assume infectious source. suspect sec to medication. PLAN. cont holding lactulose. follow up BM. if persistent still by 5/17 will check stool cx + cdiff.     Anemia- STABLE> asymptomatic. no signs acute/active bleeding. ? related to dilutional effect given volume administration PLAN. transfuse for hgb <7 or if symptoms occur. still pending FOBT.     Back Pain- RESOLVED> MSK etiology sec to chest tube earlier this admission. sp motrin x1 with relief. PLAN. cont pain control with tylenol PRN, max 3g in 1d given cirrhosis. caution with nsaids given recent brynn. manage effusions.    BRYNN- RESOLVED> baseline Cr 0.9. likely sec to poor circulatory effective volume in setting of hypoalbuminemia. initially also thought to be from pre renal azotemia given massive volume loss post chest tube placement but did not respond to hydration, less likely. sp albumin challenge followed by lasixx3 with great improvement. PLAN- cont protein supplementation. avoid nephrotoxic meds. plan as per nephro    Hx PE- STABLE. dx 2/8/18. currently on eliquis 5mg bid. PLAN- cont eliquis, should be on AC x6mo total, anticipated stop date 8/8/18. hold PRN procedures. ] 62yoM with PMH DM2, Hx PE, Hx Pleural effusions, ETOH Cirrhosis presenting with sob, admitted for Large Rt sided pleural effusion.    Course complicated by brynn.    Course also complicated by Hypotension likely due to hypoalbuminemia in setting of cirrhosis.    Course complicated by anemia.    ETOH related Cirrhosis- STABLE> sp TIPS in 03/2018, failed to control fluid reaccumulation. complicated by hypoalbuminemia causing severe generalized edema + recurrent pleural effusion. edema less likely related to HF, pt had echo 3/2018 with normal EF and LV function. also unlikely sec to underlying DVT given chronic use of eliquis for PE. PLAN- nutrition consult appreciated, cont prostat, nutrition dense snacks (glucerna TID) supportive care for edema, elevate LE, he has said compression stockings have not worked in the past. sp albumin challenge with improvement of serum alb level. nephro consult appreciated, repeated alb + lasix challenge. also added midodrine, aldactone. GI consult appreciated, given refractory nature of symptoms/failure of previous tx, liver transplant is the last true option available. plan for dc for outpt follow up to eval for liver transplant. continued abstinence from etoh. Outpt follow up @ [facility] (appt made prior to dc)    Rt Sided Pleural Effusion- RESOLVED initially but RECURRENT, STABLE> in setting of hx of recurrent effusions in past. CT Sx consult appreciated, sp chest tube placement 5/10, removed 5/13, uncomplicated well tolerated. >3L removed. noted to have symptomatic recurrence per XR 5/15, CT Sx reconsulted, nothing further that they can add. SP repeat thoracentesis by IR 5/17, pt did not tolerate full procedure sec to discomfort. XR 5/18 showing stability. PLAN- outpt thoracentesis with CT Sx Dr Saez within 1wk.      Hypotension- RESOLVED> asymptomatic. likely sec to poor oncotic pressure. sp albumin + lasix challenge several times this admission with resolution of BRYNN and improvement in BP likely due to improving oncotic pressure. ultimately improved with addition of midodrine. PLAN- serial BP checks. overall pt prone to having this sort of complication for long term given his cirrhosis. see above    Diarrhea- STABLE> bristol 6/7 stools, no melena no hematochezia. no other signs/ symptoms to assume infectious source. suspect sec to medication. PLAN. cont holding lactulose. follow up BM. if persistent still by 5/17 will check stool cx + cdiff.     Anemia- STABLE> asymptomatic. no signs acute/active bleeding. ? related to dilutional effect given volume administration PLAN. transfuse for hgb <7 or if symptoms occur. still pending FOBT.     Back Pain- RESOLVED> MSK etiology sec to chest tube earlier this admission as well as from pain of reaccumulated pleural fluid. sp motrin x1 with relief. PLAN. cont pain control with tylenol PRN, max 3g in 1d given cirrhosis. caution with nsaids given recent brynn. manage effusions. oxycodone 5mg prn, will give 7d prescription on dc ( REVIEWED 5/18: Reference #: [id number])    BRYNN- RESOLVED> baseline Cr 0.9. likely sec to poor circulatory effective volume in setting of hypoalbuminemia. initially also thought to be from pre renal azotemia given massive volume loss post chest tube placement but did not respond to hydration, less likely. sp albumin challenge followed by lasixx3 with great improvement. PLAN- cont protein supplementation. avoid nephrotoxic meds. plan as per nephro    Hx PE- STABLE. dx 2/8/18. currently on eliquis 5mg bid. PLAN- cont eliquis, should be on AC x6mo total, anticipated stop date 8/8/18. hold PRN procedures.     Dispo: dc home today. outpt follow up to manage conditions with CT Sx + Hepatologist.

## 2018-05-21 ENCOUNTER — APPOINTMENT (OUTPATIENT)
Dept: HEMATOLOGY ONCOLOGY | Facility: CLINIC | Age: 63
End: 2018-05-21

## 2018-05-27 ENCOUNTER — EMERGENCY (EMERGENCY)
Facility: HOSPITAL | Age: 63
LOS: 1 days | Discharge: HOME | End: 2018-05-27
Attending: EMERGENCY MEDICINE
Payer: COMMERCIAL

## 2018-05-27 VITALS
RESPIRATION RATE: 17 BRPM | SYSTOLIC BLOOD PRESSURE: 109 MMHG | OXYGEN SATURATION: 97 % | DIASTOLIC BLOOD PRESSURE: 63 MMHG | HEART RATE: 86 BPM

## 2018-05-27 VITALS — HEIGHT: 65 IN | WEIGHT: 164.91 LBS

## 2018-05-27 DIAGNOSIS — Z98.890 OTHER SPECIFIED POSTPROCEDURAL STATES: Chronic | ICD-10-CM

## 2018-05-27 DIAGNOSIS — Z95.828 PRESENCE OF OTHER VASCULAR IMPLANTS AND GRAFTS: Chronic | ICD-10-CM

## 2018-05-27 LAB
ALBUMIN SERPL ELPH-MCNC: 1.8 G/DL — LOW (ref 3.3–5.2)
ALP SERPL-CCNC: 316 U/L — HIGH (ref 40–120)
ALT FLD-CCNC: 30 U/L — SIGNIFICANT CHANGE UP
ANION GAP SERPL CALC-SCNC: 9 MMOL/L — SIGNIFICANT CHANGE UP (ref 5–17)
APTT BLD: 28.9 SEC — SIGNIFICANT CHANGE UP (ref 27.5–37.4)
AST SERPL-CCNC: 52 U/L — HIGH
BASOPHILS # BLD AUTO: 0.1 K/UL — SIGNIFICANT CHANGE UP (ref 0–0.2)
BASOPHILS NFR BLD AUTO: 0.8 % — SIGNIFICANT CHANGE UP (ref 0–2)
BILIRUB SERPL-MCNC: 0.9 MG/DL — SIGNIFICANT CHANGE UP (ref 0.4–2)
BUN SERPL-MCNC: 45 MG/DL — HIGH (ref 8–20)
CALCIUM SERPL-MCNC: 7.7 MG/DL — LOW (ref 8.6–10.2)
CHLORIDE SERPL-SCNC: 104 MMOL/L — SIGNIFICANT CHANGE UP (ref 98–107)
CO2 SERPL-SCNC: 22 MMOL/L — SIGNIFICANT CHANGE UP (ref 22–29)
CREAT SERPL-MCNC: 1.14 MG/DL — SIGNIFICANT CHANGE UP (ref 0.5–1.3)
EOSINOPHIL # BLD AUTO: 0.3 K/UL — SIGNIFICANT CHANGE UP (ref 0–0.5)
EOSINOPHIL NFR BLD AUTO: 3.9 % — SIGNIFICANT CHANGE UP (ref 0–5)
GLUCOSE SERPL-MCNC: 135 MG/DL — HIGH (ref 70–115)
HCT VFR BLD CALC: 27.3 % — LOW (ref 42–52)
HGB BLD-MCNC: 9.7 G/DL — LOW (ref 14–18)
INR BLD: 1.12 RATIO — SIGNIFICANT CHANGE UP (ref 0.88–1.16)
LYMPHOCYTES # BLD AUTO: 2.1 K/UL — SIGNIFICANT CHANGE UP (ref 1–4.8)
LYMPHOCYTES # BLD AUTO: 23.7 % — SIGNIFICANT CHANGE UP (ref 20–55)
MCHC RBC-ENTMCNC: 32.7 PG — HIGH (ref 27–31)
MCHC RBC-ENTMCNC: 35.5 G/DL — SIGNIFICANT CHANGE UP (ref 32–36)
MCV RBC AUTO: 91.9 FL — SIGNIFICANT CHANGE UP (ref 80–94)
MONOCYTES # BLD AUTO: 0.7 K/UL — SIGNIFICANT CHANGE UP (ref 0–0.8)
MONOCYTES NFR BLD AUTO: 8.1 % — SIGNIFICANT CHANGE UP (ref 3–10)
NEUTROPHILS # BLD AUTO: 5.5 K/UL — SIGNIFICANT CHANGE UP (ref 1.8–8)
NEUTROPHILS NFR BLD AUTO: 62.8 % — SIGNIFICANT CHANGE UP (ref 37–73)
NT-PROBNP SERPL-SCNC: 350 PG/ML — HIGH (ref 0–300)
PLATELET # BLD AUTO: 357 K/UL — SIGNIFICANT CHANGE UP (ref 150–400)
POTASSIUM SERPL-MCNC: 3.5 MMOL/L — SIGNIFICANT CHANGE UP (ref 3.5–5.3)
POTASSIUM SERPL-SCNC: 3.5 MMOL/L — SIGNIFICANT CHANGE UP (ref 3.5–5.3)
PROT SERPL-MCNC: 4.8 G/DL — LOW (ref 6.6–8.7)
PROTHROM AB SERPL-ACNC: 12.3 SEC — SIGNIFICANT CHANGE UP (ref 9.8–12.7)
RBC # BLD: 2.97 M/UL — LOW (ref 4.6–6.2)
RBC # FLD: 15 % — SIGNIFICANT CHANGE UP (ref 11–15.6)
SODIUM SERPL-SCNC: 135 MMOL/L — SIGNIFICANT CHANGE UP (ref 135–145)
TROPONIN T SERPL-MCNC: 0.02 NG/ML — SIGNIFICANT CHANGE UP (ref 0–0.06)
WBC # BLD: 8.7 K/UL — SIGNIFICANT CHANGE UP (ref 4.8–10.8)
WBC # FLD AUTO: 8.7 K/UL — SIGNIFICANT CHANGE UP (ref 4.8–10.8)

## 2018-05-27 PROCEDURE — 84484 ASSAY OF TROPONIN QUANT: CPT

## 2018-05-27 PROCEDURE — 32554 ASPIRATE PLEURA W/O IMAGING: CPT

## 2018-05-27 PROCEDURE — 93005 ELECTROCARDIOGRAM TRACING: CPT

## 2018-05-27 PROCEDURE — 83880 ASSAY OF NATRIURETIC PEPTIDE: CPT

## 2018-05-27 PROCEDURE — 99285 EMERGENCY DEPT VISIT HI MDM: CPT | Mod: 25

## 2018-05-27 PROCEDURE — 80053 COMPREHEN METABOLIC PANEL: CPT

## 2018-05-27 PROCEDURE — 96374 THER/PROPH/DIAG INJ IV PUSH: CPT | Mod: XU

## 2018-05-27 PROCEDURE — 71046 X-RAY EXAM CHEST 2 VIEWS: CPT | Mod: 26

## 2018-05-27 PROCEDURE — 71046 X-RAY EXAM CHEST 2 VIEWS: CPT | Mod: 26,77

## 2018-05-27 PROCEDURE — 85730 THROMBOPLASTIN TIME PARTIAL: CPT

## 2018-05-27 PROCEDURE — 71046 X-RAY EXAM CHEST 2 VIEWS: CPT

## 2018-05-27 PROCEDURE — 36415 COLL VENOUS BLD VENIPUNCTURE: CPT

## 2018-05-27 PROCEDURE — 93010 ELECTROCARDIOGRAM REPORT: CPT

## 2018-05-27 PROCEDURE — 85610 PROTHROMBIN TIME: CPT

## 2018-05-27 PROCEDURE — 85027 COMPLETE CBC AUTOMATED: CPT

## 2018-05-27 PROCEDURE — 99284 EMERGENCY DEPT VISIT MOD MDM: CPT | Mod: 25

## 2018-05-27 RX ORDER — FUROSEMIDE 40 MG
20 TABLET ORAL ONCE
Qty: 0 | Refills: 0 | Status: COMPLETED | OUTPATIENT
Start: 2018-05-27 | End: 2018-05-27

## 2018-05-27 RX ADMIN — Medication 20 MILLIGRAM(S): at 17:58

## 2018-05-27 NOTE — PROGRESS NOTE ADULT - SUBJECTIVE AND OBJECTIVE BOX
Pt know to service.  Multiple admissions for hepatic hydrothorax.  S/P tips.  Would recommend transfer to facility where cirrhosis can be managed with ?transplant evaluation.  Nothing further to offer from thoracic standpoint.  May need paracentesis if symptomatic.

## 2018-05-27 NOTE — ED STATDOCS - OBJECTIVE STATEMENT
61 y/o M presents with c/o increasing SOB over the past3 days.  He has a hx of cirrhosis requiring paracentesis last time was 2 weeks ago.  He states that his legs are very swollen.  On exam + jaundice, right lung with no breath sounds, legs with bilateral 8+ pitting edema.  Will transfer to Main Dept for cardiac monitoring and further evaluation.  Preliminary orders written.

## 2018-05-27 NOTE — ED PROVIDER NOTE - MEDICAL DECISION MAKING DETAILS
Discussed case with CT surgery who managed his chest drainage. They have instructed him a number of times to follow up. Recommend that as long as there is no respiratory  emergency they can f/u outpatient  with someone who is better equipped to handle liver disease.

## 2018-05-27 NOTE — ED PROCEDURE NOTE - CPROC ED THORACENTES DETAIL1
The anatomic location was identified, and needle (see size above) was introduced into the pleural space. Sterile technique was used throughout procedure./Using thoracentesis kit with valve-directed collection bag, placed pig tail catheter in 5th intercostal space with immediate return of clear yellow fluid; approx 2200cc removed, no apparent complications

## 2018-05-27 NOTE — ED PROVIDER NOTE - PROGRESS NOTE DETAILS
discussed at length with dr burrows; patient well known to him; has had multiple chest tubes placed and also states has not followed up appropriately; patient is in no acute resp distress, although has symptoms on exertion; normal sats; xray with more fluid compared to recent d/c; discussed option of draining fluid in ED and d/c'ing to f/u with appt with hepatology in 3 weeks. Will perform thoracentesis and repeat xray, if OK may d/c Reassessed, states breathing is much improved; xray somewhat improved; symptomatically much better; ok for d/c

## 2018-05-27 NOTE — ED PROVIDER NOTE - OBJECTIVE STATEMENT
61 y/o M pt with a hx of liver cirrhosis. Pt has been seen admitted here multiple  times for paracentesis and pleuracentesis. He was given instruction in last discharged to f/u with hematology.  He was last admitted 10 days ago and presents acutely for several ays with dyspnea on exertion and SOB. Pt is comfortable at rest stating normally on room air. Denies N/V/D, fever, chills, CP, and abdominal pain. No further complaints at this time. 61 y/o M pt with a hx of liver cirrhosis. Pt has been seen admitted here multiple  times for paracentesis and thoracentesis. He was given instruction in last discharged to f/u with hepatology at [place].  He was last admitted 10 days ago and presents acutely for several days with dyspnea on exertion and SOB. Pt is comfortable at rest stating normally on room air. Denies N/V/D, fever, chills, CP, and abdominal pain. No further complaints at this time.

## 2018-05-29 ENCOUNTER — APPOINTMENT (OUTPATIENT)
Dept: THORACIC SURGERY | Facility: CLINIC | Age: 63
End: 2018-05-29

## 2018-06-05 LAB
CULTURE RESULTS: SIGNIFICANT CHANGE UP
SPECIMEN SOURCE: SIGNIFICANT CHANGE UP

## 2018-07-03 ENCOUNTER — APPOINTMENT (OUTPATIENT)
Dept: HEPATOLOGY | Facility: CLINIC | Age: 63
End: 2018-07-03

## 2018-12-15 NOTE — PATIENT PROFILE ADULT. - [facility] AT [facility]
Encounter Date: 12/15/2018    SCRIBE #1 NOTE: I, Heather Wilson, am scribing for, and in the presence of,  Dr. Hillman. I have scribed the entire note.       History     Chief Complaint   Patient presents with    Abdominal Pain     c/o LLQ pain, nausea, and vomiting since this morning.      Savannah Guerrero is a 55 y.o. female who  has a past medical history of Hyperlipidemia and Hypertension.    The patient presents to the ED due to left sided abdominal pain. She reports onset of symptoms was a few hours ago. The patient states the pain is severe. She notes associated nausea but denies any vomiting, diarrhea, urinary symptoms, fever or chills. The patient reports a history of abdominal pain but notes current pain is worse. She denies use of any medications for the pain.      The history is provided by the patient.     Review of patient's allergies indicates:  No Known Allergies  Past Medical History:   Diagnosis Date    Hyperlipidemia     Hypertension      Past Surgical History:   Procedure Laterality Date     SECTION      EGD (ESOPHAGOGASTRODUODENOSCOPY) N/A 7/3/2018    Performed by Efren Whitley MD at [facility]    ENDOSCOPIC ULTRASOUND OF UPPER GASTROINTESTINAL TRACT N/A 7/3/2018    Procedure: ULTRASOUND, ENDOSCOPIC, UPPER GI TRACT;  Surgeon: Efren Whitley MD;  Location: [facility];  Service: Endoscopy;  Laterality: N/A;    ESOPHAGOGASTRODUODENOSCOPY N/A 7/3/2018    Procedure: EGD (ESOPHAGOGASTRODUODENOSCOPY);  Surgeon: Efren Whitley MD;  Location: [facility];  Service: Endoscopy;  Laterality: N/A;    ULTRASOUND, ENDOSCOPIC, UPPER GI TRACT N/A 7/3/2018    Performed by Efren Whitley MD at [facility]     Family History   Problem Relation Age of Onset    Breast cancer Neg Hx     Cancer Neg Hx     Colon cancer Neg Hx     Ovarian cancer Neg Hx     Esophageal cancer Neg Hx      Social History     Tobacco Use    Smoking status: Never Smoker    Smokeless tobacco:  Never Used   Substance Use Topics    Alcohol use: No    Drug use: No     Review of Systems   Constitutional: Negative for chills and fever.   HENT: Negative for sore throat.    Respiratory: Negative for cough and shortness of breath.    Cardiovascular: Negative for chest pain.   Gastrointestinal: Positive for abdominal pain and nausea. Negative for vomiting.   Genitourinary: Negative for dysuria, frequency and urgency.   Musculoskeletal: Negative for back pain, neck pain and neck stiffness.   Skin: Negative for rash and wound.   Neurological: Negative for syncope and weakness.   Hematological: Does not bruise/bleed easily.   Psychiatric/Behavioral: Negative for agitation, behavioral problems and confusion.       Physical Exam     Initial Vitals [12/15/18 1355]   BP Pulse Resp Temp SpO2   (!) 165/65 85 20 98.6 °F (37 °C) 100 %      MAP       --         Physical Exam    Nursing note and vitals reviewed.  Constitutional: She appears well-developed and well-nourished. She is not diaphoretic. She appears distressed.   HENT:   Head: Normocephalic and atraumatic.   Mouth/Throat: Oropharynx is clear and moist.   Eyes: Conjunctivae and EOM are normal. Pupils are equal, round, and reactive to light.   Neck: Normal range of motion. Neck supple.   Cardiovascular: Normal rate, regular rhythm and normal heart sounds. Exam reveals no gallop and no friction rub.    No murmur heard.  Pulmonary/Chest: Breath sounds normal. She has no wheezes. She has no rhonchi. She has no rales.   Abdominal: Soft. Bowel sounds are normal. There is tenderness in the left upper quadrant and left lower quadrant. There is no rebound, no guarding and no CVA tenderness.   Musculoskeletal: Normal range of motion. She exhibits no edema or tenderness.   Lymphadenopathy:     She has no cervical adenopathy.   Neurological: She is alert and oriented to person, place, and time. She has normal strength.   Skin: Skin is warm and dry. Capillary refill takes less  than 2 seconds. No rash noted.         ED Course   Procedures  Labs Reviewed   CBC W/ AUTO DIFFERENTIAL - Abnormal; Notable for the following components:       Result Value    WBC 21.53 (*)     Gran # (ANC) 17.7 (*)     Gran% 82.1 (*)     Lymph% 13.4 (*)     Mono% 3.8 (*)     All other components within normal limits   COMPREHENSIVE METABOLIC PANEL - Abnormal; Notable for the following components:    CO2 20 (*)     Glucose 130 (*)     Total Protein 8.5 (*)     All other components within normal limits   LIPASE - Abnormal; Notable for the following components:    Lipase >1000 (*)     All other components within normal limits   URINALYSIS, REFLEX TO URINE CULTURE   LACTATE DEHYDROGENASE          Imaging Results          US Abdomen Limited (Gallbladder) (Final result)  Result time 12/15/18 16:57:33    Final result by Stephen Alvarado MD (12/15/18 16:57:33)                 Impression:      Cholelithiasis without findings to suggest acute cholecystitis.    Hypoechoic structure within the pancreatic tail, better evaluated on previous exams, please see CT 12/15/2018, MRI 12/04/2018.    Hepatomegaly with steatosis.      Electronically signed by: Stephen Alvarado MD  Date:    12/15/2018  Time:    16:57             Narrative:    EXAMINATION:  US ABDOMEN LIMITED    CLINICAL HISTORY:  Unspecified abdominal pain    TECHNIQUE:  Limited ultrasound of the right upper quadrant of the abdomen (including pancreas, liver, gallbladder, common bile duct, and right kidney) was performed.    COMPARISON:  CT 12/15/2018    FINDINGS:  There is a relatively hypoechoic focus within the pancreatic tail measuring 2.2 x 2.1 x 1.6 cm, better characterized on previous CT and MRI.  The pancreatic parenchyma is otherwise grossly unremarkable without ductal dilation, noting overall limited evaluation given obscuration by overlying structures.  The visualized aorta and IVC are unremarkable.  There is cholelithiasis, noting calculus in the region of the  gallbladder neck.  Sonographic Deleon sign is negative.  No gallbladder wall hyperemia, wall thickening, or pericholecystic fluid.  The common duct is not dilated measuring 0.5 cm.  The hepatic parenchyma is diffusely echogenic suggesting steatosis.  The liver is enlarged measuring 18.5 cm.  The right kidney is grossly unremarkable.  No visualized ascites.                               CT Abdomen Pelvis With Contrast (Final result)  Result time 12/15/18 15:57:22    Final result by Fidelina Walker MD (12/15/18 15:57:22)                 Impression:      Again noted is a hypodense lesion the distal pancreatic body/tail which was recently characterized on MRI of 12/04/2018.  There is been interval development of edematous change of the distal pancreatic body and tail with regions of decreased enhancement as well as prominent peripancreatic inflammatory stranding along with fluid extending along the retroperitoneum and left pericolic gutter compatible with acute pancreatitis.  Component of pancreatic necrosis not excluded given the decreased enhancement of the pancreatic tail.      Electronically signed by: Fidelina Walker MD  Date:    12/15/2018  Time:    15:57             Narrative:    EXAMINATION:  CT ABDOMEN PELVIS WITH CONTRAST    CLINICAL HISTORY:  LLQ pain, suspect diverticulitis;    TECHNIQUE:  Low dose axial images, sagittal and coronal reformations were obtained from the lung bases to the pubic symphysis following the IV administration of 100 mL of Omnipaque 350 .  Oral contrast was not given.    COMPARISON:  12/04/2018    FINDINGS:  The lung bases demonstrate subsegmental atelectasis.  The base of the heart appears normal.  The aorta is of normal caliber and tapers appropriately.    No radiopaque gallstones are seen.  No intrahepatic or extrahepatic biliary ductal dilatation is identified.  There is Fatty infiltration of the liver.  The spleen, adrenal glands and kidneys have a normal appearance.  The urinary  bladder appears normal.  Uterus and adnexa have a normal appearance.    There is a hypodense lesion in the distal pancreatic body/tail measuring approximately 2.7 cm, recently characterized on MRI of the pelvis.  As compared to the previous study, there has been edematous changes of the tail of the pancreas with decreased enhancement as compared to the more proximal pancreas along with peripancreatic inflammatory stranding and free fluid extending along the retroperitoneum and left pericolic gutter compatible with acute pancreatitis.  Given the decreased enhancement and portions of the pancreatic tail, component necrosis not entirely excluded.    The bowel appears normal.  No free air or obstruction is identified.  No pathologically enlarged abdominopelvic lymph nodes are seen.    Age-appropriate degenerative changes affect skeleton.                                 Medical Decision Making:   Differential Diagnosis:   Differential Diagnosis includes, but is not limited to:  AAA, aortic dissection, mesenteric ischemia, perforated viscous, MI/ACS, SBO/volvulus, incarcerated/strangulated hernia, intussusception, ileus, appendicitis, cholecystitis, cholangitis, diverticulitis, esophagitis, hepatitis, nephrolithiasis, pancreatitis, gastroenteritis, colitis, IBD/IBS, biliary colic, GERD, PUD, constipation, UTI/pyelonephritis,  disorder.    Clinical Tests:   Lab Tests: Ordered and Reviewed  Radiological Study: Ordered and Reviewed  ED Management:  54 yo female with pancreatic cyst and gallstones with worsening pain. Patient was given morphine and IVF , labs show eleated lipase and wbc given zosyn, ct shows findings concerning for acute pancreatitis and possible necrosis. RUQ US without evidence of acute cholecystitis plan to admit to medicine    Discussed case with Dr. Gomez, recommends admission, contact [facility] for evaluation, and plan for possible US Monday    Dr. Kennedy, [facility] service agrees with admission and treatment  of pancreatitis, no indication for urgent endoscopy t this time.        5:23 PM Case discussed with [facility], will come to evaluate and admit the patient    6:15 PM The patient's family are here. After discussion with patient, the patient would like to leave to go to [facility] where she has been previously seen.     Savannah Guerrero is a 55 y.o. female Is clinically sober and appears free from distracting injury.  The patient appears to have intact insight, judgment and reason.  In my opinion, this patient has the capacity to make decisions.    Symptoms:  Abdominal pain and nausea    I have told the patient that, their labs are  abnormal and I think they may have  Acute pancreatitis and possible necrotizing pancreatitis .    I have discussed the need for admission. I have discussed the benefits including: If you stay for treatment, pain control, IVF and evaluation by GI. I have told the patient the risks of leaving which is worsening pain, infection, and worsening pancreatitis.      I'm unable to convince the patient to stay.  I have asked the patient to return as soon as possible to complete their evaluation.  I was unable to get a hold of the patient's primary care physician do to after hours.    The patient is unwilling to stay for admission.  They are unwilling to remain for additional monitoring.  They are refusing further care and are leaving AGAINST MEDICAL ADVICE.                     ED Course as of Dec 15 1722   Sat Dec 15, 2018   1355 Sort note: Savannah Guerrero nontoxic/afebrile 55 y.o.  presented to the ED with c/o evaluation of abdominal pain, N/V with chills.  Denies any diarrhea.     Patient seen and medically screened by Physician assistant in Sort process due to ED crowding.  Appropriate tests and/or medications ordered.  Care transferred to an alternate provider when patient was placed in an Exam Room from the [facility] for physical exam, additional orders,  and disposition. [facility]    [AM]      ED Course User Index  [AM] Michelle Sams PA-C     Clinical Impression:     1. Acute pancreatitis, unspecified complication status, unspecified pancreatitis type    2. Abdominal pain        Disposition:   Disposition: AMA    Rosa Iselaibstefanie attestation I, Freeman Hillman,  personally performed the services described in this documentation. All medical record entries made by the scribe were at my direction and in my presence.  I have reviewed the chart and agree that the record reflects my personal performance and is accurate and complete. Freeman Hillman M.D. 12:11 PM12/17/2018                      Freeman Hillman Jr., MD  12/17/18 1212     unsure

## 2019-03-06 NOTE — PROGRESS NOTE ADULT - SUBJECTIVE AND OBJECTIVE BOX
PULMONARY PROGRESS NOTE      [id number]    Patient is a 62y old  Male who presents with a chief complaint of Feeling SOB (08 Feb 2018 14:59)      INTERVAL HPI/OVERNIGHT EVENTS:Comfortable s/p draiage 1750 cc.  Back on lovenox.    MEDICATIONS  (STANDING):  enoxaparin Injectable 70 milliGRAM(s) SubCutaneous every 12 hours  furosemide    Tablet 60 milliGRAM(s) Oral daily  lidocaine 1% Injectable 30 milliLiter(s) Local Injection once  loperamide 2 milliGRAM(s) Oral once  multivitamin 1 Tablet(s) Oral daily  spironolactone 50 milliGRAM(s) Oral two times a day      MEDICATIONS  (PRN):      Allergies    No Known Allergies    Intolerances        PAST MEDICAL & SURGICAL HISTORY:  DM (diabetes mellitus)  No significant past surgical history      SOCIAL HISTORY  Smoking History: former      REVIEW OF SYSTEMS:    CONSTITUTIONAL:  No distress    HEENT:  Eyes:  No diplopia or blurred vision. ENT:  No earache, sore throat or runny nose.    CARDIOVASCULAR:  No pressure, squeezing, tightness, heaviness or aching about the chest; no palpitations.    RESPIRATORY:  per hpi    GASTROINTESTINAL:  No nausea, vomiting or diarrhea.    GENITOURINARY:  No dysuria, frequency or urgency.    MUSCULOSKELETAL:  No joint pain    SKIN:  No new lesions.    NEUROLOGIC:  No paresthesias, fasciculations, seizures or weakness.    PSYCHIATRIC:  No disorder of thought or mood.    ENDOCRINE:  No heat or cold intolerance, polyuria or polydipsia.    HEMATOLOGICAL:  No easy bruising or bleeding.     Vital Signs Last 24 Hrs  T(C): 36.6 (21 Feb 2018 08:05), Max: 36.8 (20 Feb 2018 20:18)  T(F): 97.9 (21 Feb 2018 08:05), Max: 98.3 (20 Feb 2018 20:18)  HR: 92 (21 Feb 2018 08:05) (80 - 95)  BP: 94/67 (21 Feb 2018 08:05) (94/67 - 106/65)  BP(mean): --  RR: 16 (21 Feb 2018 08:05) (16 - 18)  SpO2: 98% (21 Feb 2018 08:05) (96% - 98%)    PHYSICAL EXAMINATION:    GENERAL: The patient is awake and alert in no apparent distress.     HEENT: Head is normocephalic and atraumatic. Extraocular muscles are intact. Mucous membranes are moist.    NECK: Supple.    LUNGS:diminished bs r base    HEART: Regular rate and rhythm without murmur.    ABDOMEN: Soft, nontender, and nondistended.      EXTREMITIES: Without any cyanosis, clubbing, rash, lesions or edema.    NEUROLOGIC: Grossly intact.    SKIN: No ulceration or induration present.      LABS:                        13.0   6.7   )-----------( 304      ( 20 Feb 2018 08:31 )             38.4     02-20    136  |  102  |  39.0<H>  ----------------------------<  104  5.3   |  24.0  |  0.68    Ca    8.9      20 Feb 2018 08:31                          MICROBIOLOGY:    RADIOLOGY & ADDITIONAL STUDIES:< from: Xray Chest 1 View- PORTABLE-Urgent (02.20.18 @ 19:39) >    An informed consent obtained from patient . Risks and benefits explained   in detail.Patient made sit on the stretcher foward leaning on a pillow.   Time out procedure was performed. Limited ultrasonography demonstrated   right pleural fluid. The site marked on the skin.    The access site prepped and draped in sterile fashion. 1% lidocaine   utilized for local anesthesia. Right pleural cavity accessed, under   ultrasound guidance, using 5 Tajik drainage catheter placed into the   pleural cavity.   Ultrasound guidance with permanent recording and reporting. Approximately   1750 cc of clear yellow fluid drained.The catheter removed. Site dressed   in sterile fashion.    Patient tolerated the procedure well. No complications noted. Patient   transferred to the floor in stable condition for further observation and   management.  Follow-up chest radiograph showed residual small pleural fluid. No   pneumothorax.  Impression:    Ultrasound guided right pleural drainage.       < end of copied text >  < from: US Duplex Venous Lower Ext Complete, Bilateral (02.19.18 @ 17:58) >    No evidence of bilateral lower extremity deep venous thrombosis.    < end of copied text > did not assess

## 2019-03-18 NOTE — ED ADULT NURSE NOTE - AS SC [facility]
----- Message from Manjit Hough MD sent at 3/15/2019  3:50 PM EDT -----  Please inform the family that the patient has a very high cholesterol and ldl level. I do not recommend prescription medication at this time due to her age and other health problems. CMP-Normal electrolyte levels except for a elevation in glucose levels, and normal liver function. You have some mild renal insufficiency.
HIPAA verifed with  Daughter Ruchi Amaya, informed of lab results. Patient fell last night and broke her hip, having surgery today.
(3) slightly limited

## 2019-07-25 NOTE — PROGRESS NOTE ADULT - SUBJECTIVE AND OBJECTIVE BOX
Pt seen and examined f/u alcoholic cirrhosis with recurrant right pleural effusion presumably due to ascites.  This morning notes increasing SOB again.    REVIEW OF SYSTEMS:    CONSTITUTIONAL: No fever, weight loss, or fatigue  EYES: No eye pain, visual disturbances, or discharge  ENMT:  No difficulty hearing, tinnitus, vertigo; No sinus or throat pain  RESPIRATORY: No cough, wheezing, chills or hemoptysis;+SOB  CARDIOVASCULAR: No chest pain, palpitations, dizziness, or leg swelling  GASTROINTESTINAL: No abdominal or epigastric pain. No nausea, vomiting, or hematemesis; No diarrhea or constipation. No melena or hematochezia.    MEDICATIONS:  MEDICATIONS  (STANDING):  ALBUTerol/ipratropium for Nebulization 3 milliLiter(s) Nebulizer every 6 hours  apixaban 5 milliGRAM(s) Oral every 12 hours  dextrose 5%. 1000 milliLiter(s) (50 mL/Hr) IV Continuous <Continuous>  dextrose 50% Injectable 12.5 Gram(s) IV Push once  dextrose 50% Injectable 25 Gram(s) IV Push once  dextrose 50% Injectable 25 Gram(s) IV Push once  escitalopram 10 milliGRAM(s) Oral daily  insulin lispro (HumaLOG) corrective regimen sliding scale   SubCutaneous three times a day before meals  insulin lispro (HumaLOG) corrective regimen sliding scale   SubCutaneous at bedtime  lactobacillus acidophilus 1 Tablet(s) Oral two times a day with meals  pantoprazole    Tablet 40 milliGRAM(s) Oral before breakfast    MEDICATIONS  (PRN):  acetaminophen   Tablet. 650 milliGRAM(s) Oral every 6 hours PRN pain  ALBUTerol    0.083% 2.5 milliGRAM(s) Nebulizer every 4 hours PRN Shortness of Breath and/or Wheezing  dextrose 40% Gel 15 Gram(s) Oral once PRN Blood Glucose LESS THAN 70 milliGRAM(s)/deciliter  glucagon  Injectable 1 milliGRAM(s) IntraMuscular once PRN Glucose LESS THAN 70 milligrams/deciliter  methocarbamol 1500 milliGRAM(s) Oral three times a day PRN muscle spasm/pain  oxyCODONE    IR 5 milliGRAM(s) Oral every 8 hours PRN Severe Pain (7 - 10)  sodium chloride 0.65% Nasal 1 Spray(s) Both Nostrils daily PRN Nasal Congestion/ nose bleed/ dry nose      Allergies    No Known Allergies    Intolerances        Vital Signs Last 24 Hrs  T(C): 36.9 (16 May 2018 04:18), Max: 37 (15 May 2018 16:05)  T(F): 98.4 (16 May 2018 04:18), Max: 98.6 (15 May 2018 16:05)  HR: 101 (16 May 2018 04:18) (94 - 108)  BP: 119/68 (16 May 2018 04:18) (98/59 - 122/64)  BP(mean): --  RR: 20 (16 May 2018 04:18) (18 - 28)  SpO2: 94% (16 May 2018 04:18) (94% - 100%)    05-15 @ 07:01  -  05-16 @ 07:00  --------------------------------------------------------  IN: 720 mL / OUT: 0 mL / NET: 720 mL        PHYSICAL EXAM:    General: Well developed; well nourished; in no acute distress  HEENT: MMM, conjunctiva and sclera clear  Gastrointestinal:Abdomen: Soft non-tender non-distended; Normal bowel sounds; No hepatosplenomegaly  Extremities: no cyanosis, clubbing or edema.  Skin: Warm and dry. No obvious rash    LABS:      CBC Full  -  ( 16 May 2018 06:35 )  WBC Count : x  Hemoglobin : 8.2 g/dL  Hematocrit : 23.0 %  Platelet Count - Automated : x  Mean Cell Volume : x  Mean Cell Hemoglobin : x  Mean Cell Hemoglobin Concentration : x  Auto Neutrophil # : x  Auto Lymphocyte # : x  Auto Monocyte # : x  Auto Eosinophil # : x  Auto Basophil # : x  Auto Neutrophil % : x  Auto Lymphocyte % : x  Auto Monocyte % : x  Auto Eosinophil % : x  Auto Basophil % : x    05-15    137  |  102  |  69.0<H>  ----------------------------<  104  3.5   |  23.0  |  0.98    Ca    8.3<L>      15 May 2018 07:02    TPro  5.1<L>  /  Alb  2.8<L>  /  TBili  0.4  /  DBili  x   /  AST  38  /  ALT  23  /  AlkPhos  356<H>  05-15                      RADIOLOGY & ADDITIONAL STUDIES (The following images were personally reviewed): no

## 2019-08-07 NOTE — ED ADULT NURSE NOTE - TOBACCO SCREENING (FROM THE ASSIST)
Patient is wondering the status of his leave of absence paperwork for [employer].  He states that he gave the paperwork to ou to be completed at his last visit.  Please call patient with an update as he is not getting paid at the present time.  Thank You.   Statement Selected

## 2019-11-07 NOTE — PROGRESS NOTE ADULT - ASSESSMENT
63 y/o M with a PMH significant for alcoholic cirrhosis, PE on eliquis, DM2, and recurrent pleural effusions in the setting of hepatic hydrothorax. Patient presented to [facility] on 3/18/18 with worsening dyspnea and orthopnea x3 days, found to have a large right pleural effusion s/p chest tube placement at OSH, and TIPS here on 3/29 with no complications, transferred to MICU for monitoring, stable for transfer back to medicine floors with pigtail catheter in place. Now with BRYNN, improving with albumin IVF.

## 2019-11-11 NOTE — PHYSICAL THERAPY INITIAL EVALUATION ADULT - ORIENTATION, REHAB EVAL
1. Plan for labs at the laboratory at around 8 AM.     2. I will review results and let you know what they mean. 3. If you need a trial of testosterone therapy, we can do so if safe. 4. Plan to return to clinic in 3 months, but if all is normal, may not need to return. Danica Nielsen.  [address] [facility]  [address] [place] [address]
oriented to person, place, time and situation

## 2019-11-11 NOTE — ED PROVIDER NOTE - RESPIRATORY, MLM
EMT IS AT BEDSIDE decrease breathe sounds right lung upper to lower lobes. Normal clear lung sounds on the left.

## 2020-06-24 NOTE — PROGRESS NOTE ADULT - ASSESSMENT
Assess    End Stage Liver Disease Post TIPS and pleural drainage  Post PE (?occult liver malignancy) - currently off AC      Rec    Resume AC in 1-2 days  FU CTA in one to two months and consider early DC of AC given bleeding risk if neg  Principally per GI and CT-surgery  Little to offer Detail Level: Simple

## 2021-06-19 NOTE — PHYSICAL THERAPY INITIAL EVALUATION ADULT - THERAPY FREQUENCY, PT EVAL
Letter by Katy Hernandez RN at      Author: Katy Hernandez RN Service: -- Author Type: --    Filed:  Encounter Date: 8/26/2019 Status: (Other)         Rogelio Richey  [address] [place] [id number]             August 26, 2019         Dear Mr. Richey,    [employer].    Sincerely,    Katy CHUNG       
Patient is currently functioning at baseline (independent) and will be D/C from PT program at this time. Patient will remain on ambulation aide to ensure OOB/mobility/ambulation for remainder of hospital stay.

## 2021-10-12 NOTE — DISCHARGE NOTE ADULT - LAUNCH MEDICATION RECONCILIATION
<<-----Click here for Discharge Medication Review Burow's Graft Text: The defect edges were debeveled with a #15 scalpel blade.  Given the location of the defect, shape of the defect, the proximity to free margins and the presence of a standing cone deformity a Burow's skin graft was deemed most appropriate. The standing cone was removed and this tissue was then trimmed to the shape of the primary defect. The adipose tissue was also removed until only dermis and epidermis were left.  The skin margins of the secondary defect were undermined to an appropriate distance in all directions utilizing iris scissors.  The secondary defect was closed with interrupted buried subcutaneous sutures.  The skin edges were then re-apposed with running  sutures.  The skin graft was then placed in the primary defect and oriented appropriately.

## 2021-10-13 NOTE — PROGRESS NOTE ADULT - PROBLEM/PLAN-6
[place] Islander speaking     Mother  said pt has sore throat and sneezing asking for a covid test ,
DISPLAY PLAN FREE TEXT

## 2022-02-15 NOTE — ED ADULT NURSE NOTE - PAIN: PRESENCE, MLM
72 y.o. male for open access colonoscopy for screening   Additional data for completion of the targeted pre-endoscopy H&P will be provided under 'H&P interval notes'. Please see that document which will be attached to this.   Bri Mascorro MD complains of pain/discomfort

## 2022-06-13 NOTE — ED ADULT NURSE NOTE - JUGULAR VENOUS DISTENTION
absent

## 2022-07-19 NOTE — PATIENT PROFILE ADULT. - NS PRO OT REFERRAL QUES 1 YN
Pt toes were bandaged per PA order, bacitracin, telfa, and coban. Toes were kushal taped.Pt tolerated well.All questions were answered, mom and p tated understanding of teaching.   no

## 2022-07-31 NOTE — PATIENT PROFILE ADULT. - CAREGIVER
Order was written/H&P was completed/Contractions pattern was reviewed/FHR was reviewed/Induction / Augmentation was discussed No

## 2022-09-27 NOTE — DISCHARGE NOTE ADULT - FUNCTIONAL SCREEN CURRENT LEVEL: AMBULATION, MLM
[facility]  [address]  Dept: [phone]    September 29, 2022     Patient: Vivi Martinez   YOB: 2010   Date of Visit: 9/27/2022       To Whom it May Concern:    Vivi Martinez is under my professional care, please excuse his father from work  He was present and at the bedside during his son's stay  He was seen in the hospital from 9/27/2022   to 09/29/22  He may return to work on 9/30/2022 without limitations  If you have any questions or concerns, please don't hesitate to call           Sincerely,          Jodi Desai MD
(0) independent

## 2022-12-15 NOTE — DIETITIAN INITIAL EVALUATION ADULT. - PROBLEM/PLAN-4
Pt is requesting new rx for     Omnicef 300mg caps     Did not see on active med list please verify and send new rx     [place] spec/mail pharmacy  [phone]   DISPLAY PLAN FREE TEXT

## 2023-01-01 NOTE — H&P ADULT - GENERAL
Goal Outcome Evaluation:              Outcome Evaluation: VSS, no events today , mom got discharged today and plans on returning tomorrow evening, tolerating slow increase of feeds, added prolacta today, MLC infusing well          details…

## 2023-01-20 NOTE — DISCHARGE NOTE ADULT - PLAN OF CARE
Resident/Student After TIPS was performed you developed an acute kidney injury and high potassium likely related to diuretic use and low blood pressures. As you are leaving against medical advice; a plan was made with assistance of nephrology for the safest possible discharge. Please follow directions below:  -please resume lasix (furosemide) 40 mg daily   -DO NOT resume aldactone 50 mg BID (spironolactone).   -you must follow up with a primary care doctor within 1 week of discharge to have your labs checked. On admission your eliquis was changed to IV heparin. Your creatinine clearance and liver function have improved this hospitalization. You may continue with eliquis however please follow the dose change below:  -take 10 mg twice daily for 7 days (14 doses total)  -after 7 days; continue with home dose of 5 mg twice daily and follow up with your primary care doctor. Please follow up with your primary care doctor within 1 week of discharge You were admitted with ascites and pleural effusion related to liver failure. You had a chest tube placed which was removed 4/3 and there is concern that the fluid will accumulate again. You had TIPS procedure this admission to address cirrhosis and ultrasound demonstrated the procedure was successful. You are leaving against medical advice however please follow instructions below:  -please return to ER if you experience SOB  -please take lactulose 3 times daily for a goal of 2 soft bowel movements daily. This will prevent confusion and elevated ammonia related to liver failure  -please resume lasix (furosemide) 40 mg daily   -DO NOT resume aldactone 50 mg BID (spironolactone).   -you must follow up with a primary care doctor within 1 week of discharge to have your labs checked.  -a name for PMD and hepatologist have been included in your discharge paperwork. -Your chest tube was removed 4/3; the fluid will likely reaccumulate. Please return to hospital should your shortness of breath occur again.

## 2023-03-21 NOTE — PATIENT PROFILE ADULT. - SPIRITUAL FAITH, PROFILE
Detail Level: Detailed Add [id number] none

## 2023-04-29 NOTE — PATIENT PROFILE ADULT. - NS PRO CONTRA REFUSE FLU INFO
Risks/benefits discussed with patient or patient surrogate/Vaccine Information Sheet (VIS) provided-VIS date: 8/07/15
No

## 2023-05-26 NOTE — PATIENT PROFILE ADULT. - AGENT'S NAME
Other (Free Text): Status post puncture wound Melissa Gilbert  (Spouse) Note Text (......Xxx Chief Complaint.): This diagnosis correlates with the Detail Level: Detailed Render Risk Assessment In Note?: no

## 2023-05-31 NOTE — ED PROVIDER NOTE - ENMT, MLM
[Negative] : Neurological Airway patent, Nasal mucosa clear. Mouth with normal mucosa. Throat has no vesicles, no oropharyngeal exudates and uvula is midline.

## 2023-06-14 NOTE — CONSULT NOTE ADULT - PROBLEM SELECTOR RECOMMENDATION 9
[FreeTextEntry8] : 37 
CXR and case reviewed with Dr. Saez  please obtain CT chest/abd/pelvis and GI consult  will consider thoracentesis, however if fluid is ascites secondary to cirrhosis/liver failure, pt would benefit from paracentesis  thoracic surgery to follow and will reassess after CT obtained  consider obtaining records from [facility] where pt was recently hospitalized and had R chest drained twice. or from pts outpt GI doctor to determine severity of pts cirrhosis.

## 2023-08-20 NOTE — PROGRESS NOTE ADULT - PROBLEM SELECTOR PLAN 2
CTA chest reviewed - no right heart strain  NO ac x 48 hrs post biopsy  restarted Lovenox.  Pradaxa covered by [employer] on discharge as pt has no insurance No

## 2024-01-28 NOTE — ED PROVIDER NOTE - CROS ED CARDIOVAS ALL NEG
Case Management Progress Note    Patient name Moriah Arreola  Location [facility]- MRN [id number]  : 1938 Date 2024       LOS (days): 3  Geometric Mean LOS (GMLOS) (days): 3.1  Days to GMLOS:0.2        OBJECTIVE:        Current admission status: Inpatient  Preferred Pharmacy:   [id number] - [place], [place] - [address] [address]  Phone: [phone] Fax: [phone]    Primary Care Provider: Jose R Yee MD    Primary Insurance: [facility] REP  Secondary Insurance:     PROGRESS NOTE: TC from friend, Carlee, agreeable to previous referrals placed.  Would like [facility] as first option, if accepted            negative...

## 2024-02-05 NOTE — PROGRESS NOTE ADULT - PROBLEM SELECTOR PLAN 1
Patient verified name and     Order for consent WAS NOT found in EHR and matches case posting; patient verified.     Type 1B surgery, PHONE assessment complete.    Labs per surgeon: NONE  Labs per anesthesia protocol: HCG DOS  EKG: NONE        Hospital approved surgical skin cleanser and instructions given per hospital policy.    Patient provided with and instructed on educational handouts including Guide to Surgery, Pain Management, Hand Hygiene, Blood Transfusion Education, and [facility] Brochure.    Patient answered medical/surgical history questions at their best of ability. All prior to admission medications documented in [facility]. Original medication prescription bottle WAS NOT visualized during patient appointment.     Patient instructed to hold all vitamins 7 days prior to surgery and NSAIDS 5 days prior to surgery, patient verbalized understanding.     Patient teach back successful and patient demonstrates knowledge of instructions.    - Patient developed BRYNN yesterday with Cr 1.96, improved to 1.81 today   - Nephrology following, BRYNN likely multifactorial in the setting of fluid overload, diuretics, low BP and IV contrast  - Nephrology recommending continuing to hold diuretics and continuing with IVF albumin  - Patient urinating without difficulties   - will f/u UA, urine electrolytes and renal sonogram   - continue to monitor BMP, urine output, I/Os.   - continue to avoid nephrotoxins

## 2024-02-14 NOTE — PATIENT PROFILE ADULT. - CENTRAL VENOUS CATHETER
Gaby Garcia is a 66 year old female.    HPI:     HPI    Pt is here for diabetic eye exam.  Pt  denies vision changes.     Pt has been a diabetic for 10 years       Pt's diabetes is currently controlled by pills  Pt checks BS does not check  Pt's last blood sugar was  130 on 24  Last HA1C was 6.8  on 24  Endocrinologist: none       Last edited by Lor Ryan OELVIE on 2024  3:09 PM.        Patient History:  Past Medical History:   Diagnosis Date    AF (atrial fibrillation) ([facility]) 2014    Cancer (HCC)     Breast cancer    Capsular opacification     YAG laser ()    Cataracts, bilateral     Drusen of macula     Hard drusen OU    Essential hypertension     Hyperlipidemia     No diabetic retinopathy OU , ,     EDNA (obstructive sleep apnea) 2014    Type II or unspecified type diabetes mellitus without mention of complication, not stated as uncontrolled     no BDR since        Surgical History: Gaby Garcia has a past surgical history that includes Yag Capsulotomy - OS - Left Eye (3/6/2014);  (); Breast lumpectomy (); Cataract extraction w/  intraocular lens implant (Left, 2013) ([facility]); Cataract extraction w/  intraocular lens implant (Right, 2013) ([facility]); and Yag Capsulotomy - OD - Right Eye (Right, 2017) ([facility]).    Family History   Problem Relation Age of Onset    Cancer Mother     Cataracts Mother     Diabetes Mother     Cataracts Father     Diabetes Father     Retinal detachment Father     Stroke Father     Glaucoma Neg     Macular degeneration Neg        Social History:   Social History     Socioeconomic History    Marital status:    Tobacco Use    Smoking status: Never    Smokeless tobacco: Never   Substance and Sexual Activity    Alcohol use: Yes     Comment: 1-2 a week    Drug use: No   Other Topics Concern    Caffeine Concern Yes     Comment: coffee       Medications:  Current Outpatient Medications    Medication Sig Dispense Refill    sacubitril-valsartan 49-51 MG Oral Tab Take 1 tablet by mouth 2 (two) times daily.      spironolactone 4 mg/mL Oral Suspension Take 1 mg/kg by mouth BID (Diuretic).      metoprolol succinate 25 MG Oral Tablet 24 Hr Take 1 tablet (25 mg total) by mouth 2 (two) times a day. 180 tablet 3    ELIQUIS 5 MG Oral Tab TAKE 1 TABLET TWICE A  tablet 3    Propafenone HCl  MG Oral Capsule SR 12 Hr TAKE 1 CAPSULE TWICE A  capsule 3    IBRANCE 125 MG Oral Tab 21d on, 7 d off       MetFORMIN HCl (GLUCOPHAGE) 500 MG Oral Tab Take  by mouth.      simvastatin (ZOCOR) 10 MG Oral Tab Take  by mouth.      ACCU-CHEK NEVAEH PLUS In Vitro Strip       exemestane 25 MG Oral Tab Take 25 mg by mouth daily. (Patient not taking: Reported on 7/28/2022)      glimepiride (AMARYL) 1 MG Oral Tab Take  by mouth. (Patient not taking: Reported on 2/14/2024)         Allergies:  No Known Allergies    ROS:       PHYSICAL EXAM:     Base Eye Exam       Visual Acuity (Snellen - Linear)         Right Left    Dist sc 20/40 -1 20/25 +2    Dist ph sc 20/30     Near cc 20/20 20/20              Tonometry (Icare, 3:27 PM)         Right Left    Pressure 16 14              Pupils         Pupils    Right PERRL    Left PERRL              Visual Fields         Left Right     Full Full              Extraocular Movement         Right Left     Full, Ortho Full, Ortho              Neuro/Psych       Oriented x3: Yes    Mood/Affect: Normal              Dilation       Both eyes: 1.0% Mydriacyl and 2.5% Kristopher Synephrine @ 3:27 PM                  Slit Lamp and Fundus Exam       Slit Lamp Exam         Right Left    Lids/Lashes Dermatochalasis, Meibomian gland dysfunction Dermatochalasis, Meibomian gland dysfunction    Conjunctiva/Sclera Nasal pinguecula  Nasal/temp pinguecula    Cornea Clear Clear    Anterior Chamber Deep and quiet Deep and quiet    Iris Normal Normal    Lens PC IOL with YAG  PC IOL with YAG     Vitreous  Vitreous floaters Vitreous floaters              Fundus Exam         Right Left    Disc Good rim, Temporal crescent Good rim, Temporal crescent    C/D Ratio 0.3 0.3    Macula Normal- no BDR Normal- no BDR    Vessels Normal Normal    Periphery Normal Normal                  Refraction       Wearing Rx         Sphere Cylinder    Right +1.75 Sphere    Left +1.75 Sphere      Type: OTC reading only              Manifest Refraction         Sphere Cylinder Keller Dist VA Add Near VA    Right -1.00 +0.50 015 20/25- +2.50 20/20    Left -0.50 +0.50 180 20/25+ +2.50 20/20              Final Rx         Sphere Cylinder Keller Dist VA    Right -1.00 +0.50 015 20/25-    Left -0.50 +0.50 180 20/25+      Type: Distance only   Pt only wants distance prescription.                     ASSESSMENT/PLAN:     Diagnoses and Plan:     Diabetes mellitus type 2 without retinopathy (HCC)  Diabetes type II: no background of retinopathy, no signs of neovascularization noted.  Discussed ocular and systemic benefits of blood sugar control.  Diagnosis and treatment discussed in detail with patient.    Will see patient in 1 year for a diabetic exam    Pseudophakia of both eyes  Discussed early cataracts with patient.  No treatment recommended at this time.     New glasses Rx given for distance only glasses    Vitreous floaters of both eyes   There is no evidence of retinal pathology.  All signs and symptoms of retinal detachment/tears explained in detail.    Patient instructed to call the office if they experience increase in floaters, increase in flashes of light, loss of vision or curtain or veil effect.       No orders of the defined types were placed in this encounter.      Meds This Visit:  Requested Prescriptions      No prescriptions requested or ordered in this encounter        Follow up instructions:  Return in about 1 year (around 2/14/2025) for Diabetic eye exam.    2/14/2024  Scribed by: Darrius Rodriguez MD       no

## 2024-04-21 NOTE — PATIENT PROFILE ADULT. - FUNCTIONAL SCREEN CURRENT LEVEL: TOILETING, MLM
History Obtained From: The patient and his spouse    History Of Present Illness:  Renard Ward is a 76 y.o. male with PMHx s/f COPD end-stage renal disease hypertension atrial flutter with ablations 2 prior occasions presenting with shortness of breath and chills.  Patient had recently been hospitalized last month and treated for pneumonia COPD exacerbation he progressively had decreased activity tolerance more more short of breath he completed outpatient course of outpatient antibiotics and steroids but continued to get worse.  He also noted that he had decreased urinary output he has continued his home dialysis routine.  He does have chills but denies fevers he is not having much cough or sputum production his respiratory excursion feels tight to him he has had some nausea no abdominal pain no urinary symptoms no diarrhea no constipation no new rashes.  In the emergency department he was afebrile 97.4 heart rate 83 regular respiratory rate 20 blood pressure 129/61 SpO2 87% on 2 L patient had been on is much as 5 L at home.  Blood gas was taken found the patient to be acidotic with elevated pCO2 of 96 pO2 34 subsequently started on BiPAP therapy with improvement in pCO2 and stop and oxygen levels patient now saturating in the low 90s consistently.  Influenza and coronavirus swabs were negative CBC shows mild leukocytosis 11.8 hemoglobin 9.1 hematocrit 30 platelets 196 BN peptide is elevated at 1040 troponin 38 trending down to 33 creatinine 5.87.  Patient had CT of the head no acute changes chest x-ray shows opacification of the right base patchy density in the right lower to mid lung increased from prior exam 5 days prior increased opacification of the left base.  The patient will be admitted for severe shortness of breath hypoxia hypercapnia as he requires BiPAP therapy will obtain nephrology consultation to manage his volume continue treating his respiratory conditions with IV steroids nebulizer treatments  IV antibiotics.            ED Course:  ED Course as of 04/21/24 1412   Sun Apr 21, 2024   1207 IMPRESSION:  1.  Progression of airspace disease.   [CF]   1217 Will not give patient more fluids since and concern for fluid overload [CF]   1327 EKG on my read shows sinus rhythm at a rate of 67 bpm, T wave inversion in lead V3 which was seen on prior EKGs, no ST elevations.  Normal intervals. [CF]      ED Course User Index  [CF] Alpa Paul MD         Diagnoses as of 04/21/24 1412   COPD exacerbation (Multi)   Pneumonia due to infectious organism, unspecified laterality, unspecified part of lung   Hypervolemia, unspecified hypervolemia type     Relevant Results  Results for orders placed or performed during the hospital encounter of 04/21/24 (from the past 24 hour(s))   CBC and Auto Differential   Result Value Ref Range    WBC 11.8 (H) 4.4 - 11.3 x10*3/uL    nRBC 0.0 0.0 - 0.0 /100 WBCs    RBC 2.98 (L) 4.50 - 5.90 x10*6/uL    Hemoglobin 9.1 (L) 13.5 - 17.5 g/dL    Hematocrit 30.0 (L) 41.0 - 52.0 %     (H) 80 - 100 fL    MCH 30.5 26.0 - 34.0 pg    MCHC 30.3 (L) 32.0 - 36.0 g/dL    RDW 15.0 (H) 11.5 - 14.5 %    Platelets 196 150 - 450 x10*3/uL    Neutrophils % 75.7 40.0 - 80.0 %    Immature Granulocytes %, Automated 0.5 0.0 - 0.9 %    Lymphocytes % 9.0 13.0 - 44.0 %    Monocytes % 11.5 2.0 - 10.0 %    Eosinophils % 2.8 0.0 - 6.0 %    Basophils % 0.5 0.0 - 2.0 %    Neutrophils Absolute 8.93 (H) 1.60 - 5.50 x10*3/uL    Immature Granulocytes Absolute, Automated 0.06 0.00 - 0.50 x10*3/uL    Lymphocytes Absolute 1.06 0.80 - 3.00 x10*3/uL    Monocytes Absolute 1.36 (H) 0.05 - 0.80 x10*3/uL    Eosinophils Absolute 0.33 0.00 - 0.40 x10*3/uL    Basophils Absolute 0.06 0.00 - 0.10 x10*3/uL   Magnesium   Result Value Ref Range    Magnesium 1.68 1.60 - 2.40 mg/dL   Comprehensive metabolic panel   Result Value Ref Range    Glucose 112 (H) 74 - 99 mg/dL    Sodium 135 (L) 136 - 145 mmol/L    Potassium 4.8 3.5 - 5.3 mmol/L     Chloride 98 98 - 107 mmol/L    Bicarbonate 31 21 - 32 mmol/L    Anion Gap 11 10 - 20 mmol/L    Urea Nitrogen 42 (H) 6 - 23 mg/dL    Creatinine 5.87 (H) 0.50 - 1.30 mg/dL    eGFR 9 (L) >60 mL/min/1.73m*2    Calcium 8.8 8.6 - 10.3 mg/dL    Albumin 4.0 3.4 - 5.0 g/dL    Alkaline Phosphatase 59 33 - 136 U/L    Total Protein 6.3 (L) 6.4 - 8.2 g/dL    AST 12 9 - 39 U/L    Bilirubin, Total 0.4 0.0 - 1.2 mg/dL    ALT 14 10 - 52 U/L   Lipase   Result Value Ref Range    Lipase 46 9 - 82 U/L   Lactate   Result Value Ref Range    Lactate 0.3 (L) 0.4 - 2.0 mmol/L   B-Type Natriuretic Peptide   Result Value Ref Range    BNP 1,040 (H) 0 - 99 pg/mL   Blood Gas Venous Full Panel   Result Value Ref Range    POCT pH, Venous 7.12 (LL) 7.33 - 7.43 pH    POCT pCO2, Venous 96 (HH) 41 - 51 mm Hg    POCT pO2, Venous 34 (L) 35 - 45 mm Hg    POCT SO2, Venous 53 45 - 75 %    POCT Oxy Hemoglobin, Venous 51.7 45.0 - 75.0 %    POCT Hematocrit Calculated, Venous 31.0 (L) 41.0 - 52.0 %    POCT Sodium, Venous 135 (L) 136 - 145 mmol/L    POCT Potassium, Venous 4.9 3.5 - 5.3 mmol/L    POCT Chloride, Venous 96 (L) 98 - 107 mmol/L    POCT Ionized Calicum, Venous 1.18 1.10 - 1.33 mmol/L    POCT Glucose, Venous 110 (H) 74 - 99 mg/dL    POCT Lactate, Venous 0.4 0.4 - 2.0 mmol/L    POCT Base Excess, Venous 0.1 -2.0 - 3.0 mmol/L    POCT HCO3 Calculated, Venous 31.2 (H) 22.0 - 26.0 mmol/L    POCT Hemoglobin, Venous 10.3 (L) 13.5 - 17.5 g/dL    POCT Anion Gap, Venous 13.0 10.0 - 25.0 mmol/L    Patient Temperature 37.0 degrees Celsius    FiO2 44 %   Troponin I, High Sensitivity, Initial   Result Value Ref Range    Troponin I, High Sensitivity 38 (H) 0 - 20 ng/L   Influenza A, and B PCR   Result Value Ref Range    Flu A Result Not Detected Not Detected    Flu B Result Not Detected Not Detected   Sars-CoV-2 PCR   Result Value Ref Range    Coronavirus 2019, PCR Not Detected Not Detected   Troponin, High Sensitivity, 1 Hour   Result Value Ref Range    Troponin  I, High Sensitivity 33 (H) 0 - 20 ng/L   MRSA Surveillance for Vancomycin De-escalation, PCR    Specimen: Anterior Nares; Swab   Result Value Ref Range    MRSA PCR Not Detected Not Detected   Blood Gas Venous Full Panel   Result Value Ref Range    POCT pH, Venous 7.20 (LL) 7.33 - 7.43 pH    POCT pCO2, Venous 80 (HH) 41 - 51 mm Hg    POCT pO2, Venous 32 (L) 35 - 45 mm Hg    POCT SO2, Venous 55 45 - 75 %    POCT Oxy Hemoglobin, Venous 53.5 45.0 - 75.0 %    POCT Hematocrit Calculated, Venous 23.0 (L) 41.0 - 52.0 %    POCT Sodium, Venous 134 (L) 136 - 145 mmol/L    POCT Potassium, Venous 4.8 3.5 - 5.3 mmol/L    POCT Chloride, Venous 98 98 - 107 mmol/L    POCT Ionized Calicum, Venous 1.19 1.10 - 1.33 mmol/L    POCT Glucose, Venous 101 (H) 74 - 99 mg/dL    POCT Lactate, Venous 0.4 0.4 - 2.0 mmol/L    POCT Base Excess, Venous 2.5 -2.0 - 3.0 mmol/L    POCT HCO3 Calculated, Venous 31.3 (H) 22.0 - 26.0 mmol/L    POCT Hemoglobin, Venous 7.7 (L) 13.5 - 17.5 g/dL    POCT Anion Gap, Venous 10.0 10.0 - 25.0 mmol/L    Patient Temperature 37.0 degrees Celsius    FiO2 40 %      CT head wo IV contrast    Result Date: 4/21/2024  Interpreted By:  Nilson Gerard, STUDY: CT HEAD WO IV CONTRAST  4/21/2024 12:23 pm   INDICATION: Signs/Symptoms:AMS   COMPARISON: 08/29/2023   ACCESSION NUMBER(S): [id number]   ORDERING CLINICIAN: CULLEN LAKHANI   TECHNIQUE: Contiguous axial CT images of the brain were obtained without IV contrast.   FINDINGS: The ventricles, cisterns and sulci are prominent, consistent with moderate diffuse volume loss. Areas of white matter low attenuation are nonspecific but likely related to chronic microvascular disease. Right parietal encephalomalacia unchanged. There is intracranial atherosclerosis.   Gray-white differentiation is preserved. No acute intracranial hemorrhage or mass effect. No midline shift. Patent basal cisterns. No extraaxial fluid collections.   The calvaria is intact. The visualized paranasal  sinuses and mastoid air cells are clear.       No acute intracranial pathology.     Signed by: Nilson Gerard 4/21/2024 12:36 PM Dictation workstation:   [id number]    XR chest 1 view    Result Date: 4/21/2024  Interpreted By:  Mitch Ghotra, STUDY: XR CHEST 1 VIEW;  4/21/2024 11:38 am   INDICATION: Signs/Symptoms:SOB.   COMPARISON: 04/15/2024   ACCESSION NUMBER(S): [id number]   ORDERING CLINICIAN: CULLEN LAKHANI   FINDINGS: CARDIOMEDIASTINAL SILHOUETTE AND VASCULATURE:   Cardiac size:  Mild cardiomegaly Aortic shadow:  Within normal limits.   Mediastinal contours: Within normal limits.   Pulmonary vasculature:  Mild cephalization probably with mild congestion.   LUNGS: There is opacification at the right base with blunted costophrenic angle indicating an effusion probably with underlying atelectasis or infiltrate. There is patchy density in the right lower to mid lung increased from the prior exam also probably due to pneumonic infiltrate, or possibly from congestion. There is increased opacification at the left base and left retrocardiac region probably from consolidated infiltrate and effusion.   ABDOMEN AND OTHER FINDINGS: No remarkable upper abdominal findings.   BONES: No acute osseous changes.       1.  Progression of airspace disease.   Signed by: Mitch Ghotra 4/21/2024 11:48 AM Dictation workstation:   [id number]    XR chest 2 views    Result Date: 4/16/2024  Interpreted By:  Myron Tran, STUDY: XR CHEST 2 VIEWS   INDICATION: Signs/Symptoms:fu pneumonia copd bibasilar.   COMPARISON: March 5       ACCESSION NUMBER(S): [id number]   ORDERING CLINICIAN: DEVORAH MOSES   FINDINGS: Bilateral basilar airspace opacities and pleural effusions grossly similar to prior exam.   Double-lumen catheter unchanged.   Cardiomegaly unchanged.       Unchanged appearance of bilateral pleural effusions and basilar airspace opacities. Findings favor edema. A component of pneumonia also a consideration.   Signed by:  Myron Tran 4/16/2024 6:22 PM Dictation workstation:   [id number]     Scheduled medications:  apixaban, 5 mg, oral, BID  atorvastatin, 40 mg, oral, Nightly  azithromycin, 500 mg, intravenous, Once  carvedilol, 6.25 mg, oral, BID with meals  doxycycline, 100 mg, intravenous, q12h  tiotropium, 2 puff, inhalation, Daily   And  fluticasone furoate-vilanteroL, 1 puff, inhalation, Daily  ipratropium-albuteroL, 3 mL, nebulization, q6h  methIMAzole, 5 mg, oral, Daily  methylPREDNISolone sodium succinate (PF), 40 mg, intravenous, q6h  montelukast, 10 mg, oral, Nightly  vancomycin, 2 g, intravenous, Once      Continuous medications:     PRN medications:  PRN medications: vancomycin      Past Medical History  He has a past medical history of Abnormal level of blood mineral (02/22/2021), Acute diastolic (congestive) heart failure (Multi) (06/18/2020), Acute kidney failure, unspecified (CMS-[facility]) (06/16/2020), Acute respiratory failure with hypoxia (Multi) (03/11/2020), Cellulitis of left toe (08/20/2020), Chronic obstructive pulmonary disease, unspecified (Multi) (09/21/2022), Chronic respiratory failure with hypoxia (Multi) (09/21/2023), Cor pulmonale (chronic) (Multi) (01/03/2022), Encounter for screening for malignant neoplasm of colon (10/19/2022), Infective pericarditis ([facility]) (06/16/2020), 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), Nicotine dependence, unspecified, uncomplicated (01/14/2020), Nontoxic multinodular goiter (11/19/2020), Obstructive sleep apnea (adult) (pediatric) (03/11/2020), Other allergic rhinitis (02/23/2021), Other fecal abnormalities (07/30/2021), Other hypersomnia (01/23/2020), Other hypersomnia (01/23/2020), Other ill-defined heart diseases (04/27/2020), Other pericardial effusion (noninflammatory) ([facility]) (06/18/2020), Other specified personal risk factors, not elsewhere classified (07/30/2020), Other specified symptoms and signs involving the circulatory and  respiratory systems (10/10/2019), Personal history of diseases of the skin and subcutaneous tissue (11/19/2020), Personal history of nicotine dependence (08/18/2021), Personal history of other diseases of the digestive system, Personal history of other diseases of the nervous system and sense organs, Personal history of other endocrine, nutritional and metabolic disease (11/19/2020), Personal history of other endocrine, nutritional and metabolic disease (10/14/2020), Personal history of other endocrine, nutritional and metabolic disease (08/20/2020), Personal history of other infectious and parasitic diseases (07/06/2021), Personal history of other infectious and parasitic diseases (02/04/2021), Personal history of other specified conditions (04/12/2016), Personal history of other specified conditions (04/07/2016), Personal history of transient ischemic attack (TIA), and cerebral infarction without residual deficits, Pleural effusion, not elsewhere classified (07/30/2020), Pleural effusion, not elsewhere classified (03/11/2020), Pneumonia due to Streptococcus pneumoniae (CMS-[facility]) (03/11/2020), Post-traumatic stress disorder, unspecified, Rash and other nonspecific skin eruption (03/25/2021), Secondary polycythemia (01/23/2020), and Tinea unguium (08/25/2020).    Surgical History  He has a past surgical history that includes Appendectomy (04/07/2016) and IR CVC tunneled (6/28/2023).     Social History  He reports that he has quit smoking. His smoking use included cigarettes. He has never used smokeless tobacco. He reports that he does not currently use alcohol. He reports that he does not use drugs.    Family History  Family History   Family history unknown: Yes        Allergies  Penicillins    Code Status  Full Code     Review of Systems   Constitutional:  Positive for chills and fatigue. Negative for appetite change, fever and unexpected weight change.   HENT:  Negative for congestion, ear discharge, ear pain,  mouth sores, nosebleeds, sinus pain, sore throat, trouble swallowing and voice change.    Eyes:  Negative for photophobia, pain, discharge, itching and visual disturbance.   Respiratory:  Positive for chest tightness, shortness of breath and wheezing. Negative for apnea, cough and choking.    Cardiovascular:  Negative for chest pain, palpitations and leg swelling.   Gastrointestinal:  Positive for nausea. Negative for abdominal distention, abdominal pain, blood in stool, constipation, diarrhea and vomiting.   Endocrine: Negative for cold intolerance, heat intolerance, polydipsia, polyphagia and polyuria.   Genitourinary:  Negative for decreased urine volume, dysuria, flank pain, frequency, hematuria and urgency.   Musculoskeletal:  Negative for arthralgias, back pain, gait problem, myalgias, neck pain and neck stiffness.   Skin:  Negative for color change, pallor and wound.   Allergic/Immunologic: Negative for food allergies and immunocompromised state.   Neurological:  Negative for dizziness, tremors, seizures, syncope, speech difficulty, weakness, light-headedness, numbness and headaches.   Hematological:  Negative for adenopathy. Does not bruise/bleed easily.   Psychiatric/Behavioral:  Negative for confusion, dysphoric mood, hallucinations, sleep disturbance and suicidal ideas. The patient is not nervous/anxious.        Last Recorded Vitals  BP (!) 112/49 (BP Location: Right arm, Patient Position: Sitting)   Pulse 67   Temp 36.3 °C (97.4 °F) (Temporal)   Resp 18   Wt 83 kg (182 lb 15.7 oz)   SpO2 100%      Physical Exam  Vitals reviewed.   Constitutional:       General: He is not in acute distress.     Appearance: He is obese. He is toxic-appearing.   HENT:      Head: Normocephalic and atraumatic.      Right Ear: External ear normal.      Left Ear: External ear normal.      Nose: Nose normal.      Mouth/Throat:      Mouth: Mucous membranes are moist.      Pharynx: Oropharynx is clear.   Eyes:      General:  No scleral icterus.     Extraocular Movements: Extraocular movements intact.      Conjunctiva/sclera: Conjunctivae normal.      Pupils: Pupils are equal, round, and reactive to light.   Neck:      Vascular: Carotid bruit present.   Cardiovascular:      Rate and Rhythm: Normal rate and regular rhythm.      Pulses: Normal pulses.      Heart sounds: Normal heart sounds. No murmur heard.  Pulmonary:      Effort: Pulmonary effort is normal. No respiratory distress.      Breath sounds: Wheezing present. No rhonchi or rales.   Chest:      Chest wall: No tenderness.   Abdominal:      General: Bowel sounds are normal. There is no distension.      Palpations: Abdomen is soft. There is no mass.      Tenderness: There is no abdominal tenderness. There is no rebound.   Musculoskeletal:         General: No swelling or deformity. Normal range of motion.      Cervical back: Normal range of motion.      Right lower leg: Edema present.      Left lower leg: Edema present.   Lymphadenopathy:      Cervical: Cervical adenopathy present.   Skin:     General: Skin is warm and dry.      Capillary Refill: Capillary refill takes less than 2 seconds.   Neurological:      General: No focal deficit present.      Mental Status: He is alert and oriented to person, place, and time. Mental status is at baseline.      Cranial Nerves: No cranial nerve deficit.      Sensory: No sensory deficit.   Psychiatric:         Mood and Affect: Mood normal.         Behavior: Behavior normal.         Assessment/Plan   Principal Problem:    COPD exacerbation (Multi)    Acute on chronic hypoxic and hypercapnic respiratory failure secondary to COPD exacerbation pneumonia and fluid volume overload  Continue BiPAP support and oxygen supplemental oxygen therapy work to resolution of underlying issues  Gasses and mentation improved w bipap    Pneumonia  Patient recently hospitalized and treated for pneumonia less than 1 month ago  Severe penicillin allergy  Will continue  on IV cefepime as started in the emergency department vancomycin and doxycycline    COPD with exacerbation  Patient with chronic oxygen dependence  Severely hypercapnic in the emergency department prior to BiPAP therapy  Will continue BiPAP therapy  Resume the patient's home bronchodilators  Continue patient on IV Solu-Medrol  Antibiotics to treat the underlying pneumonia    Volume overload   patient presents with pulmonary edema on imaging history of decreased urinary output  Suspect this is related to the patient's illness possibly some cardiac dysfunction with concurrent decreased urinary output due to worsening renal function  Patient has been resistant to diuresis  Volume management per nephrology  Will check echocardiogram, consult cardiology    End-stage renal disease on him home hemodialysis  Patient follows with nephrology  Will request consultation from his regular nephrologist      Anemia   9,1 vs 10.5 on 3/5/24  Denies any active bleeding  Followed by nephrology  Will monitor closely    Anisocoria  R eye 1-2mm larger  No acute findings on CT head  No symptoms of HA/Confusion  O/W normal neurological exam   Monitor neurochecks q4hrs, consider repeat imaging    History of A-fib 2 prior ablations , prior cva  Monitor telemetry  Continue Eliquis        No new Assessment & Plan notes have been filed under this hospital service since the last note was generated.  Service: Internal Medicine          Janusz Bonilla, APRN-CNP    Dragon dictation software was used to dictate this note and thus there may be minor errors in translation/transcription including garbled speech or misspellings. Please contact for clarification if needed.   (0) independent

## 2024-04-22 NOTE — PROGRESS NOTE BEHAVIORAL HEALTH - NSBHCONSULTFOLLOW_PSY_A_CORE
[facility] History and Physical  ASSESSMENT & PLAN:      #acute on chronic CHF   #COPD exacerbation  versus community-acquired pneumonia   #elevated troponins  - we will continue to diurese patient with Lasix 40 mg twice daily  - Will continue DuoNebs as well as ceftriaxone and azithromycin for now  - will trend out troponins given significant jump from first read to second read  - cardiology consulted for evaluation of patient  - will continue with patient's Coumadin for now.  INR is subtherapeutic will continue to monitor     #hypertension  #Hyperlipidemia  #Type 2 diabetes  -Start patient on sliding scale insulin  - continue home statin and antihypertensives     VTE prophylaxis: Patient already on Coumadin will continue to monitor  Diet: Cardiac with fluid restrictions  CODE STATUS: Full code    >55 minutes  were spent preparing to see patient, obtaining and reviewing history, performing appropriate examination and/or evaluation, counseling and educating the patient family or caregiver, ordering medications tests or procedures, referring and communicating with other healthcare professionals, documenting clinical information in the EHR, independently interpreting results and communicating results to the patient, family or caregiver, care coordination.  > 50% of time spent counseling and/ or coordinating care    ---Of note, this documentation is completed using the Dragon Dictation system (voice recognition software). There may be spelling and/or grammatical errors that were not corrected prior to final submission.---    Chelsie Duke MD    HISTORY OF PRESENT ILLNESS:   Chief Complaint:   Worsening shortness of breath    History Of Present Illness:    Ne Richardson is a 65 y.o. female with a significant past medical history of  HFrEF status post pacemaker,  A-fib on Coumadin, acute MI  status post PCI with JENNIFER, CVA, hypertension, hyperlipidemia, DM type II coming in with shortness of breath.  Patient  reports that since yesterday she has not been feeling well and has been having a productive cough and general malaise.  Per patient steam from a hot bath this morning worsened her symptoms and she was unable to get her breathing to return to baseline even after inhaler treatment.  Patient then called EMS who brought patient to emergency room for evaluation.  In the emergency room  vitals notable for respiratory rate of 21 heart rate of 113 pulse ox of 91%.  labs notable for creatinine of 1.20 which is around patient's baseline troponin were 39 and 155.  INR was 1.3.  white count of 16.3 with neutrophils at 13.0, platelets 147 hemoglobin of 14.3 with MCV at 108.  CT angio chest showed cardiomegaly with groundglass infiltrates and interstitial infiltrates most consistent with pulmonary edema and CHF, there was also abnormal appearance of the left ventricle suggesting left ventricular aneurysm and unipolar cardiac pacemaker/defibrillator noted.  due to concerns for COPD exacerbation versus pneumonia patient was started on ceftriaxone and azithromycin, albuterol and DuoNebs administered as well as magnesium sulfate and Solu-Medrol.  Patient was also given 20 mg Lasix.     Review of systems: 10 point review of systems is otherwise negative except as mentioned above.    PAST HISTORIES:     Past Medical History:  She has a past medical history of Acute myocardial infarction, unspecified (Multi) (01/10/2022), Body mass index (BMI) 33.0-33.9, adult (02/01/2022), Disorder of the skin and subcutaneous tissue, unspecified (07/01/2020), Encounter for preprocedural cardiovascular examination (01/10/2022), Hypertensive heart disease with heart failure (Multi) (05/21/2020), Intracranial and intraspinal phlebitis and thrombophlebitis (HHS-HCC) (01/10/2022), Nausea (01/10/2022), Obesity, unspecified (03/21/2022), Obesity, unspecified (08/01/2022), Old myocardial infarction, Other mechanical complication of other cardiac electronic  "device, initial encounter (02/01/2022), Other specified counseling (02/01/2022), Other specified diseases of gallbladder (06/02/2020), Overweight (01/10/2022), Person consulting for explanation of examination or test findings (02/01/2022), Personal history of other diseases of the digestive system (07/01/2020), Personal history of other venous thrombosis and embolism, and Transient cerebral ischemic attack, unspecified.    Past Surgical History:  She has a past surgical history that includes Other surgical history (06/02/2020); Other surgical history (10/12/2022); Other surgical history (10/12/2022); Other surgical history (01/10/2022); Other surgical history (01/10/2022); Other surgical history (01/10/2022); Other surgical history (01/10/2022); Other surgical history (01/10/2022); Other surgical history (01/10/2022); Other surgical history (01/10/2022); Other surgical history (01/10/2022); Other surgical history (01/10/2022); Other surgical history (02/01/2022); Other surgical history (02/01/2022); and CT angio abdomen pelvis w and or wo IV IV contrast (8/14/2019).      Social History:  She reports that she has been smoking cigars. She has never used smokeless tobacco. She reports current alcohol use. She reports current drug use. Drug: Marijuana.    Family History:  Family History   Problem Relation Name Age of Onset   • Coronary artery disease Mother     • Diabetes Mother     • Other (Cardiac pacemaker) Father     • Coronary artery disease Father     • Other (Stroke syndrome) Other          Allergies:  Ropinirole    OBJECTIVE:     Last Recorded Vitals:  Vitals:    04/22/24 1100 04/22/24 1200 04/22/24 1221 04/22/24 1434   BP: 103/66  94/66 99/64   BP Location:    Right arm   Patient Position:    Sitting   Pulse: (!) 113  (!) 105 89   Resp: (!) 21  20 16   Temp: 36.1 °C (97 °F)      TempSrc: Temporal      SpO2: (!) 91% 96% 95% 97%   Weight: 72.5 kg (159 lb 13.3 oz)      Height: 1.575 m (5' 2.01\")        Last " I/O:  No intake/output data recorded.    Physical Exam  Constitutional:       General: She is not in acute distress.     Appearance: She is ill-appearing.   HENT:      Head: Normocephalic.      Nose: Nose normal.      Mouth/Throat:      Mouth: Mucous membranes are moist.   Eyes:      Extraocular Movements: Extraocular movements intact.      Pupils: Pupils are equal, round, and reactive to light.   Cardiovascular:      Rate and Rhythm: Normal rate and regular rhythm.      Pulses: Normal pulses.      Heart sounds: Normal heart sounds.   Pulmonary:      Effort: Pulmonary effort is normal.      Breath sounds: Wheezing present.   Abdominal:      General: Bowel sounds are normal. There is no distension.      Palpations: Abdomen is soft.   Musculoskeletal:         General: No swelling or tenderness. Normal range of motion.      Cervical back: Normal range of motion.   Skin:     General: Skin is warm.      Capillary Refill: Capillary refill takes less than 2 seconds.      Coloration: Skin is pale.   Neurological:      General: No focal deficit present.      Mental Status: She is alert.      Motor: Weakness present.   Psychiatric:         Mood and Affect: Mood normal.         Scheduled Medications  azithromycin, 500 mg, intravenous, Once  docusate sodium, 100 mg, oral, BID  furosemide, 40 mg, intravenous, q12h  melatonin, 3 mg, oral, Daily  [START ON 4/23/2024] pantoprazole, 40 mg, oral, Daily before breakfast   Or  [START ON 4/23/2024] pantoprazole, 40 mg, intravenous, Daily before breakfast      PRN Medications  PRN medications: acetaminophen **OR** acetaminophen **OR** acetaminophen, acetaminophen **OR** acetaminophen **OR** acetaminophen, benzocaine-menthol, dextromethorphan-guaifenesin, guaiFENesin, ondansetron ODT **OR** ondansetron  Continuous Medications       Outpatient Medications:  Prior to Admission medications    Medication Sig Start Date End Date Taking? Authorizing Provider   albuterol 90 mcg/actuation inhaler  Inhale 1-2 puffs. Every 4 to 6 hours 11/30/21   Historical Provider, MD   blood sugar diagnostic (True Metrix Glucose Test Strip) strip CHECK BLOOD SUGARS 4-6 TIMES DAILY 8/28/20   Historical Provider, MD   buPROPion SR (Wellbutrin SR) 200 mg 12 hr tablet Take 1 tablet (200 mg) by mouth twice a day. 8/8/22   Historical Provider, MD   carvedilol (Coreg) 12.5 mg tablet Take 1 tablet (12.5 mg) by mouth 2 times a day. 6/14/22   Historical Provider, MD   cholecalciferol (Vitamin D-3) 25 MCG (1000 UT) capsule Take 3 capsules (75 mcg) by mouth once daily.    Historical Provider, MD   clonazePAM (KlonoPIN) 2 mg tablet Take 1 tablet (2 mg) by mouth once daily. 7/16/19   Historical Provider, MD   desvenlafaxine 100 mg 24 hr tablet Take 1 tablet (100 mg) by mouth once daily. 1/23/23   Historical Provider, MD   ezetimibe (Zetia) 10 mg tablet Take 1 tablet (10 mg) by mouth once daily at bedtime. 3/7/23 4/8/24  Historical Provider, MD   furosemide (Lasix) 40 mg tablet Take 1 tablet (40 mg) by mouth once daily. 5/8/23   Historical Provider, MD   glucagon (Baqsimi) 3 mg/actuation spray,non-aerosol Administer 1 spray into affected nostril(s) if needed (for low blood sugar. May repeat after 15 minutes using a new device if there is no response). 4/25/23   Historical Provider, MD   glucagon 1 mg injection Use as directed for hypoglycemia    Historical Provider, MD   insulin aspart (NovoLOG) 100 unit/mL (3 mL) pen Inject 5-10 Units under the skin 3 times a day with meals. (BASED ON I:CHO RATIO OF 1.75 UNIT FOR 10G+SS), UP TO 60 UNITS DAILY 1/18/19   Historical Provider, MD   insulin glargine (Lantus) 100 unit/mL injection Inject 30 Units under the skin once daily at bedtime.    Historical Provider, MD   levothyroxine (Synthroid, Levoxyl) 88 mcg tablet Take 1 tablet (88 mcg) by mouth once daily. 3/7/23   Historical Provider, MD   magnesium citrate solution Use as directed    Historical Provider, MD   metoclopramide (Reglan) 5 mg tablet  Take 1 tablet (5 mg) by mouth 2 times a day. 5/13/19   Historical Provider, MD   nitroglycerin (Nitrostat) 0.4 mg SL tablet Place 1 tablet (0.4 mg) under the tongue every 5 minutes if needed for chest pain (for up to 5 doses). F NO PAIN RELIEF, CALL 911 11/27/13   Historical Provider, MD   nortriptyline (Pamelor) 25 mg capsule Take 1 capsule (25 mg) by mouth once daily at bedtime. 6/9/21   Historical Provider, MD   oxybutynin XL (Ditropan-XL) 10 mg 24 hr tablet Take 1 tablet (10 mg) by mouth once daily. 10/10/22   Historical Provider, MD   rOPINIRole (Requip) 2 mg tablet Take 1 tablet (2 mg) by mouth once daily at bedtime. 6/11/21   Historical Provider, MD   rosuvastatin (Crestor) 40 mg tablet Take 1 tablet (40 mg) by mouth once daily. 1/12/21   Historical Provider, MD   topiramate (Topamax) 200 mg tablet Take 1 tablet (200 mg) by mouth 2 times a day.    Historical Provider, MD   valsartan (Diovan) 40 mg tablet Take 1 tablet (40 mg) by mouth once daily. 12/2/22   Historical Provider, MD   VITAMIN B COMPLEX ORAL Take 1 tablet by mouth once daily.    Historical Provider, MD   warfarin (Coumadin) 5 mg tablet Take by mouth. Take 2.5 tablets 6 days a week THEN  1.5 tablets once a week 2/18/19   Historical Provider, MD   warfarin (Coumadin) 7.5 mg tablet Take by mouth. Take 15 mg on MonThur and 12.5 mg all other days of the week or as directed by [facility]. 1/11/19   Historical Provider, MD       LABS AND IMAGING:     Labs:  Results from last 7 days   Lab Units 04/22/24  1113   WBC AUTO x10*3/uL 16.3*   RBC AUTO x10*6/uL 3.96*   HEMOGLOBIN g/dL 14.3   HEMATOCRIT % 42.9   MCV fL 108*   MCH pg 36.1*   MCHC g/dL 33.3   RDW % 13.2   PLATELETS AUTO x10*3/uL 147*     Results from last 7 days   Lab Units 04/22/24  1113   SODIUM mmol/L 136   POTASSIUM mmol/L 4.1   CHLORIDE mmol/L 103   CO2 mmol/L 21   BUN mg/dL 23   CREATININE mg/dL 1.20*   GLUCOSE mg/dL 229*   PROTEIN TOTAL g/dL 7.1   CALCIUM mg/dL 9.0   BILIRUBIN TOTAL  mg/dL 0.3   ALK PHOS U/L 68   AST U/L 16   ALT U/L 19     Results from last 7 days   Lab Units 04/22/24  1113   MAGNESIUM mg/dL 1.85     Results from last 7 days   Lab Units 04/22/24  1405 04/22/24  1113   TROPHS ng/L 155* 39*       Imaging:  CT angio chest for pulmonary embolism  Narrative: Interpreted By:  Yung Murphy,   STUDY:  CT ANGIO CHEST FOR PULMONARY EMBOLISM; 4/22/2024 12:16 pm      INDICATION:  Signs/Symptoms:Sudden shortness of breath.      COMPARISON:  10/26/2022      ACCESSION NUMBER(S):  [id number]      ORDERING CLINICIAN:  RUTH DELACRUZ      TECHNIQUE:  75 ml of non-ionic iodinated contrast was injected intravenously.      CT angiography (non-coronary) of the chest was performed with  Intravenous contrast material along with 3D (maximum intensity  projection - MIP) image post processing.      One or more of the following dose reduction techniques were used:  Automated exposure control Adjustment of the mA and/or kV according  to patient size, and/or use of iterative reconstruction technique.      FINDINGS:  There is no evidence for aortic dissection or pericardial effusion.  Unipolar cardiac pacemaker/defibrillator is present. There is no  evidence for pulmonary embolism. Cardiomegaly is present along with  marked thinning of the myocardium of the left ventricular apex  suggesting left ventricular aneurysm likely secondary to remote  myocardial infarction      There are patchy areas of ground-glass infiltrate within the upper  and lower lobes bilaterally along with slight thickening of the  Kerley B lines bilaterally with the areas of ground-glass infiltrate  slightly more prominent along the dependent portion of the lungs.      Chest Wall: No chest wall abnormalities are identified.      Upper Abdominal Findings: No pathologic findings are identified  involving the visualized portions of the upper abdomen.      Skeletal System: No acute fractures or destructive lesions are  identified.       Impression: 1. Cardiomegaly with ground-glass infiltrates and interstitial  infiltrates in a pattern most consistent with pulmonary edema and  congestive heart failure.  2. Abnormal appearance of the left ventricular configuration  suggesting left ventricular aneurysm likely secondary to remote  apical infarction.  3. Unipolar cardiac pacemaker/defibrillator.      MACRO:  none      Signed by: Yung Murphy 4/22/2024 12:44 PM  Dictation workstation:   [id number]  XR chest 1 view  Narrative: Interpreted By:  Althea Arora,   STUDY:  XR CHEST 1 VIEW;  4/22/2024 11:20 am      INDICATION:  Signs/Symptoms:Shortness of.      COMPARISON:  10/26/2022      ACCESSION NUMBER(S):  [id number]      ORDERING CLINICIAN:  RUTH DELACRUZ      FINDINGS:  CARDIOMEDIASTINAL SILHOUETTE:  Cardiomediastinal silhouette is normal in size and configuration.  There is a left subclavian pacemaker-AICD in unchanged position with  the tip in the right ventricle.      LUNGS:  Lungs are clear.      ABDOMEN:  No remarkable upper abdominal findings.          BONES:  No acute osseous changes.      Impression: No acute cardiopulmonary process.      MACRO:  None      Signed by: Althea Arora 4/22/2024 11:22 AM  Dictation workstation:   [id number]        Signing off - call with questions…

## 2024-07-09 NOTE — PROGRESS NOTE ADULT - GASTROINTESTINAL
[facility] PHYSICIANS [facility] UROLOG[facility]  [address]  [facility] [address]  Dept: [phone]    [facility] Urology Office Note - Established    Patient:  Demond Cabral  YOB: 1958  Date: 7/9/2024    The patient is a 65 y.o. male who presents todayfor evaluation of the following problems:   Chief Complaint   Patient presents with    Elevated PSA     4 month PSA        HPI  Patient is a 65-year-old male with a history of elevated PSA presenting for routine follow-up.  He had a prostate MRI about 6 months ago which was negative, his PSA at that time was 10.  He has had 3 negative biopsies in the past.  His PSA is stable at 8.2.  He denies any voiding issues, he is not on any medications for his prostate or bladder.  Overall doing well from  standpoint.      Summary of old records: N/A    Additional History: N/A    Procedures Today: N/A    Urinalysis today:  No results found for this visit on 07/09/24.  Last several PSA's:  Lab Results   Component Value Date    PSA 8.20 (H) 07/03/2024    PSA 10.01 (H) 01/16/2024    PSA 7.72 (H) 06/07/2023     Last total testosterone:  No results found for: \"TESTOSTERONE\"           Last BUN and creatinine:  Lab Results   Component Value Date    BUN 13 08/31/2023     Lab Results   Component Value Date    CREATININE 0.7 12/15/2023       Additional Lab/Culture results: none    Imaging Reviewed during this Office Visit:   MRI PROSTATE W WO CONTRAST  Order: [id number]  Narrative    CLINICAL INFORMATION:    Elevated PSA; Family history of prostate cancer in father.    TECHNIQUE:    Multisequence multiplanar MRI of the prostate without and with contrast in accordance with PI-RADS technical recommendations.        COMPARISON:    9/2/2022    FINDINGS:        Size: 5.3 x 5?x 4. cm    Volume: 56 mL      PERIPHERAL ZONE: No MR features suspicious for carcinoma. Similar geographic areas of hypointensity.    TRANSITIONAL  negative Soft, non-tender, no hepatosplenomegaly, normal bowel sounds

## 2025-04-09 NOTE — ED ADULT NURSE NOTE - CHIEF COMPLAINT
04/09/25                            Fritz Julian  [address] [facility] [address]    To Whom It May Concern:    This is to certify Fritz Julian was evaluated with Jacob Brian MD on 04/09/25 and can return to school when fever free for 24 hours without a fever reducer.     RESTRICTIONS: none            Electronically signed by:  Jacob Brian MD  [facility]  [address] [facility] [address]  Dept Phone: [phone]       
The patient is a 62y Male complaining of abdominal pain.

## 2025-04-23 NOTE — BEHAVIORAL HEALTH ASSESSMENT NOTE - NSBHMEDSOTHERFT_PSY_A_CORE
(4) no impairment
Home Medications:  furosemide 40 mg oral tablet: 1 tab(s) orally once a day (22 Mar 2018 10:29)  Multiple Vitamins oral tablet: 1 tab(s) orally once a day (23 Feb 2018 13:37)  oxyCODONE 5 mg oral tablet: 1 tab(s) orally every 4 hours, As needed, Pain (22 Mar 2018 10:29)  pantoprazole 40 mg oral delayed release tablet: 1 tab(s) orally once a day (before a meal) (20 Mar 2018 15:36)

## 2025-05-17 NOTE — DISCHARGE NOTE ADULT - MEDICATION SUMMARY - MEDICATIONS TO STOP TAKING
Keep clean and dry. Antibiotic as prescribed. Return with new or worsening symptoms.   
I will STOP taking the medications listed below when I get home from the hospital:  None

## 2025-05-20 NOTE — DISCHARGE NOTE ADULT - CARE PROVIDER_API CALL
Richy Saez), Surgery; Thoracic Surgery  [address] [place] [address]  Phone: [phone]  Fax: [phone]    Ziggy Mares), Internal Medicine; Nephrology  [address] [place] [address]  Phone: [phone]  Fax: [phone] none
